# Patient Record
Sex: MALE | Race: WHITE | Employment: OTHER | URBAN - METROPOLITAN AREA
[De-identification: names, ages, dates, MRNs, and addresses within clinical notes are randomized per-mention and may not be internally consistent; named-entity substitution may affect disease eponyms.]

---

## 2017-01-11 DIAGNOSIS — I10 ESSENTIAL HYPERTENSION: ICD-10-CM

## 2017-01-11 RX ORDER — AMLODIPINE BESYLATE 5 MG/1
5 TABLET ORAL DAILY
Qty: 90 TABLET | Refills: 3 | Status: SHIPPED | OUTPATIENT
Start: 2017-01-11 | End: 2018-01-02 | Stop reason: SDUPTHER

## 2017-03-09 RX ORDER — BENAZEPRIL HYDROCHLORIDE 20 MG/1
20 TABLET ORAL DAILY
Qty: 90 TABLET | Refills: 3 | Status: SHIPPED | OUTPATIENT
Start: 2017-03-09 | End: 2018-02-28 | Stop reason: SDUPTHER

## 2017-03-23 ENCOUNTER — OFFICE VISIT (OUTPATIENT)
Dept: FAMILY MEDICINE CLINIC | Age: 67
End: 2017-03-23

## 2017-03-23 VITALS
HEART RATE: 104 BPM | RESPIRATION RATE: 20 BRPM | SYSTOLIC BLOOD PRESSURE: 136 MMHG | BODY MASS INDEX: 25.67 KG/M2 | HEIGHT: 70 IN | WEIGHT: 179.3 LBS | OXYGEN SATURATION: 97 % | DIASTOLIC BLOOD PRESSURE: 82 MMHG

## 2017-03-23 DIAGNOSIS — R29.898 LEFT LEG WEAKNESS: ICD-10-CM

## 2017-03-23 DIAGNOSIS — M48.07 FORAMINAL STENOSIS OF LUMBOSACRAL REGION: ICD-10-CM

## 2017-03-23 DIAGNOSIS — Z72.0 TOBACCO USE: ICD-10-CM

## 2017-03-23 DIAGNOSIS — N40.0 BENIGN NON-NODULAR PROSTATIC HYPERPLASIA WITHOUT LOWER URINARY TRACT SYMPTOMS: ICD-10-CM

## 2017-03-23 DIAGNOSIS — M15.9 PRIMARY OSTEOARTHRITIS INVOLVING MULTIPLE JOINTS: ICD-10-CM

## 2017-03-23 DIAGNOSIS — F10.20 UNCOMPLICATED ALCOHOL DEPENDENCE (HCC): ICD-10-CM

## 2017-03-23 DIAGNOSIS — Z51.81 MEDICATION MONITORING ENCOUNTER: ICD-10-CM

## 2017-03-23 DIAGNOSIS — M54.32 SCIATICA OF LEFT SIDE: ICD-10-CM

## 2017-03-23 DIAGNOSIS — R73.01 IFG (IMPAIRED FASTING GLUCOSE): ICD-10-CM

## 2017-03-23 DIAGNOSIS — E78.00 HYPERCHOLESTEREMIA: ICD-10-CM

## 2017-03-23 DIAGNOSIS — I73.9 PVD (PERIPHERAL VASCULAR DISEASE) (HCC): ICD-10-CM

## 2017-03-23 DIAGNOSIS — I10 ESSENTIAL HYPERTENSION: Primary | ICD-10-CM

## 2017-03-23 PROCEDURE — 99214 OFFICE O/P EST MOD 30 MIN: CPT | Performed by: FAMILY MEDICINE

## 2017-03-23 PROCEDURE — G8427 DOCREV CUR MEDS BY ELIG CLIN: HCPCS | Performed by: FAMILY MEDICINE

## 2017-03-23 PROCEDURE — 4004F PT TOBACCO SCREEN RCVD TLK: CPT | Performed by: FAMILY MEDICINE

## 2017-03-23 PROCEDURE — 3017F COLORECTAL CA SCREEN DOC REV: CPT | Performed by: FAMILY MEDICINE

## 2017-03-23 PROCEDURE — G8484 FLU IMMUNIZE NO ADMIN: HCPCS | Performed by: FAMILY MEDICINE

## 2017-03-23 PROCEDURE — G8420 CALC BMI NORM PARAMETERS: HCPCS | Performed by: FAMILY MEDICINE

## 2017-03-23 PROCEDURE — 4040F PNEUMOC VAC/ADMIN/RCVD: CPT | Performed by: FAMILY MEDICINE

## 2017-03-23 PROCEDURE — 1123F ACP DISCUSS/DSCN MKR DOCD: CPT | Performed by: FAMILY MEDICINE

## 2017-03-23 RX ORDER — ASCORBIC ACID 500 MG
500 TABLET ORAL DAILY
COMMUNITY

## 2017-03-23 RX ORDER — CYCLOBENZAPRINE HCL 10 MG
10 TABLET ORAL 3 TIMES DAILY PRN
COMMUNITY
End: 2017-09-21 | Stop reason: ALTCHOICE

## 2017-03-23 ASSESSMENT — PATIENT HEALTH QUESTIONNAIRE - PHQ9
SUM OF ALL RESPONSES TO PHQ QUESTIONS 1-9: 0
SUM OF ALL RESPONSES TO PHQ9 QUESTIONS 1 & 2: 0
2. FEELING DOWN, DEPRESSED OR HOPELESS: 0
1. LITTLE INTEREST OR PLEASURE IN DOING THINGS: 0

## 2017-03-23 ASSESSMENT — ENCOUNTER SYMPTOMS
BACK PAIN: 1
COUGH: 0
RESPIRATORY NEGATIVE: 1
CHOKING: 0

## 2017-05-01 ENCOUNTER — OFFICE VISIT (OUTPATIENT)
Dept: FAMILY MEDICINE CLINIC | Age: 67
End: 2017-05-01

## 2017-05-01 VITALS
HEIGHT: 70 IN | SYSTOLIC BLOOD PRESSURE: 150 MMHG | HEART RATE: 84 BPM | WEIGHT: 174.8 LBS | BODY MASS INDEX: 25.03 KG/M2 | RESPIRATION RATE: 16 BRPM | DIASTOLIC BLOOD PRESSURE: 78 MMHG

## 2017-05-01 DIAGNOSIS — F10.20 UNCOMPLICATED ALCOHOL DEPENDENCE (HCC): ICD-10-CM

## 2017-05-01 DIAGNOSIS — M48.07 FORAMINAL STENOSIS OF LUMBOSACRAL REGION: ICD-10-CM

## 2017-05-01 DIAGNOSIS — Z01.818 PREOPERATIVE EVALUATION TO RULE OUT SURGICAL CONTRAINDICATION: Primary | ICD-10-CM

## 2017-05-01 DIAGNOSIS — Z72.0 TOBACCO USE: ICD-10-CM

## 2017-05-01 DIAGNOSIS — I10 ESSENTIAL HYPERTENSION: ICD-10-CM

## 2017-05-01 PROCEDURE — 3017F COLORECTAL CA SCREEN DOC REV: CPT | Performed by: FAMILY MEDICINE

## 2017-05-01 PROCEDURE — G8420 CALC BMI NORM PARAMETERS: HCPCS | Performed by: FAMILY MEDICINE

## 2017-05-01 PROCEDURE — 1123F ACP DISCUSS/DSCN MKR DOCD: CPT | Performed by: FAMILY MEDICINE

## 2017-05-01 PROCEDURE — 4004F PT TOBACCO SCREEN RCVD TLK: CPT | Performed by: FAMILY MEDICINE

## 2017-05-01 PROCEDURE — 99214 OFFICE O/P EST MOD 30 MIN: CPT | Performed by: FAMILY MEDICINE

## 2017-05-01 PROCEDURE — G8427 DOCREV CUR MEDS BY ELIG CLIN: HCPCS | Performed by: FAMILY MEDICINE

## 2017-05-01 PROCEDURE — 4040F PNEUMOC VAC/ADMIN/RCVD: CPT | Performed by: FAMILY MEDICINE

## 2017-05-01 ASSESSMENT — ENCOUNTER SYMPTOMS
WHEEZING: 0
SHORTNESS OF BREATH: 0
ALLERGIC/IMMUNOLOGIC NEGATIVE: 1
COUGH: 0
BACK PAIN: 1
RESPIRATORY NEGATIVE: 1
GASTROINTESTINAL NEGATIVE: 1

## 2017-05-02 ENCOUNTER — TELEPHONE (OUTPATIENT)
Dept: FAMILY MEDICINE CLINIC | Age: 67
End: 2017-05-02

## 2017-09-21 ENCOUNTER — OFFICE VISIT (OUTPATIENT)
Dept: FAMILY MEDICINE CLINIC | Age: 67
End: 2017-09-21
Payer: MEDICARE

## 2017-09-21 VITALS
BODY MASS INDEX: 24.28 KG/M2 | RESPIRATION RATE: 13 BRPM | HEIGHT: 70 IN | WEIGHT: 169.6 LBS | DIASTOLIC BLOOD PRESSURE: 78 MMHG | SYSTOLIC BLOOD PRESSURE: 134 MMHG | HEART RATE: 105 BPM | OXYGEN SATURATION: 98 %

## 2017-09-21 DIAGNOSIS — N32.81 OAB (OVERACTIVE BLADDER): ICD-10-CM

## 2017-09-21 DIAGNOSIS — E78.00 HYPERCHOLESTEREMIA: ICD-10-CM

## 2017-09-21 DIAGNOSIS — Z23 NEED FOR VACCINATION WITH 13-POLYVALENT PNEUMOCOCCAL CONJUGATE VACCINE: ICD-10-CM

## 2017-09-21 DIAGNOSIS — M48.07 FORAMINAL STENOSIS OF LUMBOSACRAL REGION: ICD-10-CM

## 2017-09-21 DIAGNOSIS — I10 ESSENTIAL HYPERTENSION: Primary | ICD-10-CM

## 2017-09-21 PROCEDURE — G8420 CALC BMI NORM PARAMETERS: HCPCS | Performed by: FAMILY MEDICINE

## 2017-09-21 PROCEDURE — 4004F PT TOBACCO SCREEN RCVD TLK: CPT | Performed by: FAMILY MEDICINE

## 2017-09-21 PROCEDURE — 90670 PCV13 VACCINE IM: CPT | Performed by: FAMILY MEDICINE

## 2017-09-21 PROCEDURE — G0009 ADMIN PNEUMOCOCCAL VACCINE: HCPCS | Performed by: FAMILY MEDICINE

## 2017-09-21 PROCEDURE — G8427 DOCREV CUR MEDS BY ELIG CLIN: HCPCS | Performed by: FAMILY MEDICINE

## 2017-09-21 PROCEDURE — 99214 OFFICE O/P EST MOD 30 MIN: CPT | Performed by: FAMILY MEDICINE

## 2017-09-21 PROCEDURE — 4040F PNEUMOC VAC/ADMIN/RCVD: CPT | Performed by: FAMILY MEDICINE

## 2017-09-21 PROCEDURE — 3017F COLORECTAL CA SCREEN DOC REV: CPT | Performed by: FAMILY MEDICINE

## 2017-09-21 PROCEDURE — 1123F ACP DISCUSS/DSCN MKR DOCD: CPT | Performed by: FAMILY MEDICINE

## 2017-09-21 RX ORDER — OXYBUTYNIN CHLORIDE 5 MG/1
5 TABLET, EXTENDED RELEASE ORAL DAILY
Qty: 30 TABLET | Refills: 3 | Status: SHIPPED | OUTPATIENT
Start: 2017-09-21 | End: 2017-10-19 | Stop reason: SDUPTHER

## 2017-09-21 ASSESSMENT — ENCOUNTER SYMPTOMS
SHORTNESS OF BREATH: 0
BACK PAIN: 0
RESPIRATORY NEGATIVE: 1
GASTROINTESTINAL NEGATIVE: 1

## 2017-10-19 ENCOUNTER — OFFICE VISIT (OUTPATIENT)
Dept: FAMILY MEDICINE CLINIC | Age: 67
End: 2017-10-19
Payer: MEDICARE

## 2017-10-19 VITALS
TEMPERATURE: 97.5 F | SYSTOLIC BLOOD PRESSURE: 144 MMHG | HEART RATE: 96 BPM | DIASTOLIC BLOOD PRESSURE: 82 MMHG | RESPIRATION RATE: 12 BRPM | WEIGHT: 170.1 LBS | BODY MASS INDEX: 24.35 KG/M2 | HEIGHT: 70 IN

## 2017-10-19 DIAGNOSIS — J40 SINOBRONCHITIS: Primary | ICD-10-CM

## 2017-10-19 DIAGNOSIS — N32.81 OAB (OVERACTIVE BLADDER): ICD-10-CM

## 2017-10-19 DIAGNOSIS — J32.9 SINOBRONCHITIS: Primary | ICD-10-CM

## 2017-10-19 DIAGNOSIS — I10 ESSENTIAL HYPERTENSION: ICD-10-CM

## 2017-10-19 PROCEDURE — 3017F COLORECTAL CA SCREEN DOC REV: CPT | Performed by: FAMILY MEDICINE

## 2017-10-19 PROCEDURE — 1123F ACP DISCUSS/DSCN MKR DOCD: CPT | Performed by: FAMILY MEDICINE

## 2017-10-19 PROCEDURE — G8420 CALC BMI NORM PARAMETERS: HCPCS | Performed by: FAMILY MEDICINE

## 2017-10-19 PROCEDURE — G8427 DOCREV CUR MEDS BY ELIG CLIN: HCPCS | Performed by: FAMILY MEDICINE

## 2017-10-19 PROCEDURE — 99214 OFFICE O/P EST MOD 30 MIN: CPT | Performed by: FAMILY MEDICINE

## 2017-10-19 PROCEDURE — G8484 FLU IMMUNIZE NO ADMIN: HCPCS | Performed by: FAMILY MEDICINE

## 2017-10-19 PROCEDURE — 4040F PNEUMOC VAC/ADMIN/RCVD: CPT | Performed by: FAMILY MEDICINE

## 2017-10-19 PROCEDURE — 4004F PT TOBACCO SCREEN RCVD TLK: CPT | Performed by: FAMILY MEDICINE

## 2017-10-19 RX ORDER — OXYBUTYNIN CHLORIDE 10 MG/1
10 TABLET, EXTENDED RELEASE ORAL DAILY
Qty: 90 TABLET | Refills: 3 | Status: SHIPPED | OUTPATIENT
Start: 2017-10-19 | End: 2019-01-14 | Stop reason: ALTCHOICE

## 2017-10-19 RX ORDER — AZITHROMYCIN 250 MG/1
TABLET, FILM COATED ORAL
Qty: 1 PACKET | Refills: 0 | Status: SHIPPED | OUTPATIENT
Start: 2017-10-19 | End: 2017-10-29

## 2017-10-19 ASSESSMENT — ENCOUNTER SYMPTOMS
GASTROINTESTINAL NEGATIVE: 1
RHINORRHEA: 1
SHORTNESS OF BREATH: 0
COUGH: 1
SINUS PAIN: 1
WHEEZING: 0

## 2018-01-02 DIAGNOSIS — I10 ESSENTIAL HYPERTENSION: ICD-10-CM

## 2018-01-02 RX ORDER — AMLODIPINE BESYLATE 5 MG/1
5 TABLET ORAL DAILY
Qty: 90 TABLET | Refills: 3 | Status: SHIPPED | OUTPATIENT
Start: 2018-01-02 | End: 2018-12-27 | Stop reason: SDUPTHER

## 2018-01-02 NOTE — TELEPHONE ENCOUNTER
Alma Rosa Krishnamurthy called requesting a refill on the following medications:  Requested Prescriptions     Pending Prescriptions Disp Refills    amLODIPine (NORVASC) 5 MG tablet 90 tablet 3     Sig: Take 1 tablet by mouth daily     Pharmacy verified: [facility] [place]  . pv      Date of last visit: 10/19/17  Date of next visit (if applicable): Visit date not found

## 2018-02-28 RX ORDER — BENAZEPRIL HYDROCHLORIDE 20 MG/1
20 TABLET ORAL DAILY
Qty: 90 TABLET | Refills: 3 | Status: SHIPPED | OUTPATIENT
Start: 2018-02-28 | End: 2019-02-25 | Stop reason: SDUPTHER

## 2018-06-07 ENCOUNTER — TELEPHONE (OUTPATIENT)
Dept: FAMILY MEDICINE CLINIC | Age: 68
End: 2018-06-07

## 2018-06-07 DIAGNOSIS — N52.01 ERECTILE DYSFUNCTION DUE TO ARTERIAL INSUFFICIENCY: Primary | ICD-10-CM

## 2018-06-07 RX ORDER — SILDENAFIL 100 MG/1
100 TABLET, FILM COATED ORAL PRN
Qty: 6 TABLET | Refills: 3 | Status: SHIPPED | OUTPATIENT
Start: 2018-06-07 | End: 2019-01-14

## 2018-09-26 PROBLEM — Z01.818 PREOPERATIVE EVALUATION TO RULE OUT SURGICAL CONTRAINDICATION: Status: RESOLVED | Noted: 2017-05-01 | Resolved: 2018-09-26

## 2018-12-27 DIAGNOSIS — I10 ESSENTIAL HYPERTENSION: ICD-10-CM

## 2018-12-27 RX ORDER — AMLODIPINE BESYLATE 5 MG/1
5 TABLET ORAL DAILY
Qty: 90 TABLET | Refills: 3 | Status: SHIPPED | OUTPATIENT
Start: 2018-12-27 | End: 2019-10-31 | Stop reason: SDUPTHER

## 2019-01-14 ENCOUNTER — OFFICE VISIT (OUTPATIENT)
Dept: FAMILY MEDICINE CLINIC | Age: 69
End: 2019-01-14
Payer: MEDICARE

## 2019-01-14 VITALS
TEMPERATURE: 98.4 F | HEART RATE: 90 BPM | BODY MASS INDEX: 24.18 KG/M2 | RESPIRATION RATE: 16 BRPM | DIASTOLIC BLOOD PRESSURE: 70 MMHG | OXYGEN SATURATION: 99 % | WEIGHT: 168.9 LBS | HEIGHT: 70 IN | SYSTOLIC BLOOD PRESSURE: 138 MMHG

## 2019-01-14 DIAGNOSIS — I73.9 PVD (PERIPHERAL VASCULAR DISEASE) (HCC): ICD-10-CM

## 2019-01-14 DIAGNOSIS — M15.9 PRIMARY OSTEOARTHRITIS INVOLVING MULTIPLE JOINTS: ICD-10-CM

## 2019-01-14 DIAGNOSIS — R73.01 IFG (IMPAIRED FASTING GLUCOSE): ICD-10-CM

## 2019-01-14 DIAGNOSIS — Z51.81 MEDICATION MONITORING ENCOUNTER: ICD-10-CM

## 2019-01-14 DIAGNOSIS — Z72.0 TOBACCO USE: ICD-10-CM

## 2019-01-14 DIAGNOSIS — I10 ESSENTIAL HYPERTENSION: Primary | ICD-10-CM

## 2019-01-14 DIAGNOSIS — F10.20 UNCOMPLICATED ALCOHOL DEPENDENCE (HCC): ICD-10-CM

## 2019-01-14 DIAGNOSIS — E78.00 HYPERCHOLESTEREMIA: ICD-10-CM

## 2019-01-14 DIAGNOSIS — M48.07 FORAMINAL STENOSIS OF LUMBOSACRAL REGION: ICD-10-CM

## 2019-01-14 LAB — HBA1C MFR BLD: 5.4 %

## 2019-01-14 PROCEDURE — 99214 OFFICE O/P EST MOD 30 MIN: CPT | Performed by: FAMILY MEDICINE

## 2019-01-14 PROCEDURE — 4040F PNEUMOC VAC/ADMIN/RCVD: CPT | Performed by: FAMILY MEDICINE

## 2019-01-14 PROCEDURE — 1101F PT FALLS ASSESS-DOCD LE1/YR: CPT | Performed by: FAMILY MEDICINE

## 2019-01-14 PROCEDURE — 3017F COLORECTAL CA SCREEN DOC REV: CPT | Performed by: FAMILY MEDICINE

## 2019-01-14 PROCEDURE — 4004F PT TOBACCO SCREEN RCVD TLK: CPT | Performed by: FAMILY MEDICINE

## 2019-01-14 PROCEDURE — G8484 FLU IMMUNIZE NO ADMIN: HCPCS | Performed by: FAMILY MEDICINE

## 2019-01-14 PROCEDURE — 83036 HEMOGLOBIN GLYCOSYLATED A1C: CPT | Performed by: FAMILY MEDICINE

## 2019-01-14 PROCEDURE — 1123F ACP DISCUSS/DSCN MKR DOCD: CPT | Performed by: FAMILY MEDICINE

## 2019-01-14 PROCEDURE — G8427 DOCREV CUR MEDS BY ELIG CLIN: HCPCS | Performed by: FAMILY MEDICINE

## 2019-01-14 PROCEDURE — G8420 CALC BMI NORM PARAMETERS: HCPCS | Performed by: FAMILY MEDICINE

## 2019-01-14 ASSESSMENT — PATIENT HEALTH QUESTIONNAIRE - PHQ9
SUM OF ALL RESPONSES TO PHQ QUESTIONS 1-9: 0
1. LITTLE INTEREST OR PLEASURE IN DOING THINGS: 0
SUM OF ALL RESPONSES TO PHQ QUESTIONS 1-9: 0
2. FEELING DOWN, DEPRESSED OR HOPELESS: 0
SUM OF ALL RESPONSES TO PHQ9 QUESTIONS 1 & 2: 0

## 2019-01-14 ASSESSMENT — ENCOUNTER SYMPTOMS
ALLERGIC/IMMUNOLOGIC NEGATIVE: 1
RESPIRATORY NEGATIVE: 1
SHORTNESS OF BREATH: 0
GASTROINTESTINAL NEGATIVE: 1

## 2019-01-15 LAB
CHOLESTEROL, TOTAL: 176 MG/DL
CHOLESTEROL/HDL RATIO: NORMAL
HDLC SERPL-MCNC: 71 MG/DL (ref 35–70)
LDL CHOLESTEROL CALCULATED: 84 MG/DL (ref 0–160)
TRIGL SERPL-MCNC: 104 MG/DL
VLDLC SERPL CALC-MCNC: NORMAL MG/DL

## 2019-02-25 RX ORDER — BENAZEPRIL HYDROCHLORIDE 20 MG/1
20 TABLET ORAL DAILY
Qty: 90 TABLET | Refills: 3 | Status: SHIPPED | OUTPATIENT
Start: 2019-02-25 | End: 2020-02-24 | Stop reason: SDUPTHER

## 2019-04-29 RX ORDER — METHYLPREDNISOLONE ACETATE 80 MG/ML
80 INJECTION, SUSPENSION INTRA-ARTICULAR; INTRALESIONAL; INTRAMUSCULAR; SOFT TISSUE ONCE
Status: CANCELLED | OUTPATIENT
Start: 2019-04-29 | End: 2019-04-29

## 2019-04-29 RX ORDER — BUPIVACAINE HYDROCHLORIDE 5 MG/ML
1 INJECTION, SOLUTION EPIDURAL; INTRACAUDAL ONCE
Status: CANCELLED | OUTPATIENT
Start: 2019-04-29 | End: 2019-04-29

## 2019-04-30 ENCOUNTER — HOSPITAL ENCOUNTER (OUTPATIENT)
Dept: INTERVENTIONAL RADIOLOGY/VASCULAR | Age: 69
Discharge: HOME | End: 2019-04-30
Payer: MEDICARE

## 2019-04-30 DIAGNOSIS — R52 PAIN: ICD-10-CM

## 2019-04-30 PROCEDURE — 2500000003 HC RX 250 WO HCPCS

## 2019-04-30 PROCEDURE — 6360000004 HC RX CONTRAST MEDICATION: Performed by: RADIOLOGY

## 2019-04-30 PROCEDURE — 6360000002 HC RX W HCPCS

## 2019-04-30 PROCEDURE — 64493 INJ PARAVERT F JNT L/S 1 LEV: CPT | Performed by: RADIOLOGY

## 2019-04-30 PROCEDURE — 2709999900 HC NON-CHARGEABLE SUPPLY

## 2019-04-30 RX ORDER — METHYLPREDNISOLONE ACETATE 80 MG/ML
80 INJECTION, SUSPENSION INTRA-ARTICULAR; INTRALESIONAL; INTRAMUSCULAR; SOFT TISSUE ONCE
Status: COMPLETED | OUTPATIENT
Start: 2019-04-30 | End: 2019-04-30

## 2019-04-30 RX ORDER — BUPIVACAINE HYDROCHLORIDE 5 MG/ML
1 INJECTION, SOLUTION EPIDURAL; INTRACAUDAL ONCE
Status: COMPLETED | OUTPATIENT
Start: 2019-04-30 | End: 2019-04-30

## 2019-04-30 RX ADMIN — BUPIVACAINE HYDROCHLORIDE 1 ML: 5 INJECTION, SOLUTION EPIDURAL; INTRACAUDAL at 15:16

## 2019-04-30 RX ADMIN — BUPIVACAINE HYDROCHLORIDE 1 ML: 5 INJECTION, SOLUTION EPIDURAL; INTRACAUDAL at 15:19

## 2019-04-30 RX ADMIN — IOHEXOL 2 ML: 180 INJECTION INTRAVENOUS at 15:13

## 2019-04-30 RX ADMIN — METHYLPREDNISOLONE ACETATE 80 MG: 80 INJECTION, SUSPENSION INTRA-ARTICULAR; INTRALESIONAL; INTRAMUSCULAR; SOFT TISSUE at 15:19

## 2019-04-30 RX ADMIN — METHYLPREDNISOLONE ACETATE 80 MG: 80 INJECTION, SUSPENSION INTRA-ARTICULAR; INTRALESIONAL; INTRAMUSCULAR; SOFT TISSUE at 15:18

## 2019-04-30 ASSESSMENT — PAIN SCALES - GENERAL
PAINLEVEL_OUTOF10: 4
PAINLEVEL_OUTOF10: 4

## 2019-04-30 NOTE — OP NOTE
Department of Radiology  Post Procedure Progress Note      Pre-Procedure Diagnosis:  Lumbar facet joint arthropathy    Procedure Performed: Facet joint block    Anesthesia: local     Findings: successful    Immediate Complications:  None    Estimated Blood Loss: minimal    SEE DICTATED PROCEDURE NOTE FOR COMPLETE DETAILS.       Dax Otero MD   4/30/2019 3:21 PM

## 2019-04-30 NOTE — H&P
Formulation and discussion of sedation / procedure plans, risks, benefits, side effects and alternatives with patient and/or responsible adult completed.     Electronically signed by Tristan Ramirez MD on 4/30/2019 at 3:21 PM

## 2019-09-18 ENCOUNTER — OFFICE VISIT (OUTPATIENT)
Dept: FAMILY MEDICINE CLINIC | Age: 69
End: 2019-09-18
Payer: MEDICARE

## 2019-09-18 VITALS
TEMPERATURE: 98.1 F | BODY MASS INDEX: 23.54 KG/M2 | HEART RATE: 100 BPM | RESPIRATION RATE: 15 BRPM | HEIGHT: 70 IN | SYSTOLIC BLOOD PRESSURE: 122 MMHG | WEIGHT: 164.4 LBS | DIASTOLIC BLOOD PRESSURE: 78 MMHG | OXYGEN SATURATION: 98 %

## 2019-09-18 DIAGNOSIS — Z00.00 ROUTINE GENERAL MEDICAL EXAMINATION AT A HEALTH CARE FACILITY: Primary | ICD-10-CM

## 2019-09-18 PROCEDURE — 3017F COLORECTAL CA SCREEN DOC REV: CPT | Performed by: FAMILY MEDICINE

## 2019-09-18 PROCEDURE — 1123F ACP DISCUSS/DSCN MKR DOCD: CPT | Performed by: FAMILY MEDICINE

## 2019-09-18 PROCEDURE — 4040F PNEUMOC VAC/ADMIN/RCVD: CPT | Performed by: FAMILY MEDICINE

## 2019-09-18 PROCEDURE — G0438 PPPS, INITIAL VISIT: HCPCS | Performed by: FAMILY MEDICINE

## 2019-09-18 ASSESSMENT — LIFESTYLE VARIABLES
AUDIT-C TOTAL SCORE: 12
HOW OFTEN DURING THE LAST YEAR HAVE YOU FAILED TO DO WHAT WAS NORMALLY EXPECTED FROM YOU BECAUSE OF DRINKING: 0
HOW MANY STANDARD DRINKS CONTAINING ALCOHOL DO YOU HAVE ON A TYPICAL DAY: 4
HOW OFTEN DURING THE LAST YEAR HAVE YOU BEEN UNABLE TO REMEMBER WHAT HAPPENED THE NIGHT BEFORE BECAUSE YOU HAD BEEN DRINKING: 0
HOW OFTEN DURING THE LAST YEAR HAVE YOU FOUND THAT YOU WERE NOT ABLE TO STOP DRINKING ONCE YOU HAD STARTED: 0
HAVE YOU OR SOMEONE ELSE BEEN INJURED AS A RESULT OF YOUR DRINKING: 0
HOW OFTEN DURING THE LAST YEAR HAVE YOU NEEDED AN ALCOHOLIC DRINK FIRST THING IN THE MORNING TO GET YOURSELF GOING AFTER A NIGHT OF HEAVY DRINKING: 0
HOW OFTEN DO YOU HAVE A DRINK CONTAINING ALCOHOL: 4
HAS A RELATIVE, FRIEND, DOCTOR, OR ANOTHER HEALTH PROFESSIONAL EXPRESSED CONCERN ABOUT YOUR DRINKING OR SUGGESTED YOU CUT DOWN: 0
AUDIT TOTAL SCORE: 12
HOW OFTEN DURING THE LAST YEAR HAVE YOU HAD A FEELING OF GUILT OR REMORSE AFTER DRINKING: 0
HOW OFTEN DO YOU HAVE SIX OR MORE DRINKS ON ONE OCCASION: 4

## 2019-09-18 ASSESSMENT — PATIENT HEALTH QUESTIONNAIRE - PHQ9
SUM OF ALL RESPONSES TO PHQ QUESTIONS 1-9: 0
SUM OF ALL RESPONSES TO PHQ QUESTIONS 1-9: 0

## 2019-09-18 NOTE — PROGRESS NOTES
past year?: (!) No  Body mass index is 23.59 kg/m². Health Habits/Nutrition Interventions:  · none. Safety:  Safety  Do you have working smoke detectors?: Yes  Have all throw rugs been removed or fastened?: (!) No  Do you have non-slip mats or surfaces in all bathtubs/showers?: Yes  Do all of your stairways have a railing or banister?: Yes  Are your doorways, halls and stairs free of clutter?: Yes  Do you always fasten your seatbelt when you are in a car?: Yes  Safety Interventions:  · none. Personalized Preventive Plan   Current Health Maintenance Status  Immunization History   Administered Date(s) Administered    Pneumococcal Conjugate 13-valent ([place]) 09/21/2017        Health Maintenance   Topic Date Due    AAA screen  1950    Hepatitis C screen  1950    DTaP/Tdap/Td vaccine (1 - Tdap) 09/20/1968    Shingles Vaccine (1 of 2) 09/20/1999    Low dose CT lung screening  09/20/2004    Potassium monitoring  10/11/2014    Creatinine monitoring  10/11/2014    Pneumococcal 65+ years Vaccine (2 of 2 - PPSV23) 09/21/2018    Annual Wellness Visit (AWV)  05/29/2019    Flu vaccine (1) 09/01/2019    A1C test (Diabetic or Prediabetic)  01/14/2020    Lipid screen  01/15/2024    Colon cancer screen colonoscopy  02/25/2025     Recommendations for Preventive Services Due: see orders and patient instructions/AVS.  . Recommended screening schedule for the next 5-10 years is provided to the patient in written form: see Patient Instructions/AVS.    There are no diagnoses linked to this encounter.

## 2019-10-31 DIAGNOSIS — I10 ESSENTIAL HYPERTENSION: ICD-10-CM

## 2019-10-31 RX ORDER — AMLODIPINE BESYLATE 5 MG/1
5 TABLET ORAL DAILY
Qty: 90 TABLET | Refills: 3 | Status: SHIPPED | OUTPATIENT
Start: 2019-10-31 | End: 2020-11-02 | Stop reason: SDUPTHER

## 2020-02-21 NOTE — TELEPHONE ENCOUNTER
Breanna Rock called requesting a refill on the following medications:  Requested Prescriptions     Pending Prescriptions Disp Refills    benazepril (LOTENSIN) 20 MG tablet 90 tablet 3     Sig: Take 1 tablet by mouth daily     Pharmacy verified: [facility] on [address] [facility]  .       Date of last visit: 9/18/19  Date of next visit (if applicable): 7/94/5128

## 2020-02-24 RX ORDER — BENAZEPRIL HYDROCHLORIDE 20 MG/1
20 TABLET ORAL DAILY
Qty: 90 TABLET | Refills: 3 | Status: SHIPPED | OUTPATIENT
Start: 2020-02-24 | End: 2021-02-18 | Stop reason: SDUPTHER

## 2020-09-23 ENCOUNTER — OFFICE VISIT (OUTPATIENT)
Dept: FAMILY MEDICINE CLINIC | Age: 70
End: 2020-09-23
Payer: MEDICARE

## 2020-09-23 VITALS
RESPIRATION RATE: 20 BRPM | WEIGHT: 161.5 LBS | HEIGHT: 70 IN | TEMPERATURE: 96.4 F | SYSTOLIC BLOOD PRESSURE: 144 MMHG | DIASTOLIC BLOOD PRESSURE: 84 MMHG | HEART RATE: 112 BPM | BODY MASS INDEX: 23.12 KG/M2

## 2020-09-23 LAB — HBA1C MFR BLD: 4.9 %

## 2020-09-23 PROCEDURE — G8420 CALC BMI NORM PARAMETERS: HCPCS | Performed by: FAMILY MEDICINE

## 2020-09-23 PROCEDURE — 99214 OFFICE O/P EST MOD 30 MIN: CPT | Performed by: FAMILY MEDICINE

## 2020-09-23 PROCEDURE — 3017F COLORECTAL CA SCREEN DOC REV: CPT | Performed by: FAMILY MEDICINE

## 2020-09-23 PROCEDURE — 4004F PT TOBACCO SCREEN RCVD TLK: CPT | Performed by: FAMILY MEDICINE

## 2020-09-23 PROCEDURE — G0439 PPPS, SUBSEQ VISIT: HCPCS | Performed by: FAMILY MEDICINE

## 2020-09-23 PROCEDURE — 4040F PNEUMOC VAC/ADMIN/RCVD: CPT | Performed by: FAMILY MEDICINE

## 2020-09-23 PROCEDURE — 83036 HEMOGLOBIN GLYCOSYLATED A1C: CPT | Performed by: FAMILY MEDICINE

## 2020-09-23 PROCEDURE — 1123F ACP DISCUSS/DSCN MKR DOCD: CPT | Performed by: FAMILY MEDICINE

## 2020-09-23 PROCEDURE — G8427 DOCREV CUR MEDS BY ELIG CLIN: HCPCS | Performed by: FAMILY MEDICINE

## 2020-09-23 RX ORDER — HYDROCODONE BITARTRATE AND ACETAMINOPHEN 5; 325 MG/1; MG/1
1 TABLET ORAL EVERY 8 HOURS PRN
Qty: 21 TABLET | Refills: 0 | Status: SHIPPED | OUTPATIENT
Start: 2020-09-23 | End: 2020-10-02 | Stop reason: SDUPTHER

## 2020-09-23 RX ORDER — HYDROCODONE BITARTRATE AND ACETAMINOPHEN 5; 325 MG/1; MG/1
1 TABLET ORAL EVERY 8 HOURS PRN
Qty: 90 TABLET | Refills: 0 | Status: SHIPPED | OUTPATIENT
Start: 2020-09-23 | End: 2020-09-23 | Stop reason: SDUPTHER

## 2020-09-23 SDOH — ECONOMIC STABILITY: FOOD INSECURITY: WITHIN THE PAST 12 MONTHS, YOU WORRIED THAT YOUR FOOD WOULD RUN OUT BEFORE YOU GOT MONEY TO BUY MORE.: NEVER TRUE

## 2020-09-23 SDOH — ECONOMIC STABILITY: TRANSPORTATION INSECURITY
IN THE PAST 12 MONTHS, HAS THE LACK OF TRANSPORTATION KEPT YOU FROM MEDICAL APPOINTMENTS OR FROM GETTING MEDICATIONS?: NO

## 2020-09-23 SDOH — ECONOMIC STABILITY: FOOD INSECURITY: WITHIN THE PAST 12 MONTHS, THE FOOD YOU BOUGHT JUST DIDN'T LAST AND YOU DIDN'T HAVE MONEY TO GET MORE.: NEVER TRUE

## 2020-09-23 SDOH — ECONOMIC STABILITY: INCOME INSECURITY: HOW HARD IS IT FOR YOU TO PAY FOR THE VERY BASICS LIKE FOOD, HOUSING, MEDICAL CARE, AND HEATING?: NOT HARD AT ALL

## 2020-09-23 SDOH — ECONOMIC STABILITY: TRANSPORTATION INSECURITY
IN THE PAST 12 MONTHS, HAS LACK OF TRANSPORTATION KEPT YOU FROM MEETINGS, WORK, OR FROM GETTING THINGS NEEDED FOR DAILY LIVING?: NO

## 2020-09-23 ASSESSMENT — LIFESTYLE VARIABLES
HOW OFTEN DO YOU HAVE A DRINK CONTAINING ALCOHOL: 4
HOW OFTEN DURING THE LAST YEAR HAVE YOU HAD A FEELING OF GUILT OR REMORSE AFTER DRINKING: 0
AUDIT TOTAL SCORE: 11
HOW OFTEN DURING THE LAST YEAR HAVE YOU NEEDED AN ALCOHOLIC DRINK FIRST THING IN THE MORNING TO GET YOURSELF GOING AFTER A NIGHT OF HEAVY DRINKING: 0
HAS A RELATIVE, FRIEND, DOCTOR, OR ANOTHER HEALTH PROFESSIONAL EXPRESSED CONCERN ABOUT YOUR DRINKING OR SUGGESTED YOU CUT DOWN: 0
HOW OFTEN DO YOU HAVE SIX OR MORE DRINKS ON ONE OCCASION: 4
HAVE YOU OR SOMEONE ELSE BEEN INJURED AS A RESULT OF YOUR DRINKING: 0
HOW MANY STANDARD DRINKS CONTAINING ALCOHOL DO YOU HAVE ON A TYPICAL DAY: 3
HOW OFTEN DURING THE LAST YEAR HAVE YOU FAILED TO DO WHAT WAS NORMALLY EXPECTED FROM YOU BECAUSE OF DRINKING: 0
HOW OFTEN DURING THE LAST YEAR HAVE YOU BEEN UNABLE TO REMEMBER WHAT HAPPENED THE NIGHT BEFORE BECAUSE YOU HAD BEEN DRINKING: 0
HOW OFTEN DURING THE LAST YEAR HAVE YOU FOUND THAT YOU WERE NOT ABLE TO STOP DRINKING ONCE YOU HAD STARTED: 0
AUDIT-C TOTAL SCORE: 11

## 2020-09-23 ASSESSMENT — PATIENT HEALTH QUESTIONNAIRE - PHQ9
SUM OF ALL RESPONSES TO PHQ QUESTIONS 1-9: 0
2. FEELING DOWN, DEPRESSED OR HOPELESS: 0
SUM OF ALL RESPONSES TO PHQ9 QUESTIONS 1 & 2: 0
SUM OF ALL RESPONSES TO PHQ QUESTIONS 1-9: 0
1. LITTLE INTEREST OR PLEASURE IN DOING THINGS: 0

## 2020-09-23 ASSESSMENT — ENCOUNTER SYMPTOMS: RESPIRATORY NEGATIVE: 1

## 2020-09-23 NOTE — TELEPHONE ENCOUNTER
Received a VM from [facility] DR NURY BARGER stating that they received a new script for Norco 5-325 mg 90 tablets. They stated that since the patient has never had this medication before they will only pay for 1 week supply and they he will need a new script for the remaining after the 1 week.   ESSENCE

## 2020-09-23 NOTE — PROGRESS NOTES
Chronic Disease Visit Information    BP Readings from Last 3 Encounters:   09/18/19 122/78   01/14/19 138/70   10/19/17 (!) 144/82          Hemoglobin A1C (%)   Date Value   01/14/2019 5.4   08/28/2015 5.2   08/18/2014 5.6     LDL Calculated (mg/dL)   Date Value   01/15/2019 84     HDL   Date Value   01/15/2019 71 mg/dL (A)   02/21/2012 68 mg/dl     BUN (mg/dl)   Date Value   10/11/2013 10     CREATININE (mg/dl)   Date Value   10/11/2013 0.9     Glucose (mg/dl)   Date Value   10/11/2013 121 (H)            Have you changed or started any medications since your last visit including any over-the-counter medicines, vitamins, or herbal medicines? no   Are you having any side effects from any of your medications? -  no  Have you stopped taking any of your medications? Is so, why? -  no    Have you seen any other physician or provider since your last visit? Yes - Records Obtained  Have you had any other diagnostic tests since your last visit? Yes - Records Obtained  Have you been seen in the emergency room and/or had an admission to a hospital since we last saw you? No  Have you had your annual diabetic retinal (eye) exam? Yes - Records Requested  Have you had your routine dental cleaning in the past 6 months? no    Have you activated your [employer] account? If not, what are your barriers?  No: pt choice     Patient Care Team:  Roger Mcclain MD as PCP - General (Family Medicine)  Roger Mcclain MD as PCP - [address] [place], [place] [address]afshan Gómez Provider         Medical History Review  Past Medical, Family, and Social History reviewed and does contribute to the patient presenting condition    Health Maintenance   Topic Date Due    AAA screen  1950    Hepatitis C screen  1950    DTaP/Tdap/Td vaccine (1 - Tdap) 09/20/1968    Shingles Vaccine (1 of 2) 09/20/1999    Low dose CT lung screening  09/20/2004    Potassium monitoring  10/11/2014    Creatinine monitoring  10/11/2014    Pneumococcal 65+ years Vaccine (2 of 2 - PPSV23) 09/21/2018    Annual Wellness Visit (AWV)  05/29/2019    A1C test (Diabetic or Prediabetic)  01/14/2020    Flu vaccine (1) 09/01/2020    Lipid screen  01/15/2024    Colon cancer screen colonoscopy  03/03/2030    Hepatitis A vaccine  Aged Out    Hepatitis B vaccine  Aged Out    Hib vaccine  Aged Out    Meningococcal (ACWY) vaccine  Aged Out       Tobacco Use Visit Information:    Have you changed or started any medications since your last visit including any over-the-counter medicines, vitamins, or herbal medicines? no   Are you having any side effects from any of your medications? -  no  Have you stopped taking any of your medications? Is so, why? -  no    Have you seen any other physician or provider since your last visit? Yes - Records Obtained  Have you had any other diagnostic tests since your last visit? Yes - Records Obtained  Have you been seen in the emergency room and/or had an admission to a hospital since we last saw you? No  Have you had your routine dental cleaning in the past 6 months? no    Have you activated your [employer] account? If not, what are your barriers?  No: pt choice     Patient Care Team:  Elin Garzon MD as PCP - General (Family Medicine)  E[facility] Garzon [facility]    Current Tobacco Use    Social History     Tobacco Use   Smoking Status Current Every Day Smoker    Packs/day: 1.00    Years: 40.00    Pack years: 40.00    Types: Cigarettes, Pipe   Smokeless Tobacco Never Used     Ready to quit: No  Counseling given: Yes     Are you motivated to quit? - no  If yes, have you set a date to quite? - no    Have you tried any anti-smoking aids in the past? -  yes - Chantix     1-800-QUIT-NOW (2-722.457.1482)     Medical History Review  Past Medical, Family, and Social History reviewed and does contribute to the patient presenting condition    Health Maintenance   Topic Date Due    AAA screen  1950    Hepatitis C screen  1950    DTaP/Tdap/Td vaccine (1 - Tdap) 09/20/1968    Shingles Vaccine (1 of 2) 09/20/1999    Low dose CT lung screening  09/20/2004    Potassium monitoring  10/11/2014    Creatinine monitoring  10/11/2014    Pneumococcal 65+ years Vaccine (2 of 2 - PPSV23) 09/21/2018    Annual Wellness Visit (AWV)  05/29/2019    A1C test (Diabetic or Prediabetic)  01/14/2020    Flu vaccine (1) 09/01/2020    Lipid screen  01/15/2024    Colon cancer screen colonoscopy  03/03/2030    Hepatitis A vaccine  Aged Out    Hepatitis B vaccine  Aged Out    Hib vaccine  Aged Out    Meningococcal (ACWY) vaccine  Aged Out

## 2020-09-23 NOTE — PROGRESS NOTES
recording program.  Efforts were made to edit the dictations but occasionally words are mis-transcribed. Review of Systems   Constitutional: Negative. Respiratory: Negative. Cardiovascular: Negative. Endocrine: Negative. Musculoskeletal: Positive for arthralgias. See HPI. Neurological: Negative. Negative for dizziness, light-headedness and headaches. Psychiatric/Behavioral: Positive for sleep disturbance. Negative for agitation, behavioral problems, confusion, decreased concentration, dysphoric mood, hallucinations and suicidal ideas. All other systems reviewed and are negative. Objective:   Physical Exam  Vitals signs and nursing note reviewed. Constitutional:       Appearance: He is normal weight. Eyes:      Extraocular Movements: Extraocular movements intact. Conjunctiva/sclera: Conjunctivae normal.      Pupils: Pupils are equal, round, and reactive to light. Neck:      Vascular: No carotid bruit. Cardiovascular:      Rate and Rhythm: Normal rate and regular rhythm. Pulses: Normal pulses. Heart sounds: Normal heart sounds. Pulmonary:      Effort: Pulmonary effort is normal.      Breath sounds: Normal breath sounds. Abdominal:      General: Bowel sounds are normal.   Musculoskeletal:      Left shoulder: He exhibits decreased range of motion, crepitus and pain. He exhibits no spasm. Arms:       Right lower leg: No edema. Left lower leg: No edema. Skin:     General: Skin is warm and dry. Capillary Refill: Capillary refill takes 2 to 3 seconds. Neurological:      General: No focal deficit present. Mental Status: He is alert and oriented to person, place, and time. Coordination: Coordination normal.      Gait: Gait normal.   Psychiatric:         Mood and Affect: Mood normal.         Assessment:       Diagnosis Orders   1. Routine general medical examination at a health care facility     2.  Traumatic complete tear of left rotator cuff, subsequent encounter  HYDROcodone-acetaminophen (NORCO) 5-325 MG per tablet    DISCONTINUED: HYDROcodone-acetaminophen (NORCO) 5-325 MG per tablet   3. IFG (impaired fasting glucose)  POCT glycosylated hemoglobin (Hb A1C)   4. Uncomplicated alcohol dependence ([facility] [address] 8-10 beers daily. Plan:      Orders Placed This Encounter   Procedures    POCT glycosylated hemoglobin (Hb A1C)     Medications Discontinued During This Encounter   Medication Reason    HYDROcodone-acetaminophen (NORCO) 5-325 MG per tablet REORDER     Current Outpatient Medications   Medication Sig Dispense Refill    HYDROcodone-acetaminophen (NORCO) 5-325 MG per tablet Take 1 tablet by mouth every 8 hours as needed for Pain for up to 7 days. Intended supply: 7 days. Take lowest dose possible to manage pain 21 tablet 0    benazepril (LOTENSIN) 20 MG tablet Take 1 tablet by mouth daily 90 tablet 3    amLODIPine (NORVASC) 5 MG tablet Take 1 tablet by mouth daily 90 tablet 3    Ascorbic Acid (VITAMIN C) 500 MG tablet Take 500 mg by mouth daily      Multiple Vitamin (MULTI VITAMIN PO) Take by mouth      Acetaminophen-Aspirin Buffered (EXCEDRIN BACK & BODY PO) Take by mouth 3 times daily as needed       No current facility-administered medications for this visit. Use norco as needed for pain relief. Follow thru with ortho for possible shoulder repair. Discussed use, benefit, and side effects of prescribed medications. Barriers to compliance discussed. All patient questions answered. Pt voiced understanding. Rick Jaeger Annual Wellness Visit  Name: Marsha Carrasco Date: 2020   MRN: [id number] Sex: Male   Age: 69 y.o.  Ethnicity: Non-/Non    : 1950 Race: Roby Holter is here for Medicare AWV and 6 Month Follow-Up (HTN, lipid)    Screenings for behavioral, psychosocial and functional/safety risks, and cognitive dysfunction are all negative except as indicated below. These results, as well as other patient data from the [address] form, are documented in Flowsheets linked to this Encounter. Allergies   Allergen Reactions    Pcn [Penicillins]          Prior to Visit Medications    Medication Sig Taking? Authorizing Provider   HYDROcodone-acetaminophen (NORCO) 5-325 MG per tablet Take 1 tablet by mouth every 8 hours as needed for Pain for up to 7 days. Intended supply: 7 days.  Take lowest dose possible to manage pain Yes Ana Gibbons MD   benazepril (LOTENSIN) 20 MG tablet Take 1 tablet by mouth daily Yes Ana Gibbons MD   amLODIPine (NORVASC) 5 MG tablet Take 1 tablet by mouth daily Yes Ana Gibbons MD   Ascorbic Acid (VITAMIN C) 500 MG tablet Take 500 mg by mouth daily Yes Historical Provider, MD   Multiple Vitamin (MULTI VITAMIN PO) Take by mouth Yes Historical Provider, MD   Acetaminophen-Aspirin Buffered ([address] PO) Take by mouth 3 times daily as needed  Historical Provider, MD         Past Medical History:   Diagnosis Date    BPH (benign prostatic hyperplasia)     Chronic back pain     Erectile dysfunction     Hypertension     Osteoarthritis     Personal history of colonic polyps 2006    PVD (peripheral vascular disease) [facility])        Past Surgical History:   Procedure Laterality Date    CHOLECYSTECTOMY  09/2006    COLONOSCOPY  [id number]   [facility] History   Problem Relation Age of Onset    Cancer Mother         Skin Cancer    High Blood Pressure Mother     High Blood Pressure Father        CareTeam (Including outside providers/suppliers regularly involved in providing care):   Patient Care Team:  Ana Gibbons MD as PCP - General (Family Medicine)  Ana Gibbons MD as PCP - [facility] Empaneled Provider    Wt Readings from Last 3 Encounters:   09/23/20 161 lb 8 oz (73.3 kg)   09/18/19 164 lb 6.4 oz (74.6 kg)   01/14/19 168 lb 14.4 oz (76.6 kg)     Vitals: Date(s) Administered    Pneumococcal Conjugate 13-valent (Zohra Kurtz) 09/21/2017        Health Maintenance   Topic Date Due    AAA screen  1950    Hepatitis C screen  1950    DTaP/Tdap/Td vaccine (1 - Tdap) 09/20/1968    Shingles Vaccine (1 of 2) 09/20/1999    Low dose CT lung screening  09/20/2004    Potassium monitoring  10/11/2014    Creatinine monitoring  10/11/2014    Pneumococcal 65+ years Vaccine (2 of 2 - PPSV23) 09/21/2018    Annual Wellness Visit (AWV)  05/29/2019    A1C test (Diabetic or Prediabetic)  01/14/2020    Flu vaccine (1) 09/01/2020    Lipid screen  01/15/2024    Colon cancer screen colonoscopy  03/03/2030    Hepatitis A vaccine  Aged Out    Hepatitis B vaccine  Aged Out    Hib vaccine  Aged Out    Meningococcal (ACWY) vaccine  Aged Out     Recommendations for [employer] [place] [employer] Due: see orders and patient instructions/AVS.  . Recommended screening schedule for the next 5-10 years is provided to the patient in written form: see Patient Irena Stevens was seen today for medicare awv and 6 month follow-up. Diagnoses and all orders for this visit:    Routine general medical examination at a health care facility    Traumatic complete tear of left rotator cuff, subsequent encounter  -     Discontinue: HYDROcodone-acetaminophen (NORCO) 5-325 MG per tablet; Take 1 tablet by mouth every 8 hours as needed for Pain for up to 30 days. Intended supply: 7 days. Take lowest dose possible to manage pain  -     HYDROcodone-acetaminophen (NORCO) 5-325 MG per tablet; Take 1 tablet by mouth every 8 hours as needed for Pain for up to 7 days. Intended supply: 7 days. Take lowest dose possible to manage pain    IFG (impaired fasting glucose)  -     POCT glycosylated hemoglobin (Hb C9T)    Uncomplicated alcohol dependence (HCC), 8-10 beers daily.

## 2020-09-23 NOTE — PATIENT INSTRUCTIONS
You may receive a survey regarding the care you received during your visit. Your input is valuable to us. We encourage you to complete and return your survey. We hope you will choose us in the future for your healthcare needs. Personalized Preventive Plan for Angela Sky - 9/23/2020  Medicare offers a range of preventive health benefits. Some of the tests and screenings are paid in full while other may be subject to a deductible, co-insurance, and/or copay. Some of these benefits include a comprehensive review of your medical history including lifestyle, illnesses that may run in your family, and various assessments and screenings as appropriate. After reviewing your medical record and screening and assessments performed today your provider may have ordered immunizations, labs, imaging, and/or referrals for you. A list of these orders (if applicable) as well as your Preventive Care list are included within your After Visit Summary for your review. Other Preventive Recommendations:    · A preventive eye exam performed by an eye specialist is recommended every 1-2 years to screen for glaucoma; cataracts, macular degeneration, and other eye disorders. · A preventive dental visit is recommended every 6 months. · Try to get at least 150 minutes of exercise per week or 10,000 steps per day on a pedometer . · Order or download the FREE \"Exercise & Physical Activity: Your Everyday Guide\" from [employer]. Call 4-563.448.4043 or search [employer] online. · You need 8513-1530 mg of calcium and 3780-7970 IU of vitamin D per day. It is possible to meet your calcium requirement with diet alone, but a vitamin D supplement is usually necessary to meet this goal.  · When exposed to the sun, use a sunscreen that protects against both UVA and UVB radiation with an SPF of 30 or greater.  Reapply every 2 to 3 hours or after sweating, drying off with a towel, or

## 2020-10-01 ENCOUNTER — TELEPHONE (OUTPATIENT)
Dept: FAMILY MEDICINE CLINIC | Age: 70
End: 2020-10-01

## 2020-10-02 RX ORDER — HYDROCODONE BITARTRATE AND ACETAMINOPHEN 5; 325 MG/1; MG/1
1 TABLET ORAL EVERY 8 HOURS PRN
Qty: 21 TABLET | Refills: 0 | Status: SHIPPED | OUTPATIENT
Start: 2020-10-02 | End: 2020-10-14 | Stop reason: SDUPTHER

## 2020-10-14 RX ORDER — HYDROCODONE BITARTRATE AND ACETAMINOPHEN 5; 325 MG/1; MG/1
1 TABLET ORAL EVERY 8 HOURS PRN
Qty: 21 TABLET | Refills: 0 | Status: SHIPPED | OUTPATIENT
Start: 2020-10-14 | End: 2020-10-26 | Stop reason: SDUPTHER

## 2020-10-14 NOTE — TELEPHONE ENCOUNTER
Fabio Goodrich called requesting a refill on the following medications:  Requested Prescriptions     Pending Prescriptions Disp Refills    HYDROcodone-acetaminophen (NORCO) 5-325 MG per tablet 21 tablet 0     Sig: Take 1 tablet by mouth every 8 hours as needed for Pain for up to 7 days. Intended supply: 7 days.  Take lowest dose possible to manage pain     [facility]armacy [facility]    Date of last visit: 09/23/20  Date of next visit (if applicable): 5/12/9373

## 2020-10-26 ENCOUNTER — TELEPHONE (OUTPATIENT)
Dept: FAMILY MEDICINE CLINIC | Age: 70
End: 2020-10-26

## 2020-10-26 RX ORDER — HYDROCODONE BITARTRATE AND ACETAMINOPHEN 5; 325 MG/1; MG/1
1 TABLET ORAL EVERY 8 HOURS PRN
Qty: 21 TABLET | Refills: 0 | Status: SHIPPED | OUTPATIENT
Start: 2020-10-26 | End: 2021-01-05 | Stop reason: SDUPTHER

## 2020-10-26 NOTE — TELEPHONE ENCOUNTER
Pt is calling to request a refill for NORCO 5-325 MG but this medication did not load for refill request. Pt states he only has one day left.

## 2020-11-02 RX ORDER — AMLODIPINE BESYLATE 5 MG/1
5 TABLET ORAL DAILY
Qty: 90 TABLET | Refills: 3 | Status: SHIPPED | OUTPATIENT
Start: 2020-11-02 | End: 2021-11-02 | Stop reason: SDUPTHER

## 2020-11-02 NOTE — TELEPHONE ENCOUNTER
amLODIPine (NORVASC) 5 MG tablet     [place] wants [place] refill, pref pharm [facility] [address]

## 2021-01-05 DIAGNOSIS — S32.040S COMPRESSION FRACTURE OF L4 VERTEBRA, SEQUELA: ICD-10-CM

## 2021-01-05 DIAGNOSIS — S46.012D TRAUMATIC COMPLETE TEAR OF LEFT ROTATOR CUFF, SUBSEQUENT ENCOUNTER: ICD-10-CM

## 2021-01-05 RX ORDER — HYDROCODONE BITARTRATE AND ACETAMINOPHEN 5; 325 MG/1; MG/1
1 TABLET ORAL EVERY 8 HOURS PRN
Qty: 21 TABLET | Refills: 0 | Status: SHIPPED | OUTPATIENT
Start: 2021-01-05 | End: 2021-01-12

## 2021-01-29 ENCOUNTER — OFFICE VISIT (OUTPATIENT)
Dept: FAMILY MEDICINE CLINIC | Age: 71
End: 2021-01-29
Payer: MEDICARE

## 2021-01-29 VITALS
OXYGEN SATURATION: 98 % | RESPIRATION RATE: 20 BRPM | TEMPERATURE: 97 F | WEIGHT: 163.6 LBS | SYSTOLIC BLOOD PRESSURE: 138 MMHG | DIASTOLIC BLOOD PRESSURE: 80 MMHG | HEART RATE: 87 BPM | BODY MASS INDEX: 23.47 KG/M2

## 2021-01-29 DIAGNOSIS — F10.20 UNCOMPLICATED ALCOHOL DEPENDENCE (HCC): ICD-10-CM

## 2021-01-29 DIAGNOSIS — E78.00 HYPERCHOLESTEREMIA: ICD-10-CM

## 2021-01-29 DIAGNOSIS — S46.012D TRAUMATIC ROTATOR CUFF TEAR, LEFT, SUBSEQUENT ENCOUNTER: ICD-10-CM

## 2021-01-29 DIAGNOSIS — R73.01 IFG (IMPAIRED FASTING GLUCOSE): ICD-10-CM

## 2021-01-29 DIAGNOSIS — I10 ESSENTIAL HYPERTENSION: ICD-10-CM

## 2021-01-29 DIAGNOSIS — Z01.818 PRE-OP EVALUATION: Primary | ICD-10-CM

## 2021-01-29 DIAGNOSIS — I73.9 PVD (PERIPHERAL VASCULAR DISEASE) (HCC): ICD-10-CM

## 2021-01-29 DIAGNOSIS — Z72.0 TOBACCO USE: ICD-10-CM

## 2021-01-29 PROCEDURE — 4040F PNEUMOC VAC/ADMIN/RCVD: CPT | Performed by: FAMILY MEDICINE

## 2021-01-29 PROCEDURE — 3017F COLORECTAL CA SCREEN DOC REV: CPT | Performed by: FAMILY MEDICINE

## 2021-01-29 PROCEDURE — 1036F TOBACCO NON-USER: CPT | Performed by: FAMILY MEDICINE

## 2021-01-29 PROCEDURE — G8484 FLU IMMUNIZE NO ADMIN: HCPCS | Performed by: FAMILY MEDICINE

## 2021-01-29 PROCEDURE — G8427 DOCREV CUR MEDS BY ELIG CLIN: HCPCS | Performed by: FAMILY MEDICINE

## 2021-01-29 PROCEDURE — G8420 CALC BMI NORM PARAMETERS: HCPCS | Performed by: FAMILY MEDICINE

## 2021-01-29 PROCEDURE — 99214 OFFICE O/P EST MOD 30 MIN: CPT | Performed by: FAMILY MEDICINE

## 2021-01-29 PROCEDURE — 1123F ACP DISCUSS/DSCN MKR DOCD: CPT | Performed by: FAMILY MEDICINE

## 2021-01-29 ASSESSMENT — PATIENT HEALTH QUESTIONNAIRE - PHQ9
1. LITTLE INTEREST OR PLEASURE IN DOING THINGS: 0
SUM OF ALL RESPONSES TO PHQ QUESTIONS 1-9: 0
SUM OF ALL RESPONSES TO PHQ QUESTIONS 1-9: 0
2. FEELING DOWN, DEPRESSED OR HOPELESS: 0

## 2021-01-29 NOTE — PROGRESS NOTES
2021    Noelle Zaldivar (:  1950) is a 69 y.o. male, here for a preventive medicine evaluation. Chief Complaint   Patient presents with   Nas Suarez 2021 left shoulder surgery-pre-op testing done in media      Pre-op evaluation. Scheduled for left RC repair with Dr. Drew Suarez on 21. Pt denies CP, chest tightness, SOB. Able to perform 4 METs with no cardiac symptoms. No recent stress or echo. Long hx of smoking, recently quit to have the surgery. BPs controlled. BP Readings from Last 3 Encounters:   21 138/80   20 (!) 144/84   19 122/78     Pt denies hx of general anesthesia complications. Patient Active Problem List   Diagnosis    HTN (hypertension)    Hypercholesteremia    PVD (peripheral vascular disease) ([facility] [address]    Low back pain    ED (erectile dysfunction)    OA (osteoarthritis), multiple joints    BPH (benign prostatic hyperplasia)    Medication monitoring encounter    Tobacco use    IFG (impaired fasting glucose)    Compression fracture of L4 lumbar vertebra, age indeterminate.  Foraminal stenosis of lumbosacral region. L3-4 to L5-S1 due to herniated disc.  Left leg weakness    Uncomplicated alcohol dependence (HCC), 8-10 beers daily.  OAB (overactive bladder)       Review of Systems   Constitutional: Negative. HENT: Negative. Respiratory: Negative. Cardiovascular: Negative. Gastrointestinal: Negative. Musculoskeletal: Positive for arthralgias (left shoulder pain). All other systems reviewed and are negative. Prior to Visit Medications    Medication Sig Taking?  Authorizing Provider   amLODIPine (NORVASC) 5 MG tablet Take 1 tablet by mouth daily Yes Juan Corley MD   benazepril (LOTENSIN) 20 MG tablet Take 1 tablet by mouth daily Yes Juan Corley MD   Ascorbic Acid (VITAMIN C) 500 MG tablet Take 500 mg by mouth daily Yes Historical Provider, MD   Multiple Vitamin (MULTI VITAMIN PO) Take by mouth Yes Historical Provider, MD   Acetaminophen-Aspirin Buffered ([address] [facility] PO) Take by mouth 3 times daily as needed Yes Historical Provider, MD        Allergies   Allergen Reactions    Pcn [Penicillins]        Past Medical History:   Diagnosis Date    BPH (benign prostatic hyperplasia)     Chronic back pain     Erectile dysfunction     Hypertension     Osteoarthritis     Personal history of colonic polyps     PVD (peripheral vascular disease) [facility])        Past Surgical History:   Procedure Laterality Date    CHOLECYSTECTOMY  2006    COLONOSCOPY  [id number]   [address] [facility]      Dr. Josselin Vu at [address] [place] [address] Marital status:      Spouse name: Jocelyn Toussaint Number of children: 3    Years of education: 6    Highest education level: GED or equivalent   Occupational History    Not on file   Social Needs    Financial resource strain: Not hard at all   [employer] insecurity     Worry: Never true     Inability: Never true   [employer] needs     Medical: No     Non-medical: No   Tobacco Use    Smoking status: Former Smoker     Packs/day: 1.00     Years: 40.00     Pack years: 40.00     Types: Cigarettes, Pipe     Quit date: 2021     Years since quittin.0    Smokeless tobacco: Never Used   Substance and Sexual Activity    Alcohol use:  Yes     Alcohol/week: 0.0 standard drinks     Comment: daily beer    Drug use: No    Sexual activity: Not on file   Lifestyle    Physical activity     Days per week: Not on file     Minutes per session: Not on file    Stress: Not on file   Relationships    Social connections     Talks on phone: Not on file     Gets together: Not on file     Attends Yazidism service: Not on file     Active member of club or organization: Not on file     Attends meetings of clubs or organizations: Not on file     Relationship status: Not on file    Intimate partner violence     Fear of current or ex partner: Not on file     Emotionally abused: Not on file     Physically abused: Not on file     Forced sexual activity: Not on file   Other Topics Concern    Not on file   Social History Narrative    Not on file        Family History   Problem Relation Age of Onset    Cancer Mother         Skin Cancer    High Blood Pressure Mother     High Blood Pressure Father        ADVANCE DIRECTIVE: N, <no information>    Vitals:    01/29/21 1102   BP: 138/80   Pulse: 87   Resp: 20   Temp: 97 °F (36.1 °C)   TempSrc: Skin   SpO2: 98%   Weight: 163 lb 9.6 oz (74.2 kg)     Estimated body mass index is 23.47 kg/m² as calculated from the following:    Height as of 9/23/20: 5' 10\" (1.778 m). Weight as of this encounter: 163 lb 9.6 oz (74.2 kg). Physical Exam  Vitals signs and nursing note reviewed. Constitutional:       General: He is not in acute distress. Appearance: Normal appearance. He is well-developed. HENT:      Head: Normocephalic and atraumatic. Right Ear: Tympanic membrane normal.      Left Ear: Tympanic membrane normal.   Eyes:      Conjunctiva/sclera: Conjunctivae normal.   Neck:      Musculoskeletal: Neck supple. Cardiovascular:      Rate and Rhythm: Normal rate and regular rhythm. Heart sounds: Normal heart sounds. No murmur. Pulmonary:      Effort: Pulmonary effort is normal.      Breath sounds: Normal breath sounds. No wheezing, rhonchi or rales. Abdominal:      General: There is no distension. Skin:     General: Skin is warm and dry. Findings: No rash (on exposed surfaces). Neurological:      General: No focal deficit present. Mental Status: He is alert. Psychiatric:         Attention and Perception: Attention normal.         Mood and Affect: Mood normal.         Speech: Speech normal.         Behavior: Behavior normal. Behavior is cooperative. Thought Content: Thought content normal.         Judgment: Judgment normal.         No flowsheet data found.     Lab Results acceptable risk for surgery, copy of this note will be sent to Dr. Dominga Mosqueda for RTO as needed. An electronic signature was used to authenticate this note.     --Do Bui DO on 1/30/2021 at 9:56 AM

## 2021-01-29 NOTE — PROGRESS NOTES
Visit Information    Have you changed or started any medications since your last visit including any over-the-counter medicines, vitamins, or herbal medicines? no   Are you having any side effects from any of your medications? -  no  Have you stopped taking any of your medications? Is so, why? -  no    Have you seen any other physician or provider since your last visit? Yes - Records Obtained  Have you had any other diagnostic tests since your last visit? Yes - Records Obtained  Have you been seen in the emergency room and/or had an admission to a hospital since we last saw you? No  Have you had your routine dental cleaning in the past 6 months? na    Have you activated your [employer] account? If not, what are your barriers?  No:      Patient Care Team:  Fabrizio Peacock DO as PCP - General (Family Medicine)  Fabrizio Peacock DO as PCP - [facility] Provider    Medical History Review  Past Medical, Family, and Social History reviewed and does contribute to the patient presenting condition    Health Maintenance   Topic Date Due    AAA screen  1950    Hepatitis C screen  1950    DTaP/Tdap/Td vaccine (1 - Tdap) 09/20/1968    Shingles Vaccine (1 of 2) 09/20/1999    Low dose CT lung screening  09/20/2004    Potassium monitoring  10/11/2014    Creatinine monitoring  10/11/2014    Pneumococcal 65+ years Vaccine (2 of 2 - PPSV23) 09/21/2018    Flu vaccine (1) 09/01/2020    A1C test (Diabetic or Prediabetic)  09/23/2021    Annual Wellness Visit (AWV)  09/24/2021    Lipid screen  01/15/2024    Colon cancer screen colonoscopy  03/03/2030    Hepatitis A vaccine  Aged Out    Hepatitis B vaccine  Aged Out    Hib vaccine  Aged Out    Meningococcal (ACWY) vaccine  Aged Out

## 2021-01-30 ASSESSMENT — ENCOUNTER SYMPTOMS
RESPIRATORY NEGATIVE: 1
GASTROINTESTINAL NEGATIVE: 1

## 2021-02-18 RX ORDER — BENAZEPRIL HYDROCHLORIDE 20 MG/1
20 TABLET ORAL DAILY
Qty: 90 TABLET | Refills: 3 | Status: SHIPPED | OUTPATIENT
Start: 2021-02-18 | End: 2022-01-06

## 2021-02-18 NOTE — TELEPHONE ENCOUNTER
Caitlyn Tripathi called requesting a refill on the following medications:  Requested Prescriptions     Pending Prescriptions Disp Refills    benazepril (LOTENSIN) 20 MG tablet 90 tablet 3     Sig: Take 1 tablet by mouth daily     Pharmacy verified:  [facility]      Date of last visit: 1/29/21  Date of next visit (if applicable): 5/54/9768

## 2021-03-23 ENCOUNTER — OFFICE VISIT (OUTPATIENT)
Dept: FAMILY MEDICINE CLINIC | Age: 71
End: 2021-03-23
Payer: MEDICARE

## 2021-03-23 VITALS
HEIGHT: 70 IN | TEMPERATURE: 96 F | BODY MASS INDEX: 23.34 KG/M2 | OXYGEN SATURATION: 99 % | RESPIRATION RATE: 20 BRPM | DIASTOLIC BLOOD PRESSURE: 76 MMHG | HEART RATE: 105 BPM | SYSTOLIC BLOOD PRESSURE: 128 MMHG | WEIGHT: 163 LBS

## 2021-03-23 DIAGNOSIS — E78.00 HYPERCHOLESTEREMIA: ICD-10-CM

## 2021-03-23 DIAGNOSIS — I10 ESSENTIAL HYPERTENSION: Primary | ICD-10-CM

## 2021-03-23 DIAGNOSIS — Z72.0 TOBACCO USE: ICD-10-CM

## 2021-03-23 DIAGNOSIS — R73.01 IFG (IMPAIRED FASTING GLUCOSE): ICD-10-CM

## 2021-03-23 PROCEDURE — 3017F COLORECTAL CA SCREEN DOC REV: CPT | Performed by: FAMILY MEDICINE

## 2021-03-23 PROCEDURE — 99214 OFFICE O/P EST MOD 30 MIN: CPT | Performed by: FAMILY MEDICINE

## 2021-03-23 PROCEDURE — 1036F TOBACCO NON-USER: CPT | Performed by: FAMILY MEDICINE

## 2021-03-23 PROCEDURE — 4040F PNEUMOC VAC/ADMIN/RCVD: CPT | Performed by: FAMILY MEDICINE

## 2021-03-23 PROCEDURE — 1123F ACP DISCUSS/DSCN MKR DOCD: CPT | Performed by: FAMILY MEDICINE

## 2021-03-23 PROCEDURE — G8420 CALC BMI NORM PARAMETERS: HCPCS | Performed by: FAMILY MEDICINE

## 2021-03-23 PROCEDURE — G8484 FLU IMMUNIZE NO ADMIN: HCPCS | Performed by: FAMILY MEDICINE

## 2021-03-23 PROCEDURE — G8427 DOCREV CUR MEDS BY ELIG CLIN: HCPCS | Performed by: FAMILY MEDICINE

## 2021-03-23 RX ORDER — OXYCODONE HYDROCHLORIDE AND ACETAMINOPHEN 5; 325 MG/1; MG/1
1 TABLET ORAL EVERY 4 HOURS PRN
COMMUNITY
End: 2022-02-21

## 2021-03-23 NOTE — PROGRESS NOTES
3/23/2021    Cherelle Alexander (:  1950) is a 69 y.o. male, here for a preventive medicine evaluation. Chief Complaint   Patient presents with    6 Month Follow-Up     6 month eval.  Doing well overall. S/p RC repair left, surgery went well. In PT now. BPs and weight stable. BP Readings from Last 3 Encounters:   21 128/76   21 138/80   20 (!) 144/84     Wt Readings from Last 3 Encounters:   21 163 lb (73.9 kg)   21 163 lb 9.6 oz (74.2 kg)   20 161 lb 8 oz (73.3 kg)     He is no longer smoking. Patient Active Problem List   Diagnosis    HTN (hypertension)    Hypercholesteremia    PVD [facility]peripheral vascular disease) ([facility] [address]    Low back pain    ED (erectile dysfunction)    OA (osteoarthritis), multiple joints    BPH (benign prostatic hyperplasia)    Medication monitoring encounter    Tobacco use    IFG (impaired fasting glucose)    Compression fracture of L4 lumbar vertebra, age indeterminate.  Foraminal stenosis of lumbosacral region. L3-4 to L5-S1 due to herniated disc.  Left leg weakness    Uncomplicated alcohol dependence (HCC), 8-10 beers daily.  OAB (overactive bladder)       Review of Systems   Constitutional: Negative. HENT: Negative. Respiratory: Negative. Cardiovascular: Negative. Gastrointestinal: Negative. Musculoskeletal: Negative. All other systems reviewed and are negative. Prior to Visit Medications    Medication Sig Taking? Authorizing Provider   oxyCODONE-acetaminophen (PERCOCET) 5-325 MG per tablet Take 1 tablet by mouth every 4 hours as needed for Pain.  Yes Historical Provider, MD   benazepril (LOTENSIN) 20 MG tablet Take 1 tablet by mouth daily Yes Fabrizio Peacock DO   amLODIPine (NORVASC) 5 MG tablet Take 1 tablet by mouth daily Yes Kelsie Crespo MD   Ascorbic Acid (VITAMIN C) 500 MG tablet Take 500 mg by mouth daily Yes Historical Provider, MD   Multiple Vitamin (MULTI VITAMIN PO) Take by mouth Yes Historical Provider, MD   Acetaminophen-Aspirin Buffered ([address]  PO) Take by mouth 3 times daily as needed Yes Historical Provider, MD        Allergies   Allergen Reactions    Pcn [Penicillins]        Past Medical History:   Diagnosis Date    BPH (benign prostatic hyperplasia)     Chronic back pain     Erectile dysfunction     Hypertension     Osteoarthritis     Personal history of colonic polyps     PVD [facility]peripheral vascular disease) [facility])        Past Surgical History:   Procedure Laterality Date    CHOLECYSTECTOMY  2006    COLONOSCOPY  [id number]   [facility]      Dr. Jigna Cornejo at [address] [place] [address]  2021       Social History     Socioeconomic History    Marital status:      Spouse name: Martin Mcgowan Number of children: 3    Years of education: 6    Highest education level: GED or equivalent   Occupational History    Not on file   Social Needs    Financial resource strain: Not hard at all   [employer] insecurity     Worry: Never true     Inability: Never true   [employer] needs     Medical: No     Non-medical: No   Tobacco Use    Smoking status: Former Smoker     Packs/day: 1.00     Years: 40.00     Pack years: 40.00     Types: Cigarettes, Pipe     Quit date: 2021     Years since quittin.1    Smokeless tobacco: Never Used   Substance and Sexual Activity    Alcohol use:  Yes     Alcohol/week: 0.0 standard drinks     Comment: daily beer    Drug use: No    Sexual activity: Not on file   Lifestyle    Physical activity     Days per week: Not on file     Minutes per session: Not on file    Stress: Not on file   Relationships    Social connections     Talks on phone: Not on file     Gets together: Not on file     Attends Gnosticism service: Not on file     Active member of club or organization: Not on file     Attends meetings of clubs or organizations: Not on file     Relationship status: Not on file    Intimate partner violence Fear of current or ex partner: Not on file     Emotionally abused: Not on file     Physically abused: Not on file     Forced sexual activity: Not on file   Other Topics Concern    Not on file   Social History Narrative    Not on file        Family History   Problem Relation Age of Onset    Cancer Mother         Skin Cancer    High Blood Pressure Mother     High Blood Pressure Father        ADVANCE DIRECTIVE: N, <no information>    Vitals:    03/23/21 1424   BP: 128/76   Site: Right Upper Arm   Position: Sitting   Cuff Size: Large Adult   Pulse: 105   Resp: 20   Temp: 96 °F (35.6 °C)   TempSrc: Temporal   SpO2: 99%   Weight: 163 lb (73.9 kg)   Height: 5' 10\" (1.778 m)     Estimated body mass index is 23.39 kg/m² as calculated from the following:    Height as of this encounter: 5' 10\" (1.778 m). Weight as of this encounter: 163 lb (73.9 kg). Physical Exam  Vitals signs and nursing note reviewed. Constitutional:       General: He is not in acute distress. Appearance: Normal appearance. He is well-developed. HENT:      Head: Normocephalic and atraumatic. Right Ear: Tympanic membrane normal.      Left Ear: Tympanic membrane normal.   Eyes:      Conjunctiva/sclera: Conjunctivae normal.   Neck:      Musculoskeletal: Neck supple. Cardiovascular:      Rate and Rhythm: Normal rate and regular rhythm. Heart sounds: Normal heart sounds. No murmur. Pulmonary:      Effort: Pulmonary effort is normal.      Breath sounds: Normal breath sounds. No wheezing, rhonchi or rales. Abdominal:      General: There is no distension. Skin:     General: Skin is warm and dry. Findings: No rash (on exposed surfaces). Neurological:      General: No focal deficit present. Mental Status: He is alert. Psychiatric:         Attention and Perception: Attention normal.         Mood and Affect: Mood normal.         Speech: Speech normal.         Behavior: Behavior normal. Behavior is cooperative. Thought Content: Thought content normal.         Judgment: Judgment normal.         No flowsheet data found. Lab Results   Component Value Date    CHOL 176 01/15/2019    CHOL 169 08/28/2015    CHOL 215 08/18/2014    TRIG 104 01/15/2019    TRIG 52 08/28/2015    TRIG 200 08/18/2014    HDL 71 01/15/2019    HDL 70 08/28/2015    HDL 86 08/18/2014    HDL 68 02/21/2012    LDLCALC 84 01/15/2019    LDLCALC 89 08/28/2015    LDLCALC 89 08/18/2014    GLUCOSE 121 10/11/2013    LABA1C 4.9 09/23/2020    LABA1C 5.4 01/14/2019    LABA1C 5.2 08/28/2015       The 10-year ASCVD risk score (Ju Shafer et al., 2013) is: 18.3%    Values used to calculate the score:      Age: 69 years      Sex: Male      Is Non- : No      Diabetic: No      Tobacco smoker: No      Systolic Blood Pressure: 528 mmHg      Is BP treated: Yes      HDL Cholesterol: 71 mg/dL      Total Cholesterol: 176 mg/dL    Immunization History   Administered Date(s) Administered    Pneumococcal Conjugate 13-valent (Coretha Conroe) 09/21/2017       Health Maintenance   Topic Date Due    AAA screen  Never done    Hepatitis C screen  Never done    DTaP/Tdap/Td vaccine (1 - Tdap) Never done    Shingles Vaccine (1 of 2) Never done    Low dose CT lung screening  Never done    Potassium monitoring  10/11/2014    Creatinine monitoring  10/11/2014    Pneumococcal 65+ years Vaccine (2 of 2 - PPSV23) 09/21/2018    Flu vaccine (1) Never done    COVID-19 Vaccine (2 - Moderna 2-dose series) 04/16/2021    A1C test (Diabetic or Prediabetic)  09/23/2021    Annual Wellness Visit (AWV)  09/24/2021    Lipid screen  01/15/2024    Colon cancer screen colonoscopy  03/03/2030    Hepatitis A vaccine  Aged Out    Hepatitis B vaccine  Aged Out    Hib vaccine  Aged Out    Meningococcal (ACWY) vaccine  Aged Out       ASSESSMENT/PLAN:  1. Essential hypertension  -     Comprehensive Metabolic Panel; Future  -     CBC Auto Differential; Future  2. Hypercholesteremia  -     Lipid Panel w/ Reflex Direct LDL; Future  -     Comprehensive Metabolic Panel; Future  -     TSH with Reflex; Future  3. Tobacco use  4. IFG (impaired fasting glucose)  -     Comprehensive Metabolic Panel; Future  -     Hemoglobin A1C; Future    -  Chronic medical problems stable  -  Continue current medications  -  Follow up with specialists as scheduled  -  Preventive care declined at this time  -  Check labs 6 mos    Return in about 6 months (around 9/23/2021) for HTN. An electronic signature was used to authenticate this note.     --Mirlande Navarrete,  on 3/24/2021 at 8:08 AM

## 2021-03-24 ASSESSMENT — ENCOUNTER SYMPTOMS
GASTROINTESTINAL NEGATIVE: 1
RESPIRATORY NEGATIVE: 1

## 2021-07-14 DIAGNOSIS — S32.040S COMPRESSION FRACTURE OF L4 VERTEBRA, SEQUELA: ICD-10-CM

## 2021-07-14 DIAGNOSIS — S46.012D TRAUMATIC COMPLETE TEAR OF LEFT ROTATOR CUFF, SUBSEQUENT ENCOUNTER: ICD-10-CM

## 2021-07-14 RX ORDER — HYDROCODONE BITARTRATE AND ACETAMINOPHEN 5; 325 MG/1; MG/1
1 TABLET ORAL EVERY 8 HOURS PRN
Qty: 21 TABLET | Refills: 0 | OUTPATIENT
Start: 2021-07-14 | End: 2021-07-21

## 2021-07-14 NOTE — TELEPHONE ENCOUNTER
Pt called office requesting a refill of his Lanexa to [address] [place] [address]. If no call back, he will check with the pharmacy after 4pm. Refill if appropriate.

## 2021-11-02 DIAGNOSIS — I10 ESSENTIAL HYPERTENSION: ICD-10-CM

## 2021-11-02 RX ORDER — AMLODIPINE BESYLATE 5 MG/1
5 TABLET ORAL DAILY
Qty: 90 TABLET | Refills: 1 | Status: SHIPPED | OUTPATIENT
Start: 2021-11-02 | End: 2022-02-21 | Stop reason: SDUPTHER

## 2021-11-02 NOTE — TELEPHONE ENCOUNTER
----- Message from Madill sent at 11/2/2021 12:42 PM EDT -----  Subject: Refill Request    QUESTIONS  Name of Medication? amLODIPine (NORVASC) 5 MG tablet  Patient-reported dosage and instructions? 5 MG tablet, Once daily   How many days do you have left? 20  Preferred Pharmacy? [id number] Pharmacy phone number (if available)? [phone]  ---------------------------------------------------------------------------  --------------  CALL BACK INFO  What is the best way for the office to contact you? OK to leave message on   voicemail  Preferred Call Back Phone Number?  [phone]

## 2022-02-21 ENCOUNTER — OFFICE VISIT (OUTPATIENT)
Dept: FAMILY MEDICINE CLINIC | Age: 72
End: 2022-02-21
Payer: MEDICARE

## 2022-02-21 VITALS
RESPIRATION RATE: 18 BRPM | HEIGHT: 70 IN | BODY MASS INDEX: 21.26 KG/M2 | HEART RATE: 100 BPM | WEIGHT: 148.5 LBS | SYSTOLIC BLOOD PRESSURE: 132 MMHG | DIASTOLIC BLOOD PRESSURE: 76 MMHG

## 2022-02-21 DIAGNOSIS — R73.01 IFG (IMPAIRED FASTING GLUCOSE): ICD-10-CM

## 2022-02-21 DIAGNOSIS — Z00.00 MEDICARE ANNUAL WELLNESS VISIT, SUBSEQUENT: Primary | ICD-10-CM

## 2022-02-21 DIAGNOSIS — Z72.0 TOBACCO USE: ICD-10-CM

## 2022-02-21 DIAGNOSIS — R39.11 URINARY HESITANCY: ICD-10-CM

## 2022-02-21 DIAGNOSIS — N40.0 BENIGN PROSTATIC HYPERPLASIA, UNSPECIFIED WHETHER LOWER URINARY TRACT SYMPTOMS PRESENT: ICD-10-CM

## 2022-02-21 DIAGNOSIS — I10 HYPERTENSION, UNSPECIFIED TYPE: ICD-10-CM

## 2022-02-21 DIAGNOSIS — F10.20 UNCOMPLICATED ALCOHOL DEPENDENCE (HCC): ICD-10-CM

## 2022-02-21 DIAGNOSIS — K62.5 RECTAL BLEEDING: ICD-10-CM

## 2022-02-21 DIAGNOSIS — E78.00 HYPERCHOLESTEREMIA: ICD-10-CM

## 2022-02-21 DIAGNOSIS — I73.9 PVD (PERIPHERAL VASCULAR DISEASE) (HCC): ICD-10-CM

## 2022-02-21 DIAGNOSIS — R63.4 UNINTENDED WEIGHT LOSS: ICD-10-CM

## 2022-02-21 PROCEDURE — G8484 FLU IMMUNIZE NO ADMIN: HCPCS | Performed by: FAMILY MEDICINE

## 2022-02-21 PROCEDURE — G8420 CALC BMI NORM PARAMETERS: HCPCS | Performed by: FAMILY MEDICINE

## 2022-02-21 PROCEDURE — 99214 OFFICE O/P EST MOD 30 MIN: CPT | Performed by: FAMILY MEDICINE

## 2022-02-21 PROCEDURE — 4004F PT TOBACCO SCREEN RCVD TLK: CPT | Performed by: FAMILY MEDICINE

## 2022-02-21 PROCEDURE — G0439 PPPS, SUBSEQ VISIT: HCPCS | Performed by: FAMILY MEDICINE

## 2022-02-21 PROCEDURE — 3017F COLORECTAL CA SCREEN DOC REV: CPT | Performed by: FAMILY MEDICINE

## 2022-02-21 PROCEDURE — G8427 DOCREV CUR MEDS BY ELIG CLIN: HCPCS | Performed by: FAMILY MEDICINE

## 2022-02-21 PROCEDURE — 4040F PNEUMOC VAC/ADMIN/RCVD: CPT | Performed by: FAMILY MEDICINE

## 2022-02-21 PROCEDURE — 1123F ACP DISCUSS/DSCN MKR DOCD: CPT | Performed by: FAMILY MEDICINE

## 2022-02-21 RX ORDER — BENAZEPRIL HYDROCHLORIDE 20 MG/1
TABLET ORAL
Qty: 90 TABLET | Refills: 3 | Status: SHIPPED | OUTPATIENT
Start: 2022-02-21 | End: 2022-08-22 | Stop reason: SDUPTHER

## 2022-02-21 RX ORDER — AMLODIPINE BESYLATE 5 MG/1
5 TABLET ORAL DAILY
Qty: 90 TABLET | Refills: 3 | Status: SHIPPED | OUTPATIENT
Start: 2022-02-21 | End: 2022-08-22 | Stop reason: SDUPTHER

## 2022-02-21 SDOH — ECONOMIC STABILITY: FOOD INSECURITY: WITHIN THE PAST 12 MONTHS, THE FOOD YOU BOUGHT JUST DIDN'T LAST AND YOU DIDN'T HAVE MONEY TO GET MORE.: NEVER TRUE

## 2022-02-21 SDOH — ECONOMIC STABILITY: FOOD INSECURITY: WITHIN THE PAST 12 MONTHS, YOU WORRIED THAT YOUR FOOD WOULD RUN OUT BEFORE YOU GOT MONEY TO BUY MORE.: NEVER TRUE

## 2022-02-21 ASSESSMENT — LIFESTYLE VARIABLES
HOW OFTEN DURING THE LAST YEAR HAVE YOU NEEDED AN ALCOHOLIC DRINK FIRST THING IN THE MORNING TO GET YOURSELF GOING AFTER A NIGHT OF HEAVY DRINKING: 0
HOW OFTEN DURING THE LAST YEAR HAVE YOU FAILED TO DO WHAT WAS NORMALLY EXPECTED FROM YOU BECAUSE OF DRINKING: 0
HAVE YOU OR SOMEONE ELSE BEEN INJURED AS A RESULT OF YOUR DRINKING: 0
HAS A RELATIVE, FRIEND, DOCTOR, OR ANOTHER HEALTH PROFESSIONAL EXPRESSED CONCERN ABOUT YOUR DRINKING OR SUGGESTED YOU CUT DOWN: 0
HOW OFTEN DURING THE LAST YEAR HAVE YOU FOUND THAT YOU WERE NOT ABLE TO STOP DRINKING ONCE YOU HAD STARTED: 0
HOW MANY STANDARD DRINKS CONTAINING ALCOHOL DO YOU HAVE ON A TYPICAL DAY: 7 TO 9
HOW OFTEN DURING THE LAST YEAR HAVE YOU BEEN UNABLE TO REMEMBER WHAT HAPPENED THE NIGHT BEFORE BECAUSE YOU HAD BEEN DRINKING: 0
HOW OFTEN DO YOU HAVE A DRINK CONTAINING ALCOHOL: 4 OR MORE TIMES A WEEK
HOW OFTEN DURING THE LAST YEAR HAVE YOU HAD A FEELING OF GUILT OR REMORSE AFTER DRINKING: 0

## 2022-02-21 ASSESSMENT — ENCOUNTER SYMPTOMS
RESPIRATORY NEGATIVE: 1
BLOOD IN STOOL: 1
ABDOMINAL PAIN: 0

## 2022-02-21 ASSESSMENT — SOCIAL DETERMINANTS OF HEALTH (SDOH): HOW HARD IS IT FOR YOU TO PAY FOR THE VERY BASICS LIKE FOOD, HOUSING, MEDICAL CARE, AND HEATING?: NOT HARD AT ALL

## 2022-02-21 NOTE — PROGRESS NOTES
(LOTENSIN) 20 MG tablet TAKE 1 TABLET BY MOUTH EVERY DAY Yes Tapan Amaya DO   amLODIPine (NORVASC) 5 MG tablet Take 1 tablet by mouth daily Yes Tapan Amaya DO   Ascorbic Acid (VITAMIN C) 500 MG tablet Take 500 mg by mouth daily Yes Historical Provider, MD   Multiple Vitamin (MULTI VITAMIN PO) Take by mouth Yes Historical Provider, MD   Acetaminophen-Aspirin Buffered ([address] PO) Take by mouth 3 times daily as needed Yes Historical Provider, MD        Allergies   Allergen Reactions    Pcn [Penicillins]        Past Medical History:   Diagnosis Date    BPH (benign prostatic hyperplasia)     Chronic back pain     Erectile dysfunction     Hypertension     Osteoarthritis     Personal history of colonic polyps 2006    PVD (peripheral vascular disease) [facility])        Past Surgical History:   Procedure Laterality Date    CHOLECYSTECTOMY  09/2006    COLONOSCOPY  [id number]   [facility]      Dr. No Tsai at [address] [place] [address]  02/04/2021       Social History     Socioeconomic History    Marital status:      Spouse name: Radha Renteria Number of children: 3    Years of education: 6    Highest education level: GED or equivalent   Occupational History    Not on file   Tobacco Use    Smoking status: Current Every Day Smoker     Packs/day: 1.00     Years: 40.00     Pack years: 40.00     Types: Cigarettes, Pipe    Smokeless tobacco: Never Used   Vaping Use    Vaping Use: Never used   Substance and Sexual Activity    Alcohol use:  Yes     Alcohol/week: 0.0 standard drinks     Comment: daily beer    Drug use: No    Sexual activity: Not on file   Other Topics Concern    Not on file   Social History Narrative    Not on file     Social Determinants of Health     Financial Resource Strain: Low Risk     Difficulty of Paying Living Expenses: Not hard at all   Food Insecurity: No Food Insecurity    Worried About [facility] in the Last Year: Never true    Ran Out of Food in the Last Year: Never true   Transportation Needs:     Lack of Transportation (Medical): Not on file    Lack of Transportation (Non-Medical): Not on file   Physical Activity: Inactive    Days of Exercise per Week: 0 days    Minutes of Exercise per Session: 0 min   Stress:     Feeling of Stress : Not on file   Social Connections:     Frequency of Communication with Friends and Family: Not on file    Frequency of Social Gatherings with Friends and Family: Not on file    Attends [employer] Services: Not on file    Active Member of Clubs or Organizations: Not on file    Attends Club or Organization Meetings: Not on file    Marital Status: Not on file   Intimate Partner Violence:     Fear of Current or Ex-Partner: Not on file    Emotionally Abused: Not on file    Physically Abused: Not on file    Sexually Abused: Not on file   Housing Stability:     Unable to Pay for Housing in the Last Year: Not on file    Number of [place] in the Last Year: Not on file    Unstable Housing in the Last Year: Not on file        Family History   Problem Relation Age of Onset    Cancer Mother         Skin Cancer    High Blood Pressure Mother     High Blood Pressure Father        ADVANCE DIRECTIVE: N, <no information>    Vitals:    02/21/22 1402   BP: 132/76   Site: Right Upper Arm   Position: Sitting   Cuff Size: Medium Adult   Pulse: 100   Resp: 18   Weight: 148 lb 8 oz (67.4 kg)   Height: 5' 10\" (1.778 m)     Estimated body mass index is 21.31 kg/m² as calculated from the following:    Height as of this encounter: 5' 10\" (1.778 m). Weight as of this encounter: 148 lb 8 oz (67.4 kg). Physical Exam  Vitals and nursing note reviewed. Constitutional:       General: He is not in acute distress. Appearance: Normal appearance. He is well-developed. HENT:      Head: Normocephalic and atraumatic.       Right Ear: Tympanic membrane normal.      Left Ear: Tympanic membrane normal.   Eyes: Conjunctiva/sclera: Conjunctivae normal.   Cardiovascular:      Rate and Rhythm: Normal rate and regular rhythm. Heart sounds: Normal heart sounds. No murmur heard. Pulmonary:      Effort: Pulmonary effort is normal.      Breath sounds: Normal breath sounds. No wheezing, rhonchi or rales. Abdominal:      General: There is no distension. Musculoskeletal:      Cervical back: Neck supple. Skin:     General: Skin is warm and dry. Findings: No rash (on exposed surfaces). Neurological:      General: No focal deficit present. Mental Status: He is alert. Psychiatric:         Attention and Perception: Attention normal.         Mood and Affect: Mood normal.         Speech: Speech normal.         Behavior: Behavior normal. Behavior is cooperative. Thought Content: Thought content normal.         Judgment: Judgment normal.         No flowsheet data found.     Lab Results   Component Value Date    CHOL 176 01/15/2019    CHOL 169 08/28/2015    CHOL 215 08/18/2014    TRIG 104 01/15/2019    TRIG 52 08/28/2015    TRIG 200 08/18/2014    HDL 71 01/15/2019    HDL 70 08/28/2015    HDL 86 08/18/2014    HDL 68 02/21/2012    LDLCALC 84 01/15/2019    LDLCALC 89 08/28/2015    LDLCALC 89 08/18/2014    GLUCOSE 121 10/11/2013    LABA1C 4.9 09/23/2020    LABA1C 5.4 01/14/2019    LABA1C 5.2 08/28/2015       The ASCVD Risk score (Catalina Chaudhary, et al., 2013) failed to calculate for the following reasons:    Cannot find a previous HDL lab    Cannot find a previous total cholesterol lab    Immunization History   Administered Date(s) Administered    COVID-19, Nicole Mendoza, Primary or Immunocompromised, PF, 100mcg/0.5mL 03/19/2021, 04/16/2021    Pneumococcal Conjugate 13-valent (Clemon Anes) 09/21/2017       Health Maintenance   Topic Date Due    Hepatitis C screen  Never done    Depression Screen  Never done    DTaP/Tdap/Td vaccine (1 - Tdap) Never done    Shingles Vaccine (1 of 2) Never done    Low dose CT lung screening  Never done    AAA screen  Never done    Potassium monitoring  10/11/2014    Creatinine monitoring  10/11/2014    Pneumococcal 65+ years Vaccine (2 of 2 - PPSV23) 09/21/2018    Flu vaccine (1) Never done    COVID-19 Vaccine (3 - Booster for Moderna series) 09/16/2021    A1C test (Diabetic or Prediabetic)  09/23/2021    Annual Wellness Visit (AWV)  09/24/2021    Lipid screen  01/15/2024    Colorectal Cancer Screen  03/03/2030    Hepatitis A vaccine  Aged Out    Hepatitis B vaccine  Aged Out    Hib vaccine  Aged Out    Meningococcal (ACWY) vaccine  Aged Out       Assessment & Plan   Medicare annual wellness visit, subsequent  Rectal bleeding  Unintended weight loss  Urinary hesitancy  -     Urinalysis with Reflex to Culture; Future  Hypertension, unspecified type  -     CBC with Auto Differential; Future  Hypercholesteremia  -     Lipid Panel w/ Reflex Direct LDL; Future  -     Comprehensive Metabolic Panel; Future  -     TSH with Reflex; Future  Uncomplicated alcohol dependence ([address] 8-10 beers daily. Tobacco use  Benign prostatic hyperplasia, unspecified whether lower urinary tract symptoms present  IFG (impaired fasting glucose)  -     Comprehensive Metabolic Panel; Future  -     Hemoglobin A1C; Future  PVD (peripheral vascular disease) (HCC)    -  Chronic medical problems stable  -  Continue current medications  -  Check labs, will call  -  Monitor wt closely, last CRS 2020  -  GI and urologic symptoms resolved at this time  -  Encouraged to limit ETOH  -  Declines all other preventive care at this time    Return in 6 months (on 8/21/2022) for HTN.         --Tapan Amaya, DO    Medicare Annual Wellness Visit    Aly Lee is here for Medicare AWV, Health Maintenance (needs a pneumonia vaccine), and Medication Refill    Assessment & Plan   Leslee Councilman was seen today for medicare awv, health maintenance and medication refill.     Diagnoses and all orders for this visit:    Medicare annual wellness visit, subsequent    Rectal bleeding    Unintended weight loss    Urinary hesitancy  -     Urinalysis with Reflex to Culture; Future    Hypertension, unspecified type  -     CBC with Auto Differential; Future    Hypercholesteremia  -     Lipid Panel w/ Reflex Direct LDL; Future  -     Comprehensive Metabolic Panel; Future  -     TSH with Reflex; Future    Uncomplicated alcohol dependence ([address] 8-10 beers daily. Tobacco use    Benign prostatic hyperplasia, unspecified whether lower urinary tract symptoms present    IFG (impaired fasting glucose)  -     Comprehensive Metabolic Panel; Future  -     Hemoglobin A1C; Future    PVD (peripheral vascular disease) (HCC)    Other orders  -     benazepril (LOTENSIN) 20 MG tablet; TAKE 1 TABLET BY MOUTH EVERY DAY  -     amLODIPine (NORVASC) 5 MG tablet; Take 1 tablet by mouth daily         Recommendations for Preventive Services Due: see orders and patient instructions/AVS.  Recommended screening schedule for the next 5-10 years is provided to the patient in written form: see Patient Instructions/AVS.     Return in 6 months (on 8/21/2022) for HTN. Reviewed and updated this visit by clinical staff:  Tobacco  Allergies  Meds  Problems  Med Hx  Surg Hx  Soc Hx  Fam Hx        Subjective       Patient's complete Health Risk Assessment and screening values have been reviewed and are found in Flowsheets. The following problems were reviewed today and where indicated follow up appointments were made and/or referrals ordered.     Positive Risk Factor Screenings with Interventions:         Tobacco Use:     Tobacco Use: High Risk    Smoking Tobacco Use: Current Every Day Smoker    Smokeless Tobacco Use: Never Used     E-Cigarettes/Vaping Use     Questions Responses    E-Cigarette/Vaping Use Never User    Start Date     Passive Exposure     Quit Date     Counseling Given     Comments         Substance Abuse - Tobacco Interventions:  tobacco cessation tips and resources provided    Alcohol Screening:  AUDIT Total Score: 11    A score of 8 or more is associated with harmful or hazardous drinking. A score of 13 or more in women, and 15 or more in men, is likely to indicate alcohol dependence.     Substance Abuse - Alcohol Interventions:  patient is not ready to change his/her alcohol consumption behavior at this time          General Health and ACP:  General  In general, how would you say your health is?: Very Good  In the past 7 days, have you experienced any of the following: New or Increased Pain, New or Increased Fatigue, Loneliness, Social Isolation, Stress or Anger?: No  Do you get the social and emotional support that you need?: Yes  Do you have a Living Will?: Yes    Advance Directives     Power of  Living Will ACP-Advance Directive ACP-Power of     Not on File Not on File Not on File Not on File      General Health Risk Interventions:  · NA    Health Habits/Nutrition:     Physical Activity: Inactive    Days of Exercise per Week: 0 days    Minutes of Exercise per Session: 0 min     Have you lost any weight without trying in the past 3 months?: (!) Yes    Body mass index: 21.31    Have you seen the dentist within the past year?: N/A - wear dentures      Health Habits/Nutrition Interventions:  · Nutritional issues:  educational materials for healthy, well-balanced diet provided    Hearing/Vision:  No exam data present  Hearing/Vision  Do you or your family notice any trouble with your hearing that hasn't been managed with hearing aids?: No  Do you have difficulty driving, watching TV, or doing any of your daily activities because of your eyesight?: No  Have you had an eye exam within the past year?: (!) No    Hearing/Vision Interventions:  · Vision concerns:  patient encouraged to make appointment with his/her eye specialist         Objective             Allergies   Allergen Reactions    Pcn [Penicillins]      Prior to Visit Medications    Medication Sig Taking?  Authorizing Provider   benazepril (LOTENSIN) 20 MG tablet TAKE 1 TABLET BY MOUTH EVERY DAY Yes Itzel Starr, DO   amLODIPine (NORVASC) 5 MG tablet Take 1 tablet by mouth daily Yes Itzel Starr, DO   Ascorbic Acid (VITAMIN C) 500 MG tablet Take 500 mg by mouth daily Yes Historical Provider, MD   Multiple Vitamin (MULTI VITAMIN PO) Take by mouth Yes Historical Provider, MD   Acetaminophen-Aspirin Buffered ([address]) Take by mouth 3 times daily as needed Yes Historical Provider, MD [place] (Including outside providers/suppliers regularly involved in providing care):   Patient Care Team:  Itzel Starr DO as PCP - General (Family Medicine)  Itzel Starr DO as PCP - REHABILITATION [facility] Empaneled Provider

## 2022-02-21 NOTE — PATIENT INSTRUCTIONS
You may receive a survey regarding the care you received during your visit. Your input is valuable to us. We encourage you to complete and return your survey. We hope you will choose us in the future for your healthcare needs. Tobacco Cessation Programs     Telephonic behavior modification  Mali Escobar ([phone])   Counseling service for those who are ready to quit using tobacco.     Available for uninsured [place] residents, [employer] recipients, pregnant women, or patients whose health plans or employers are members of the [address] [place] [address] behavior modification   http://[place]. Quitlogix. org   Online support program to help patients through each step of the quitting process. Available 24 hours a day 7 days a week. Provides up to date researched based tool, step-by-step guides, and motivational messages. Online behavior modification   www.lungusa.org/stop-smoking/how-to-quit   HelpLine: 1-800-LUNG-[place] (281-3390)   Email questions to:  Prudenciodevendra@[employer]. [employer]    Website offers resources to help tobacco users figure out their reasons for quitting and then take the big step of quitting for good. Hypnosis   Location: [address] [place] [address] [place] [employer] [place], [place]   Contact: Phu Prince, PhD at [phone]   Hypnosis for tobacco cessation   Cost $225 for the initial session and $175 for each session afterwards. Most patients require 6-8 sessions. There is the option to submit through the patients insurance. Hypnosis and behavior modification   Location: [address] [place] [address],[facility],  [address] [place], [place]   Contact: Rachael Artis, PhD at [phone]  [address] [place] [address] Counseling and hypnosis for nicotine addition   Cost: For uninsured patients:  Please call above phone number  Cost for insured patients depends on patients insurance plan.     Behavior modification   Location: [facility], [address] [facility] [address] [place], [address]: [place] [address] include four one-on-one appointments between the patient and a respiratory therapist.  The four appointments span over three weeks. The respiratory therapist schedules one of the appointments to occur 48 hours after the patients quit date.  Cost $100 total for the four sessions. Tobacco cessation products are not included in the cost and are not provided by LISS [address]iz [address] for [place] - 2/21/2022  Medicare offers a range of preventive health benefits. Some of the tests and screenings are paid in full while other may be subject to a deductible, co-insurance, and/or copay. Some of these benefits include a comprehensive review of your medical history including lifestyle, illnesses that may run in your family, and various assessments and screenings as appropriate. After reviewing your medical record and screening and assessments performed today your provider may have ordered immunizations, labs, imaging, and/or referrals for you. A list of these orders (if applicable) as well as your Preventive Care list are included within your After Visit Summary for your review. Other Preventive Recommendations:    · A preventive eye exam performed by an eye specialist is recommended every 1-2 years to screen for glaucoma; cataracts, macular degeneration, and other eye disorders. · A preventive dental visit is recommended every 6 months. · Try to get at least 150 minutes of exercise per week or 10,000 steps per day on a pedometer . · Order or download the FREE \"Exercise & Physical Activity: Your Everyday Guide\" from [employer]. Call 1-990.366.5774 or search [employer] online. · You need 6457-0710 mg of calcium and 8250-5901 IU of vitamin D per day.  It is possible to meet your calcium requirement with diet alone, but a vitamin D supplement is usually necessary to meet this goal.  · When exposed to the sun, use a sunscreen that protects against both UVA and UVB radiation with an SPF of 30 or greater. Reapply every 2 to 3 hours or after sweating, drying off with a towel, or swimming. · Always wear a seat belt when traveling in a car. Always wear a helmet when riding a bicycle or motorcycle.

## 2022-02-23 LAB
ABSOLUTE BASO #: 0.1 X10E9/L (ref 0–0.2)
ABSOLUTE EOS #: 0.1 X10E9/L (ref 0–0.4)
ABSOLUTE LYMPH #: 2.5 X10E9/L (ref 1–3.5)
ABSOLUTE MONO #: 0.9 X10E9/L (ref 0–0.9)
ABSOLUTE NEUT #: 6.3 X10E9/L (ref 1.5–6.6)
ALBUMIN SERPL-MCNC: 4 G/DL (ref 3.2–5.3)
ALK PHOSPHATASE: 92 U/L (ref 39–130)
ALT SERPL-CCNC: 14 U/L (ref 0–40)
ANION GAP SERPL CALCULATED.3IONS-SCNC: 12 MMOL/L (ref 5–15)
APPEARANCE: ABNORMAL
AST SERPL-CCNC: 17 U/L (ref 0–41)
AVERAGE GLUCOSE: 123 MG/DL
BASOPHILS RELATIVE PERCENT: 1 %
BILIRUB SERPL-MCNC: 0.6 MG/DL (ref 0.3–1.2)
BILIRUBIN: NEGATIVE
BUN BLDV-MCNC: 6 MG/DL (ref 5–27)
CALCIUM SERPL-MCNC: 9.8 MG/DL (ref 8.5–10.5)
CHLORIDE BLD-SCNC: 98 MMOL/L (ref 98–109)
CHOLESTEROL/HDL RATIO: 2.2 (ref 1–5)
CHOLESTEROL: 169 MG/DL (ref 150–200)
CO2: 27 MMOL/L (ref 22–32)
COLOR: YELLOW
CREAT SERPL-MCNC: 0.63 MG/DL (ref 0.6–1.3)
EGFR AFRICAN AMERICAN: >60 ML/MIN/1.73SQ.M
EGFR IF NONAFRICAN AMERICAN: >60 ML/MIN/1.73SQ.M
EOSINOPHILS RELATIVE PERCENT: 0.7 %
GLUCOSE BLD-MCNC: NEGATIVE MG/DL
GLUCOSE: 80 MG/DL (ref 65–99)
HBA1C MFR BLD: 5.9 % (ref 4.4–6.4)
HCT VFR BLD CALC: 47.5 % (ref 39–49)
HDLC SERPL-MCNC: 76 MG/DL
HEMOGLOBIN: 16.5 G/DL (ref 13–17)
KETONES, URINE: NEGATIVE MG/DL
LDL CHOLESTEROL CALCULATED: 80 MG/DL
LDL/HDL RATIO: 1.1
LEUKOCYTE ESTERASE, URINE: ABNORMAL
LYMPHOCYTE %: 25 %
MCH RBC QN AUTO: 36.2 PG (ref 27–34)
MCHC RBC AUTO-ENTMCNC: 34.8 G/DL (ref 32–36)
MCV RBC AUTO: 104 FL (ref 80–100)
MONOCYTES # BLD: 9.6 %
NEUTROPHILS RELATIVE PERCENT: 63.7 %
NITRITE, URINE: POSITIVE
OCCULT BLOOD,URINE: ABNORMAL
OTHER MICROSCOPIC ELEMENTS: ABNORMAL
PDW BLD-RTO: 14.4 % (ref 11.5–15)
PH: 6.5 (ref 5–8.5)
PLATELETS: 398 X10E9/L (ref 150–450)
PMV BLD AUTO: 8.6 FL (ref 7–12)
POTASSIUM SERPL-SCNC: 3.6 MMOL/L (ref 3.5–5)
PROTEIN, URINE: ABNORMAL MG/DL
RBC: 14 /HPF (ref 0–5)
RBC: 4.56 X10E12/L (ref 4.1–5.7)
REPORT STATUS: NORMAL
SITE/TYPE: ABNORMAL
SITE/TYPE: NORMAL
SODIUM BLD-SCNC: 137 MMOL/L (ref 134–146)
SP GRAVITY MISCELLANEOUS: 1 (ref 1–1.03)
TOTAL PROTEIN: 8 G/DL (ref 6–8)
TRIGL SERPL-MCNC: 66 MG/DL (ref 27–150)
TSH SERPL DL<=0.05 MIU/L-ACNC: 3.57 UIU/ML (ref 0.49–4.67)
URINE CULTURE, ROUTINE: NORMAL
UROBILINOGEN, URINE: <1.1 EU/DL
VLDLC SERPL CALC-MCNC: 13 MG/DL (ref 0–30)
WBC: 305 /HPF (ref 0–5)
WBC: 9.8 X10E9/L (ref 4–11)

## 2022-02-24 RX ORDER — SULFAMETHOXAZOLE AND TRIMETHOPRIM 800; 160 MG/1; MG/1
1 TABLET ORAL 2 TIMES DAILY
Qty: 6 TABLET | Refills: 0 | Status: SHIPPED | OUTPATIENT
Start: 2022-02-24 | End: 2022-02-27

## 2022-08-22 ENCOUNTER — OFFICE VISIT (OUTPATIENT)
Dept: FAMILY MEDICINE CLINIC | Age: 72
End: 2022-08-22
Payer: MEDICARE

## 2022-08-22 VITALS
BODY MASS INDEX: 20.99 KG/M2 | SYSTOLIC BLOOD PRESSURE: 138 MMHG | RESPIRATION RATE: 16 BRPM | HEART RATE: 92 BPM | WEIGHT: 146.3 LBS | DIASTOLIC BLOOD PRESSURE: 74 MMHG

## 2022-08-22 DIAGNOSIS — F10.20 UNCOMPLICATED ALCOHOL DEPENDENCE (HCC): ICD-10-CM

## 2022-08-22 DIAGNOSIS — R73.01 IFG (IMPAIRED FASTING GLUCOSE): ICD-10-CM

## 2022-08-22 DIAGNOSIS — L72.3 SEBACEOUS CYST: Primary | ICD-10-CM

## 2022-08-22 DIAGNOSIS — Z72.0 TOBACCO USE: ICD-10-CM

## 2022-08-22 DIAGNOSIS — R63.4 UNINTENDED WEIGHT LOSS: ICD-10-CM

## 2022-08-22 DIAGNOSIS — N40.0 BENIGN PROSTATIC HYPERPLASIA, UNSPECIFIED WHETHER LOWER URINARY TRACT SYMPTOMS PRESENT: ICD-10-CM

## 2022-08-22 DIAGNOSIS — I10 HYPERTENSION, UNSPECIFIED TYPE: ICD-10-CM

## 2022-08-22 DIAGNOSIS — E78.00 HYPERCHOLESTEREMIA: ICD-10-CM

## 2022-08-22 DIAGNOSIS — I73.9 PVD (PERIPHERAL VASCULAR DISEASE) (HCC): ICD-10-CM

## 2022-08-22 PROCEDURE — 99214 OFFICE O/P EST MOD 30 MIN: CPT | Performed by: FAMILY MEDICINE

## 2022-08-22 PROCEDURE — 1123F ACP DISCUSS/DSCN MKR DOCD: CPT | Performed by: FAMILY MEDICINE

## 2022-08-22 PROCEDURE — 3017F COLORECTAL CA SCREEN DOC REV: CPT | Performed by: FAMILY MEDICINE

## 2022-08-22 PROCEDURE — 4004F PT TOBACCO SCREEN RCVD TLK: CPT | Performed by: FAMILY MEDICINE

## 2022-08-22 PROCEDURE — G8427 DOCREV CUR MEDS BY ELIG CLIN: HCPCS | Performed by: FAMILY MEDICINE

## 2022-08-22 PROCEDURE — G8420 CALC BMI NORM PARAMETERS: HCPCS | Performed by: FAMILY MEDICINE

## 2022-08-22 RX ORDER — BENAZEPRIL HYDROCHLORIDE 20 MG/1
TABLET ORAL
Qty: 90 TABLET | Refills: 3 | Status: SHIPPED | OUTPATIENT
Start: 2022-08-22

## 2022-08-22 RX ORDER — AMLODIPINE BESYLATE 5 MG/1
5 TABLET ORAL DAILY
Qty: 90 TABLET | Refills: 3 | Status: SHIPPED | OUTPATIENT
Start: 2022-08-22

## 2022-08-22 ASSESSMENT — ENCOUNTER SYMPTOMS
RESPIRATORY NEGATIVE: 1
GASTROINTESTINAL NEGATIVE: 1

## 2022-08-22 NOTE — PROGRESS NOTES
Arabella Garza (:  1950) is a 66 y.o. male,Established patient, here for evaluation of the following chief complaint(s):  6 Month Follow-Up, Medication Refill, and Cyst (Pt found a small cyst on his right buttock/hip area since 3/2022, getting bigger over time)        Subjective   SUBJECTIVE/OBJECTIVE:  HPI:    Chief Complaint   Patient presents with    6 Month Follow-Up    Medication Refill    Cyst     Pt found a small cyst on his right buttock/hip area since 3/2022, getting bigger over time     6 month eval.      Pt has a small cyst on his right hip for the last several months. BPs ok. BP Readings from Last 3 Encounters:   22 138/74   22 132/76   21 128/76     Weight is still dropping slowly. See previous note regarding an illness at that time. Has not gained any back. Appetite is poor. BMs regular. Last colonoscopy , due again in . Wt Readings from Last 3 Encounters:   22 146 lb 4.8 oz (66.4 kg)   22 148 lb 8 oz (67.4 kg)   21 163 lb (73.9 kg)     Patient Active Problem List   Diagnosis    HTN (hypertension)    Hypercholesteremia    PVD (peripheral vascular disease) (HCC)    Low back pain    ED (erectile dysfunction)    OA (osteoarthritis), multiple joints    BPH (benign prostatic hyperplasia)    Medication monitoring encounter    Tobacco use    IFG (impaired fasting glucose)    Compression fracture of L4 lumbar vertebra, age indeterminate. Foraminal stenosis of lumbosacral region. L3-4 to L5-S1 due to herniated disc. Left leg weakness    Uncomplicated alcohol dependence (HCC), 8-10 beers daily.     OAB (overactive bladder)     Past Surgical History:   Procedure Laterality Date    CHOLECYSTECTOMY  2006    COLONOSCOPY  [id number]    Shabana Scales at [address]  2021     Social History     Tobacco Use    Smoking status: Every Day     Packs/day: 1.00     Years: 40.00     Pack years: 40.00 Types: Cigarettes, Pipe    Smokeless tobacco: Never   Vaping Use    Vaping Use: Never used   Substance Use Topics    Alcohol use: Yes     Alcohol/week: 0.0 standard drinks     Comment: daily beer    Drug use: No     Prior to Admission medications    Medication Sig Start Date End Date Taking? Authorizing Provider   amLODIPine (NORVASC) 5 MG tablet Take 1 tablet by mouth daily 8/22/22  Yes Zaria Lara, DO   benazepril (LOTENSIN) 20 MG tablet TAKE 1 TABLET BY MOUTH EVERY DAY 8/22/22  Yes Zaria Lara, DO   Ascorbic Acid (VITAMIN C) 500 MG tablet Take 500 mg by mouth daily   Yes Historical Provider, MD   Multiple Vitamin (MULTI VITAMIN PO) Take by mouth   Yes Historical Provider, MD   Acetaminophen-Aspirin Buffered ([address]) Take by mouth 3 times daily as needed   Yes Historical Provider, MD         Review of Systems   Constitutional: Negative. HENT: Negative. Respiratory: Negative. Cardiovascular: Negative. Gastrointestinal: Negative. Musculoskeletal: Negative. Skin:         Cyst to right hip   All other systems reviewed and are negative. Objective   Physical Exam  Vitals and nursing note reviewed. Constitutional:       General: He is not in acute distress. Appearance: Normal appearance. He is well-developed. HENT:      Head: Normocephalic and atraumatic. Right Ear: Tympanic membrane normal.      Left Ear: Tympanic membrane normal.   Eyes:      Conjunctiva/sclera: Conjunctivae normal.   Cardiovascular:      Rate and Rhythm: Normal rate and regular rhythm. Heart sounds: Normal heart sounds. No murmur heard. Pulmonary:      Effort: Pulmonary effort is normal.      Breath sounds: Normal breath sounds. No wheezing, rhonchi or rales. Abdominal:      General: There is no distension. Musculoskeletal:      Cervical back: Neck supple. Skin:     General: Skin is warm and dry. Findings: No rash (on exposed surfaces).           Neurological: General: No focal deficit present. Mental Status: He is alert. Psychiatric:         Attention and Perception: Attention normal.         Mood and Affect: Mood normal.         Speech: Speech normal.         Behavior: Behavior normal. Behavior is cooperative. Thought Content: Thought content normal.         Judgment: Judgment normal.             ASSESSMENT/PLAN:  1. Sebaceous cyst  2. Unintended weight loss  3. Hypercholesteremia  4. Uncomplicated alcohol dependence ([facility] [address] 8-10 beers daily. 5. Tobacco use  6. Hypertension, unspecified type  7. IFG (impaired fasting glucose)  8. PVD (peripheral vascular disease) ([facility]  9. Benign prostatic hyperplasia, unspecified whether lower urinary tract symptoms present    -  Chronic medical problems stable  -  Continue current medications  -  Follow up with specialists as scheduled  -  Pt reassurance regarding #1  -  Weight continues to drop slightly which is concerning. Discussed GI referral vs CT chest/abd for which he declines  -  Will continue to monitor at home    Return in about 6 months (around 2/22/2023) for HTN. An electronic signature was used to authenticate this note.     --Effie Blas, DO

## 2023-04-10 DIAGNOSIS — I10 ESSENTIAL (PRIMARY) HYPERTENSION: ICD-10-CM

## 2023-04-10 RX ORDER — AMLODIPINE BESYLATE 5 MG/1
TABLET ORAL
Qty: 90 TABLET | Refills: 1 | Status: SHIPPED | OUTPATIENT
Start: 2023-04-10 | End: 2023-05-01 | Stop reason: SDUPTHER

## 2023-04-10 RX ORDER — BENAZEPRIL HYDROCHLORIDE 20 MG/1
TABLET ORAL
Qty: 90 TABLET | Refills: 1 | Status: SHIPPED | OUTPATIENT
Start: 2023-04-10 | End: 2023-05-01 | Stop reason: SDUPTHER

## 2023-05-01 DIAGNOSIS — I10 ESSENTIAL (PRIMARY) HYPERTENSION: ICD-10-CM

## 2023-05-01 RX ORDER — AMLODIPINE BESYLATE 5 MG/1
5 TABLET ORAL DAILY
Qty: 90 TABLET | Refills: 1 | Status: SHIPPED | OUTPATIENT
Start: 2023-05-01

## 2023-05-01 RX ORDER — BENAZEPRIL HYDROCHLORIDE 20 MG/1
20 TABLET ORAL DAILY
Qty: 90 TABLET | Refills: 1 | Status: SHIPPED | OUTPATIENT
Start: 2023-05-01

## 2023-05-01 NOTE — TELEPHONE ENCOUNTER
Wife Lianet Ling requesting refill of Norvasc and Lotensin to [facility]. Please refill if appropriate.

## 2023-10-12 DIAGNOSIS — I10 ESSENTIAL (PRIMARY) HYPERTENSION: ICD-10-CM

## 2023-10-12 RX ORDER — AMLODIPINE BESYLATE 5 MG/1
5 TABLET ORAL DAILY
Qty: 90 TABLET | Refills: 1 | OUTPATIENT
Start: 2023-10-12

## 2023-10-12 RX ORDER — BENAZEPRIL HYDROCHLORIDE 20 MG/1
20 TABLET ORAL DAILY
Qty: 90 TABLET | Refills: 1 | OUTPATIENT
Start: 2023-10-12

## 2023-10-12 RX ORDER — BENAZEPRIL HYDROCHLORIDE 20 MG/1
20 TABLET ORAL DAILY
Qty: 90 TABLET | Refills: 0 | Status: SHIPPED | OUTPATIENT
Start: 2023-10-12

## 2023-10-12 RX ORDER — AMLODIPINE BESYLATE 5 MG/1
5 TABLET ORAL DAILY
Qty: 90 TABLET | Refills: 0 | Status: SHIPPED | OUTPATIENT
Start: 2023-10-12

## 2023-10-12 NOTE — TELEPHONE ENCOUNTER
The patient called in requesting refills on his amlodipine 5mg and benazepril 20mg to be sent to [facility]. Orders pended for your signature. If no call back the patient will know that the scripts were sent.

## 2023-10-12 NOTE — TELEPHONE ENCOUNTER
Spoke with patient and appt given for 12/6/23. Patient leaving for [place] to go deer hunting next week and will be gone until December.   Please refill until appt

## 2023-12-06 ENCOUNTER — OFFICE VISIT (OUTPATIENT)
Dept: FAMILY MEDICINE CLINIC | Age: 73
End: 2023-12-06
Payer: MEDICARE

## 2023-12-06 VITALS
HEART RATE: 68 BPM | DIASTOLIC BLOOD PRESSURE: 80 MMHG | WEIGHT: 147.4 LBS | HEIGHT: 70 IN | RESPIRATION RATE: 16 BRPM | BODY MASS INDEX: 21.1 KG/M2 | SYSTOLIC BLOOD PRESSURE: 132 MMHG

## 2023-12-06 DIAGNOSIS — R13.10 DYSPHAGIA, UNSPECIFIED TYPE: ICD-10-CM

## 2023-12-06 DIAGNOSIS — K21.9 GASTROESOPHAGEAL REFLUX DISEASE, UNSPECIFIED WHETHER ESOPHAGITIS PRESENT: ICD-10-CM

## 2023-12-06 DIAGNOSIS — I73.9 PVD (PERIPHERAL VASCULAR DISEASE) (HCC): ICD-10-CM

## 2023-12-06 DIAGNOSIS — E78.00 HYPERCHOLESTEREMIA: ICD-10-CM

## 2023-12-06 DIAGNOSIS — N40.0 BENIGN PROSTATIC HYPERPLASIA, UNSPECIFIED WHETHER LOWER URINARY TRACT SYMPTOMS PRESENT: ICD-10-CM

## 2023-12-06 DIAGNOSIS — R73.01 IFG (IMPAIRED FASTING GLUCOSE): ICD-10-CM

## 2023-12-06 DIAGNOSIS — Z00.00 MEDICARE ANNUAL WELLNESS VISIT, SUBSEQUENT: Primary | ICD-10-CM

## 2023-12-06 DIAGNOSIS — I10 ESSENTIAL (PRIMARY) HYPERTENSION: ICD-10-CM

## 2023-12-06 DIAGNOSIS — R49.0 HOARSENESS OF VOICE: ICD-10-CM

## 2023-12-06 DIAGNOSIS — Z72.0 TOBACCO USE: ICD-10-CM

## 2023-12-06 DIAGNOSIS — Z13.6 SCREENING FOR AAA (ABDOMINAL AORTIC ANEURYSM): ICD-10-CM

## 2023-12-06 DIAGNOSIS — M79.89 MASS OF SOFT TISSUE OF HIP: ICD-10-CM

## 2023-12-06 DIAGNOSIS — F10.20 UNCOMPLICATED ALCOHOL DEPENDENCE (HCC): ICD-10-CM

## 2023-12-06 PROCEDURE — 4004F PT TOBACCO SCREEN RCVD TLK: CPT | Performed by: FAMILY MEDICINE

## 2023-12-06 PROCEDURE — 3017F COLORECTAL CA SCREEN DOC REV: CPT | Performed by: FAMILY MEDICINE

## 2023-12-06 PROCEDURE — 3079F DIAST BP 80-89 MM HG: CPT | Performed by: FAMILY MEDICINE

## 2023-12-06 PROCEDURE — 3075F SYST BP GE 130 - 139MM HG: CPT | Performed by: FAMILY MEDICINE

## 2023-12-06 PROCEDURE — G8484 FLU IMMUNIZE NO ADMIN: HCPCS | Performed by: FAMILY MEDICINE

## 2023-12-06 PROCEDURE — G8420 CALC BMI NORM PARAMETERS: HCPCS | Performed by: FAMILY MEDICINE

## 2023-12-06 PROCEDURE — G0439 PPPS, SUBSEQ VISIT: HCPCS | Performed by: FAMILY MEDICINE

## 2023-12-06 PROCEDURE — 1123F ACP DISCUSS/DSCN MKR DOCD: CPT | Performed by: FAMILY MEDICINE

## 2023-12-06 PROCEDURE — 99214 OFFICE O/P EST MOD 30 MIN: CPT | Performed by: FAMILY MEDICINE

## 2023-12-06 PROCEDURE — G8427 DOCREV CUR MEDS BY ELIG CLIN: HCPCS | Performed by: FAMILY MEDICINE

## 2023-12-06 RX ORDER — BENAZEPRIL HYDROCHLORIDE 20 MG/1
20 TABLET ORAL DAILY
Qty: 90 TABLET | Refills: 3 | Status: SHIPPED | OUTPATIENT
Start: 2023-12-06

## 2023-12-06 RX ORDER — PANTOPRAZOLE SODIUM 40 MG/1
40 TABLET, DELAYED RELEASE ORAL
Qty: 90 TABLET | Refills: 3 | Status: SHIPPED | OUTPATIENT
Start: 2023-12-06

## 2023-12-06 RX ORDER — AMLODIPINE BESYLATE 5 MG/1
5 TABLET ORAL DAILY
Qty: 90 TABLET | Refills: 3 | Status: SHIPPED | OUTPATIENT
Start: 2023-12-06

## 2023-12-06 SDOH — ECONOMIC STABILITY: FOOD INSECURITY: WITHIN THE PAST 12 MONTHS, THE FOOD YOU BOUGHT JUST DIDN'T LAST AND YOU DIDN'T HAVE MONEY TO GET MORE.: NEVER TRUE

## 2023-12-06 SDOH — ECONOMIC STABILITY: HOUSING INSECURITY
IN THE LAST 12 MONTHS, WAS THERE A TIME WHEN YOU DID NOT HAVE A STEADY PLACE TO SLEEP OR SLEPT IN A SHELTER (INCLUDING NOW)?: NO

## 2023-12-06 SDOH — ECONOMIC STABILITY: INCOME INSECURITY: HOW HARD IS IT FOR YOU TO PAY FOR THE VERY BASICS LIKE FOOD, HOUSING, MEDICAL CARE, AND HEATING?: NOT HARD AT ALL

## 2023-12-06 SDOH — ECONOMIC STABILITY: FOOD INSECURITY: WITHIN THE PAST 12 MONTHS, YOU WORRIED THAT YOUR FOOD WOULD RUN OUT BEFORE YOU GOT MONEY TO BUY MORE.: NEVER TRUE

## 2023-12-06 ASSESSMENT — LIFESTYLE VARIABLES
HOW OFTEN DURING THE LAST YEAR HAVE YOU NEEDED AN ALCOHOLIC DRINK FIRST THING IN THE MORNING TO GET YOURSELF GOING AFTER A NIGHT OF HEAVY DRINKING: 0
HAS A RELATIVE, FRIEND, DOCTOR, OR ANOTHER HEALTH PROFESSIONAL EXPRESSED CONCERN ABOUT YOUR DRINKING OR SUGGESTED YOU CUT DOWN: 0
HAVE YOU OR SOMEONE ELSE BEEN INJURED AS A RESULT OF YOUR DRINKING: 0
HOW OFTEN DURING THE LAST YEAR HAVE YOU HAD A FEELING OF GUILT OR REMORSE AFTER DRINKING: 0
HOW MANY STANDARD DRINKS CONTAINING ALCOHOL DO YOU HAVE ON A TYPICAL DAY: 7 TO 9
HOW OFTEN DO YOU HAVE A DRINK CONTAINING ALCOHOL: 4 OR MORE TIMES A WEEK
HOW OFTEN DURING THE LAST YEAR HAVE YOU FAILED TO DO WHAT WAS NORMALLY EXPECTED FROM YOU BECAUSE OF DRINKING: 0
HOW OFTEN DURING THE LAST YEAR HAVE YOU FOUND THAT YOU WERE NOT ABLE TO STOP DRINKING ONCE YOU HAD STARTED: 4
HOW OFTEN DURING THE LAST YEAR HAVE YOU BEEN UNABLE TO REMEMBER WHAT HAPPENED THE NIGHT BEFORE BECAUSE YOU HAD BEEN DRINKING: 0

## 2023-12-06 ASSESSMENT — ENCOUNTER SYMPTOMS
TROUBLE SWALLOWING: 1
RESPIRATORY NEGATIVE: 1
GASTROINTESTINAL NEGATIVE: 1
VOICE CHANGE: 1

## 2023-12-06 ASSESSMENT — PATIENT HEALTH QUESTIONNAIRE - PHQ9
SUM OF ALL RESPONSES TO PHQ9 QUESTIONS 1 & 2: 0
SUM OF ALL RESPONSES TO PHQ QUESTIONS 1-9: 0
1. LITTLE INTEREST OR PLEASURE IN DOING THINGS: 0
SUM OF ALL RESPONSES TO PHQ QUESTIONS 1-9: 0
SUM OF ALL RESPONSES TO PHQ QUESTIONS 1-9: 0
2. FEELING DOWN, DEPRESSED OR HOPELESS: 0
SUM OF ALL RESPONSES TO PHQ QUESTIONS 1-9: 0

## 2023-12-06 NOTE — PROGRESS NOTES
is no distension. Musculoskeletal:      Cervical back: Neck supple. Skin:     General: Skin is warm and dry. Findings: No rash (on exposed surfaces). Neurological:      General: No focal deficit present. Mental Status: He is alert. Psychiatric:         Attention and Perception: Attention normal.         Mood and Affect: Mood normal.         Speech: Speech normal.         Behavior: Behavior normal. Behavior is cooperative. Thought Content: Thought content normal.         Judgment: Judgment normal.             Latest Ref Rng & Units 2/22/2022     2:50 PM 9/23/2020     2:48 PM 1/15/2019    12:00 AM   LAB PRIMARY CARE   A1C 4.4 - 6.4 % 5.9  4.9     A1C POC 4.4 - 6.4 % 5.9  4.9     GLU random 65 - 99 mg/dL  Negative mg/dL 80     Negative      CHOL 150 - 200 mg/dL 169   176       TRIG 27 - 150 mg/dL 66   104       HDL >39 mg/dL 76   71       LDL CALC <130 mg/dL 80   84        - 146 mmol/L 137      K 3.5 - 5.0 mmol/L 3.6      BUN 5 - 27 mg/dL 6      CR 0.60 - 1.30 mg/dL 0.63      CA 8.5 - 10.5 mg/dL 9.8      ALT 0 - 40 U/L 14      AST 0 - 41 U/L 17      TSH 0.49 - 4.67 uIU/mL 3.57      HGB 13.0 - 17.0 g/dL 16.5          This result is from an external source.     Multiple values from one day are sorted in reverse-chronological order       Lab Results   Component Value Date/Time    CHOL 169 02/22/2022 02:50 PM    CHOL 176 01/15/2019 12:00 AM    CHOL 169 08/28/2015 12:00 AM    CHOL 215 08/18/2014 12:00 AM    TRIG 66 02/22/2022 02:50 PM    TRIG 104 01/15/2019 12:00 AM    TRIG 52 08/28/2015 12:00 AM    HDL 76 02/22/2022 02:50 PM    HDL 71 01/15/2019 12:00 AM    HDL 70 08/28/2015 12:00 AM    HDL 68 02/21/2012 12:00 AM    LDLCALC 80 02/22/2022 02:50 PM    LDLCALC 84 01/15/2019 12:00 AM    LDLCALC 89 08/28/2015 12:00 AM    GLUCOSE 80 02/22/2022 02:50 PM    GLUCOSE Negative 02/22/2022 02:50 PM    LABA1C 5.9 02/22/2022 02:50 PM    LABA1C 4.9 09/23/2020 02:48 PM    LABA1C 5.4 01/14/2019 02:48 PM

## 2023-12-07 ENCOUNTER — TELEPHONE (OUTPATIENT)
Dept: SURGERY | Age: 73
End: 2023-12-07

## 2023-12-07 LAB
A/G RATIO: 1.3 (ref 1.5–2.5)
ABSOLUTE BASO #: 0 /CMM (ref 0–200)
ABSOLUTE EOS #: 100 /CMM (ref 0–500)
ABSOLUTE LYMPH #: 2200 /CMM (ref 1000–4800)
ABSOLUTE MONO #: 900 /CMM (ref 0–800)
ABSOLUTE NEUT #: 6200 /CMM (ref 1800–7700)
ALBUMIN SERPL-MCNC: 4.3 G/DL (ref 3.5–5)
ALP BLD-CCNC: 84 IU/L (ref 41–137)
ALT SERPL-CCNC: 12 IU/L (ref 10–40)
ANION GAP SERPL CALCULATED.3IONS-SCNC: 10 MMOL/L (ref 4–12)
AST SERPL-CCNC: 16 IU/L (ref 15–41)
BASOPHILS RELATIVE PERCENT: 0.4 % (ref 0–2)
BILIRUB SERPL-MCNC: 1 MG/DL (ref 0.2–1)
BUN BLDV-MCNC: 8 MG/DL (ref 7–20)
CALCIUM SERPL-MCNC: 9.1 MG/DL (ref 8.8–10.5)
CHLORIDE BLD-SCNC: 101 MEQ/L (ref 101–111)
CHOLESTEROL/HDL RELATIVE RISK: 1.7 (ref 4–5)
CHOLESTEROL: 153 MG/DL
CO2: 26 MEQ/L (ref 21–32)
CREAT SERPL-MCNC: 0.58 MG/DL (ref 0.6–1.3)
CREATININE CLEARANCE: >60
DIRECT-LDL / HDL RISK: 0.6
EOSINOPHILS RELATIVE PERCENT: 1.5 % (ref 0–6)
GLUCOSE: 103 MG/DL (ref 70–110)
HCT VFR BLD CALC: 55 % (ref 40–49)
HDLC SERPL-MCNC: 89 MG/DL
HEMOGLOBIN: 19.1 GM/DL (ref 13.5–16.5)
LDL CHOLESTEROL DIRECT: 62 MG/DL
LYMPHOCYTES RELATIVE PERCENT: 23.1 % (ref 15–45)
MACROCYTOSIS: ABNORMAL
MCH RBC QN AUTO: 36.6 PG (ref 27.5–33)
MCHC RBC AUTO-ENTMCNC: 34.7 GM/DL (ref 33–36)
MCV RBC AUTO: 105.6 CU MIC (ref 80–97)
MONOCYTES RELATIVE PERCENT: 9.1 % (ref 2–10)
NEUTROPHILS RELATIVE PERCENT: 65.9 % (ref 40–70)
NUCLEATED RBCS: 0.2 /100 WBC
PDW BLD-RTO: 14.8 % (ref 12–16)
PLATELET # BLD: 320 TH/CMM (ref 150–400)
POTASSIUM SERPL-SCNC: 3.8 MEQ/L (ref 3.6–5)
RBC # BLD: 5.21 MIL/CMM (ref 4.5–6)
SODIUM BLD-SCNC: 137 MEQ/L (ref 135–145)
TOTAL PROTEIN: 7.6 G/DL (ref 6.2–8)
TRIGL SERPL-MCNC: 45 MG/DL
TSH REFLEX: 3.38 MCIU/ML (ref 0.49–4.67)
VLDLC SERPL CALC-MCNC: 9 MG/DL
WBC # BLD: 9.4 TH/CMM (ref 4.4–10.5)

## 2023-12-07 NOTE — TELEPHONE ENCOUNTER
Left voicemail; NP referral from [address] for mass of soft tissue of hip w/ Dr. Jay Rodriguez

## 2023-12-08 LAB
ESTIMATED AVERAGE GLUCOSE: 88 MG/DL
HBA1C MFR BLD: 4.7 % (ref 4.4–6.4)

## 2024-01-05 ENCOUNTER — HOSPITAL ENCOUNTER (OUTPATIENT)
Dept: ULTRASOUND IMAGING | Age: 74
Discharge: HOME | End: 2024-01-05
Attending: FAMILY MEDICINE
Payer: MEDICARE

## 2024-01-05 DIAGNOSIS — Z13.6 SCREENING FOR AAA (ABDOMINAL AORTIC ANEURYSM): ICD-10-CM

## 2024-01-05 PROCEDURE — 76706 US ABDL AORTA SCREEN AAA: CPT

## 2024-01-12 ENCOUNTER — PROCEDURE VISIT (OUTPATIENT)
Dept: SURGERY | Age: 74
End: 2024-01-12

## 2024-01-12 VITALS
HEART RATE: 89 BPM | RESPIRATION RATE: 18 BRPM | TEMPERATURE: 97.2 F | SYSTOLIC BLOOD PRESSURE: 124 MMHG | OXYGEN SATURATION: 97 % | DIASTOLIC BLOOD PRESSURE: 82 MMHG

## 2024-01-12 DIAGNOSIS — L72.0 EPIDERMOID CYST: Primary | ICD-10-CM

## 2024-01-12 DIAGNOSIS — D17.20 LIPOMA OF LOWER EXTREMITY, UNSPECIFIED LATERALITY: ICD-10-CM

## 2024-01-12 NOTE — PROGRESS NOTES
OFFICE PROCEDURE NOTE  Pt Name: Masood Randhawa  Date of Birth 1950   Procedure Performed: 1/13/2024 in the office    PREOPERATIVE DIAGNOSIS: 1.5 cm epidermoid cyst right hip, 1 cm lipoma right lower leg  POSTOPERATIVE DIAGNOSIS: Same, path pending  PROCEDURE PERFORMED:  Excision of 1.5 cm epidermoid cyst right hip, 1 cm lipoma right lower leg  SURGEON:  Dr. Oscar Fabian.  ANESTHESIA:  Local  ESTIMATED BLOOD LOSS:  5 ml  COMPLICATIONS:  None immediately appreciated.     DISCUSSION: Masood is a 73 y.o. year old male who was seen in the office regarding a mass located on the right hip and right lower leg that has been enlarging. After history and physical examination was performed potential diagnostic and therapeutic modalities discussed with the patient. Operative and non operative management was discussed. He was given opportunity to ask questions.  Once answered informed consent was obtained. He was then positioned for the procedure.     OPERATIVE FINDINGS:  At time of exploration, a 1.5 cm epidermoid cyst right hip, 1 cm lipoma right lower leg were removed.      PROCEDURE:  The patient was taken to the office procedure room and positioned appropriately. The skin overlying the lesion was prepped and draped in sterile fashion. Skin edges was infiltrated with local anesthetic. A transverse incision made with a #15 scalpel blade over the top of the lesion and carried down to subcutaneous tissues. Removal of the lesion was performed using a combination of blunt and sharp dissection. Following removal adequate hemostasis was appreciated and no additional pathology evident. The deep tissues were approximated with 3-0 Vicryl suture and skin closed using 4-0 Vicryl suture in running fashion. The right lower leg lesion removed in similar fashion. The wound was then cleansed and sterile dressings were applied. Pt tolerated the procedure well with no immediate complications evident. All sponge, instrument and

## 2024-02-06 DIAGNOSIS — D75.1 POLYCYTHEMIA: Primary | ICD-10-CM

## 2024-02-06 LAB
ABSOLUTE BASO #: 100 /CMM (ref 0–200)
ABSOLUTE EOS #: 100 /CMM (ref 0–500)
ABSOLUTE LYMPH #: 2100 /CMM (ref 1000–4800)
ABSOLUTE MONO #: 800 /CMM (ref 0–800)
ABSOLUTE NEUT #: 5200 /CMM (ref 1800–7700)
BASOPHILS RELATIVE PERCENT: 0.7 % (ref 0–2)
EOSINOPHILS RELATIVE PERCENT: 1.3 % (ref 0–6)
HCT VFR BLD CALC: 52.6 % (ref 40–49)
HEMOGLOBIN: 18.4 GM/DL (ref 13.5–16.5)
LYMPHOCYTES RELATIVE PERCENT: 25.4 % (ref 15–45)
MACROCYTOSIS: ABNORMAL
MCH RBC QN AUTO: 37 PG (ref 27.5–33)
MCHC RBC AUTO-ENTMCNC: 35 GM/DL (ref 33–36)
MCV RBC AUTO: 105.7 CU MIC (ref 80–97)
MONOCYTES RELATIVE PERCENT: 10 % (ref 2–10)
NEUTROPHILS RELATIVE PERCENT: 62.6 % (ref 40–70)
NUCLEATED RBCS: 0.2 /100 WBC
PDW BLD-RTO: 15.5 % (ref 12–16)
PLATELET # BLD: 314 TH/CMM (ref 150–400)
RBC # BLD: 4.98 MIL/CMM (ref 4.5–6)
WBC # BLD: 8.3 TH/CMM (ref 4.4–10.5)

## 2024-03-16 NOTE — PROGRESS NOTES
Osteoarthritis     Personal history of colonic polyps 2006    PVD (peripheral vascular disease) (HCC)     PVD (peripheral vascular disease) (HCC) 02/14/2012      Past Surgical History:   Procedure Laterality Date    CHOLECYSTECTOMY  09/2006    COLONOSCOPY  [id number]    LUMBAR FUSION      Dr. Malone at [facility]     ROTATOR CUFF REPAIR  02/04/2021      Family History   Problem Relation Age of Onset    Cancer Mother         Skin Cancer    High Blood Pressure Mother     High Blood Pressure Father       Social History     Tobacco Use    Smoking status: Every Day     Current packs/day: 1.00     Average packs/day: 1 pack/day for 40.0 years (40.0 ttl pk-yrs)     Types: Cigarettes, Pipe    Smokeless tobacco: Never   Substance Use Topics    Alcohol use: Yes     Alcohol/week: 0.0 standard drinks of alcohol     Comment: daily beer      Current Outpatient Medications   Medication Sig Dispense Refill    amLODIPine (NORVASC) 5 MG tablet Take 1 tablet by mouth daily 90 tablet 3    benazepril (LOTENSIN) 20 MG tablet Take 1 tablet by mouth daily 90 tablet 3    pantoprazole (PROTONIX) 40 MG tablet Take 1 tablet by mouth daily (with breakfast) 90 tablet 3    Ascorbic Acid (VITAMIN C) 500 MG tablet Take 1 tablet by mouth daily      Multiple Vitamin (MULTI VITAMIN PO) Take by mouth      Acetaminophen-Aspirin Buffered (EXCEDRIN BACK & BODY PO) Take by mouth 3 times daily as needed       No current facility-administered medications for this visit.      Allergies   Allergen Reactions    Pcn [Penicillins]           Review of Systems:   Review of Systems   Pertinent review of systems noted in HPI, all other ROS negative.   Objective:   Physical Exam   /72   Pulse (!) 113   Temp 97.9 °F (36.6 °C) (Oral)   Resp 16   Ht 1.778 m (5' 10\")   Wt 66.7 kg (147 lb)   SpO2 99%   BMI 21.09 kg/m²    General appearance: No apparent distress, calm and cooperative.  HEENT: Pupils equal, round, and reactive to light. Conjunctivae/corneas clear. Oral

## 2024-03-18 ENCOUNTER — HOSPITAL ENCOUNTER (OUTPATIENT)
Dept: INFUSION THERAPY | Age: 74
Discharge: HOME | End: 2024-03-18
Payer: MEDICARE

## 2024-03-18 ENCOUNTER — OFFICE VISIT (OUTPATIENT)
Dept: ONCOLOGY | Age: 74
End: 2024-03-18
Payer: MEDICARE

## 2024-03-18 VITALS
DIASTOLIC BLOOD PRESSURE: 72 MMHG | OXYGEN SATURATION: 99 % | TEMPERATURE: 97.9 F | HEART RATE: 113 BPM | HEIGHT: 70 IN | SYSTOLIC BLOOD PRESSURE: 138 MMHG | WEIGHT: 147 LBS | RESPIRATION RATE: 16 BRPM | BODY MASS INDEX: 21.05 KG/M2

## 2024-03-18 VITALS
RESPIRATION RATE: 16 BRPM | TEMPERATURE: 97.9 F | OXYGEN SATURATION: 99 % | HEART RATE: 113 BPM | SYSTOLIC BLOOD PRESSURE: 138 MMHG | DIASTOLIC BLOOD PRESSURE: 72 MMHG

## 2024-03-18 DIAGNOSIS — D75.1 ERYTHROCYTOSIS: ICD-10-CM

## 2024-03-18 DIAGNOSIS — D75.1 ERYTHROCYTOSIS: Primary | ICD-10-CM

## 2024-03-18 LAB
ALBUMIN SERPL BCG-MCNC: 4.3 G/DL (ref 3.5–5.1)
ALP SERPL-CCNC: 84 U/L (ref 38–126)
ALT SERPL W/O P-5'-P-CCNC: 11 U/L (ref 11–66)
ANION GAP SERPL CALC-SCNC: 15 MEQ/L (ref 8–16)
AST SERPL-CCNC: 16 U/L (ref 5–40)
BILIRUB SERPL-MCNC: 0.5 MG/DL (ref 0.3–1.2)
BUN SERPL-MCNC: 8 MG/DL (ref 7–22)
CALCIUM SERPL-MCNC: 9.5 MG/DL (ref 8.5–10.5)
CHLORIDE SERPL-SCNC: 99 MEQ/L (ref 98–111)
CO2 SERPL-SCNC: 25 MEQ/L (ref 23–33)
COHGB MFR BLDV: 8.3 % CO SAT
CREAT SERPL-MCNC: 0.6 MG/DL (ref 0.4–1.2)
GFR SERPL CREATININE-BSD FRML MDRD: > 60 ML/MIN/1.73M2
GLUCOSE SERPL-MCNC: 76 MG/DL (ref 70–108)
JAK2 QUAL, SOURCE: NORMAL
POTASSIUM SERPL-SCNC: 3.5 MEQ/L (ref 3.5–5.2)
PROT SERPL-MCNC: 8.1 G/DL (ref 6.1–8)
SODIUM SERPL-SCNC: 139 MEQ/L (ref 135–145)

## 2024-03-18 PROCEDURE — 99204 OFFICE O/P NEW MOD 45 MIN: CPT | Performed by: NURSE PRACTITIONER

## 2024-03-18 PROCEDURE — 99211 OFF/OP EST MAY X REQ PHY/QHP: CPT

## 2024-03-18 PROCEDURE — 3075F SYST BP GE 130 - 139MM HG: CPT | Performed by: NURSE PRACTITIONER

## 2024-03-18 PROCEDURE — 80053 COMPREHEN METABOLIC PANEL: CPT

## 2024-03-18 PROCEDURE — 3078F DIAST BP <80 MM HG: CPT | Performed by: NURSE PRACTITIONER

## 2024-03-18 PROCEDURE — G8484 FLU IMMUNIZE NO ADMIN: HCPCS | Performed by: NURSE PRACTITIONER

## 2024-03-18 PROCEDURE — 81270 JAK2 GENE: CPT

## 2024-03-18 PROCEDURE — 85025 COMPLETE CBC W/AUTO DIFF WBC: CPT

## 2024-03-18 PROCEDURE — 81219 CALR GENE COM VARIANTS: CPT

## 2024-03-18 PROCEDURE — 82746 ASSAY OF FOLIC ACID SERUM: CPT

## 2024-03-18 PROCEDURE — 3017F COLORECTAL CA SCREEN DOC REV: CPT | Performed by: NURSE PRACTITIONER

## 2024-03-18 PROCEDURE — 82375 ASSAY CARBOXYHB QUANT: CPT

## 2024-03-18 PROCEDURE — 1123F ACP DISCUSS/DSCN MKR DOCD: CPT | Performed by: NURSE PRACTITIONER

## 2024-03-18 PROCEDURE — 4004F PT TOBACCO SCREEN RCVD TLK: CPT | Performed by: NURSE PRACTITIONER

## 2024-03-18 PROCEDURE — G8427 DOCREV CUR MEDS BY ELIG CLIN: HCPCS | Performed by: NURSE PRACTITIONER

## 2024-03-18 PROCEDURE — 82607 VITAMIN B-12: CPT

## 2024-03-18 PROCEDURE — 81338 MPL GENE COMMON VARIANTS: CPT

## 2024-03-18 PROCEDURE — G8420 CALC BMI NORM PARAMETERS: HCPCS | Performed by: NURSE PRACTITIONER

## 2024-03-18 PROCEDURE — 36415 COLL VENOUS BLD VENIPUNCTURE: CPT

## 2024-03-18 PROCEDURE — 82668 ASSAY OF ERYTHROPOIETIN: CPT

## 2024-03-19 LAB
ABSOLUTE IMMATURE GRANULOCYTE: 0.03 THOU/MM3 (ref 0–0.07)
BASOPHILS ABSOLUTE: 0 THOU/MM3 (ref 0–0.1)
BASOPHILS NFR BLD AUTO: 0 % (ref 0–3)
EOSINOPHIL NFR BLD AUTO: 0 % (ref 0–4)
EOSINOPHILS ABSOLUTE: 0 THOU/MM3 (ref 0–0.4)
ERYTHROCYTE [DISTWIDTH] IN BLOOD BY AUTOMATED COUNT: 13.9 % (ref 11.5–14.5)
FOLATE SERPL-MCNC: > 20 NG/ML (ref 4.8–24.2)
HCT VFR BLD AUTO: 52.7 % (ref 42–52)
HGB BLD-MCNC: 18.4 GM/DL (ref 14–18)
IMMATURE GRANULOCYTES: 0 %
LYMPHOCYTES ABSOLUTE: 1.9 THOU/MM3 (ref 1–4.8)
LYMPHOCYTES NFR BLD AUTO: 18 % (ref 15–47)
MCH RBC QN AUTO: 36.1 PG (ref 26–33)
MCHC RBC AUTO-ENTMCNC: 34.9 GM/DL (ref 32.2–35.5)
MCV RBC AUTO: 104 FL (ref 80–94)
MONOCYTES ABSOLUTE: 0.8 THOU/MM3 (ref 0.4–1.3)
MONOCYTES NFR BLD AUTO: 7 % (ref 0–12)
NEUTROPHILS NFR BLD AUTO: 74 % (ref 43–75)
PATHOLOGIST REVIEW: ABNORMAL
PLATELET # BLD AUTO: 346 THOU/MM3 (ref 130–400)
PMV BLD AUTO: 9.5 FL (ref 9.4–12.4)
RBC # BLD AUTO: 5.09 MILL/MM3 (ref 4.7–6.1)
SEGMENTED NEUTROPHILS ABSOLUTE COUNT: 8 THOU/MM3 (ref 1.8–7.7)
VIT B12 SERPL-MCNC: 376 PG/ML (ref 211–911)
WBC # BLD AUTO: 10.8 THOU/MM3 (ref 4.8–10.8)

## 2024-03-21 LAB — EPO SERPL-ACNC: 4 MU/ML (ref 4–27)

## 2024-03-27 LAB
JAK2 P.V617F BLD/T QL: NOT DETECTED
SPECIMEN SOURCE: NORMAL

## 2024-03-29 LAB — MPL GENE MUT TESTED BLD/T: NOT DETECTED

## 2024-04-01 ENCOUNTER — TELEPHONE (OUTPATIENT)
Dept: ONCOLOGY | Age: 74
End: 2024-04-01

## 2024-04-01 LAB — GENE XXX MUT ANL BLD/T: NOT DETECTED

## 2024-05-01 DIAGNOSIS — D75.1 ERYTHROCYTOSIS: Primary | ICD-10-CM

## 2024-05-06 NOTE — PROGRESS NOTES
Systems:   Review of Systems   Pertinent review of systems noted in HPI, all other ROS negative.   Objective:   Physical Exam   BP (!) 152/73 (Site: Right Upper Arm, Position: Sitting)   Pulse 99   Temp 97.8 °F (36.6 °C) (Oral)   Resp 18   Ht 1.778 m (5' 10\")   Wt 64.4 kg (142 lb)   SpO2 96%   BMI 20.37 kg/m²    General appearance: No apparent distress, calm and cooperative.  HEENT: Pupils equal, round, and reactive to light. Conjunctivae/corneas clear. Oral mucosa moist  Neck: Supple, with full range of motion. Trachea midline.   Respiratory:  Normal respiratory effort. Clear to auscultation all lung fields.  Cardiovascular: RRR, S1/S2  Abdomen: Soft, non-tender, non-distended with active BS  Musculoskeletal: No clubbing, cyanosis or edema bilaterally.  He is able to ambulate in office with use of cane  Skin: Skin color, texture, turgor normal.  No visible rashes or lesions.  Neurologic:  Neurovascularly intact without any focal sensory/motor deficits. Cranial nerves: II-XII intact, grossly non-focal.  Psychiatric: Alert and oriented x 3, thought content appropriate, normal insight  Capillary Refill: < 3 seconds   Peripheral Pulses: +2 palpable      Imaging Studies and Labs:   CBC:   Lab Results   Component Value Date    WBC 10.5 05/07/2024    HGB 17.7 05/07/2024    HCT 50.9 05/07/2024     (H) 05/07/2024     05/07/2024     BMP:   Lab Results   Component Value Date/Time     03/18/2024 01:54 PM    K 3.5 03/18/2024 01:54 PM    CL 99 03/18/2024 01:54 PM    CO2 25 03/18/2024 01:54 PM    BUN 8 03/18/2024 01:54 PM    CREATININE 0.6 03/18/2024 01:54 PM    GLUCOSE 76 03/18/2024 01:54 PM    GLUCOSE 103 12/07/2023 12:58 PM    CALCIUM 9.5 03/18/2024 01:54 PM      LFT:   Lab Results   Component Value Date    ALT 11 03/18/2024    AST 16 03/18/2024    ALKPHOS 84 03/18/2024    BILITOT 0.5 03/18/2024      Assessment & Plan   Assessment and Plan:     1. Erythrocytosis  Hx polycythemia since at least

## 2024-05-07 ENCOUNTER — HOSPITAL ENCOUNTER (OUTPATIENT)
Dept: INFUSION THERAPY | Age: 74
Discharge: HOME | End: 2024-05-07
Payer: MEDICARE

## 2024-05-07 ENCOUNTER — OFFICE VISIT (OUTPATIENT)
Dept: ONCOLOGY | Age: 74
End: 2024-05-07

## 2024-05-07 VITALS
SYSTOLIC BLOOD PRESSURE: 152 MMHG | TEMPERATURE: 97.8 F | RESPIRATION RATE: 18 BRPM | HEART RATE: 99 BPM | DIASTOLIC BLOOD PRESSURE: 73 MMHG | BODY MASS INDEX: 20.33 KG/M2 | OXYGEN SATURATION: 96 % | HEIGHT: 70 IN | WEIGHT: 142 LBS

## 2024-05-07 VITALS
SYSTOLIC BLOOD PRESSURE: 152 MMHG | DIASTOLIC BLOOD PRESSURE: 73 MMHG | TEMPERATURE: 97.8 F | HEART RATE: 99 BPM | OXYGEN SATURATION: 96 % | RESPIRATION RATE: 18 BRPM

## 2024-05-07 DIAGNOSIS — D75.1 ERYTHROCYTOSIS: Primary | ICD-10-CM

## 2024-05-07 DIAGNOSIS — D75.1 ERYTHROCYTOSIS: ICD-10-CM

## 2024-05-07 LAB
BASOPHILS ABSOLUTE: 0 THOU/MM3 (ref 0–0.1)
BASOPHILS NFR BLD AUTO: 0 % (ref 0–3)
EOSINOPHIL NFR BLD AUTO: 1 % (ref 0–4)
EOSINOPHILS ABSOLUTE: 0.1 THOU/MM3 (ref 0–0.4)
ERYTHROCYTE [DISTWIDTH] IN BLOOD BY AUTOMATED COUNT: 14.7 % (ref 11.5–14.5)
HCT VFR BLD AUTO: 50.9 % (ref 42–52)
HGB BLD-MCNC: 17.7 GM/DL (ref 14–18)
IMMATURE GRANULOCYTES %: 0 %
IMMATURE GRANULOCYTES ABSOLUTE: 0.03 THOU/MM3 (ref 0–0.07)
LYMPHOCYTES ABSOLUTE: 2.5 THOU/MM3 (ref 1–4.8)
LYMPHOCYTES NFR BLD AUTO: 24 % (ref 15–47)
MCH RBC QN AUTO: 35.5 PG (ref 26–33)
MCHC RBC AUTO-ENTMCNC: 34.8 GM/DL (ref 32.2–35.5)
MCV RBC AUTO: 102 FL (ref 80–94)
MONOCYTES ABSOLUTE: 0.9 THOU/MM3 (ref 0.4–1.3)
MONOCYTES NFR BLD AUTO: 9 % (ref 0–12)
NEUTROPHILS ABSOLUTE: 7 THOU/MM3 (ref 1.8–7.7)
NEUTROPHILS NFR BLD AUTO: 67 % (ref 43–75)
PLATELET # BLD AUTO: 279 THOU/MM3 (ref 130–400)
PMV BLD AUTO: 9.7 FL (ref 9.4–12.4)
RBC # BLD AUTO: 4.98 MILL/MM3 (ref 4.7–6.1)
WBC # BLD AUTO: 10.5 THOU/MM3 (ref 4.8–10.8)

## 2024-05-07 PROCEDURE — 36415 COLL VENOUS BLD VENIPUNCTURE: CPT

## 2024-05-07 PROCEDURE — 85025 COMPLETE CBC W/AUTO DIFF WBC: CPT

## 2024-05-07 PROCEDURE — 99211 OFF/OP EST MAY X REQ PHY/QHP: CPT

## 2024-08-09 DIAGNOSIS — D75.1 ERYTHROCYTOSIS: Primary | ICD-10-CM

## 2024-08-12 NOTE — PROGRESS NOTES
Pertinent review of systems noted in HPI, all other ROS negative.   Objective:   Physical Exam   BP (!) 149/70 (Site: Right Upper Arm, Position: Sitting)   Pulse (!) 108   Temp 97.4 °F (36.3 °C) (Oral)   Resp 16   Ht 1.778 m (5' 10\")   Wt 61.4 kg (135 lb 6.4 oz)   SpO2 95%   BMI 19.43 kg/m²    General appearance: No apparent distress, calm and cooperative.  HEENT: Pupils equal, round, and reactive to light. Conjunctivae/corneas clear. Oral mucosa moist  Neck: Supple, with full range of motion. Trachea midline.   Respiratory:  Normal respiratory effort. Clear to auscultation all lung fields.  Cardiovascular: RRR, S1/S2  Abdomen: Soft, non-tender, non-distended with active BS  Musculoskeletal: No clubbing, cyanosis or edema bilaterally.  He is able to ambulate in office  Skin: Skin color, texture, turgor normal.  No visible rashes or lesions.  Neurologic:  Neurovascularly intact without any focal sensory/motor deficits. Cranial nerves: II-XII intact, grossly non-focal.  Psychiatric: Alert and oriented x 3, thought content appropriate, normal insight  Capillary Refill: < 3 seconds   Peripheral Pulses: +2 palpable      Imaging Studies and Labs:   CBC:   Lab Results   Component Value Date    WBC 12.2 (H) 08/13/2024    HGB 16.4 08/13/2024    HCT 46.6 08/13/2024     (H) 08/13/2024     08/13/2024     BMP:   Lab Results   Component Value Date/Time     03/18/2024 01:54 PM    K 3.5 03/18/2024 01:54 PM    CL 99 03/18/2024 01:54 PM    CO2 25 03/18/2024 01:54 PM    BUN 8 03/18/2024 01:54 PM    CREATININE 0.6 03/18/2024 01:54 PM    GLUCOSE 76 03/18/2024 01:54 PM    GLUCOSE 103 12/07/2023 12:58 PM    CALCIUM 9.5 03/18/2024 01:54 PM      LFT:   Lab Results   Component Value Date    ALT 11 03/18/2024    AST 16 03/18/2024    ALKPHOS 84 03/18/2024    BILITOT 0.5 03/18/2024      Assessment & Plan   Assessment and Plan:     1. Erythrocytosis  Hx polycythemia since at least December 2023.  No prior labs to

## 2024-08-13 ENCOUNTER — HOSPITAL ENCOUNTER (OUTPATIENT)
Dept: INFUSION THERAPY | Age: 74
Discharge: HOME | End: 2024-08-13
Payer: MEDICARE

## 2024-08-13 ENCOUNTER — OFFICE VISIT (OUTPATIENT)
Dept: ONCOLOGY | Age: 74
End: 2024-08-13
Payer: MEDICARE

## 2024-08-13 VITALS
RESPIRATION RATE: 16 BRPM | TEMPERATURE: 97.4 F | SYSTOLIC BLOOD PRESSURE: 149 MMHG | HEART RATE: 108 BPM | WEIGHT: 135.4 LBS | DIASTOLIC BLOOD PRESSURE: 70 MMHG | BODY MASS INDEX: 19.39 KG/M2 | HEIGHT: 70 IN | OXYGEN SATURATION: 95 %

## 2024-08-13 VITALS
DIASTOLIC BLOOD PRESSURE: 70 MMHG | TEMPERATURE: 97.4 F | SYSTOLIC BLOOD PRESSURE: 149 MMHG | RESPIRATION RATE: 16 BRPM | OXYGEN SATURATION: 95 % | HEART RATE: 108 BPM

## 2024-08-13 DIAGNOSIS — D75.1 ERYTHROCYTOSIS: ICD-10-CM

## 2024-08-13 DIAGNOSIS — D75.1 ERYTHROCYTOSIS: Primary | ICD-10-CM

## 2024-08-13 LAB
BASOPHILS ABSOLUTE: 0 THOU/MM3 (ref 0–0.1)
BASOPHILS NFR BLD AUTO: 0 % (ref 0–3)
EOSINOPHIL NFR BLD AUTO: 0 % (ref 0–4)
EOSINOPHILS ABSOLUTE: 0 THOU/MM3 (ref 0–0.4)
ERYTHROCYTE [DISTWIDTH] IN BLOOD BY AUTOMATED COUNT: 14.1 % (ref 11.5–14.5)
HCT VFR BLD AUTO: 46.6 % (ref 42–52)
HGB BLD-MCNC: 16.4 GM/DL (ref 14–18)
IMMATURE GRANULOCYTES %: 0 %
IMMATURE GRANULOCYTES ABSOLUTE: 0.03 THOU/MM3 (ref 0–0.07)
LYMPHOCYTES ABSOLUTE: 2.6 THOU/MM3 (ref 1–4.8)
LYMPHOCYTES NFR BLD AUTO: 21 % (ref 15–47)
MCH RBC QN AUTO: 35.6 PG (ref 26–33)
MCHC RBC AUTO-ENTMCNC: 35.2 GM/DL (ref 32.2–35.5)
MCV RBC AUTO: 101 FL (ref 80–94)
MONOCYTES ABSOLUTE: 0.8 THOU/MM3 (ref 0.4–1.3)
MONOCYTES NFR BLD AUTO: 7 % (ref 0–12)
NEUTROPHILS ABSOLUTE: 8.7 THOU/MM3 (ref 1.8–7.7)
NEUTROPHILS NFR BLD AUTO: 71 % (ref 43–75)
PLATELET # BLD AUTO: 317 THOU/MM3 (ref 130–400)
PMV BLD AUTO: 9 FL (ref 9.4–12.4)
RBC # BLD AUTO: 4.61 MILL/MM3 (ref 4.7–6.1)
WBC # BLD AUTO: 12.2 THOU/MM3 (ref 4.8–10.8)

## 2024-08-13 PROCEDURE — 3077F SYST BP >= 140 MM HG: CPT | Performed by: NURSE PRACTITIONER

## 2024-08-13 PROCEDURE — 3017F COLORECTAL CA SCREEN DOC REV: CPT | Performed by: NURSE PRACTITIONER

## 2024-08-13 PROCEDURE — 4004F PT TOBACCO SCREEN RCVD TLK: CPT | Performed by: NURSE PRACTITIONER

## 2024-08-13 PROCEDURE — 1123F ACP DISCUSS/DSCN MKR DOCD: CPT | Performed by: NURSE PRACTITIONER

## 2024-08-13 PROCEDURE — 85025 COMPLETE CBC W/AUTO DIFF WBC: CPT

## 2024-08-13 PROCEDURE — 99211 OFF/OP EST MAY X REQ PHY/QHP: CPT

## 2024-08-13 PROCEDURE — G8427 DOCREV CUR MEDS BY ELIG CLIN: HCPCS | Performed by: NURSE PRACTITIONER

## 2024-08-13 PROCEDURE — G8420 CALC BMI NORM PARAMETERS: HCPCS | Performed by: NURSE PRACTITIONER

## 2024-08-13 PROCEDURE — 36415 COLL VENOUS BLD VENIPUNCTURE: CPT

## 2024-08-13 PROCEDURE — 99213 OFFICE O/P EST LOW 20 MIN: CPT | Performed by: NURSE PRACTITIONER

## 2024-08-13 PROCEDURE — 3078F DIAST BP <80 MM HG: CPT | Performed by: NURSE PRACTITIONER

## 2024-12-09 ENCOUNTER — TELEPHONE (OUTPATIENT)
Dept: FAMILY MEDICINE CLINIC | Age: 74
End: 2024-12-09

## 2024-12-09 ENCOUNTER — OFFICE VISIT (OUTPATIENT)
Dept: FAMILY MEDICINE CLINIC | Age: 74
End: 2024-12-09

## 2024-12-09 VITALS
DIASTOLIC BLOOD PRESSURE: 70 MMHG | WEIGHT: 129.9 LBS | HEART RATE: 100 BPM | HEIGHT: 70 IN | SYSTOLIC BLOOD PRESSURE: 128 MMHG | BODY MASS INDEX: 18.6 KG/M2 | RESPIRATION RATE: 18 BRPM

## 2024-12-09 DIAGNOSIS — Z72.0 TOBACCO USE: ICD-10-CM

## 2024-12-09 DIAGNOSIS — I73.9 PVD (PERIPHERAL VASCULAR DISEASE) (HCC): ICD-10-CM

## 2024-12-09 DIAGNOSIS — G56.01 CARPAL TUNNEL SYNDROME ON RIGHT: ICD-10-CM

## 2024-12-09 DIAGNOSIS — D75.1 POLYCYTHEMIA: ICD-10-CM

## 2024-12-09 DIAGNOSIS — R06.02 SOB (SHORTNESS OF BREATH): ICD-10-CM

## 2024-12-09 DIAGNOSIS — R29.898 WEAKNESS OF LEFT SHOULDER: ICD-10-CM

## 2024-12-09 DIAGNOSIS — F10.10 ALCOHOL ABUSE: ICD-10-CM

## 2024-12-09 DIAGNOSIS — E78.00 HYPERCHOLESTEREMIA: ICD-10-CM

## 2024-12-09 DIAGNOSIS — R63.4 UNINTENDED WEIGHT LOSS: ICD-10-CM

## 2024-12-09 DIAGNOSIS — H91.93 BILATERAL HEARING LOSS, UNSPECIFIED HEARING LOSS TYPE: ICD-10-CM

## 2024-12-09 DIAGNOSIS — H61.23 IMPACTED CERUMEN OF BOTH EARS: ICD-10-CM

## 2024-12-09 DIAGNOSIS — Z51.81 MEDICATION MONITORING ENCOUNTER: ICD-10-CM

## 2024-12-09 DIAGNOSIS — R73.01 IFG (IMPAIRED FASTING GLUCOSE): ICD-10-CM

## 2024-12-09 DIAGNOSIS — N40.0 BENIGN PROSTATIC HYPERPLASIA, UNSPECIFIED WHETHER LOWER URINARY TRACT SYMPTOMS PRESENT: ICD-10-CM

## 2024-12-09 DIAGNOSIS — R63.0 DECREASED APPETITE: Primary | ICD-10-CM

## 2024-12-09 DIAGNOSIS — R49.0 HOARSENESS OF VOICE: ICD-10-CM

## 2024-12-09 DIAGNOSIS — F10.20 UNCOMPLICATED ALCOHOL DEPENDENCE (HCC): ICD-10-CM

## 2024-12-09 DIAGNOSIS — K21.9 GASTROESOPHAGEAL REFLUX DISEASE, UNSPECIFIED WHETHER ESOPHAGITIS PRESENT: ICD-10-CM

## 2024-12-09 DIAGNOSIS — Z91.81 AT HIGH RISK FOR FALLS: ICD-10-CM

## 2024-12-09 DIAGNOSIS — I10 ESSENTIAL (PRIMARY) HYPERTENSION: ICD-10-CM

## 2024-12-09 PROBLEM — M67.912 UNSPECIFIED DISORDER OF SYNOVIUM AND TENDON, LEFT SHOULDER: Status: ACTIVE | Noted: 2024-12-09

## 2024-12-09 PROBLEM — M75.120 FULL THICKNESS ROTATOR CUFF TEAR: Status: ACTIVE | Noted: 2024-12-09

## 2024-12-09 PROBLEM — M67.922 TENDINOPATHY OF LEFT BICEPS TENDON: Status: ACTIVE | Noted: 2024-12-09

## 2024-12-09 PROBLEM — S49.92XA INJURY OF LEFT SHOULDER: Status: ACTIVE | Noted: 2024-12-09

## 2024-12-09 RX ORDER — AMLODIPINE BESYLATE 5 MG/1
5 TABLET ORAL DAILY
Qty: 90 TABLET | Refills: 3 | Status: SHIPPED | OUTPATIENT
Start: 2024-12-09

## 2024-12-09 RX ORDER — BENAZEPRIL HYDROCHLORIDE 20 MG/1
20 TABLET ORAL DAILY
Qty: 90 TABLET | Refills: 3 | Status: SHIPPED | OUTPATIENT
Start: 2024-12-09

## 2024-12-09 SDOH — ECONOMIC STABILITY: FOOD INSECURITY: WITHIN THE PAST 12 MONTHS, YOU WORRIED THAT YOUR FOOD WOULD RUN OUT BEFORE YOU GOT MONEY TO BUY MORE.: NEVER TRUE

## 2024-12-09 SDOH — ECONOMIC STABILITY: FOOD INSECURITY: WITHIN THE PAST 12 MONTHS, THE FOOD YOU BOUGHT JUST DIDN'T LAST AND YOU DIDN'T HAVE MONEY TO GET MORE.: NEVER TRUE

## 2024-12-09 SDOH — ECONOMIC STABILITY: INCOME INSECURITY: HOW HARD IS IT FOR YOU TO PAY FOR THE VERY BASICS LIKE FOOD, HOUSING, MEDICAL CARE, AND HEATING?: NOT HARD AT ALL

## 2024-12-09 ASSESSMENT — PATIENT HEALTH QUESTIONNAIRE - PHQ9
1. LITTLE INTEREST OR PLEASURE IN DOING THINGS: NOT AT ALL
SUM OF ALL RESPONSES TO PHQ QUESTIONS 1-9: 0
2. FEELING DOWN, DEPRESSED OR HOPELESS: NOT AT ALL
SUM OF ALL RESPONSES TO PHQ QUESTIONS 1-9: 0
SUM OF ALL RESPONSES TO PHQ9 QUESTIONS 1 & 2: 0
SUM OF ALL RESPONSES TO PHQ QUESTIONS 1-9: 0
SUM OF ALL RESPONSES TO PHQ QUESTIONS 1-9: 0

## 2024-12-09 NOTE — PROGRESS NOTES
Masood Randhawa (:  1950) is a 74 y.o. male,Established patient, here for evaluation of the following chief complaint(s):  Annual Exam, Shortness of Breath (C/o out of breath /Not eating much- weight loss /Balance off /Lost voice about a year ago /Sweats /), and Hearing Loss (Left )          Subjective   SUBJECTIVE/OBJECTIVE:  HPI  Chief Complaint   Patient presents with    Annual Exam    Shortness of Breath     C/o out of breath   Not eating much- weight loss   Balance off   Lost voice about a year ago   Sweats       Hearing Loss     Left      Annual eval.    Several concerns today.    Pt c/o hoarseness for several months.  Uncertain cause.  Some PND.  Denies GERD.  He is having some SOB but continues to smoke.    Also with concerns regarding decreased appetite and weight loss.  Down significantly as noted below.      Wt Readings from Last 3 Encounters:   24 58.9 kg (129 lb 14.4 oz)   24 61.4 kg (135 lb 6.4 oz)   24 64.4 kg (142 lb)     BP Readings from Last 3 Encounters:   24 128/70   24 (!) 149/70   24 (!) 149/70     Also c/o right hand pain and weakness for over a year.  Has noticed muscle atrophy near the thumb.    Following on regular basis with Oncology.        Patient Active Problem List   Diagnosis    HTN (hypertension)    Hypercholesteremia    PVD (peripheral vascular disease) (HCC)    Low back pain    ED (erectile dysfunction)    OA (osteoarthritis), multiple joints    BPH (benign prostatic hyperplasia)    Medication monitoring encounter    Tobacco use    IFG (impaired fasting glucose)    Compression fracture of L4 lumbar vertebra, age indeterminate.    Foraminal stenosis of lumbosacral region. L3-4 to L5-S1 due to herniated disc.    Left leg weakness    Uncomplicated alcohol dependence (HCC), 8-10 beers daily.    OAB (overactive bladder)    Unspecified disorder of synovium and tendon, left shoulder    Tendinopathy of left biceps tendon    Injury of left

## 2024-12-09 NOTE — TELEPHONE ENCOUNTER
Patient was seen today and forgot to mention about a few times having diarrhea and not making it to the bathroom in time.

## 2024-12-09 NOTE — PATIENT INSTRUCTIONS
appointments span over three weeks.  The respiratory therapist schedules one of the appointments to occur 48 hours after the patient’s quit date.   Cost $100 total for the four sessions.  Tobacco cessation products are not included in the cost and are not provided by [facility].

## 2024-12-10 ASSESSMENT — ENCOUNTER SYMPTOMS
GASTROINTESTINAL NEGATIVE: 1
VOMITING: 0
DIARRHEA: 0
VOICE CHANGE: 1
NAUSEA: 0
WHEEZING: 1
SHORTNESS OF BREATH: 1
CHEST TIGHTNESS: 1
COUGH: 1
BLOOD IN STOOL: 0

## 2024-12-19 LAB
A/G RATIO: 1.3 (ref 1.5–2.5)
ALBUMIN: 4.3 G/DL (ref 3.5–5)
ALP BLD-CCNC: 66 IU/L (ref 41–137)
ALT SERPL-CCNC: 11 IU/L (ref 10–40)
ANION GAP SERPL CALCULATED.3IONS-SCNC: 10 MMOL/L (ref 4–12)
AST SERPL-CCNC: 15 IU/L (ref 15–41)
BASOPHILS ABSOLUTE: 100 /CMM (ref 0–200)
BASOPHILS RELATIVE PERCENT: 0.7 % (ref 0–2)
BILIRUB SERPL-MCNC: 0.8 MG/DL (ref 0.2–1)
BUN BLDV-MCNC: 8 MG/DL (ref 7–20)
CALCIUM SERPL-MCNC: 9 MG/DL (ref 8.8–10.5)
CHLORIDE BLD-SCNC: 96 MEQ/L (ref 101–111)
CHOLESTEROL, TOTAL: 156 MG/DL
CHOLESTEROL/HDL RELATIVE RISK: 1.7 (ref 4–5)
CO2: 29 MEQ/L (ref 21–32)
CREAT SERPL-MCNC: 0.61 MG/DL (ref 0.6–1.3)
CREATININE CLEARANCE: >60
DIRECT-LDL / HDL RISK: 0.7
EOSINOPHILS ABSOLUTE: 100 /CMM (ref 0–500)
EOSINOPHILS RELATIVE PERCENT: 0.7 % (ref 0–6)
ESTIMATED AVERAGE GLUCOSE: 100 MG/DL
FOLATE: 15.7 NG/ML
GLUCOSE: 115 MG/DL (ref 70–110)
HBA1C MFR BLD: 5.1 % (ref 4.4–6.4)
HCT VFR BLD CALC: 51.4 % (ref 40–49)
HDLC SERPL-MCNC: 87 MG/DL
HEMOGLOBIN: 17.1 GM/DL (ref 13.5–16.5)
LDL CHOLESTEROL DIRECT: 62 MG/DL
LIPASE: 48 IU/L (ref 21–51)
LYMPHOCYTES ABSOLUTE: 2400 /CMM (ref 1000–4800)
LYMPHOCYTES RELATIVE PERCENT: 25.8 % (ref 15–45)
MACROCYTOSIS: ABNORMAL
MAGNESIUM: 2 MG/DL (ref 1.8–2.5)
MCH RBC QN AUTO: 36.1 PG (ref 27.5–33)
MCHC RBC AUTO-ENTMCNC: 33.2 GM/DL (ref 33–36)
MCV RBC AUTO: 108.7 CU MIC (ref 80–97)
MONOCYTES ABSOLUTE: 800 /CMM (ref 0–800)
MONOCYTES RELATIVE PERCENT: 8.2 % (ref 2–10)
NEUTROPHILS ABSOLUTE: 5900 /CMM (ref 1800–7700)
NEUTROPHILS RELATIVE PERCENT: 64.6 % (ref 40–70)
NUCLEATED RBCS: 0.1 /100 WBC
PDW BLD-RTO: 14.6 % (ref 12–16)
PLATELET # BLD: 352 TH/CMM (ref 150–400)
POTASSIUM SERPL-SCNC: 3.7 MEQ/L (ref 3.6–5)
RBC # BLD: 4.73 MIL/CMM (ref 4.5–6)
SODIUM BLD-SCNC: 135 MEQ/L (ref 135–145)
TOTAL PROTEIN: 7.7 G/DL (ref 6.2–8)
TRIGL SERPL-MCNC: 64 MG/DL
TSH REFLEX: 3.62 MCIU/ML (ref 0.49–4.67)
VITAMIN B-12: 206 PG/ML (ref 180–914)
VLDLC SERPL CALC-MCNC: 12 MG/DL
WBC # BLD: 9.2 TH/CMM (ref 4.4–10.5)

## 2024-12-23 ENCOUNTER — TELEPHONE (OUTPATIENT)
Dept: FAMILY MEDICINE CLINIC | Age: 74
End: 2024-12-23

## 2024-12-23 RX ORDER — ALBUTEROL SULFATE 90 UG/1
2 INHALANT RESPIRATORY (INHALATION) 4 TIMES DAILY PRN
Qty: 18 G | Refills: 1 | Status: SHIPPED | OUTPATIENT
Start: 2024-12-23

## 2024-12-23 NOTE — TELEPHONE ENCOUNTER
Wife Soraya calling regarding patient. Requesting inhaler to help with SOB and breathing issues until all testing can be completed  Pharmacy is [facility].  Please advise.  Will check with pharmacy after 4pm.

## 2024-12-24 LAB — THIAMINE BLOOD: 104 NMOL/L (ref 70–180)

## 2025-01-07 ENCOUNTER — HOSPITAL ENCOUNTER (OUTPATIENT)
Dept: PULMONOLOGY | Age: 75
Discharge: HOME | End: 2025-01-07
Attending: FAMILY MEDICINE
Payer: MEDICARE

## 2025-01-07 DIAGNOSIS — R06.02 SOB (SHORTNESS OF BREATH): ICD-10-CM

## 2025-01-07 DIAGNOSIS — Z72.0 TOBACCO USE: ICD-10-CM

## 2025-01-07 PROCEDURE — 94726 PLETHYSMOGRAPHY LUNG VOLUMES: CPT

## 2025-01-07 PROCEDURE — 94060 EVALUATION OF WHEEZING: CPT

## 2025-01-07 PROCEDURE — 94729 DIFFUSING CAPACITY: CPT

## 2025-01-07 NOTE — PROGRESS NOTES
[facility] [place] [facility]  [facility] EAR, NOSE AND THROAT  [address]  [address]  [facility] [place] [address]  Dept: [phone]  Dept Fax: [phone]  Loc: [phone]    Masood Randhawa is a 74 y.o. male who was referred by Samy Piper DO for:  Chief Complaint   Patient presents with    Hoarse     New patient here for evaluation of his hoarseness, voice changes and weight loss. Hoarseness and weight loss has been going on for about a year. Only once in a whole has trouble swallowing. Denies sore throat. Referred by Samy Piper DO   .    HPI:     Masood Randhawa presents today for evaluation of hoarseness.  Patient was referred by Samy Piper DO; notes reviewed.  Patient is accompanied by spouse who assist with obtaining primary history.  She states that patient has been putting off 'getting himself taken care of' for many years and she is finally persisted in getting him established with different healthcare providers.  Patient has longstanding history of cigarette smoking of 1 pack/day for the last 50 years and is currently down to a quarter of a pack a day.  He states that he is currently in the process of trying to quit.  He also has history of alcohol abuse for the last 60 years drinking approximately 8 beers per day and whiskey every now and then.  Patient states that he has had intermittent hoarseness of the voice but it has become more persistent over the last year and getting worse.  He voices concerns regarding night sweats happening about 3 times a week and unintentional weight loss from 180 pounds to 120 pounds in the last year.  Patient has history of wheezing with exertion but overall he denies any concern for shortness of breath at rest or with minimal exertion.  Spouse however states that he does get winded with walking around.  No reports of inspiratory or expiratory stridor noted.  No reports of hemoptysis or radiating otalgia.  He feels

## 2025-01-08 ENCOUNTER — OFFICE VISIT (OUTPATIENT)
Dept: ENT CLINIC | Age: 75
End: 2025-01-08
Payer: MEDICARE

## 2025-01-08 VITALS
SYSTOLIC BLOOD PRESSURE: 118 MMHG | BODY MASS INDEX: 17.04 KG/M2 | TEMPERATURE: 97.8 F | WEIGHT: 119 LBS | HEIGHT: 70 IN | HEART RATE: 97 BPM | DIASTOLIC BLOOD PRESSURE: 74 MMHG | OXYGEN SATURATION: 97 %

## 2025-01-08 DIAGNOSIS — J38.6 SUPRAGLOTTIC AIRWAY OBSTRUCTION: ICD-10-CM

## 2025-01-08 DIAGNOSIS — Z01.818 PRE-OP TESTING: Primary | ICD-10-CM

## 2025-01-08 DIAGNOSIS — R49.0 HOARSENESS OF VOICE: ICD-10-CM

## 2025-01-08 DIAGNOSIS — K21.9 GASTROESOPHAGEAL REFLUX DISEASE WITHOUT ESOPHAGITIS: ICD-10-CM

## 2025-01-08 DIAGNOSIS — Z78.9 ALCOHOL USE: ICD-10-CM

## 2025-01-08 DIAGNOSIS — H61.23 HEARING LOSS OF BOTH EARS DUE TO CERUMEN IMPACTION: ICD-10-CM

## 2025-01-08 DIAGNOSIS — Z87.891 HISTORY OF SMOKING GREATER THAN 50 PACK YEARS: ICD-10-CM

## 2025-01-08 DIAGNOSIS — R63.4 UNINTENTIONAL WEIGHT LOSS: ICD-10-CM

## 2025-01-08 DIAGNOSIS — R61 NIGHT SWEATS: ICD-10-CM

## 2025-01-08 DIAGNOSIS — R09.89 ABNORMAL FINDINGS ON LARYNGOSCOPY: Primary | ICD-10-CM

## 2025-01-08 PROCEDURE — 3074F SYST BP LT 130 MM HG: CPT | Performed by: REGISTERED NURSE

## 2025-01-08 PROCEDURE — 31575 DIAGNOSTIC LARYNGOSCOPY: CPT | Performed by: REGISTERED NURSE

## 2025-01-08 PROCEDURE — 4004F PT TOBACCO SCREEN RCVD TLK: CPT | Performed by: REGISTERED NURSE

## 2025-01-08 PROCEDURE — 3017F COLORECTAL CA SCREEN DOC REV: CPT | Performed by: REGISTERED NURSE

## 2025-01-08 PROCEDURE — 1159F MED LIST DOCD IN RCRD: CPT | Performed by: REGISTERED NURSE

## 2025-01-08 PROCEDURE — 99204 OFFICE O/P NEW MOD 45 MIN: CPT | Performed by: REGISTERED NURSE

## 2025-01-08 PROCEDURE — G8427 DOCREV CUR MEDS BY ELIG CLIN: HCPCS | Performed by: REGISTERED NURSE

## 2025-01-08 PROCEDURE — G8419 CALC BMI OUT NRM PARAM NOF/U: HCPCS | Performed by: REGISTERED NURSE

## 2025-01-08 PROCEDURE — 1123F ACP DISCUSS/DSCN MKR DOCD: CPT | Performed by: REGISTERED NURSE

## 2025-01-08 PROCEDURE — 3078F DIAST BP <80 MM HG: CPT | Performed by: REGISTERED NURSE

## 2025-01-08 PROCEDURE — M1308 PR FLU IMMUNIZE NO ADMIN: HCPCS | Performed by: REGISTERED NURSE

## 2025-01-08 PROCEDURE — 69210 REMOVE IMPACTED EAR WAX UNI: CPT | Performed by: REGISTERED NURSE

## 2025-01-08 PROCEDURE — 1160F RVW MEDS BY RX/DR IN RCRD: CPT | Performed by: REGISTERED NURSE

## 2025-01-08 RX ORDER — PANTOPRAZOLE SODIUM 40 MG/1
40 TABLET, DELAYED RELEASE ORAL
Qty: 90 TABLET | Refills: 0 | Status: SHIPPED | OUTPATIENT
Start: 2025-01-08

## 2025-01-08 RX ORDER — FAMOTIDINE 40 MG/1
40 TABLET, FILM COATED ORAL EVERY EVENING
Qty: 30 TABLET | Refills: 3 | Status: SHIPPED | OUTPATIENT
Start: 2025-01-08

## 2025-01-10 ENCOUNTER — TELEPHONE (OUTPATIENT)
Dept: FAMILY MEDICINE CLINIC | Age: 75
End: 2025-01-10

## 2025-01-10 ENCOUNTER — HOSPITAL ENCOUNTER (OUTPATIENT)
Dept: CT IMAGING | Age: 75
Discharge: HOME | End: 2025-01-10
Attending: FAMILY MEDICINE
Payer: MEDICARE

## 2025-01-10 ENCOUNTER — HOSPITAL ENCOUNTER (OUTPATIENT)
Dept: CT IMAGING | Age: 75
Discharge: HOME | End: 2025-01-10
Attending: REGISTERED NURSE
Payer: MEDICARE

## 2025-01-10 ENCOUNTER — PREP FOR PROCEDURE (OUTPATIENT)
Dept: ENT CLINIC | Age: 75
End: 2025-01-10

## 2025-01-10 DIAGNOSIS — Z87.891 HISTORY OF SMOKING GREATER THAN 50 PACK YEARS: ICD-10-CM

## 2025-01-10 DIAGNOSIS — R63.4 UNINTENTIONAL WEIGHT LOSS: ICD-10-CM

## 2025-01-10 DIAGNOSIS — J38.6 SUPRAGLOTTIC AIRWAY OBSTRUCTION: ICD-10-CM

## 2025-01-10 DIAGNOSIS — R61 NIGHT SWEATS: ICD-10-CM

## 2025-01-10 DIAGNOSIS — Z78.9 ALCOHOL USE: ICD-10-CM

## 2025-01-10 DIAGNOSIS — R06.81: ICD-10-CM

## 2025-01-10 DIAGNOSIS — R09.89 ABNORMAL FINDINGS ON LARYNGOSCOPY: ICD-10-CM

## 2025-01-10 DIAGNOSIS — R49.0 HOARSENESS OF VOICE: ICD-10-CM

## 2025-01-10 DIAGNOSIS — R06.02 SOB (SHORTNESS OF BREATH): ICD-10-CM

## 2025-01-10 DIAGNOSIS — K21.9: ICD-10-CM

## 2025-01-10 DIAGNOSIS — Z72.0 TOBACCO USE: ICD-10-CM

## 2025-01-10 DIAGNOSIS — R63.4 UNINTENDED WEIGHT LOSS: ICD-10-CM

## 2025-01-10 DIAGNOSIS — K21.9 GASTROESOPHAGEAL REFLUX DISEASE WITHOUT ESOPHAGITIS: ICD-10-CM

## 2025-01-10 DIAGNOSIS — R63.0 DECREASED APPETITE: ICD-10-CM

## 2025-01-10 PROCEDURE — 71260 CT THORAX DX C+: CPT

## 2025-01-10 PROCEDURE — 74177 CT ABD & PELVIS W/CONTRAST: CPT

## 2025-01-10 PROCEDURE — 70491 CT SOFT TISSUE NECK W/DYE: CPT

## 2025-01-10 PROCEDURE — 6360000004 HC RX CONTRAST MEDICATION: Performed by: FAMILY MEDICINE

## 2025-01-10 RX ORDER — IOPAMIDOL 755 MG/ML
85 INJECTION, SOLUTION INTRAVASCULAR
Status: COMPLETED | OUTPATIENT
Start: 2025-01-10 | End: 2025-01-10

## 2025-01-10 RX ADMIN — IOPAMIDOL 85 ML: 755 INJECTION, SOLUTION INTRAVENOUS at 14:24

## 2025-01-10 NOTE — TELEPHONE ENCOUNTER
Masood is scheduled for surgery with Dr Daley on 2/12/25. We are requesting a medical clearance. Patient will obtain an updated EKG, already had labs and CXR on 12/19/24.    Surgery:  Biopsy of larynx/supraglottis with possible debulking    Please advise.    Thank you!

## 2025-01-13 ENCOUNTER — TELEPHONE (OUTPATIENT)
Dept: ENT CLINIC | Age: 75
End: 2025-01-13

## 2025-01-13 DIAGNOSIS — R49.0 HOARSENESS OF VOICE: Primary | ICD-10-CM

## 2025-01-13 DIAGNOSIS — J38.3 VOCAL CORD MASS: ICD-10-CM

## 2025-01-13 DIAGNOSIS — J38.6 SUPRAGLOTTIC AIRWAY OBSTRUCTION: ICD-10-CM

## 2025-01-13 DIAGNOSIS — R63.4 UNINTENTIONAL WEIGHT LOSS: ICD-10-CM

## 2025-01-13 DIAGNOSIS — R59.0 LYMPHADENOPATHY OF HEAD AND NECK REGION: ICD-10-CM

## 2025-01-13 DIAGNOSIS — R61 NIGHT SWEATS: ICD-10-CM

## 2025-01-13 DIAGNOSIS — Z87.891 HISTORY OF SMOKING GREATER THAN 50 PACK YEARS: ICD-10-CM

## 2025-01-13 DIAGNOSIS — R09.89 ABNORMAL FINDINGS ON LARYNGOSCOPY: ICD-10-CM

## 2025-01-13 NOTE — TELEPHONE ENCOUNTER
Reviewed patient's CT soft tissue neck with Dr. Daley in office this morning with recommendations for further surgical care/adjustment to previous surgical plan.  I called patient and no answer; left voicemail to return call.    Recommended surgical procedure as follows:  Transoral biopsy and debulking of laryngeal mass with transoral partial vertical laryngectomy.    Would also recommend biopsy of noted necrotic lymph node to the right deep to the SCM a the level of the vocal cords.    I would like to personally review recommended procedures with patient/spouse when they return call.

## 2025-01-14 PROBLEM — R59.0 LYMPHADENOPATHY OF HEAD AND NECK REGION: Status: ACTIVE | Noted: 2025-01-14

## 2025-01-14 NOTE — TELEPHONE ENCOUNTER
I was able to speak with patient and spouse over the phone and reviewed recommendations in detail with them.   They wish to proceed with additional surgical intervention as well as biopsy of right lymph node.    Orders placed for addendum to surgical plan as well as biopsy of lymph node if we can assist with scheduling for the OR time as well as biopsy.    Thank you!

## 2025-01-24 ENCOUNTER — TELEPHONE (OUTPATIENT)
Dept: FAMILY MEDICINE CLINIC | Age: 75
End: 2025-01-24

## 2025-01-24 NOTE — TELEPHONE ENCOUNTER
Wife Soraya on [employer]IP[employer] called office stating pt had 2 CTs done about 2wks ago and they have not heard anything about results. Please advise.

## 2025-01-24 NOTE — TELEPHONE ENCOUNTER
CT chest showed an abnormality in the neck region, this is already being followed by ENT.  The CT abd/pelvis looked good overall.  Questionable mild inflammation of the colon.  GI referral was faxed on 12/20, does he have a scheduled appt?

## 2025-01-24 NOTE — TELEPHONE ENCOUNTER
Wife Soraya notified. Patient is scheduled  for two procedures on 2/4/25 and 2/12/25 for his neck. Also patient did see GI. Visit note in media tab on 1/3/25. She stated all they did was give him a kit to collect his diarrhea with, in which he has not completed at this time. Stated his neck is probably a little more important right now. She stated they believe it is cancer. Much support provided to wife at this time.

## 2025-01-29 ENCOUNTER — TELEPHONE (OUTPATIENT)
Dept: FAMILY MEDICINE CLINIC | Age: 75
End: 2025-01-29

## 2025-01-29 RX ORDER — ALBUTEROL SULFATE 90 UG/1
2 INHALANT RESPIRATORY (INHALATION) EVERY 6 HOURS PRN
Qty: 18 G | Refills: 1 | Status: SHIPPED | OUTPATIENT
Start: 2025-01-29

## 2025-01-29 NOTE — TELEPHONE ENCOUNTER
Received a fax from [facility] patient was given a 30 day temporary supply of albuterol hfa 90 mcg inhaler. Reason for notification: this drug is not on our formulary.

## 2025-02-04 ENCOUNTER — HOSPITAL ENCOUNTER (OUTPATIENT)
Age: 75
Discharge: HOME | End: 2025-02-04
Payer: MEDICARE

## 2025-02-04 ENCOUNTER — HOSPITAL ENCOUNTER (OUTPATIENT)
Dept: ULTRASOUND IMAGING | Age: 75
Discharge: HOME | End: 2025-02-04
Payer: MEDICARE

## 2025-02-04 DIAGNOSIS — J38.6 SUPRAGLOTTIC AIRWAY OBSTRUCTION: ICD-10-CM

## 2025-02-04 DIAGNOSIS — R59.0 LYMPHADENOPATHY OF HEAD AND NECK REGION: ICD-10-CM

## 2025-02-04 DIAGNOSIS — J38.3 VOCAL CORD MASS: ICD-10-CM

## 2025-02-04 DIAGNOSIS — R09.89 ABNORMAL FINDINGS ON LARYNGOSCOPY: ICD-10-CM

## 2025-02-04 PROCEDURE — 76942 ECHO GUIDE FOR BIOPSY: CPT

## 2025-02-04 PROCEDURE — 93005 ELECTROCARDIOGRAM TRACING: CPT | Performed by: OTOLARYNGOLOGY

## 2025-02-05 LAB
EKG ATRIAL RATE: 91 BPM
EKG P AXIS: 83 DEGREES
EKG P-R INTERVAL: 126 MS
EKG Q-T INTERVAL: 346 MS
EKG QRS DURATION: 84 MS
EKG QTC CALCULATION (BAZETT): 425 MS
EKG R AXIS: 65 DEGREES
EKG T AXIS: 23 DEGREES
EKG VENTRICULAR RATE: 91 BPM

## 2025-02-07 ENCOUNTER — TELEPHONE (OUTPATIENT)
Dept: ENT CLINIC | Age: 75
End: 2025-02-07

## 2025-02-07 NOTE — TELEPHONE ENCOUNTER
I called and spoke with patients spouse Soraya regarding pathology results from lymph node biopsy consistent with squamous cell carcinoma.    Patient was asleep at this time and she requested that I speak with her. We discussed that patient has been more weak lately, sleeping longer, and it is getting harder for him to swallow food/liquids, and harder to breathe. Spouse states she has been watching him gasp for air more frequently lately.  I advised Soraya that these are all concerning findings and would recommend ER evaluation and consideration for admission. 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  Soraya voiced that patient is very stubborn and noncompliant and she doesn't think he would be agreeable to ER evaluation/admission. She asked that I call her back later this afternoon when patient is awake to have this discussion further with him and I will.

## 2025-02-07 NOTE — TELEPHONE ENCOUNTER
Dr. Daley; for your information.    Patient scheduled for procedure 2/12: laryngoscopy with biopsy of larynx/supraglottis with possible debulking and transoral partial vertical laryngectomy.     Recent FNA of lymph node to neck positive for SCC.

## 2025-02-07 NOTE — TELEPHONE ENCOUNTER
Called patient and spouse back as requested this afternoon and reviewed pathology report findings with patient. He understands.  Patient confirms he has been more weak, tired, struggling to eat/swallow, and increasingly short of breath. I advised him he needed to come in through ER for evaluation and most likely admission. Patient stated he understood and he deferred presentation today against my recommendations; plans to present to ER tomorrow afternoon.  We discussed he will most likely require admission for close airway monitoring; potentially HFNC, IV fluids and potentially NG tube placement for supplemental nutrition with consideration for sooner surgical care than already scheduled next week with Dr. Daley.    Patient and wife understand and they plan to come in to ER tomorrow afternoon per patient request. I advised spouse that if acute SOB were to occur; need to come sooner.    Patient understands he is a complex airway and if he were to decompensate with his breathing he could require temporary tracheostomy tube placement. He notes he would agree to this if it were completely necessary.    At this time he wishes to have everything done to treat his cancer.    Will consider outpatient rad/onc consultation in the future pending acute concerns/surgical care.

## 2025-02-08 ENCOUNTER — APPOINTMENT (OUTPATIENT)
Dept: CT IMAGING | Age: 75
DRG: 143 | End: 2025-02-08
Payer: MEDICARE

## 2025-02-08 ENCOUNTER — APPOINTMENT (OUTPATIENT)
Dept: GENERAL RADIOLOGY | Age: 75
DRG: 143 | End: 2025-02-08
Payer: MEDICARE

## 2025-02-08 ENCOUNTER — HOSPITAL ENCOUNTER (INPATIENT)
Age: 75
LOS: 9 days | Discharge: HOME | DRG: 143 | End: 2025-02-17
Attending: EMERGENCY MEDICINE | Admitting: STUDENT IN AN ORGANIZED HEALTH CARE EDUCATION/TRAINING PROGRAM
Payer: MEDICARE

## 2025-02-08 DIAGNOSIS — R09.89 ABNORMAL FINDINGS ON LARYNGOSCOPY: ICD-10-CM

## 2025-02-08 DIAGNOSIS — K21.9: ICD-10-CM

## 2025-02-08 DIAGNOSIS — J38.3 VOCAL CORD MASS: ICD-10-CM

## 2025-02-08 DIAGNOSIS — R63.4 UNINTENTIONAL WEIGHT LOSS: ICD-10-CM

## 2025-02-08 DIAGNOSIS — R49.0 HOARSENESS OF VOICE: ICD-10-CM

## 2025-02-08 DIAGNOSIS — C32.0 CANCER OF GLOTTIS (HCC): ICD-10-CM

## 2025-02-08 DIAGNOSIS — C76.0 HEAD AND NECK CANCER (HCC): ICD-10-CM

## 2025-02-08 DIAGNOSIS — R62.7 FAILURE TO THRIVE IN ADULT: Primary | ICD-10-CM

## 2025-02-08 DIAGNOSIS — R61 NIGHT SWEATS: ICD-10-CM

## 2025-02-08 DIAGNOSIS — R59.0 LYMPHADENOPATHY OF HEAD AND NECK REGION: ICD-10-CM

## 2025-02-08 DIAGNOSIS — Z87.891 HISTORY OF SMOKING GREATER THAN 50 PACK YEARS: ICD-10-CM

## 2025-02-08 DIAGNOSIS — R06.81: ICD-10-CM

## 2025-02-08 DIAGNOSIS — E43 SEVERE MALNUTRITION: ICD-10-CM

## 2025-02-08 DIAGNOSIS — R00.0 TACHYCARDIA: ICD-10-CM

## 2025-02-08 DIAGNOSIS — J38.6 SUPRAGLOTTIC AIRWAY OBSTRUCTION: ICD-10-CM

## 2025-02-08 LAB
ANION GAP SERPL CALC-SCNC: 19 MEQ/L (ref 8–16)
BASOPHILS ABSOLUTE: 0.1 THOU/MM3 (ref 0–0.1)
BASOPHILS NFR BLD AUTO: 0.6 %
BUN SERPL-MCNC: 14 MG/DL (ref 7–22)
CALCIUM SERPL-MCNC: 9.3 MG/DL (ref 8.5–10.5)
CHLORIDE SERPL-SCNC: 92 MEQ/L (ref 98–111)
CO2 SERPL-SCNC: 24 MEQ/L (ref 23–33)
CREAT SERPL-MCNC: 0.6 MG/DL (ref 0.4–1.2)
DEPRECATED RDW RBC AUTO: 47.5 FL (ref 35–45)
EOSINOPHIL NFR BLD AUTO: 0.2 %
EOSINOPHILS ABSOLUTE: 0 THOU/MM3 (ref 0–0.4)
ERYTHROCYTE [DISTWIDTH] IN BLOOD BY AUTOMATED COUNT: 13.2 % (ref 11.5–14.5)
GFR SERPL CREATININE-BSD FRML MDRD: > 90 ML/MIN/1.73M2
GLUCOSE SERPL-MCNC: 84 MG/DL (ref 70–108)
HCT VFR BLD AUTO: 40.6 % (ref 42–52)
HGB BLD-MCNC: 14.6 GM/DL (ref 14–18)
IMM GRANULOCYTES # BLD AUTO: 0.03 THOU/MM3 (ref 0–0.07)
IMM GRANULOCYTES NFR BLD AUTO: 0.3 %
LACTIC ACID, SEPSIS: 1 MMOL/L (ref 0.5–1.9)
LACTIC ACID, SEPSIS: 3.1 MMOL/L (ref 0.5–1.9)
LYMPHOCYTES ABSOLUTE: 2.2 THOU/MM3 (ref 1–4.8)
LYMPHOCYTES NFR BLD AUTO: 21.6 %
MAGNESIUM SERPL-MCNC: 1.8 MG/DL (ref 1.6–2.4)
MCH RBC QN AUTO: 35.1 PG (ref 26–33)
MCHC RBC AUTO-ENTMCNC: 36 GM/DL (ref 32.2–35.5)
MCV RBC AUTO: 97.6 FL (ref 80–94)
MONOCYTES ABSOLUTE: 0.8 THOU/MM3 (ref 0.4–1.3)
MONOCYTES NFR BLD AUTO: 8 %
NEUTROPHILS ABSOLUTE: 7 THOU/MM3 (ref 1.8–7.7)
NEUTROPHILS NFR BLD AUTO: 69.3 %
NRBC BLD AUTO-RTO: 0 /100 WBC
OSMOLALITY SERPL CALC.SUM OF ELEC: 269.8 MOSMOL/KG (ref 275–300)
PLATELET # BLD AUTO: 324 THOU/MM3 (ref 130–400)
PMV BLD AUTO: 10 FL (ref 9.4–12.4)
POTASSIUM SERPL-SCNC: 3.6 MEQ/L (ref 3.5–5.2)
RBC # BLD AUTO: 4.16 MILL/MM3 (ref 4.7–6.1)
SODIUM SERPL-SCNC: 135 MEQ/L (ref 135–145)
TROPONIN, HIGH SENSITIVITY: 18 NG/L (ref 0–12)
TROPONIN, HIGH SENSITIVITY: 21 NG/L (ref 0–12)
WBC # BLD AUTO: 10.1 THOU/MM3 (ref 4.8–10.8)

## 2025-02-08 PROCEDURE — 71260 CT THORAX DX C+: CPT

## 2025-02-08 PROCEDURE — 2500000003 HC RX 250 WO HCPCS

## 2025-02-08 PROCEDURE — 84484 ASSAY OF TROPONIN QUANT: CPT

## 2025-02-08 PROCEDURE — 70491 CT SOFT TISSUE NECK W/DYE: CPT

## 2025-02-08 PROCEDURE — 94760 N-INVAS EAR/PLS OXIMETRY 1: CPT

## 2025-02-08 PROCEDURE — 83605 ASSAY OF LACTIC ACID: CPT

## 2025-02-08 PROCEDURE — 2140000000 HC CCU INTERMEDIATE R&B

## 2025-02-08 PROCEDURE — 71045 X-RAY EXAM CHEST 1 VIEW: CPT

## 2025-02-08 PROCEDURE — 94640 AIRWAY INHALATION TREATMENT: CPT

## 2025-02-08 PROCEDURE — 84134 ASSAY OF PREALBUMIN: CPT

## 2025-02-08 PROCEDURE — 99222 1ST HOSP IP/OBS MODERATE 55: CPT

## 2025-02-08 PROCEDURE — 2580000003 HC RX 258: Performed by: EMERGENCY MEDICINE

## 2025-02-08 PROCEDURE — 93005 ELECTROCARDIOGRAM TRACING: CPT | Performed by: EMERGENCY MEDICINE

## 2025-02-08 PROCEDURE — 85025 COMPLETE CBC W/AUTO DIFF WBC: CPT

## 2025-02-08 PROCEDURE — 80048 BASIC METABOLIC PNL TOTAL CA: CPT

## 2025-02-08 PROCEDURE — 36415 COLL VENOUS BLD VENIPUNCTURE: CPT

## 2025-02-08 PROCEDURE — 6370000000 HC RX 637 (ALT 250 FOR IP)

## 2025-02-08 PROCEDURE — 6360000004 HC RX CONTRAST MEDICATION: Performed by: EMERGENCY MEDICINE

## 2025-02-08 PROCEDURE — 99285 EMERGENCY DEPT VISIT HI MDM: CPT

## 2025-02-08 PROCEDURE — 83735 ASSAY OF MAGNESIUM: CPT

## 2025-02-08 RX ORDER — MAGNESIUM SULFATE IN WATER 40 MG/ML
2000 INJECTION, SOLUTION INTRAVENOUS PRN
Status: DISCONTINUED | OUTPATIENT
Start: 2025-02-08 | End: 2025-02-17 | Stop reason: HOSPADM

## 2025-02-08 RX ORDER — LISINOPRIL 20 MG/1
20 TABLET ORAL DAILY
Status: DISCONTINUED | OUTPATIENT
Start: 2025-02-08 | End: 2025-02-17 | Stop reason: HOSPADM

## 2025-02-08 RX ORDER — SODIUM CHLORIDE, SODIUM LACTATE, POTASSIUM CHLORIDE, CALCIUM CHLORIDE 600; 310; 30; 20 MG/100ML; MG/100ML; MG/100ML; MG/100ML
INJECTION, SOLUTION INTRAVENOUS CONTINUOUS
Status: ACTIVE | OUTPATIENT
Start: 2025-02-08 | End: 2025-02-09

## 2025-02-08 RX ORDER — FAMOTIDINE 20 MG/1
40 TABLET, FILM COATED ORAL EVERY EVENING
Status: DISCONTINUED | OUTPATIENT
Start: 2025-02-08 | End: 2025-02-17 | Stop reason: HOSPADM

## 2025-02-08 RX ORDER — SODIUM CHLORIDE 9 MG/ML
INJECTION, SOLUTION INTRAVENOUS PRN
Status: DISCONTINUED | OUTPATIENT
Start: 2025-02-08 | End: 2025-02-17 | Stop reason: HOSPADM

## 2025-02-08 RX ORDER — AMLODIPINE BESYLATE 5 MG/1
5 TABLET ORAL DAILY
Status: DISCONTINUED | OUTPATIENT
Start: 2025-02-08 | End: 2025-02-17 | Stop reason: HOSPADM

## 2025-02-08 RX ORDER — ALBUTEROL SULFATE 90 UG/1
2 INHALANT RESPIRATORY (INHALATION) 4 TIMES DAILY
Status: DISCONTINUED | OUTPATIENT
Start: 2025-02-09 | End: 2025-02-11

## 2025-02-08 RX ORDER — PANTOPRAZOLE SODIUM 40 MG/1
40 TABLET, DELAYED RELEASE ORAL
Status: DISCONTINUED | OUTPATIENT
Start: 2025-02-09 | End: 2025-02-17 | Stop reason: HOSPADM

## 2025-02-08 RX ORDER — POTASSIUM CHLORIDE 7.45 MG/ML
10 INJECTION INTRAVENOUS PRN
Status: DISCONTINUED | OUTPATIENT
Start: 2025-02-08 | End: 2025-02-17 | Stop reason: HOSPADM

## 2025-02-08 RX ORDER — ALBUTEROL SULFATE 90 UG/1
2 INHALANT RESPIRATORY (INHALATION) EVERY 6 HOURS PRN
Status: DISCONTINUED | OUTPATIENT
Start: 2025-02-08 | End: 2025-02-08

## 2025-02-08 RX ORDER — ALBUTEROL SULFATE 90 UG/1
2 INHALANT RESPIRATORY (INHALATION) EVERY 4 HOURS PRN
Status: DISCONTINUED | OUTPATIENT
Start: 2025-02-08 | End: 2025-02-17 | Stop reason: HOSPADM

## 2025-02-08 RX ORDER — IOPAMIDOL 755 MG/ML
80 INJECTION, SOLUTION INTRAVASCULAR
Status: COMPLETED | OUTPATIENT
Start: 2025-02-08 | End: 2025-02-08

## 2025-02-08 RX ORDER — BISACODYL 5 MG/1
5 TABLET, DELAYED RELEASE ORAL DAILY PRN
Status: DISCONTINUED | OUTPATIENT
Start: 2025-02-08 | End: 2025-02-17 | Stop reason: HOSPADM

## 2025-02-08 RX ORDER — ONDANSETRON 2 MG/ML
4 INJECTION INTRAMUSCULAR; INTRAVENOUS EVERY 6 HOURS PRN
Status: DISCONTINUED | OUTPATIENT
Start: 2025-02-08 | End: 2025-02-17 | Stop reason: HOSPADM

## 2025-02-08 RX ORDER — SENNOSIDES 8.8 MG/5ML
5 LIQUID ORAL 2 TIMES DAILY PRN
Status: DISCONTINUED | OUTPATIENT
Start: 2025-02-08 | End: 2025-02-17 | Stop reason: HOSPADM

## 2025-02-08 RX ORDER — ONDANSETRON 4 MG/1
4 TABLET, ORALLY DISINTEGRATING ORAL EVERY 8 HOURS PRN
Status: DISCONTINUED | OUTPATIENT
Start: 2025-02-08 | End: 2025-02-17 | Stop reason: HOSPADM

## 2025-02-08 RX ORDER — SODIUM CHLORIDE 0.9 % (FLUSH) 0.9 %
5-40 SYRINGE (ML) INJECTION PRN
Status: DISCONTINUED | OUTPATIENT
Start: 2025-02-08 | End: 2025-02-17 | Stop reason: HOSPADM

## 2025-02-08 RX ORDER — ENOXAPARIN SODIUM 100 MG/ML
40 INJECTION SUBCUTANEOUS DAILY
Status: DISCONTINUED | OUTPATIENT
Start: 2025-02-09 | End: 2025-02-17 | Stop reason: HOSPADM

## 2025-02-08 RX ORDER — ACETAMINOPHEN 650 MG/1
650 SUPPOSITORY RECTAL EVERY 6 HOURS PRN
Status: DISCONTINUED | OUTPATIENT
Start: 2025-02-08 | End: 2025-02-17 | Stop reason: HOSPADM

## 2025-02-08 RX ORDER — POTASSIUM CHLORIDE 1500 MG/1
40 TABLET, EXTENDED RELEASE ORAL PRN
Status: DISCONTINUED | OUTPATIENT
Start: 2025-02-08 | End: 2025-02-17 | Stop reason: HOSPADM

## 2025-02-08 RX ORDER — SODIUM CHLORIDE 0.9 % (FLUSH) 0.9 %
5-40 SYRINGE (ML) INJECTION EVERY 12 HOURS SCHEDULED
Status: DISCONTINUED | OUTPATIENT
Start: 2025-02-08 | End: 2025-02-17 | Stop reason: HOSPADM

## 2025-02-08 RX ORDER — ACETAMINOPHEN 325 MG/1
650 TABLET ORAL EVERY 6 HOURS PRN
Status: DISCONTINUED | OUTPATIENT
Start: 2025-02-08 | End: 2025-02-17 | Stop reason: HOSPADM

## 2025-02-08 RX ADMIN — AMLODIPINE BESYLATE 5 MG: 5 TABLET ORAL at 22:00

## 2025-02-08 RX ADMIN — IOPAMIDOL 80 ML: 755 INJECTION, SOLUTION INTRAVENOUS at 17:17

## 2025-02-08 RX ADMIN — SODIUM CHLORIDE, POTASSIUM CHLORIDE, SODIUM LACTATE AND CALCIUM CHLORIDE: 600; 310; 30; 20 INJECTION, SOLUTION INTRAVENOUS at 15:00

## 2025-02-08 RX ADMIN — LISINOPRIL 20 MG: 20 TABLET ORAL at 22:42

## 2025-02-08 RX ADMIN — ALBUTEROL SULFATE 2 PUFF: 90 AEROSOL, METERED RESPIRATORY (INHALATION) at 21:22

## 2025-02-08 RX ADMIN — Medication 3 MG: at 22:01

## 2025-02-08 RX ADMIN — FAMOTIDINE 40 MG: 20 TABLET, FILM COATED ORAL at 22:01

## 2025-02-08 RX ADMIN — SODIUM CHLORIDE, PRESERVATIVE FREE 10 ML: 5 INJECTION INTRAVENOUS at 22:01

## 2025-02-08 ASSESSMENT — PAIN - FUNCTIONAL ASSESSMENT: PAIN_FUNCTIONAL_ASSESSMENT: NONE - DENIES PAIN

## 2025-02-08 NOTE — ED PROVIDER NOTES
[facility]      EMERGENCY MEDICINE     Pt Name: Masood Randhawa  MRN: [id number]  Birthdate 1950  Date of evaluation: 2/8/2025  Provider: Amrik Sarmiento DO  Supervising Physician: Amrik Ramirez DO    CHIEF COMPLAINT       Chief Complaint   Patient presents with    Shortness of Breath     Diagnosed with throat cancer last Tuesday    Weight Loss     180 to 120 in 7 months     HISTORY OF PRESENT ILLNESS   Masood Randhawa is a 74 y.o. male with a h/o COPD and recent Dx of vocal cord SCC who presents to the emergency department from home for evaluation of failure to thrive.  Patient has been having the symptoms for several months up to a year.  He has been having night sweats, has lost 60 pounds in 7 months, his voice has become increasingly hoarse.  Patient is become increasingly short of breath but only feels mildly short of breath and so.  It is positional.  He follows up with ENT and was recently told that he had squamous cell carcinoma and he was advised to come here for further evaluation.  Patient is upcoming surgery scheduled with Dr. Daley.  He endorses poor appetite, denies any fever, chest pain, vomiting.  His wife notes he is having difficulty swallowing foods and liquids.    PASTMEDICAL HISTORY     Past Medical History:   Diagnosis Date    BPH (benign prostatic hyperplasia)     Chronic back pain     COPD (chronic obstructive pulmonary disease) ([facility])     Erectile dysfunction     Gastroesophageal reflux disease with apnea without esophagitis 01/10/2025    Head and neck cancer (HCC) 2/8/2025    Hypertension     Osteoarthritis     Personal history of colonic polyps 2006    PVD (peripheral vascular disease) ([facility])     PVD (peripheral vascular disease) ([facility]) 02/14/2012       Patient Active Problem List   Diagnosis Code    HTN (hypertension) I10    Hypercholesteremia E78.00    PVD (peripheral vascular disease) ([facility]) I73.9    Low back pain M54.50    ED (erectile dysfunction) N52.9

## 2025-02-08 NOTE — ED NOTES
Patient presents to ED with complaint of shortness of breath for past 8 months, weight loss and decreased appetite for past 7 months.  Patient states he was diagnosed with throat cancer this past week and was told to get checked here in ED.  Patient states he is scheduled for throat surgery on Wednesday.  Patient has slight inspiratory stridor noted with expiratory wheezing throughout.  Patient states he uses inhaler at home.  Patient denies pain, patient denies nausea.  EKG obtained.  Patient gowned and placed on cardiac monitor.

## 2025-02-09 PROBLEM — R00.0 TACHYCARDIA: Status: ACTIVE | Noted: 2025-02-09

## 2025-02-09 PROBLEM — J38.3 VOCAL CORD MASS: Status: ACTIVE | Noted: 2025-02-09

## 2025-02-09 PROBLEM — R62.7 FAILURE TO THRIVE IN ADULT: Status: ACTIVE | Noted: 2025-02-09

## 2025-02-09 LAB
ANION GAP SERPL CALC-SCNC: 19 MEQ/L (ref 8–16)
BUN SERPL-MCNC: 9 MG/DL (ref 7–22)
CALCIUM SERPL-MCNC: 9 MG/DL (ref 8.5–10.5)
CHLORIDE SERPL-SCNC: 94 MEQ/L (ref 98–111)
CO2 SERPL-SCNC: 26 MEQ/L (ref 23–33)
CREAT SERPL-MCNC: 0.5 MG/DL (ref 0.4–1.2)
DEPRECATED RDW RBC AUTO: 49.7 FL (ref 35–45)
EKG ATRIAL RATE: 104 BPM
EKG P AXIS: 87 DEGREES
EKG P-R INTERVAL: 130 MS
EKG Q-T INTERVAL: 334 MS
EKG QRS DURATION: 78 MS
EKG QTC CALCULATION (BAZETT): 439 MS
EKG R AXIS: 76 DEGREES
EKG T AXIS: 60 DEGREES
EKG VENTRICULAR RATE: 104 BPM
ERYTHROCYTE [DISTWIDTH] IN BLOOD BY AUTOMATED COUNT: 13.5 % (ref 11.5–14.5)
GFR SERPL CREATININE-BSD FRML MDRD: > 90 ML/MIN/1.73M2
GLUCOSE SERPL-MCNC: 92 MG/DL (ref 70–108)
HCT VFR BLD AUTO: 41.2 % (ref 42–52)
HGB BLD-MCNC: 14.1 GM/DL (ref 14–18)
MAGNESIUM SERPL-MCNC: 1.8 MG/DL (ref 1.6–2.4)
MCH RBC QN AUTO: 34.6 PG (ref 26–33)
MCHC RBC AUTO-ENTMCNC: 34.2 GM/DL (ref 32.2–35.5)
MCV RBC AUTO: 101.2 FL (ref 80–94)
PHOSPHATE SERPL-MCNC: 3.2 MG/DL (ref 2.4–4.7)
PLATELET # BLD AUTO: 323 THOU/MM3 (ref 130–400)
PMV BLD AUTO: 9.9 FL (ref 9.4–12.4)
POTASSIUM SERPL-SCNC: 3 MEQ/L (ref 3.5–5.2)
POTASSIUM SERPL-SCNC: 5.5 MEQ/L (ref 3.5–5.2)
PREALB SERPL-MCNC: 19.8 MG/DL (ref 20–40)
RBC # BLD AUTO: 4.07 MILL/MM3 (ref 4.7–6.1)
SODIUM SERPL-SCNC: 139 MEQ/L (ref 135–145)
WBC # BLD AUTO: 9.4 THOU/MM3 (ref 4.8–10.8)

## 2025-02-09 PROCEDURE — 84132 ASSAY OF SERUM POTASSIUM: CPT

## 2025-02-09 PROCEDURE — 99232 SBSQ HOSP IP/OBS MODERATE 35: CPT | Performed by: STUDENT IN AN ORGANIZED HEALTH CARE EDUCATION/TRAINING PROGRAM

## 2025-02-09 PROCEDURE — 93010 ELECTROCARDIOGRAM REPORT: CPT | Performed by: NUCLEAR MEDICINE

## 2025-02-09 PROCEDURE — 6360000002 HC RX W HCPCS

## 2025-02-09 PROCEDURE — 2140000000 HC CCU INTERMEDIATE R&B

## 2025-02-09 PROCEDURE — 85027 COMPLETE CBC AUTOMATED: CPT

## 2025-02-09 PROCEDURE — 83735 ASSAY OF MAGNESIUM: CPT

## 2025-02-09 PROCEDURE — 36415 COLL VENOUS BLD VENIPUNCTURE: CPT

## 2025-02-09 PROCEDURE — 80048 BASIC METABOLIC PNL TOTAL CA: CPT

## 2025-02-09 PROCEDURE — 2500000003 HC RX 250 WO HCPCS

## 2025-02-09 PROCEDURE — 94761 N-INVAS EAR/PLS OXIMETRY MLT: CPT

## 2025-02-09 PROCEDURE — 94640 AIRWAY INHALATION TREATMENT: CPT

## 2025-02-09 PROCEDURE — 6370000000 HC RX 637 (ALT 250 FOR IP)

## 2025-02-09 PROCEDURE — 99231 SBSQ HOSP IP/OBS SF/LOW 25: CPT

## 2025-02-09 PROCEDURE — 2580000003 HC RX 258

## 2025-02-09 PROCEDURE — 94669 MECHANICAL CHEST WALL OSCILL: CPT

## 2025-02-09 PROCEDURE — 84100 ASSAY OF PHOSPHORUS: CPT

## 2025-02-09 RX ADMIN — ALBUTEROL SULFATE 2 PUFF: 90 AEROSOL, METERED RESPIRATORY (INHALATION) at 17:23

## 2025-02-09 RX ADMIN — POTASSIUM CHLORIDE 10 MEQ: 7.46 INJECTION, SOLUTION INTRAVENOUS at 06:42

## 2025-02-09 RX ADMIN — AMLODIPINE BESYLATE 5 MG: 5 TABLET ORAL at 07:40

## 2025-02-09 RX ADMIN — POTASSIUM CHLORIDE 10 MEQ: 7.46 INJECTION, SOLUTION INTRAVENOUS at 11:42

## 2025-02-09 RX ADMIN — SODIUM CHLORIDE, PRESERVATIVE FREE 10 ML: 5 INJECTION INTRAVENOUS at 21:30

## 2025-02-09 RX ADMIN — ENOXAPARIN SODIUM 40 MG: 100 INJECTION SUBCUTANEOUS at 07:40

## 2025-02-09 RX ADMIN — ALBUTEROL SULFATE 2 PUFF: 90 AEROSOL, METERED RESPIRATORY (INHALATION) at 21:20

## 2025-02-09 RX ADMIN — SODIUM CHLORIDE: 9 INJECTION, SOLUTION INTRAVENOUS at 06:42

## 2025-02-09 RX ADMIN — PANTOPRAZOLE SODIUM 40 MG: 40 TABLET, DELAYED RELEASE ORAL at 05:30

## 2025-02-09 RX ADMIN — ALBUTEROL SULFATE 2 PUFF: 90 AEROSOL, METERED RESPIRATORY (INHALATION) at 13:52

## 2025-02-09 RX ADMIN — Medication 3 MG: at 21:30

## 2025-02-09 RX ADMIN — POTASSIUM CHLORIDE 10 MEQ: 7.46 INJECTION, SOLUTION INTRAVENOUS at 10:38

## 2025-02-09 RX ADMIN — POTASSIUM CHLORIDE 10 MEQ: 7.46 INJECTION, SOLUTION INTRAVENOUS at 07:47

## 2025-02-09 RX ADMIN — LISINOPRIL 20 MG: 20 TABLET ORAL at 07:40

## 2025-02-09 RX ADMIN — FAMOTIDINE 40 MG: 20 TABLET, FILM COATED ORAL at 17:13

## 2025-02-09 RX ADMIN — POTASSIUM CHLORIDE 10 MEQ: 7.46 INJECTION, SOLUTION INTRAVENOUS at 12:51

## 2025-02-09 RX ADMIN — POTASSIUM CHLORIDE 10 MEQ: 7.46 INJECTION, SOLUTION INTRAVENOUS at 09:07

## 2025-02-09 RX ADMIN — ALBUTEROL SULFATE 2 PUFF: 90 AEROSOL, METERED RESPIRATORY (INHALATION) at 07:46

## 2025-02-09 NOTE — PLAN OF CARE
Problem: Respiratory - Adult  Goal: Achieves optimal ventilation and oxygenation  2/9/2025 0801 by Maria Elena Samuels, RCP  Outcome: Adequate for Discharge

## 2025-02-09 NOTE — PLAN OF CARE
Problem: Discharge Planning  Goal: Discharge to home or other facility with appropriate resources  2/9/2025 0937 by Ally Perkins RN  Flowsheets (Taken 2/9/2025 0937)  Discharge to home or other facility with appropriate resources:   Identify barriers to discharge with patient and caregiver   Arrange for needed discharge resources and transportation as appropriate  2/8/2025 2303 by Amrik Delgadillo RN  Outcome: Not Progressing  Flowsheets (Taken 2/8/2025 2303)  Discharge to home or other facility with appropriate resources:   Identify barriers to discharge with patient and caregiver   Refer to discharge planning if patient needs post-hospital services based on physician order or complex needs related to functional status, cognitive ability or social support system   Identify discharge learning needs (meds, wound care, etc)   Arrange for needed discharge resources and transportation as appropriate     Problem: Cardiovascular - Adult  Goal: Maintains optimal cardiac output and hemodynamic stability  2/9/2025 0937 by Ally Perkins RN  Flowsheets (Taken 2/9/2025 0937)  Maintains optimal cardiac output and hemodynamic stability:   Monitor blood pressure and heart rate   Monitor urine output and notify Licensed Independent Practitioner for values outside of normal range  2/8/2025 2303 by Amrik Delgadillo RN  Outcome: Not Progressing  Flowsheets (Taken 2/8/2025 2303)  Maintains optimal cardiac output and hemodynamic stability:   Monitor blood pressure and heart rate   Assess for signs of decreased cardiac output  Goal: Absence of cardiac dysrhythmias or at baseline  2/8/2025 2303 by Amrik Delgadillo RN  Outcome: Not Progressing  Flowsheets (Taken 2/8/2025 2303)  Absence of cardiac dysrhythmias or at baseline:   Monitor cardiac rate and rhythm   Assess for signs of decreased cardiac output     Problem: Infection - Adult  Goal: Absence of infection during hospitalization  2/9/2025 0937 by Ally Perkins

## 2025-02-09 NOTE — RT PROTOCOL NOTE
RT Inhaler-Nebulizer Bronchodilator Protocol Note    There is a bronchodilator order in the chart from a provider indicating to follow the RT Bronchodilator Protocol and there is an “Initiate RT Inhaler-Nebulizer Bronchodilator Protocol” order as well (see protocol at bottom of note).    CXR Findings:  XR CHEST PORTABLE    Result Date: 2/8/2025  Emphysematous changes with no acute intrathoracic process. **This report has been created using voice recognition software. It may contain minor errors which are inherent in voice recognition technology.** Electronically signed by Dr Matt Kinney      The findings from the last RT Protocol Assessment were as follows:   History Pulmonary Disease: Chronic pulmonary disease  Respiratory Pattern: Regular pattern and RR 12-20 bpm  Breath Sounds: Slightly diminished and/or crackles  Cough: Strong, spontaneous, non-productive  Indication for Bronchodilator Therapy: On home bronchodilators  Bronchodilator Assessment Score: 4    Aerosolized bronchodilator medication orders have been revised according to the RT Inhaler-Nebulizer Bronchodilator Protocol below.    Respiratory Therapist to perform RT Therapy Protocol Assessment initially then follow the protocol.  Repeat RT Therapy Protocol Assessment PRN for score 0-3 or on second treatment, BID, and PRN for scores above 3.    No Indications - adjust the frequency to every 6 hours PRN wheezing or bronchospasm, if no treatments needed after 48 hours then discontinue using Per Protocol order mode.     If indication present, adjust the RT bronchodilator orders based on the Bronchodilator Assessment Score as indicated below.  Use Inhaler orders unless patient has one or more of the following: on home nebulizer, not able to hold breath for 10 seconds, is not alert and oriented, cannot activate and use MDI correctly, or respiratory rate 25 breaths per minute or more, then use the equivalent nebulizer order(s) with same Frequency and PRN

## 2025-02-09 NOTE — PLAN OF CARE
Problem: Discharge Planning  Goal: Discharge to home or other facility with appropriate resources  Outcome: Not Progressing  Flowsheets (Taken 2/8/2025 2303)  Discharge to home or other facility with appropriate resources:   Identify barriers to discharge with patient and caregiver   Refer to discharge planning if patient needs post-hospital services based on physician order or complex needs related to functional status, cognitive ability or social support system   Identify discharge learning needs (meds, wound care, etc)   Arrange for needed discharge resources and transportation as appropriate     Problem: Respiratory - Adult  Goal: Achieves optimal ventilation and oxygenation  Outcome: Not Progressing  Flowsheets (Taken 2/8/2025 2303)  Achieves optimal ventilation and oxygenation:   Assess for changes in respiratory status   Position to facilitate oxygenation and minimize respiratory effort     Problem: Cardiovascular - Adult  Goal: Maintains optimal cardiac output and hemodynamic stability  Outcome: Not Progressing  Flowsheets (Taken 2/8/2025 2303)  Maintains optimal cardiac output and hemodynamic stability:   Monitor blood pressure and heart rate   Assess for signs of decreased cardiac output     Problem: Cardiovascular - Adult  Goal: Absence of cardiac dysrhythmias or at baseline  Outcome: Not Progressing  Flowsheets (Taken 2/8/2025 2303)  Absence of cardiac dysrhythmias or at baseline:   Monitor cardiac rate and rhythm   Assess for signs of decreased cardiac output     Problem: Infection - Adult  Goal: Absence of infection during hospitalization  Outcome: Not Progressing  Flowsheets (Taken 2/8/2025 2303)  Absence of infection during hospitalization:   Assess and monitor for signs and symptoms of infection   Monitor all insertion sites i.e., indwelling lines, tubes and drains   Monitor lab/diagnostic results     Problem: Pain  Goal: Verbalizes/displays adequate comfort level or baseline comfort

## 2025-02-09 NOTE — RT PROTOCOL NOTE
RT Inhaler-Nebulizer Bronchodilator Protocol Note    There is a bronchodilator order in the chart from a provider indicating to follow the RT Bronchodilator Protocol and there is an “Initiate RT Inhaler-Nebulizer Bronchodilator Protocol” order as well (see protocol at bottom of note).    CXR Findings:  XR CHEST PORTABLE    Result Date: 2/8/2025  Emphysematous changes with no acute intrathoracic process. **This report has been created using voice recognition software. It may contain minor errors which are inherent in voice recognition technology.** Electronically signed by Dr Matt Kinney      The findings from the last RT Protocol Assessment were as follows:   History Pulmonary Disease: Chronic pulmonary disease  Respiratory Pattern: Dyspnea on exertion or RR 21-25 bpm  Breath Sounds: Slightly diminished and/or crackles  Cough: Strong, spontaneous, non-productive  Indication for Bronchodilator Therapy: On home bronchodilators, Decreased or absent breath sounds (QID albuterol MDI at home)  Bronchodilator Assessment Score: 6    Aerosolized bronchodilator medication orders have been revised according to the RT Inhaler-Nebulizer Bronchodilator Protocol below.    Respiratory Therapist to perform RT Therapy Protocol Assessment initially then follow the protocol.  Repeat RT Therapy Protocol Assessment PRN for score 0-3 or on second treatment, BID, and PRN for scores above 3.    No Indications - adjust the frequency to every 6 hours PRN wheezing or bronchospasm, if no treatments needed after 48 hours then discontinue using Per Protocol order mode.     If indication present, adjust the RT bronchodilator orders based on the Bronchodilator Assessment Score as indicated below.  Use Inhaler orders unless patient has one or more of the following: on home nebulizer, not able to hold breath for 10 seconds, is not alert and oriented, cannot activate and use MDI correctly, or respiratory rate 25 breaths per minute or more, then

## 2025-02-09 NOTE — CONSULTS
Department of Otolaryngology  Consult Note    Reason for Consult: Worsening shortness of breath in setting of metastatic squamous cell carcinoma with necrotic lymph nodes and laryngeal mass    Requesting Physician:  Dr. Sarmiento    CHIEF COMPLAINT: Shortness of breath, worsening fatigue, and dysphagia    History Obtained From:  patient, electronic medical record    HISTORY OF PRESENT ILLNESS:                The patient is a 74 y.o. male recently diagnosed with metastatic squamous cell carcinoma from FNA of cervical lymph node who was admitted to [facility] for worsening fatigue, worsening shortness of breath, and dysphagia.  Patient was seen outpatient with complaints of hoarseness and voice changes for multiple years which he did not seek care for until now.  He underwent CT scan which showed laryngeal mass and necrotic cervical lymph nodes.  Patient underwent FNA of a necrotic cervical lymph node which was positive for metastatic squamous cell carcinoma. 2/7 when provider called to review FNA results patient endorsed the above symptoms of worsening shortness of breath, worsening hoarseness, extreme fatigue, and worsening dysphagia.and was advised to come to the ED for evaluation and airway monitoring.  Patient is scheduled for laryngoscopy with biopsy of larynx/supraglottis with possible debulking 2/12/2025 with Dr. Daley.    Past Medical History:        Diagnosis Date    BPH (benign prostatic hyperplasia)     Chronic back pain     COPD (chronic obstructive pulmonary disease) (HCC)     Erectile dysfunction     Gastroesophageal reflux disease with apnea without esophagitis 01/10/2025    Head and neck cancer (HCC) 2/8/2025    Hypertension     Osteoarthritis     Personal history of colonic polyps 2006    PVD (peripheral vascular disease) (HCC)     PVD (peripheral vascular disease) (HCC) 02/14/2012       Past Surgical History:        Procedure Laterality Date    CARPAL TUNNEL RELEASE

## 2025-02-10 LAB
ANION GAP SERPL CALC-SCNC: 10 MEQ/L (ref 8–16)
BUN SERPL-MCNC: 10 MG/DL (ref 7–22)
CALCIUM SERPL-MCNC: 9 MG/DL (ref 8.5–10.5)
CHLORIDE SERPL-SCNC: 100 MEQ/L (ref 98–111)
CO2 SERPL-SCNC: 29 MEQ/L (ref 23–33)
CREAT SERPL-MCNC: 0.4 MG/DL (ref 0.4–1.2)
DEPRECATED RDW RBC AUTO: 49.4 FL (ref 35–45)
ERYTHROCYTE [DISTWIDTH] IN BLOOD BY AUTOMATED COUNT: 13.3 % (ref 11.5–14.5)
GFR SERPL CREATININE-BSD FRML MDRD: > 90 ML/MIN/1.73M2
GLUCOSE SERPL-MCNC: 102 MG/DL (ref 70–108)
HCT VFR BLD AUTO: 38.9 % (ref 42–52)
HGB BLD-MCNC: 13.5 GM/DL (ref 14–18)
MAGNESIUM SERPL-MCNC: 1.9 MG/DL (ref 1.6–2.4)
MCH RBC QN AUTO: 34.9 PG (ref 26–33)
MCHC RBC AUTO-ENTMCNC: 34.7 GM/DL (ref 32.2–35.5)
MCV RBC AUTO: 100.5 FL (ref 80–94)
PHOSPHATE SERPL-MCNC: 3 MG/DL (ref 2.4–4.7)
PLATELET # BLD AUTO: 309 THOU/MM3 (ref 130–400)
PMV BLD AUTO: 10.2 FL (ref 9.4–12.4)
POTASSIUM SERPL-SCNC: 4.2 MEQ/L (ref 3.5–5.2)
RBC # BLD AUTO: 3.87 MILL/MM3 (ref 4.7–6.1)
SODIUM SERPL-SCNC: 139 MEQ/L (ref 135–145)
WBC # BLD AUTO: 9.1 THOU/MM3 (ref 4.8–10.8)

## 2025-02-10 PROCEDURE — 6370000000 HC RX 637 (ALT 250 FOR IP): Performed by: REGISTERED NURSE

## 2025-02-10 PROCEDURE — 2500000003 HC RX 250 WO HCPCS

## 2025-02-10 PROCEDURE — 84100 ASSAY OF PHOSPHORUS: CPT

## 2025-02-10 PROCEDURE — 94761 N-INVAS EAR/PLS OXIMETRY MLT: CPT

## 2025-02-10 PROCEDURE — 6370000000 HC RX 637 (ALT 250 FOR IP)

## 2025-02-10 PROCEDURE — 36415 COLL VENOUS BLD VENIPUNCTURE: CPT

## 2025-02-10 PROCEDURE — 6360000002 HC RX W HCPCS

## 2025-02-10 PROCEDURE — 83735 ASSAY OF MAGNESIUM: CPT

## 2025-02-10 PROCEDURE — 80048 BASIC METABOLIC PNL TOTAL CA: CPT

## 2025-02-10 PROCEDURE — 99232 SBSQ HOSP IP/OBS MODERATE 35: CPT | Performed by: STUDENT IN AN ORGANIZED HEALTH CARE EDUCATION/TRAINING PROGRAM

## 2025-02-10 PROCEDURE — 2140000000 HC CCU INTERMEDIATE R&B

## 2025-02-10 PROCEDURE — 85027 COMPLETE CBC AUTOMATED: CPT

## 2025-02-10 PROCEDURE — 99231 SBSQ HOSP IP/OBS SF/LOW 25: CPT | Performed by: REGISTERED NURSE

## 2025-02-10 PROCEDURE — 94640 AIRWAY INHALATION TREATMENT: CPT

## 2025-02-10 RX ORDER — SODIUM CHLORIDE FOR INHALATION 0.9 %
3 VIAL, NEBULIZER (ML) INHALATION 3 TIMES DAILY
Status: DISCONTINUED | OUTPATIENT
Start: 2025-02-10 | End: 2025-02-11

## 2025-02-10 RX ORDER — ASPIRIN 81 MG/1
81 TABLET ORAL DAILY
COMMUNITY

## 2025-02-10 RX ADMIN — ALBUTEROL SULFATE 2 PUFF: 90 AEROSOL, METERED RESPIRATORY (INHALATION) at 22:10

## 2025-02-10 RX ADMIN — SODIUM CHLORIDE, PRESERVATIVE FREE 10 ML: 5 INJECTION INTRAVENOUS at 10:09

## 2025-02-10 RX ADMIN — ACETAMINOPHEN 650 MG: 325 TABLET ORAL at 15:01

## 2025-02-10 RX ADMIN — ALBUTEROL SULFATE 2 PUFF: 90 AEROSOL, METERED RESPIRATORY (INHALATION) at 12:53

## 2025-02-10 RX ADMIN — LISINOPRIL 20 MG: 20 TABLET ORAL at 10:09

## 2025-02-10 RX ADMIN — PANTOPRAZOLE SODIUM 40 MG: 40 TABLET, DELAYED RELEASE ORAL at 05:38

## 2025-02-10 RX ADMIN — ISODIUM CHLORIDE 3 ML: 0.03 SOLUTION RESPIRATORY (INHALATION) at 12:53

## 2025-02-10 RX ADMIN — ALBUTEROL SULFATE 2 PUFF: 90 AEROSOL, METERED RESPIRATORY (INHALATION) at 15:57

## 2025-02-10 RX ADMIN — SODIUM CHLORIDE, PRESERVATIVE FREE 10 ML: 5 INJECTION INTRAVENOUS at 19:57

## 2025-02-10 RX ADMIN — ENOXAPARIN SODIUM 40 MG: 100 INJECTION SUBCUTANEOUS at 10:09

## 2025-02-10 RX ADMIN — ALBUTEROL SULFATE 2 PUFF: 90 AEROSOL, METERED RESPIRATORY (INHALATION) at 08:10

## 2025-02-10 RX ADMIN — AMLODIPINE BESYLATE 5 MG: 5 TABLET ORAL at 10:09

## 2025-02-10 RX ADMIN — FAMOTIDINE 40 MG: 20 TABLET, FILM COATED ORAL at 18:14

## 2025-02-10 RX ADMIN — ISODIUM CHLORIDE 3 ML: 0.03 SOLUTION RESPIRATORY (INHALATION) at 22:10

## 2025-02-10 RX ADMIN — Medication 3 MG: at 19:57

## 2025-02-10 ASSESSMENT — PAIN DESCRIPTION - DESCRIPTORS: DESCRIPTORS: DISCOMFORT

## 2025-02-10 ASSESSMENT — PAIN SCALES - GENERAL: PAINLEVEL_OUTOF10: 3

## 2025-02-10 ASSESSMENT — PAIN DESCRIPTION - ORIENTATION: ORIENTATION: POSTERIOR

## 2025-02-10 ASSESSMENT — PAIN DESCRIPTION - LOCATION: LOCATION: NECK

## 2025-02-10 NOTE — PLAN OF CARE
Problem: Respiratory - Adult  Goal: Achieves optimal ventilation and oxygenation  2/10/2025 1152 by Brittney Mixon, RN  Outcome: Progressing  Flowsheets (Taken 2/10/2025 1152)  Achieves optimal ventilation and oxygenation:   Assess for changes in respiratory status   Assess for changes in mentation and behavior   Position to facilitate oxygenation and minimize respiratory effort

## 2025-02-10 NOTE — PLAN OF CARE
Problem: Discharge Planning  Goal: Discharge to home or other facility with appropriate resources  2/9/2025 2330 by Amrik Delgadillo RN  Outcome: Progressing  Flowsheets (Taken 2/9/2025 2330)  Discharge to home or other facility with appropriate resources:   Identify barriers to discharge with patient and caregiver   Identify discharge learning needs (meds, wound care, etc)   Refer to discharge planning if patient needs post-hospital services based on physician order or complex needs related to functional status, cognitive ability or social support system   Arrange for needed discharge resources and transportation as appropriate     Problem: Respiratory - Adult  Goal: Achieves optimal ventilation and oxygenation  2/9/2025 2330 by Amrik Delgadillo RN  Outcome: Progressing  Flowsheets  Taken 2/9/2025 2330 by Amrik Delgadillo RN  Achieves optimal ventilation and oxygenation: Assess for changes in respiratory status  Taken 2/9/2025 1724 by Cordell Paz RCP  Achieves optimal ventilation and oxygenation: Assess for changes in respiratory status  Taken 2/9/2025 1353 by Cordell Paz RCP  Achieves optimal ventilation and oxygenation: Assess for changes in respiratory status     Problem: Cardiovascular - Adult  Goal: Maintains optimal cardiac output and hemodynamic stability  2/9/2025 2330 by Amrik Delgadillo RN  Outcome: Progressing  Flowsheets (Taken 2/9/2025 2330)  Maintains optimal cardiac output and hemodynamic stability:   Monitor blood pressure and heart rate   Assess for signs of decreased cardiac output     Problem: Cardiovascular - Adult  Goal: Absence of cardiac dysrhythmias or at baseline  Outcome: Progressing  Flowsheets (Taken 2/9/2025 2330)  Absence of cardiac dysrhythmias or at baseline:   Monitor cardiac rate and rhythm   Assess for signs of decreased cardiac output     Problem: Infection - Adult  Goal: Absence of infection during hospitalization  2/9/2025 2330 by Amrik Delgadillo

## 2025-02-11 PROBLEM — E43 SEVERE MALNUTRITION (HCC): Status: ACTIVE | Noted: 2025-02-11

## 2025-02-11 LAB
ANION GAP SERPL CALC-SCNC: 11 MEQ/L (ref 8–16)
BUN SERPL-MCNC: 16 MG/DL (ref 7–22)
CALCIUM SERPL-MCNC: 9.1 MG/DL (ref 8.5–10.5)
CHLORIDE SERPL-SCNC: 99 MEQ/L (ref 98–111)
CO2 SERPL-SCNC: 28 MEQ/L (ref 23–33)
CREAT SERPL-MCNC: 0.6 MG/DL (ref 0.4–1.2)
DEPRECATED RDW RBC AUTO: 50.1 FL (ref 35–45)
ERYTHROCYTE [DISTWIDTH] IN BLOOD BY AUTOMATED COUNT: 13.6 % (ref 11.5–14.5)
GFR SERPL CREATININE-BSD FRML MDRD: > 90 ML/MIN/1.73M2
GLUCOSE SERPL-MCNC: 101 MG/DL (ref 70–108)
HCT VFR BLD AUTO: 40 % (ref 42–52)
HGB BLD-MCNC: 13.9 GM/DL (ref 14–18)
MAGNESIUM SERPL-MCNC: 1.9 MG/DL (ref 1.6–2.4)
MCH RBC QN AUTO: 34.8 PG (ref 26–33)
MCHC RBC AUTO-ENTMCNC: 34.8 GM/DL (ref 32.2–35.5)
MCV RBC AUTO: 100.3 FL (ref 80–94)
PHOSPHATE SERPL-MCNC: 3.6 MG/DL (ref 2.4–4.7)
PLATELET # BLD AUTO: 283 THOU/MM3 (ref 130–400)
PMV BLD AUTO: 10 FL (ref 9.4–12.4)
POTASSIUM SERPL-SCNC: 4 MEQ/L (ref 3.5–5.2)
RBC # BLD AUTO: 3.99 MILL/MM3 (ref 4.7–6.1)
SODIUM SERPL-SCNC: 138 MEQ/L (ref 135–145)
WBC # BLD AUTO: 8.9 THOU/MM3 (ref 4.8–10.8)

## 2025-02-11 PROCEDURE — 94640 AIRWAY INHALATION TREATMENT: CPT

## 2025-02-11 PROCEDURE — 99231 SBSQ HOSP IP/OBS SF/LOW 25: CPT | Performed by: REGISTERED NURSE

## 2025-02-11 PROCEDURE — 6370000000 HC RX 637 (ALT 250 FOR IP)

## 2025-02-11 PROCEDURE — 80048 BASIC METABOLIC PNL TOTAL CA: CPT

## 2025-02-11 PROCEDURE — 94669 MECHANICAL CHEST WALL OSCILL: CPT

## 2025-02-11 PROCEDURE — 2500000003 HC RX 250 WO HCPCS

## 2025-02-11 PROCEDURE — 83735 ASSAY OF MAGNESIUM: CPT

## 2025-02-11 PROCEDURE — 85027 COMPLETE CBC AUTOMATED: CPT

## 2025-02-11 PROCEDURE — 2140000000 HC CCU INTERMEDIATE R&B

## 2025-02-11 PROCEDURE — 99232 SBSQ HOSP IP/OBS MODERATE 35: CPT | Performed by: STUDENT IN AN ORGANIZED HEALTH CARE EDUCATION/TRAINING PROGRAM

## 2025-02-11 PROCEDURE — 6370000000 HC RX 637 (ALT 250 FOR IP): Performed by: REGISTERED NURSE

## 2025-02-11 PROCEDURE — 94761 N-INVAS EAR/PLS OXIMETRY MLT: CPT

## 2025-02-11 PROCEDURE — 36415 COLL VENOUS BLD VENIPUNCTURE: CPT

## 2025-02-11 PROCEDURE — 6360000002 HC RX W HCPCS

## 2025-02-11 PROCEDURE — 84100 ASSAY OF PHOSPHORUS: CPT

## 2025-02-11 RX ORDER — ALBUTEROL SULFATE 90 UG/1
2 INHALANT RESPIRATORY (INHALATION)
Status: DISCONTINUED | OUTPATIENT
Start: 2025-02-12 | End: 2025-02-13

## 2025-02-11 RX ADMIN — ALBUTEROL SULFATE 2 PUFF: 90 AEROSOL, METERED RESPIRATORY (INHALATION) at 07:44

## 2025-02-11 RX ADMIN — ALBUTEROL SULFATE 2 PUFF: 90 AEROSOL, METERED RESPIRATORY (INHALATION) at 20:28

## 2025-02-11 RX ADMIN — AMLODIPINE BESYLATE 5 MG: 5 TABLET ORAL at 09:10

## 2025-02-11 RX ADMIN — ISODIUM CHLORIDE 3 ML: 0.03 SOLUTION RESPIRATORY (INHALATION) at 07:48

## 2025-02-11 RX ADMIN — SENNOSIDES 8.8 MG: 8.8 LIQUID ORAL at 09:10

## 2025-02-11 RX ADMIN — SODIUM CHLORIDE, PRESERVATIVE FREE 10 ML: 5 INJECTION INTRAVENOUS at 19:49

## 2025-02-11 RX ADMIN — LISINOPRIL 20 MG: 20 TABLET ORAL at 09:10

## 2025-02-11 RX ADMIN — ALBUTEROL SULFATE 2 PUFF: 90 AEROSOL, METERED RESPIRATORY (INHALATION) at 13:27

## 2025-02-11 RX ADMIN — Medication 3 MG: at 19:49

## 2025-02-11 RX ADMIN — PANTOPRAZOLE SODIUM 40 MG: 40 TABLET, DELAYED RELEASE ORAL at 05:31

## 2025-02-11 RX ADMIN — FAMOTIDINE 40 MG: 20 TABLET, FILM COATED ORAL at 16:57

## 2025-02-11 RX ADMIN — ENOXAPARIN SODIUM 40 MG: 100 INJECTION SUBCUTANEOUS at 09:10

## 2025-02-11 ASSESSMENT — PAIN SCALES - GENERAL: PAINLEVEL_OUTOF10: 0

## 2025-02-11 NOTE — PLAN OF CARE
Problem: Respiratory - Adult  Goal: Achieves optimal ventilation and oxygenation  2/11/2025 0924 by Nichelle Tan RCP  Outcome: Progressing   Patient tolerating treatment well. Takes Albuterol at home.   2/11/2025 0923 by Barb Soliz RN  Outcome: Progressing  2/10/2025 2306 by Amanda Espino RN  Outcome: Progressing  2/10/2025 2213 by Susan Gutiérrez RCP  Outcome: Progressing

## 2025-02-11 NOTE — RT PROTOCOL NOTE
RT Inhaler-Nebulizer Bronchodilator Protocol Note    There is a bronchodilator order in the chart from a provider indicating to follow the RT Bronchodilator Protocol and there is an “Initiate RT Inhaler-Nebulizer Bronchodilator Protocol” order as well (see protocol at bottom of note).    CXR Findings:  No results found.    The findings from the last RT Protocol Assessment were as follows:   History Pulmonary Disease: Chronic pulmonary disease  Respiratory Pattern: Dyspnea on exertion or RR 21-25 bpm  Breath Sounds: Intermittent or unilateral wheezes  Cough: weak productive  Indication for Bronchodilator Therapy: On home bronchodilators, Wheezing associated with pulm disorder  Bronchodilator Assessment Score: 10    Aerosolized bronchodilator medication orders have been revised according to the RT Inhaler-Nebulizer Bronchodilator Protocol below.    Respiratory Therapist to perform RT Therapy Protocol Assessment initially then follow the protocol.  Repeat RT Therapy Protocol Assessment PRN for score 0-3 or on second treatment, BID, and PRN for scores above 3.    No Indications - adjust the frequency to every 6 hours PRN wheezing or bronchospasm, if no treatments needed after 48 hours then discontinue using Per Protocol order mode.     If indication present, adjust the RT bronchodilator orders based on the Bronchodilator Assessment Score as indicated below.  Use Inhaler orders unless patient has one or more of the following: on home nebulizer, not able to hold breath for 10 seconds, is not alert and oriented, cannot activate and use MDI correctly, or respiratory rate 25 breaths per minute or more, then use the equivalent nebulizer order(s) with same Frequency and PRN reasons based on the score.  If a patient is on this medication at home then do not decrease Frequency below that used at home.    0-3 - enter or revise RT bronchodilator order(s) to equivalent RT Bronchodilator order with Frequency of every 4 hours PRN for

## 2025-02-11 NOTE — PLAN OF CARE
Problem: Respiratory - Adult  Goal: Achieves optimal ventilation and oxygenation  2/10/2025 2213 by Susan Gutiérrez RCP  Outcome: Progressing   Patient mutually agreed on goals.

## 2025-02-11 NOTE — PLAN OF CARE
Problem: Discharge Planning  Goal: Discharge to home or other facility with appropriate resources  2/10/2025 2306 by Amanda Espino RN  Outcome: Progressing     Problem: Respiratory - Adult  Goal: Achieves optimal ventilation and oxygenation  2/10/2025 2306 by Amanda Espino RN  Outcome: Progressing     Problem: Cardiovascular - Adult  Goal: Maintains optimal cardiac output and hemodynamic stability  2/10/2025 2306 by Amanda Espino RN  Outcome: Progressing

## 2025-02-12 ENCOUNTER — APPOINTMENT (OUTPATIENT)
Dept: GENERAL RADIOLOGY | Age: 75
DRG: 143 | End: 2025-02-12
Payer: MEDICARE

## 2025-02-12 ENCOUNTER — ANESTHESIA (OUTPATIENT)
Dept: OPERATING ROOM | Age: 75
End: 2025-02-12
Payer: MEDICARE

## 2025-02-12 ENCOUNTER — ANESTHESIA EVENT (OUTPATIENT)
Dept: OPERATING ROOM | Age: 75
End: 2025-02-12
Payer: MEDICARE

## 2025-02-12 PROBLEM — J96.21 ACUTE ON CHRONIC RESPIRATORY FAILURE WITH HYPOXIA (HCC): Status: ACTIVE | Noted: 2025-02-12

## 2025-02-12 LAB
ANION GAP SERPL CALC-SCNC: 15 MEQ/L (ref 8–16)
BUN SERPL-MCNC: 21 MG/DL (ref 7–22)
CALCIUM SERPL-MCNC: 9.4 MG/DL (ref 8.5–10.5)
CHLORIDE SERPL-SCNC: 95 MEQ/L (ref 98–111)
CO2 SERPL-SCNC: 28 MEQ/L (ref 23–33)
CREAT SERPL-MCNC: 0.5 MG/DL (ref 0.4–1.2)
DEPRECATED RDW RBC AUTO: 50.9 FL (ref 35–45)
ERYTHROCYTE [DISTWIDTH] IN BLOOD BY AUTOMATED COUNT: 13.9 % (ref 11.5–14.5)
FOLATE SERPL-MCNC: 17.2 NG/ML (ref 4.8–24.2)
GFR SERPL CREATININE-BSD FRML MDRD: > 90 ML/MIN/1.73M2
GLUCOSE SERPL-MCNC: 100 MG/DL (ref 70–108)
HCT VFR BLD AUTO: 38.9 % (ref 42–52)
HGB BLD-MCNC: 13.4 GM/DL (ref 14–18)
MAGNESIUM SERPL-MCNC: 1.9 MG/DL (ref 1.6–2.4)
MCH RBC QN AUTO: 34.8 PG (ref 26–33)
MCHC RBC AUTO-ENTMCNC: 34.4 GM/DL (ref 32.2–35.5)
MCV RBC AUTO: 101 FL (ref 80–94)
PHOSPHATE SERPL-MCNC: 4.2 MG/DL (ref 2.4–4.7)
PLATELET # BLD AUTO: 294 THOU/MM3 (ref 130–400)
PMV BLD AUTO: 10 FL (ref 9.4–12.4)
POTASSIUM SERPL-SCNC: 4.3 MEQ/L (ref 3.5–5.2)
RBC # BLD AUTO: 3.85 MILL/MM3 (ref 4.7–6.1)
SODIUM SERPL-SCNC: 138 MEQ/L (ref 135–145)
VIT B12 SERPL-MCNC: 380 PG/ML (ref 211–911)
WBC # BLD AUTO: 10.2 THOU/MM3 (ref 4.8–10.8)

## 2025-02-12 PROCEDURE — 0CSS8ZZ REPOSITION LARYNX, VIA NATURAL OR ARTIFICIAL OPENING ENDOSCOPIC: ICD-10-PCS | Performed by: OTOLARYNGOLOGY

## 2025-02-12 PROCEDURE — 7100000001 HC PACU RECOVERY - ADDTL 15 MIN: Performed by: OTOLARYNGOLOGY

## 2025-02-12 PROCEDURE — 31375 PARTIAL REMOVAL OF LARYNX: CPT | Performed by: OTOLARYNGOLOGY

## 2025-02-12 PROCEDURE — 2500000003 HC RX 250 WO HCPCS: Performed by: NURSE PRACTITIONER

## 2025-02-12 PROCEDURE — APPNB45 APP NON BILLABLE 31-45 MINUTES: Performed by: NURSE PRACTITIONER

## 2025-02-12 PROCEDURE — 5A0955A ASSISTANCE WITH RESPIRATORY VENTILATION, GREATER THAN 96 CONSECUTIVE HOURS, HIGH NASAL FLOW/VELOCITY: ICD-10-PCS | Performed by: OTOLARYNGOLOGY

## 2025-02-12 PROCEDURE — 88305 TISSUE EXAM BY PATHOLOGIST: CPT

## 2025-02-12 PROCEDURE — 3700000001 HC ADD 15 MINUTES (ANESTHESIA): Performed by: OTOLARYNGOLOGY

## 2025-02-12 PROCEDURE — 2580000003 HC RX 258: Performed by: NURSE PRACTITIONER

## 2025-02-12 PROCEDURE — 3700000000 HC ANESTHESIA ATTENDED CARE: Performed by: OTOLARYNGOLOGY

## 2025-02-12 PROCEDURE — 6360000002 HC RX W HCPCS

## 2025-02-12 PROCEDURE — 82746 ASSAY OF FOLIC ACID SERUM: CPT

## 2025-02-12 PROCEDURE — 7100000000 HC PACU RECOVERY - FIRST 15 MIN: Performed by: OTOLARYNGOLOGY

## 2025-02-12 PROCEDURE — 6360000002 HC RX W HCPCS: Performed by: OTOLARYNGOLOGY

## 2025-02-12 PROCEDURE — 6360000002 HC RX W HCPCS: Performed by: NURSE ANESTHETIST, CERTIFIED REGISTERED

## 2025-02-12 PROCEDURE — 6370000000 HC RX 637 (ALT 250 FOR IP): Performed by: NURSE PRACTITIONER

## 2025-02-12 PROCEDURE — 83735 ASSAY OF MAGNESIUM: CPT

## 2025-02-12 PROCEDURE — 94640 AIRWAY INHALATION TREATMENT: CPT

## 2025-02-12 PROCEDURE — 6370000000 HC RX 637 (ALT 250 FOR IP)

## 2025-02-12 PROCEDURE — 3600000014 HC SURGERY LEVEL 4 ADDTL 15MIN: Performed by: OTOLARYNGOLOGY

## 2025-02-12 PROCEDURE — 36415 COLL VENOUS BLD VENIPUNCTURE: CPT

## 2025-02-12 PROCEDURE — 94761 N-INVAS EAR/PLS OXIMETRY MLT: CPT

## 2025-02-12 PROCEDURE — 87641 MR-STAPH DNA AMP PROBE: CPT

## 2025-02-12 PROCEDURE — 71045 X-RAY EXAM CHEST 1 VIEW: CPT

## 2025-02-12 PROCEDURE — 6360000002 HC RX W HCPCS: Performed by: NURSE PRACTITIONER

## 2025-02-12 PROCEDURE — 2700000000 HC OXYGEN THERAPY PER DAY

## 2025-02-12 PROCEDURE — 2709999900 HC NON-CHARGEABLE SUPPLY: Performed by: OTOLARYNGOLOGY

## 2025-02-12 PROCEDURE — 31552 LARYNGOPLASTY LARYNGEAL STEN: CPT | Performed by: OTOLARYNGOLOGY

## 2025-02-12 PROCEDURE — 80048 BASIC METABOLIC PNL TOTAL CA: CPT

## 2025-02-12 PROCEDURE — 84100 ASSAY OF PHOSPHORUS: CPT

## 2025-02-12 PROCEDURE — 2580000003 HC RX 258: Performed by: NURSE ANESTHETIST, CERTIFIED REGISTERED

## 2025-02-12 PROCEDURE — 82607 VITAMIN B-12: CPT

## 2025-02-12 PROCEDURE — 3600000004 HC SURGERY LEVEL 4 BASE: Performed by: OTOLARYNGOLOGY

## 2025-02-12 PROCEDURE — 99232 SBSQ HOSP IP/OBS MODERATE 35: CPT | Performed by: INTERNAL MEDICINE

## 2025-02-12 PROCEDURE — 85027 COMPLETE CBC AUTOMATED: CPT

## 2025-02-12 PROCEDURE — 2500000003 HC RX 250 WO HCPCS: Performed by: NURSE ANESTHETIST, CERTIFIED REGISTERED

## 2025-02-12 PROCEDURE — 2000000000 HC ICU R&B

## 2025-02-12 PROCEDURE — 2720000010 HC SURG SUPPLY STERILE: Performed by: OTOLARYNGOLOGY

## 2025-02-12 PROCEDURE — 6360000002 HC RX W HCPCS: Performed by: STUDENT IN AN ORGANIZED HEALTH CARE EDUCATION/TRAINING PROGRAM

## 2025-02-12 RX ORDER — SODIUM CHLORIDE 0.9 % (FLUSH) 0.9 %
5-40 SYRINGE (ML) INJECTION PRN
Status: DISCONTINUED | OUTPATIENT
Start: 2025-02-12 | End: 2025-02-12 | Stop reason: HOSPADM

## 2025-02-12 RX ORDER — REMIFENTANIL HYDROCHLORIDE 1 MG/ML
INJECTION, POWDER, LYOPHILIZED, FOR SOLUTION INTRAVENOUS
Status: DISCONTINUED | OUTPATIENT
Start: 2025-02-12 | End: 2025-02-12 | Stop reason: SDUPTHER

## 2025-02-12 RX ORDER — ROCURONIUM BROMIDE 10 MG/ML
INJECTION, SOLUTION INTRAVENOUS
Status: DISCONTINUED | OUTPATIENT
Start: 2025-02-12 | End: 2025-02-12 | Stop reason: SDUPTHER

## 2025-02-12 RX ORDER — MEPERIDINE HYDROCHLORIDE 25 MG/ML
12.5 INJECTION INTRAMUSCULAR; INTRAVENOUS; SUBCUTANEOUS EVERY 5 MIN PRN
Status: DISCONTINUED | OUTPATIENT
Start: 2025-02-12 | End: 2025-02-12 | Stop reason: HOSPADM

## 2025-02-12 RX ORDER — PROPOFOL 10 MG/ML
INJECTION, EMULSION INTRAVENOUS
Status: DISCONTINUED | OUTPATIENT
Start: 2025-02-12 | End: 2025-02-12 | Stop reason: SDUPTHER

## 2025-02-12 RX ORDER — FENTANYL CITRATE 50 UG/ML
50 INJECTION, SOLUTION INTRAMUSCULAR; INTRAVENOUS EVERY 5 MIN PRN
Status: DISCONTINUED | OUTPATIENT
Start: 2025-02-12 | End: 2025-02-12 | Stop reason: HOSPADM

## 2025-02-12 RX ORDER — EPINEPHRINE 1 MG/ML
INJECTION, SOLUTION, CONCENTRATE INTRAVENOUS PRN
Status: DISCONTINUED | OUTPATIENT
Start: 2025-02-12 | End: 2025-02-12 | Stop reason: ALTCHOICE

## 2025-02-12 RX ORDER — SODIUM CHLORIDE FOR INHALATION 0.9 %
3 VIAL, NEBULIZER (ML) INHALATION 4 TIMES DAILY
Status: DISCONTINUED | OUTPATIENT
Start: 2025-02-12 | End: 2025-02-17 | Stop reason: HOSPADM

## 2025-02-12 RX ORDER — OXYCODONE HYDROCHLORIDE 5 MG/1
5 TABLET ORAL EVERY 4 HOURS PRN
Status: DISCONTINUED | OUTPATIENT
Start: 2025-02-12 | End: 2025-02-17 | Stop reason: HOSPADM

## 2025-02-12 RX ORDER — MIDAZOLAM HYDROCHLORIDE 1 MG/ML
INJECTION, SOLUTION INTRAMUSCULAR; INTRAVENOUS
Status: DISCONTINUED | OUTPATIENT
Start: 2025-02-12 | End: 2025-02-12 | Stop reason: SDUPTHER

## 2025-02-12 RX ORDER — ONDANSETRON 2 MG/ML
INJECTION INTRAMUSCULAR; INTRAVENOUS
Status: DISCONTINUED | OUTPATIENT
Start: 2025-02-12 | End: 2025-02-12 | Stop reason: SDUPTHER

## 2025-02-12 RX ORDER — FENTANYL CITRATE 50 UG/ML
INJECTION, SOLUTION INTRAMUSCULAR; INTRAVENOUS
Status: COMPLETED
Start: 2025-02-12 | End: 2025-02-12

## 2025-02-12 RX ORDER — PHENYLEPHRINE HCL IN 0.9% NACL 1 MG/10 ML
SYRINGE (ML) INTRAVENOUS
Status: DISCONTINUED | OUTPATIENT
Start: 2025-02-12 | End: 2025-02-12 | Stop reason: SDUPTHER

## 2025-02-12 RX ORDER — LIDOCAINE HYDROCHLORIDE 20 MG/ML
INJECTION, SOLUTION INTRAVENOUS
Status: DISCONTINUED | OUTPATIENT
Start: 2025-02-12 | End: 2025-02-12 | Stop reason: SDUPTHER

## 2025-02-12 RX ORDER — DEXAMETHASONE SODIUM PHOSPHATE 4 MG/ML
10 INJECTION, SOLUTION INTRA-ARTICULAR; INTRALESIONAL; INTRAMUSCULAR; INTRAVENOUS; SOFT TISSUE ONCE
Status: DISCONTINUED | OUTPATIENT
Start: 2025-02-12 | End: 2025-02-12

## 2025-02-12 RX ORDER — OXYCODONE HYDROCHLORIDE 5 MG/1
10 TABLET ORAL EVERY 4 HOURS PRN
Status: DISCONTINUED | OUTPATIENT
Start: 2025-02-12 | End: 2025-02-17 | Stop reason: HOSPADM

## 2025-02-12 RX ORDER — FENTANYL CITRATE 50 UG/ML
INJECTION, SOLUTION INTRAMUSCULAR; INTRAVENOUS
Status: DISCONTINUED | OUTPATIENT
Start: 2025-02-12 | End: 2025-02-12 | Stop reason: SDUPTHER

## 2025-02-12 RX ORDER — SODIUM CHLORIDE, SODIUM LACTATE, POTASSIUM CHLORIDE, CALCIUM CHLORIDE 600; 310; 30; 20 MG/100ML; MG/100ML; MG/100ML; MG/100ML
INJECTION, SOLUTION INTRAVENOUS
Status: DISCONTINUED | OUTPATIENT
Start: 2025-02-12 | End: 2025-02-12 | Stop reason: SDUPTHER

## 2025-02-12 RX ORDER — SODIUM CHLORIDE 0.9 % (FLUSH) 0.9 %
5-40 SYRINGE (ML) INJECTION EVERY 12 HOURS SCHEDULED
Status: DISCONTINUED | OUTPATIENT
Start: 2025-02-12 | End: 2025-02-12 | Stop reason: HOSPADM

## 2025-02-12 RX ORDER — PIPERACILLIN SODIUM, TAZOBACTAM SODIUM 3; .375 G/15ML; G/15ML
INJECTION, POWDER, LYOPHILIZED, FOR SOLUTION INTRAVENOUS
Status: DISCONTINUED | OUTPATIENT
Start: 2025-02-12 | End: 2025-02-12 | Stop reason: SDUPTHER

## 2025-02-12 RX ORDER — NALOXONE HYDROCHLORIDE 0.4 MG/ML
INJECTION, SOLUTION INTRAMUSCULAR; INTRAVENOUS; SUBCUTANEOUS PRN
Status: DISCONTINUED | OUTPATIENT
Start: 2025-02-12 | End: 2025-02-12 | Stop reason: HOSPADM

## 2025-02-12 RX ORDER — SODIUM CHLORIDE 9 MG/ML
INJECTION, SOLUTION INTRAVENOUS PRN
Status: DISCONTINUED | OUTPATIENT
Start: 2025-02-12 | End: 2025-02-12 | Stop reason: HOSPADM

## 2025-02-12 RX ORDER — DIPHENHYDRAMINE HYDROCHLORIDE 50 MG/ML
12.5 INJECTION INTRAMUSCULAR; INTRAVENOUS
Status: DISCONTINUED | OUTPATIENT
Start: 2025-02-12 | End: 2025-02-12 | Stop reason: HOSPADM

## 2025-02-12 RX ORDER — ONDANSETRON 2 MG/ML
4 INJECTION INTRAMUSCULAR; INTRAVENOUS
Status: DISCONTINUED | OUTPATIENT
Start: 2025-02-12 | End: 2025-02-12 | Stop reason: HOSPADM

## 2025-02-12 RX ORDER — SODIUM CHLORIDE 9 MG/ML
INJECTION, SOLUTION INTRAVENOUS
Status: DISCONTINUED | OUTPATIENT
Start: 2025-02-12 | End: 2025-02-12 | Stop reason: SDUPTHER

## 2025-02-12 RX ADMIN — ROCURONIUM BROMIDE 30 MG: 10 INJECTION INTRAVENOUS at 11:49

## 2025-02-12 RX ADMIN — FENTANYL CITRATE 50 MCG: 50 INJECTION, SOLUTION INTRAMUSCULAR; INTRAVENOUS at 14:33

## 2025-02-12 RX ADMIN — PIPERACILLIN AND TAZOBACTAM 3.38 G: 3; .375 INJECTION, POWDER, FOR SOLUTION INTRAVENOUS at 13:11

## 2025-02-12 RX ADMIN — PIPERACILLIN AND TAZOBACTAM 3.38 G: 3; .375 INJECTION, POWDER, FOR SOLUTION INTRAVENOUS at 11:11

## 2025-02-12 RX ADMIN — PROPOFOL 120 MCG/KG/MIN: 10 INJECTION, EMULSION INTRAVENOUS at 11:31

## 2025-02-12 RX ADMIN — PROPOFOL 20 MG: 10 INJECTION, EMULSION INTRAVENOUS at 10:46

## 2025-02-12 RX ADMIN — PIPERACILLIN AND TAZOBACTAM 3375 MG: 3; .375 INJECTION, POWDER, FOR SOLUTION INTRAVENOUS at 19:09

## 2025-02-12 RX ADMIN — PROPOFOL 30 MG: 10 INJECTION, EMULSION INTRAVENOUS at 12:15

## 2025-02-12 RX ADMIN — SODIUM CHLORIDE, PRESERVATIVE FREE 10 ML: 5 INJECTION INTRAVENOUS at 19:53

## 2025-02-12 RX ADMIN — SODIUM CHLORIDE, POTASSIUM CHLORIDE, SODIUM LACTATE AND CALCIUM CHLORIDE: 600; 310; 30; 20 INJECTION, SOLUTION INTRAVENOUS at 11:00

## 2025-02-12 RX ADMIN — SODIUM CHLORIDE: 9 INJECTION, SOLUTION INTRAVENOUS at 10:24

## 2025-02-12 RX ADMIN — AMLODIPINE BESYLATE 5 MG: 5 TABLET ORAL at 08:01

## 2025-02-12 RX ADMIN — SUGAMMADEX 200 MG: 100 INJECTION, SOLUTION INTRAVENOUS at 13:29

## 2025-02-12 RX ADMIN — SODIUM CHLORIDE: 9 INJECTION, SOLUTION INTRAVENOUS at 16:11

## 2025-02-12 RX ADMIN — HYDROMORPHONE HYDROCHLORIDE 0.5 MG: 1 INJECTION, SOLUTION INTRAMUSCULAR; INTRAVENOUS; SUBCUTANEOUS at 16:24

## 2025-02-12 RX ADMIN — Medication 3 MG: at 21:31

## 2025-02-12 RX ADMIN — Medication 200 MCG: at 13:31

## 2025-02-12 RX ADMIN — MIDAZOLAM 1 MG: 1 INJECTION INTRAMUSCULAR; INTRAVENOUS at 10:34

## 2025-02-12 RX ADMIN — HYDROMORPHONE HYDROCHLORIDE 0.5 MG: 1 INJECTION, SOLUTION INTRAMUSCULAR; INTRAVENOUS; SUBCUTANEOUS at 19:50

## 2025-02-12 RX ADMIN — Medication 200 MCG: at 13:29

## 2025-02-12 RX ADMIN — PROPOFOL 50 MG: 10 INJECTION, EMULSION INTRAVENOUS at 13:15

## 2025-02-12 RX ADMIN — ALBUTEROL SULFATE 2 PUFF: 90 AEROSOL, METERED RESPIRATORY (INHALATION) at 16:43

## 2025-02-12 RX ADMIN — PROPOFOL 40 MG: 10 INJECTION, EMULSION INTRAVENOUS at 11:28

## 2025-02-12 RX ADMIN — ISODIUM CHLORIDE 3 ML: 0.03 SOLUTION RESPIRATORY (INHALATION) at 22:15

## 2025-02-12 RX ADMIN — HYDROMORPHONE HYDROCHLORIDE 0.25 MG: 1 INJECTION, SOLUTION INTRAMUSCULAR; INTRAVENOUS; SUBCUTANEOUS at 23:36

## 2025-02-12 RX ADMIN — LISINOPRIL 20 MG: 20 TABLET ORAL at 08:01

## 2025-02-12 RX ADMIN — FENTANYL CITRATE 50 MCG: 50 INJECTION, SOLUTION INTRAMUSCULAR; INTRAVENOUS at 11:11

## 2025-02-12 RX ADMIN — ONDANSETRON 4 MG: 2 INJECTION INTRAMUSCULAR; INTRAVENOUS at 13:26

## 2025-02-12 RX ADMIN — ROCURONIUM BROMIDE 40 MG: 10 INJECTION INTRAVENOUS at 10:50

## 2025-02-12 RX ADMIN — REMIFENTANIL HYDROCHLORIDE 0.02 MCG/KG/MIN: 1 INJECTION, POWDER, LYOPHILIZED, FOR SOLUTION INTRAVENOUS at 11:31

## 2025-02-12 RX ADMIN — PROPOFOL 30 MG: 10 INJECTION, EMULSION INTRAVENOUS at 10:43

## 2025-02-12 RX ADMIN — FENTANYL CITRATE 50 MCG: 50 INJECTION, SOLUTION INTRAMUSCULAR; INTRAVENOUS at 11:19

## 2025-02-12 RX ADMIN — Medication 25 MG: at 10:43

## 2025-02-12 RX ADMIN — FENTANYL CITRATE 50 MCG: 50 INJECTION, SOLUTION INTRAMUSCULAR; INTRAVENOUS at 14:38

## 2025-02-12 RX ADMIN — MIDAZOLAM 1 MG: 1 INJECTION INTRAMUSCULAR; INTRAVENOUS at 10:28

## 2025-02-12 RX ADMIN — ROCURONIUM BROMIDE 10 MG: 10 INJECTION INTRAVENOUS at 11:25

## 2025-02-12 RX ADMIN — FAMOTIDINE 40 MG: 20 TABLET, FILM COATED ORAL at 18:20

## 2025-02-12 RX ADMIN — ISODIUM CHLORIDE 3 ML: 0.03 SOLUTION RESPIRATORY (INHALATION) at 16:43

## 2025-02-12 RX ADMIN — ALBUTEROL SULFATE 2 PUFF: 90 AEROSOL, METERED RESPIRATORY (INHALATION) at 22:21

## 2025-02-12 RX ADMIN — LIDOCAINE HYDROCHLORIDE 80 MG: 20 INJECTION, SOLUTION INTRAVENOUS at 10:43

## 2025-02-12 ASSESSMENT — PAIN DESCRIPTION - PAIN TYPE
TYPE: ACUTE PAIN;SURGICAL PAIN

## 2025-02-12 ASSESSMENT — PAIN DESCRIPTION - FREQUENCY
FREQUENCY: CONTINUOUS

## 2025-02-12 ASSESSMENT — PAIN SCALES - WONG BAKER
WONGBAKER_NUMERICALRESPONSE: NO HURT

## 2025-02-12 ASSESSMENT — PAIN SCALES - GENERAL
PAINLEVEL_OUTOF10: 7
PAINLEVEL_OUTOF10: 5
PAINLEVEL_OUTOF10: 0
PAINLEVEL_OUTOF10: 0
PAINLEVEL_OUTOF10: 8
PAINLEVEL_OUTOF10: 0
PAINLEVEL_OUTOF10: 8
PAINLEVEL_OUTOF10: 2
PAINLEVEL_OUTOF10: 7

## 2025-02-12 ASSESSMENT — PAIN DESCRIPTION - LOCATION
LOCATION: THROAT
LOCATION: THROAT;NECK
LOCATION: THROAT;NECK;HEAD
LOCATION: THROAT
LOCATION: HEAD;THROAT;NECK

## 2025-02-12 ASSESSMENT — PAIN DESCRIPTION - ORIENTATION
ORIENTATION: MID
ORIENTATION: MID
ORIENTATION: INNER
ORIENTATION: MID

## 2025-02-12 ASSESSMENT — PAIN DESCRIPTION - ONSET
ONSET: ON-GOING

## 2025-02-12 ASSESSMENT — PAIN DESCRIPTION - DESCRIPTORS
DESCRIPTORS: DISCOMFORT;NAGGING
DESCRIPTORS: DISCOMFORT;NAGGING;SORE
DESCRIPTORS: SORE
DESCRIPTORS: DISCOMFORT;NAGGING;SORE
DESCRIPTORS: SORE

## 2025-02-12 ASSESSMENT — PAIN - FUNCTIONAL ASSESSMENT
PAIN_FUNCTIONAL_ASSESSMENT: ACTIVITIES ARE NOT PREVENTED
PAIN_FUNCTIONAL_ASSESSMENT: WONG-BAKER FACES

## 2025-02-12 NOTE — ANESTHESIA PRE PROCEDURE
Department of Anesthesiology  Preprocedure Note       Name:  Masood Randhawa   Age:  74 y.o.  :  1950                                          MRN:  [id number]         Date:  2025      Surgeon: Surgeon(s):  Rickie Daley MD    Procedure: Procedure(s):  Laryngoscopy with biopsy of larynx/supraglottis with possible debulking TRANSORAL PARTIAL VERTICAL LARYNGECTOMY    Medications prior to admission:   Prior to Admission medications    Medication Sig Start Date End Date Taking? Authorizing Provider   aspirin 81 MG EC tablet Take 1 tablet by mouth daily   Yes ProviderHilary MD   albuterol sulfate HFA (PROVENTIL;VENTOLIN;PROAIR) 108 (90 Base) MCG/ACT inhaler Inhale 2 puffs into the lungs every 6 hours as needed for Wheezing 25  Yes Samy Piper DO   pantoprazole (PROTONIX) 40 MG tablet Take 1 tablet by mouth every morning (before breakfast) 25  Yes Alecia Seymour APRN - CNP   famotidine (PEPCID) 40 MG tablet Take 1 tablet by mouth every evening 25  Yes Alecia Seymour APRN - CNP   amLODIPine (NORVASC) 5 MG tablet Take 1 tablet by mouth daily 24  Yes Samy Piper DO   benazepril (LOTENSIN) 20 MG tablet Take 1 tablet by mouth daily 24  Yes Samy Piper DO   Multiple Vitamin (MULTI VITAMIN PO) Take 1 tablet by mouth daily   Yes Hilary Peralta MD       Current medications:    Current Facility-Administered Medications   Medication Dose Route Frequency Provider Last Rate Last Admin   • albuterol sulfate HFA (PROVENTIL;VENTOLIN;PROAIR) 108 (90 Base) MCG/ACT inhaler 2 puff  2 puff Inhalation TID RT Andria Vizcarra DO       • Menthol lozenge 4.8 mg  1 lozenge Oral Q2H PRN Cathryn Esparza PA-C       • amLODIPine (NORVASC) tablet 5 mg  5 mg Oral Daily Domenic Faria DO   5 mg at 25 0801   • lisinopril (PRINIVIL;ZESTRIL) tablet 20 mg  20 mg Oral Daily Domenic Faria DO   20 mg at 25 08   • famotidine (PEPCID) tablet

## 2025-02-12 NOTE — RT PROTOCOL NOTE
--------------  ADMISSION REVIEW     Payor: Raoul Huston  #:  [id number]  Authorization Number: [id number]    Admit date: 1/30/19  Admit time: 2204     Admitting Physician: Caleb Ramirez MD  Attending Physician:  Caleb Ramirez MD  Primary Care RT Inhaler-Nebulizer Bronchodilator Protocol Note    There is a bronchodilator order in the chart from a provider indicating to follow the RT Bronchodilator Protocol and there is an “Initiate RT Inhaler-Nebulizer Bronchodilator Protocol” order as well (see protocol at bottom of note).    CXR Findings:  No results found.    The findings from the last RT Protocol Assessment were as follows:   History Pulmonary Disease: Chronic pulmonary disease  Respiratory Pattern: Dyspnea on exertion or RR 21-25 bpm  Breath Sounds: Slightly diminished and/or crackles  Cough: Weak, non-productive  Indication for Bronchodilator Therapy: Decreased or absent breath sounds  Bronchodilator Assessment Score: 9    Aerosolized bronchodilator medication orders have been revised according to the RT Inhaler-Nebulizer Bronchodilator Protocol below.    Respiratory Therapist to perform RT Therapy Protocol Assessment initially then follow the protocol.  Repeat RT Therapy Protocol Assessment PRN for score 0-3 or on second treatment, BID, and PRN for scores above 3.    No Indications - adjust the frequency to every 6 hours PRN wheezing or bronchospasm, if no treatments needed after 48 hours then discontinue using Per Protocol order mode.     If indication present, adjust the RT bronchodilator orders based on the Bronchodilator Assessment Score as indicated below.  Use Inhaler orders unless patient has one or more of the following: on home nebulizer, not able to hold breath for 10 seconds, is not alert and oriented, cannot activate and use MDI correctly, or respiratory rate 25 breaths per minute or more, then use the equivalent nebulizer order(s) with same Frequency and PRN reasons based on the score.  If a patient is on this medication at home then do not decrease Frequency below that used at home.    0-3 - enter or revise RT bronchodilator order(s) to equivalent RT Bronchodilator order with Frequency of every 4 hours PRN for wheezing or increased  per Infectious Disease at the nursing home, currently Dr. Selina Farmer 

## 2025-02-12 NOTE — PLAN OF CARE
Problem: Discharge Planning  Goal: Discharge to home or other facility with appropriate resources  2/11/2025 2007 by Amanda Espino RN  Outcome: Progressing     Problem: Respiratory - Adult  Goal: Achieves optimal ventilation and oxygenation  2/11/2025 2007 by Amanda Espino RN  Outcome: Progressing  Flowsheets (Taken 2/11/2025 1328 by Cordell Paz RCP)  Achieves optimal ventilation and oxygenation: Assess for changes in respiratory status     Problem: Cardiovascular - Adult  Goal: Maintains optimal cardiac output and hemodynamic stability  2/11/2025 2007 by Amanda Espino RN  Outcome: Progressing

## 2025-02-12 NOTE — PLAN OF CARE
Name: Masood Randhawa  YOB: 1950  MRN: [id number]  Date: 02/12/25     Plan of Care      Masood Randhawa is a 74 y.o. male w/ PMHx BPH, COPD, GERD, HTN, PVD, OA, SCC of head and neck who presented to [facility] with worsening fatigue, SOB, dysphagia, and hoarseness. Pt was seen by ENT and had CT soft tissue neck done which revealed a laryngeal mass w/ cervical LN enlargement. He underwent FNA which shoed metastatic SCC. Has never received HPV vaccine. Endorses worsening sx of SOB and hoarseness that started ~1 mo ago, fatigue, unintentional weight loss, and dysphagia w/ poor appetite. He quit smoking ~1 month ago (50 pk-yr hx). He underwent debulking of vocal cord mass 2/12/25 with Dr. Daley. Tolerated the procedure well and was transferred to ICU post-op for close monitoring of airway.   VSS, reports moderate throat pain. Denies any CP, HA, nausea, abdominal pain. Alert & oriented x4, using whiteboard to communicate.       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 Daley. Noted to have supraglottic airway obstruction w/ hoarseness and assoc B-sx. Per admit note, ENT requested pt be admitted for close monitoring d/t SOB and complex airway. Mallampati 2.   HHFNC per ENT  SCDs ; will continue holding Lovenox for few days d/t high bleeding risk   If having large hemoptysis or coughing up large clots, okay to intubate and pack pharynx per ENT  Continue Zosyn for surgical ppx  Dilaudid and Roxicodone PRN for pain  B-cell sx: including severe, unintentional weight loss (60 lb in 7 mo), night sweats, chills, poor appetite. Likely 2/2 SCC head and neck. Prealbumin low at 19.8.   Dietician following; aggressive high protein diet  COPD/asthma: Spirometry w/o bronchodilator 3/2013 - FVC 88%, FEV1 77%, FEV1/FVC 70%. On albuterol per home meds. Not on maintenance inhalers. Continue albuterol inhaler.  Acute Macrocytic Anemia: suspect 2/2

## 2025-02-12 NOTE — ANESTHESIA POSTPROCEDURE EVALUATION
Department of Anesthesiology  Postprocedure Note    Patient: Masood Randhawa  MRN: [id number]  YOB: 1950  Date of evaluation: 2/12/2025    Procedure Summary       Date: 02/12/25 Room / Location: [facility] OR 03 / [facility] OR    Anesthesia Start: 1024 Anesthesia Stop: 1356    Procedure: Laryngoscopy with Right Transoral anterolateral laryngectomy, Advancement flap laryongoplasty Diagnosis:       Hoarseness of voice      History of smoking greater than 50 pack years      Unintentional weight loss      Night sweats      Abnormal findings on laryngoscopy      Gastroesophageal reflux disease with apnea without esophagitis      Supraglottic airway obstruction      (Hoarseness of voice [R49.0])      (History of smoking greater than 50 pack years [Z87.891])      (Unintentional weight loss [R63.4])      (Night sweats [R61])      (Abnormal findings on laryngoscopy [R09.89])      (Gastroesophageal reflux disease with apnea without esophagitis [K21.9, R06.81])      (Supraglottic airway obstruction [J38.6])    Surgeons: Rickie Daley MD Responsible Provider: Du Warren DO    Anesthesia Type: general ASA Status: 3            Anesthesia Type: No value filed.    Edenilson Phase I: Edenilson Score: 8    Edenilson Phase II:      Anesthesia Post Evaluation    Patient location during evaluation: PACU  Patient participation: complete - patient participated  Level of consciousness: sleepy but conscious  Airway patency: patent  Nausea & Vomiting: no vomiting and no nausea  Cardiovascular status: hemodynamically stable  Respiratory status: acceptable  Hydration status: stable  Pain management: adequate    No notable events documented.

## 2025-02-13 LAB
BUN SERPL-MCNC: 16 MG/DL (ref 7–22)
BUN SERPL-MCNC: 17 MG/DL (ref 7–22)
CALCIUM SERPL-MCNC: 9 MG/DL (ref 8.5–10.5)
CALCIUM SERPL-MCNC: 9.4 MG/DL (ref 8.5–10.5)
CHLORIDE SERPL-SCNC: 101 MEQ/L (ref 98–111)
CHLORIDE SERPL-SCNC: 99 MEQ/L (ref 98–111)
CREAT SERPL-MCNC: 0.6 MG/DL (ref 0.4–1.2)
CREAT SERPL-MCNC: 0.7 MG/DL (ref 0.4–1.2)
DEPRECATED RDW RBC AUTO: 53.3 FL (ref 35–45)
DEPRECATED RDW RBC AUTO: 53.8 FL (ref 35–45)
ERYTHROCYTE [DISTWIDTH] IN BLOOD BY AUTOMATED COUNT: 14.2 % (ref 11.5–14.5)
ERYTHROCYTE [DISTWIDTH] IN BLOOD BY AUTOMATED COUNT: 14.2 % (ref 11.5–14.5)
FERRITIN SERPL IA-MCNC: 529 NG/ML (ref 22–322)
GFR SERPL CREATININE-BSD FRML MDRD: > 90 ML/MIN/1.73M2
GFR SERPL CREATININE-BSD FRML MDRD: > 90 ML/MIN/1.73M2
GLUCOSE SERPL-MCNC: 101 MG/DL (ref 70–108)
GLUCOSE SERPL-MCNC: 111 MG/DL (ref 70–108)
HCT VFR BLD AUTO: 37.5 % (ref 42–52)
HCT VFR BLD AUTO: 39.5 % (ref 42–52)
HGB BLD-MCNC: 12.8 GM/DL (ref 14–18)
HGB BLD-MCNC: 13.5 GM/DL (ref 14–18)
IRON SATN MFR SERPL: 28 % (ref 20–50)
IRON SERPL-MCNC: 61 UG/DL (ref 65–195)
MAGNESIUM SERPL-MCNC: 2.1 MG/DL (ref 1.6–2.4)
MCH RBC QN AUTO: 34.9 PG (ref 26–33)
MCH RBC QN AUTO: 35.2 PG (ref 26–33)
MCHC RBC AUTO-ENTMCNC: 34.1 GM/DL (ref 32.2–35.5)
MCHC RBC AUTO-ENTMCNC: 34.2 GM/DL (ref 32.2–35.5)
MCV RBC AUTO: 102.2 FL (ref 80–94)
MCV RBC AUTO: 102.9 FL (ref 80–94)
MRSA DNA SPEC QL NAA+PROBE: NEGATIVE
PHOSPHATE SERPL-MCNC: 4.3 MG/DL (ref 2.4–4.7)
PLATELET # BLD AUTO: 277 THOU/MM3 (ref 130–400)
PLATELET # BLD AUTO: 285 THOU/MM3 (ref 130–400)
PMV BLD AUTO: 10.3 FL (ref 9.4–12.4)
PMV BLD AUTO: 10.6 FL (ref 9.4–12.4)
POTASSIUM SERPL-SCNC: 4.7 MEQ/L (ref 3.5–5.2)
POTASSIUM SERPL-SCNC: 5.6 MEQ/L (ref 3.5–5.2)
RBC # BLD AUTO: 3.67 MILL/MM3 (ref 4.7–6.1)
RBC # BLD AUTO: 3.84 MILL/MM3 (ref 4.7–6.1)
SODIUM SERPL-SCNC: 141 MEQ/L (ref 135–145)
SODIUM SERPL-SCNC: 142 MEQ/L (ref 135–145)
TIBC SERPL-MCNC: 215 UG/DL (ref 171–450)
WBC # BLD AUTO: 25.7 THOU/MM3 (ref 4.8–10.8)
WBC # BLD AUTO: 28.5 THOU/MM3 (ref 4.8–10.8)

## 2025-02-13 PROCEDURE — 84520 ASSAY OF UREA NITROGEN: CPT

## 2025-02-13 PROCEDURE — 6370000000 HC RX 637 (ALT 250 FOR IP): Performed by: NURSE PRACTITIONER

## 2025-02-13 PROCEDURE — 84132 ASSAY OF SERUM POTASSIUM: CPT

## 2025-02-13 PROCEDURE — 94640 AIRWAY INHALATION TREATMENT: CPT

## 2025-02-13 PROCEDURE — 82565 ASSAY OF CREATININE: CPT

## 2025-02-13 PROCEDURE — 84295 ASSAY OF SERUM SODIUM: CPT

## 2025-02-13 PROCEDURE — 2580000003 HC RX 258: Performed by: NURSE PRACTITIONER

## 2025-02-13 PROCEDURE — 94761 N-INVAS EAR/PLS OXIMETRY MLT: CPT

## 2025-02-13 PROCEDURE — 82310 ASSAY OF CALCIUM: CPT

## 2025-02-13 PROCEDURE — 82435 ASSAY OF BLOOD CHLORIDE: CPT

## 2025-02-13 PROCEDURE — 99232 SBSQ HOSP IP/OBS MODERATE 35: CPT | Performed by: INTERNAL MEDICINE

## 2025-02-13 PROCEDURE — 82947 ASSAY GLUCOSE BLOOD QUANT: CPT

## 2025-02-13 PROCEDURE — 83550 IRON BINDING TEST: CPT

## 2025-02-13 PROCEDURE — 2500000003 HC RX 250 WO HCPCS: Performed by: NURSE PRACTITIONER

## 2025-02-13 PROCEDURE — 6360000002 HC RX W HCPCS: Performed by: NURSE PRACTITIONER

## 2025-02-13 PROCEDURE — 83735 ASSAY OF MAGNESIUM: CPT

## 2025-02-13 PROCEDURE — 85027 COMPLETE CBC AUTOMATED: CPT

## 2025-02-13 PROCEDURE — 6370000000 HC RX 637 (ALT 250 FOR IP)

## 2025-02-13 PROCEDURE — 2060000000 HC ICU INTERMEDIATE R&B

## 2025-02-13 PROCEDURE — 84100 ASSAY OF PHOSPHORUS: CPT

## 2025-02-13 PROCEDURE — 36415 COLL VENOUS BLD VENIPUNCTURE: CPT

## 2025-02-13 PROCEDURE — 2700000000 HC OXYGEN THERAPY PER DAY

## 2025-02-13 PROCEDURE — 83540 ASSAY OF IRON: CPT

## 2025-02-13 PROCEDURE — 99024 POSTOP FOLLOW-UP VISIT: CPT | Performed by: REGISTERED NURSE

## 2025-02-13 PROCEDURE — 82728 ASSAY OF FERRITIN: CPT

## 2025-02-13 RX ORDER — ALBUTEROL SULFATE 90 UG/1
2 INHALANT RESPIRATORY (INHALATION)
Status: DISCONTINUED | OUTPATIENT
Start: 2025-02-14 | End: 2025-02-17 | Stop reason: HOSPADM

## 2025-02-13 RX ORDER — ALBUTEROL SULFATE 90 UG/1
2 INHALANT RESPIRATORY (INHALATION) 2 TIMES DAILY
Status: DISCONTINUED | OUTPATIENT
Start: 2025-02-13 | End: 2025-02-13

## 2025-02-13 RX ADMIN — AMLODIPINE BESYLATE 5 MG: 5 TABLET ORAL at 09:20

## 2025-02-13 RX ADMIN — ISODIUM CHLORIDE 3 ML: 0.03 SOLUTION RESPIRATORY (INHALATION) at 19:33

## 2025-02-13 RX ADMIN — ALBUTEROL SULFATE 2 PUFF: 90 AEROSOL, METERED RESPIRATORY (INHALATION) at 08:26

## 2025-02-13 RX ADMIN — HYDROMORPHONE HYDROCHLORIDE 0.5 MG: 1 INJECTION, SOLUTION INTRAMUSCULAR; INTRAVENOUS; SUBCUTANEOUS at 18:35

## 2025-02-13 RX ADMIN — HYDROMORPHONE HYDROCHLORIDE 0.5 MG: 1 INJECTION, SOLUTION INTRAMUSCULAR; INTRAVENOUS; SUBCUTANEOUS at 09:11

## 2025-02-13 RX ADMIN — ISODIUM CHLORIDE 3 ML: 0.03 SOLUTION RESPIRATORY (INHALATION) at 12:29

## 2025-02-13 RX ADMIN — ALBUTEROL SULFATE 2 PUFF: 90 AEROSOL, METERED RESPIRATORY (INHALATION) at 19:32

## 2025-02-13 RX ADMIN — PIPERACILLIN AND TAZOBACTAM 3375 MG: 3; .375 INJECTION, POWDER, FOR SOLUTION INTRAVENOUS at 03:29

## 2025-02-13 RX ADMIN — PIPERACILLIN AND TAZOBACTAM 3375 MG: 3; .375 INJECTION, POWDER, FOR SOLUTION INTRAVENOUS at 11:34

## 2025-02-13 RX ADMIN — OXYCODONE HYDROCHLORIDE 10 MG: 5 TABLET ORAL at 21:40

## 2025-02-13 RX ADMIN — OXYCODONE HYDROCHLORIDE 5 MG: 5 TABLET ORAL at 11:38

## 2025-02-13 RX ADMIN — FAMOTIDINE 40 MG: 20 TABLET, FILM COATED ORAL at 18:36

## 2025-02-13 RX ADMIN — ISODIUM CHLORIDE 3 ML: 0.03 SOLUTION RESPIRATORY (INHALATION) at 08:27

## 2025-02-13 RX ADMIN — ISODIUM CHLORIDE 3 ML: 0.03 SOLUTION RESPIRATORY (INHALATION) at 15:56

## 2025-02-13 RX ADMIN — ALBUTEROL SULFATE 2 PUFF: 90 AEROSOL, METERED RESPIRATORY (INHALATION) at 12:41

## 2025-02-13 RX ADMIN — Medication 3 MG: at 21:40

## 2025-02-13 RX ADMIN — PIPERACILLIN AND TAZOBACTAM 3375 MG: 3; .375 INJECTION, POWDER, FOR SOLUTION INTRAVENOUS at 21:41

## 2025-02-13 RX ADMIN — PANTOPRAZOLE SODIUM 40 MG: 40 TABLET, DELAYED RELEASE ORAL at 06:05

## 2025-02-13 RX ADMIN — LISINOPRIL 20 MG: 20 TABLET ORAL at 09:20

## 2025-02-13 RX ADMIN — SODIUM CHLORIDE, PRESERVATIVE FREE 10 ML: 5 INJECTION INTRAVENOUS at 09:21

## 2025-02-13 ASSESSMENT — PAIN SCALES - GENERAL
PAINLEVEL_OUTOF10: 0
PAINLEVEL_OUTOF10: 7
PAINLEVEL_OUTOF10: 1
PAINLEVEL_OUTOF10: 4
PAINLEVEL_OUTOF10: 7
PAINLEVEL_OUTOF10: 0
PAINLEVEL_OUTOF10: 7
PAINLEVEL_OUTOF10: 2

## 2025-02-13 ASSESSMENT — PAIN DESCRIPTION - LOCATION
LOCATION: THROAT
LOCATION: NECK

## 2025-02-13 ASSESSMENT — PAIN DESCRIPTION - ONSET
ONSET: ON-GOING

## 2025-02-13 ASSESSMENT — PAIN DESCRIPTION - DESCRIPTORS
DESCRIPTORS: SORE
DESCRIPTORS: ACHING
DESCRIPTORS: SORE

## 2025-02-13 ASSESSMENT — PAIN DESCRIPTION - FREQUENCY
FREQUENCY: CONTINUOUS

## 2025-02-13 ASSESSMENT — PAIN - FUNCTIONAL ASSESSMENT
PAIN_FUNCTIONAL_ASSESSMENT: ACTIVITIES ARE NOT PREVENTED

## 2025-02-13 ASSESSMENT — PAIN DESCRIPTION - PAIN TYPE
TYPE: SURGICAL PAIN
TYPE: SURGICAL PAIN
TYPE: ACUTE PAIN;SURGICAL PAIN
TYPE: SURGICAL PAIN

## 2025-02-13 ASSESSMENT — PAIN DESCRIPTION - ORIENTATION
ORIENTATION: MID

## 2025-02-13 ASSESSMENT — PAIN SCALES - WONG BAKER: WONGBAKER_NUMERICALRESPONSE: NO HURT

## 2025-02-13 NOTE — PLAN OF CARE
Internal Medicine Resident Plan of Care    Patient:  Masood Randhawa    YOB: 1950  Unit/Bed:4D-14/014-A  MRN: [id number]    Acct: [id number]   PCP: Samy Piper DO    Date of Admission: 2/8/2025    Patient is a 74-year-old male with a past medical history of oropharyngeal squamous cell carcinoma, COPD, HTN, PAD, BPH who initially presented to [facility] on 2/8/2025 with fatigue, dyspnea, dysphagia, hoarseness.  He has had a 60 pound weight loss over the course of the last 7 months.  50-pack-year smoking history, quit in January 2025.  He underwent laryngoscopic with right transoral anterolateral laryngectomy followed by advancement flap laryngoplasty with Dr. Daley on 2/12/2025.    The morning of 2/13/2025, patient did have some scant dark red hemoptysis, however no persistent bleeding.  He continues on heated high flow nasal cannula on recommendation of ENT and is overall saturating well, settings on transfer out of ICU FiO2 40%, flow rate 50 L/min, saturating in the high 90s.    Has postoperative leukocytosis, was 28.5 on 2/12/2025, improved to 25.7 on 2/13/2025.  Will require close monitoring.  Hemoglobin overall well-maintained despite recent surgical intervention, 12.8 on 2/13/2025.  Recent anemia panel most consistent with anemia of chronic disease with slight decrease in iron stores.  Remains on Zosyn for postsurgical prophylaxis.  Is on Roxicodone PO pain panel with Dilaudid pain panel for breakthrough pain.  Pain appears to be well-controlled and he has only used 1 dose of oxycodone and 0.5 mg of Dilaudid today.  Has menthol lozenges for throat discomfort.    Blood pressure appears well-controlled on current regimen  Stiolto and albuterol for COPD.  Saline nebs 4 times daily in setting of recent surgical intervention.  Remains on tube feeds through [facility].  IR request sent for PEG tube placement in setting of recent laryngectomy/-plasty.    Mr. Randhawa is seen and examined in

## 2025-02-13 NOTE — RT PROTOCOL NOTE
RT Inhaler-Nebulizer Bronchodilator Protocol Note    There is a bronchodilator order in the chart from a provider indicating to follow the RT Bronchodilator Protocol and there is an “Initiate RT Inhaler-Nebulizer Bronchodilator Protocol” order as well (see protocol at bottom of note).    CXR Findings:  XR CHEST PORTABLE    Result Date: 2/12/2025  No acute intrathoracic process. **This report has been created using voice recognition software. It may contain minor errors which are inherent in voice recognition technology.** Electronically signed by Dr. Oscar Griffith      The findings from the last RT Protocol Assessment were as follows:   History Pulmonary Disease: Chronic pulmonary disease  Respiratory Pattern: Regular pattern and RR 12-20 bpm  Breath Sounds: Slightly diminished and/or crackles  Cough: Strong, productive  Indication for Bronchodilator Therapy: Decreased or absent breath sounds  Bronchodilator Assessment Score: 5    Aerosolized bronchodilator medication orders have been revised according to the RT Inhaler-Nebulizer Bronchodilator Protocol below.    Respiratory Therapist to perform RT Therapy Protocol Assessment initially then follow the protocol.  Repeat RT Therapy Protocol Assessment PRN for score 0-3 or on second treatment, BID, and PRN for scores above 3.    No Indications - adjust the frequency to every 6 hours PRN wheezing or bronchospasm, if no treatments needed after 48 hours then discontinue using Per Protocol order mode.     If indication present, adjust the RT bronchodilator orders based on the Bronchodilator Assessment Score as indicated below.  Use Inhaler orders unless patient has one or more of the following: on home nebulizer, not able to hold breath for 10 seconds, is not alert and oriented, cannot activate and use MDI correctly, or respiratory rate 25 breaths per minute or more, then use the equivalent nebulizer order(s) with same Frequency and PRN reasons based on the score.  If

## 2025-02-13 NOTE — CONSULTS
Consult History & Physical      Patient:  Masood Randhawa  YOB: 1950  MRN: [id number]     Acct: [id number]  Code Status: Full Code  PCP: Samy Piper DO      Chief Complaint:    Chief Complaint   Patient presents with    Shortness of Breath     Diagnosed with throat cancer last Tuesday    Weight Loss     180 to 120 in 7 months       Date of Service: Pt seen/examined in consultation on 2/13/2025    History Of Present Illness:      Masood Randhawa  is a 74 y.o. male who we are asked to see/evaluate by Krunal Carroll MD for medical management of dysphagia and need for PEG tube placement in the setting of laryngeal tumor with need for radiation.  Patient presented to the ED on 2/8/25 with weakness and 60 pound weight loss over 7 months.  PMHx significant for BPH, chronic back pain, COPD, GERD, HTN, OA, and PVD. On 2/12/25 patient underwent right transoral anterolateral laryngectomy and flap laryngoplasty for metastatic squamous cell carcinoma with necrotic lymph nodes. Patient currently on high flow oxygen.  Discussed with wife and patient that Interventional radiology placement of PEG tube may be the better option given his current respiratory status if the PEG tube need is immediate otherwise he currently has NG tube and would need High flow oxygen to be weaned and respiratory status to be improved.     Past Medical History:    Past Medical History:   Diagnosis Date    BPH (benign prostatic hyperplasia)     Chronic back pain     COPD (chronic obstructive pulmonary disease) (HCC)     Erectile dysfunction     Gastroesophageal reflux disease with apnea without esophagitis 01/10/2025    Head and neck cancer (HCC) 2/8/2025    Hypertension     Osteoarthritis     Personal history of colonic polyps 2006    PVD (peripheral vascular disease) (HCC)     PVD (peripheral vascular disease) (HCC) 02/14/2012       Home Medications:  Prior to Admission medications    Medication Sig Start Date End Date

## 2025-02-13 NOTE — PLAN OF CARE
Patient stable to transfer to stepdown unit, discussed with ENT service. Going to bed 4b8. Signout given via perfectserve to hospitalist team, Dr Noble.    Electronically signed by Fabien Lopez DO on 2/13/2025 at 1:54 PM

## 2025-02-14 ENCOUNTER — APPOINTMENT (OUTPATIENT)
Dept: INTERVENTIONAL RADIOLOGY/VASCULAR | Age: 75
DRG: 143 | End: 2025-02-14
Payer: MEDICARE

## 2025-02-14 ENCOUNTER — TELEPHONE (OUTPATIENT)
Dept: ENT CLINIC | Age: 75
End: 2025-02-14

## 2025-02-14 DIAGNOSIS — C32.0 SQUAMOUS CELL CARCINOMA OF GLOTTIS (HCC): Primary | ICD-10-CM

## 2025-02-14 LAB
ANION GAP SERPL CALC-SCNC: 15 MEQ/L (ref 8–16)
BUN SERPL-MCNC: 15 MG/DL (ref 7–22)
CALCIUM SERPL-MCNC: 8.5 MG/DL (ref 8.5–10.5)
CHLORIDE SERPL-SCNC: 98 MEQ/L (ref 98–111)
CO2 SERPL-SCNC: 26 MEQ/L (ref 23–33)
CREAT SERPL-MCNC: 0.5 MG/DL (ref 0.4–1.2)
DEPRECATED RDW RBC AUTO: 52.5 FL (ref 35–45)
ERYTHROCYTE [DISTWIDTH] IN BLOOD BY AUTOMATED COUNT: 14.2 % (ref 11.5–14.5)
GFR SERPL CREATININE-BSD FRML MDRD: > 90 ML/MIN/1.73M2
GLUCOSE SERPL-MCNC: 87 MG/DL (ref 70–108)
HCT VFR BLD AUTO: 35.8 % (ref 42–52)
HGB BLD-MCNC: 12.3 GM/DL (ref 14–18)
MAGNESIUM SERPL-MCNC: 2 MG/DL (ref 1.6–2.4)
MCH RBC QN AUTO: 34.8 PG (ref 26–33)
MCHC RBC AUTO-ENTMCNC: 34.4 GM/DL (ref 32.2–35.5)
MCV RBC AUTO: 101.4 FL (ref 80–94)
PLATELET # BLD AUTO: 262 THOU/MM3 (ref 130–400)
PMV BLD AUTO: 10.4 FL (ref 9.4–12.4)
POTASSIUM SERPL-SCNC: 3.6 MEQ/L (ref 3.5–5.2)
RBC # BLD AUTO: 3.53 MILL/MM3 (ref 4.7–6.1)
SODIUM SERPL-SCNC: 139 MEQ/L (ref 135–145)
WBC # BLD AUTO: 15.8 THOU/MM3 (ref 4.8–10.8)

## 2025-02-14 PROCEDURE — 99233 SBSQ HOSP IP/OBS HIGH 50: CPT | Performed by: STUDENT IN AN ORGANIZED HEALTH CARE EDUCATION/TRAINING PROGRAM

## 2025-02-14 PROCEDURE — 36415 COLL VENOUS BLD VENIPUNCTURE: CPT

## 2025-02-14 PROCEDURE — 2580000003 HC RX 258: Performed by: NURSE PRACTITIONER

## 2025-02-14 PROCEDURE — 6360000002 HC RX W HCPCS: Performed by: RADIOLOGY

## 2025-02-14 PROCEDURE — 6370000000 HC RX 637 (ALT 250 FOR IP): Performed by: NURSE PRACTITIONER

## 2025-02-14 PROCEDURE — 6370000000 HC RX 637 (ALT 250 FOR IP)

## 2025-02-14 PROCEDURE — 94760 N-INVAS EAR/PLS OXIMETRY 1: CPT

## 2025-02-14 PROCEDURE — 94669 MECHANICAL CHEST WALL OSCILL: CPT

## 2025-02-14 PROCEDURE — 99024 POSTOP FOLLOW-UP VISIT: CPT | Performed by: REGISTERED NURSE

## 2025-02-14 PROCEDURE — 2500000003 HC RX 250 WO HCPCS: Performed by: NURSE PRACTITIONER

## 2025-02-14 PROCEDURE — 80048 BASIC METABOLIC PNL TOTAL CA: CPT

## 2025-02-14 PROCEDURE — C1713 ANCHOR/SCREW BN/BN,TIS/BN: HCPCS

## 2025-02-14 PROCEDURE — 0DH63UZ INSERTION OF FEEDING DEVICE INTO STOMACH, PERCUTANEOUS APPROACH: ICD-10-PCS | Performed by: RADIOLOGY

## 2025-02-14 PROCEDURE — 6360000002 HC RX W HCPCS: Performed by: NURSE PRACTITIONER

## 2025-02-14 PROCEDURE — 6360000004 HC RX CONTRAST MEDICATION: Performed by: RADIOLOGY

## 2025-02-14 PROCEDURE — 6370000000 HC RX 637 (ALT 250 FOR IP): Performed by: STUDENT IN AN ORGANIZED HEALTH CARE EDUCATION/TRAINING PROGRAM

## 2025-02-14 PROCEDURE — 83735 ASSAY OF MAGNESIUM: CPT

## 2025-02-14 PROCEDURE — 94640 AIRWAY INHALATION TREATMENT: CPT

## 2025-02-14 PROCEDURE — 2700000000 HC OXYGEN THERAPY PER DAY

## 2025-02-14 PROCEDURE — 94761 N-INVAS EAR/PLS OXIMETRY MLT: CPT

## 2025-02-14 PROCEDURE — 2060000000 HC ICU INTERMEDIATE R&B

## 2025-02-14 PROCEDURE — 85027 COMPLETE CBC AUTOMATED: CPT

## 2025-02-14 RX ORDER — LIDOCAINE HYDROCHLORIDE 20 MG/ML
INJECTION, SOLUTION EPIDURAL; INFILTRATION; INTRACAUDAL; PERINEURAL PRN
Status: COMPLETED | OUTPATIENT
Start: 2025-02-14 | End: 2025-02-14

## 2025-02-14 RX ORDER — FENTANYL CITRATE 50 UG/ML
INJECTION, SOLUTION INTRAMUSCULAR; INTRAVENOUS PRN
Status: COMPLETED | OUTPATIENT
Start: 2025-02-14 | End: 2025-02-14

## 2025-02-14 RX ORDER — MIDAZOLAM HYDROCHLORIDE 1 MG/ML
INJECTION, SOLUTION INTRAMUSCULAR; INTRAVENOUS PRN
Status: COMPLETED | OUTPATIENT
Start: 2025-02-14 | End: 2025-02-14

## 2025-02-14 RX ORDER — DIATRIZOATE MEGLUMINE AND DIATRIZOATE SODIUM 660; 100 MG/ML; MG/ML
SOLUTION ORAL; RECTAL PRN
Status: COMPLETED | OUTPATIENT
Start: 2025-02-14 | End: 2025-02-14

## 2025-02-14 RX ADMIN — FENTANYL CITRATE 25 MCG: 50 INJECTION, SOLUTION INTRAMUSCULAR; INTRAVENOUS at 15:00

## 2025-02-14 RX ADMIN — ALBUTEROL SULFATE 2 PUFF: 90 AEROSOL, METERED RESPIRATORY (INHALATION) at 09:26

## 2025-02-14 RX ADMIN — ALBUTEROL SULFATE 2 PUFF: 90 AEROSOL, METERED RESPIRATORY (INHALATION) at 17:10

## 2025-02-14 RX ADMIN — ACETAMINOPHEN 650 MG: 325 TABLET ORAL at 23:05

## 2025-02-14 RX ADMIN — FAMOTIDINE 40 MG: 20 TABLET, FILM COATED ORAL at 17:40

## 2025-02-14 RX ADMIN — LISINOPRIL 20 MG: 20 TABLET ORAL at 08:45

## 2025-02-14 RX ADMIN — PIPERACILLIN AND TAZOBACTAM 3375 MG: 3; .375 INJECTION, POWDER, FOR SOLUTION INTRAVENOUS at 03:52

## 2025-02-14 RX ADMIN — TIOTROPIUM BROMIDE AND OLODATEROL 2 PUFF: 3.124; 2.736 SPRAY, METERED RESPIRATORY (INHALATION) at 09:27

## 2025-02-14 RX ADMIN — PIPERACILLIN AND TAZOBACTAM 3375 MG: 3; .375 INJECTION, POWDER, FOR SOLUTION INTRAVENOUS at 13:03

## 2025-02-14 RX ADMIN — SODIUM CHLORIDE, PRESERVATIVE FREE 10 ML: 5 INJECTION INTRAVENOUS at 17:40

## 2025-02-14 RX ADMIN — DIATRIZOATE MEGLUMINE AND DIATRIZOATE SODIUM 15 ML: 600; 100 SOLUTION ORAL; RECTAL at 15:30

## 2025-02-14 RX ADMIN — MIDAZOLAM 0.5 MG: 1 INJECTION INTRAMUSCULAR; INTRAVENOUS at 15:20

## 2025-02-14 RX ADMIN — HYDROMORPHONE HYDROCHLORIDE 0.25 MG: 1 INJECTION, SOLUTION INTRAMUSCULAR; INTRAVENOUS; SUBCUTANEOUS at 17:40

## 2025-02-14 RX ADMIN — Medication 3 MG: at 21:04

## 2025-02-14 RX ADMIN — PIPERACILLIN AND TAZOBACTAM 3375 MG: 3; .375 INJECTION, POWDER, FOR SOLUTION INTRAVENOUS at 21:35

## 2025-02-14 RX ADMIN — SODIUM CHLORIDE, PRESERVATIVE FREE 10 ML: 5 INJECTION INTRAVENOUS at 21:05

## 2025-02-14 RX ADMIN — ISODIUM CHLORIDE 3 ML: 0.03 SOLUTION RESPIRATORY (INHALATION) at 09:27

## 2025-02-14 RX ADMIN — MIDAZOLAM 0.5 MG: 1 INJECTION INTRAMUSCULAR; INTRAVENOUS at 15:00

## 2025-02-14 RX ADMIN — SODIUM CHLORIDE, PRESERVATIVE FREE 10 ML: 5 INJECTION INTRAVENOUS at 08:45

## 2025-02-14 RX ADMIN — LIDOCAINE HYDROCHLORIDE 5 ML: 20 INJECTION, SOLUTION EPIDURAL; INFILTRATION; INTRACAUDAL; PERINEURAL at 15:31

## 2025-02-14 RX ADMIN — AMLODIPINE BESYLATE 5 MG: 5 TABLET ORAL at 08:45

## 2025-02-14 RX ADMIN — FENTANYL CITRATE 25 MCG: 50 INJECTION, SOLUTION INTRAMUSCULAR; INTRAVENOUS at 15:07

## 2025-02-14 RX ADMIN — FENTANYL CITRATE 25 MCG: 50 INJECTION, SOLUTION INTRAMUSCULAR; INTRAVENOUS at 15:20

## 2025-02-14 RX ADMIN — MIDAZOLAM 0.5 MG: 1 INJECTION INTRAMUSCULAR; INTRAVENOUS at 15:07

## 2025-02-14 ASSESSMENT — PAIN DESCRIPTION - DESCRIPTORS
DESCRIPTORS: DISCOMFORT
DESCRIPTORS: ACHING;DISCOMFORT

## 2025-02-14 ASSESSMENT — PAIN SCALES - GENERAL
PAINLEVEL_OUTOF10: 0
PAINLEVEL_OUTOF10: 3
PAINLEVEL_OUTOF10: 3
PAINLEVEL_OUTOF10: 0
PAINLEVEL_OUTOF10: 3
PAINLEVEL_OUTOF10: 4
PAINLEVEL_OUTOF10: 0
PAINLEVEL_OUTOF10: 0

## 2025-02-14 ASSESSMENT — PAIN - FUNCTIONAL ASSESSMENT
PAIN_FUNCTIONAL_ASSESSMENT: ACTIVITIES ARE NOT PREVENTED
PAIN_FUNCTIONAL_ASSESSMENT: ACTIVITIES ARE NOT PREVENTED

## 2025-02-14 ASSESSMENT — PAIN DESCRIPTION - FREQUENCY: FREQUENCY: INTERMITTENT

## 2025-02-14 ASSESSMENT — PAIN DESCRIPTION - ONSET: ONSET: ON-GOING

## 2025-02-14 ASSESSMENT — PAIN DESCRIPTION - LOCATION
LOCATION: THROAT
LOCATION: THROAT
LOCATION: ABDOMEN

## 2025-02-14 ASSESSMENT — PAIN DESCRIPTION - ORIENTATION
ORIENTATION: MID
ORIENTATION: MID

## 2025-02-14 ASSESSMENT — PAIN DESCRIPTION - PAIN TYPE: TYPE: ACUTE PAIN;SURGICAL PAIN

## 2025-02-14 NOTE — H&P
Patient:  Masood Randhawa  YOB: 1950  Date of Service: 2/8/2025  Admission Date:2/8/2025  Code Status: No Order  NIHSS:      Assessment / Plan     Squamous cell carcinoma of the head and neck: Noted to have supraglottic airway obstruction with hoarseness and associated B-type symptoms.  Scheduled for resection.  Per ED, ENT physician has requested patient be admitted for close monitoring due to shortness of breath.  Patient denies ever receiving the HPV vaccine.    Mallampati 2  Severe unintentional weight loss: Likely from squamous cell carcinoma of the head and neck.  Has lost 60 pounds in 7 months with B-type symptoms including night sweats/chills.  He does endorse poor appetite.  BMI 19.37.  Chloride low due to poor oral intake.  Prealbumin ordered  Dietitian consulted  Aggressive protein diet  Moderate COPD/asthma: On albuterol per home medications.  Not on daily maintenance inhalers.  Continued.  Hypertension: Continue amlodipine and lisinopril.  Tobacco abuse: 50-pack-year history.  1 pack/day, quit smoking 1 month ago.      LDA: []CVC / []PICC / []Midline / []Rousseau / []Drains / []Mediport / [x]None  Antibiotics:     []Yes / [x]No  Steroids:  []Yes / [x]No  Labs: [x]Yes   Level of care: [x]Step Down / []Med-Surg  Bed Status: [x]Inpatient  Telemetry: [x]Yes / []No  PT/OT: []Yes / []No    DVT Prophylaxis: [x] Lovenox / [] Heparin / [] SCDs / [] Already on Systemic Anticoagulation / [] None     Bowel:    BP: (!) 123/55 HR: 91  Air:   on None (Room air) on    I/O: No intake/output data recorded.  Diet: No diet orders on file      Subjective   General Medicine History and Physical   Per Initial H&P:  Masood Randhawa is a 74 y.o., , male with a PMH of tobacco abuse COPD and hypertension who presented with worsening fatigue shortness of breath dysphagia and baseline hoarseness.  Patient was seen by ENT and had a CT scan which showed laryngeal mass with cervical lymph node 
Formulation and discussion of sedation / procedure plans, risks, benefits, side effects and alternatives with patient and/or responsible adult completed.    History and Physical reviewed and unchanged.    Electronically signed by Fredo Strickland MD on 2/14/25 at 3:24 PM EST   
record)  [x]Formulation and discussion of sedation/procedure plan, risks, and expectations with patient and/or responsible adult completed.  [x]Patient examined immediately prior to the procedure. (Refer to nursing sedation/analgesia documentation record)    Fredo Strickland MD, MD  Electronically signed 2/14/2025 at 3:24 PM    
injection 40 mg, 40 mg, SubCUTAneous, Daily  ondansetron (ZOFRAN-ODT) disintegrating tablet 4 mg, 4 mg, Oral, Q8H PRN **OR** ondansetron (ZOFRAN) injection 4 mg, 4 mg, IntraVENous, Q6H PRN  bisacodyl (DULCOLAX) EC tablet 5 mg, 5 mg, Oral, Daily PRN  senna (SENOKOT) 8.8 MG/5ML syrup 8.8 mg, 5 mL, Oral, BID PRN  acetaminophen (TYLENOL) tablet 650 mg, 650 mg, Oral, Q6H PRN **OR** acetaminophen (TYLENOL) suppository 650 mg, 650 mg, Rectal, Q6H PRN  melatonin tablet 3 mg, 3 mg, Oral, Nightly  albuterol sulfate HFA (PROVENTIL;VENTOLIN;PROAIR) 108 (90 Base) MCG/ACT inhaler 2 puff, 2 puff, Inhalation, 4x Daily  albuterol sulfate HFA (PROVENTIL;VENTOLIN;PROAIR) 108 (90 Base) MCG/ACT inhaler 2 puff, 2 puff, Inhalation, Q4H PRN        ASSESSMENT AND PLAN     Worsening shortness of breath in setting of metastatic squamous cell carcinoma with necrotic lymph nodes and laryngeal mass      -Diet as patient tolerates; monitor for risk of aspiration.   -Difficult airway; close monitoring. If patient requires intubation provider should be aware of difficult airway as depicted in pictures above, mass and edema obliterates view of vocal cords. Will likely need a boujee and small tube ETT.   -Plan for procedure tomorrow 2/12/25 with Dr. Daley for debulking of vocal cord mass. Holding Lovenox for procedure. Place SCD's for DVT prophylaxis.  NPO after midnight.      Electronically signed by SOO Delatorre CNP on 2/11/2025 at 6:27 AM                  Electronically signed by Alecia Seymour APRN - CNP at 2/11/2025 11:49 AM

## 2025-02-14 NOTE — OP NOTE
Department of Radiology  Post Procedure Progress Note      Pre-Procedure Diagnosis:  head and neck cancer , dysphagia     Procedure Performed:  G tube insertion     Anesthesia: local / versed and fentanyl    Findings: successful    Immediate Complications:  None    Estimated Blood Loss: minimal    SEE DICTATED PROCEDURE NOTE FOR COMPLETE DETAILS.    Fredo Strickland MD   2/14/2025 3:26 PM  
the AE fold laterally to the epiglottic cartilage anteriorly to the arytenoid tower and AE fold posteriorly and medially into the interarytenoid mucosa as well as down well beyond 10 mm below the level of the free edge of the true cords and anterior commissure.  It did not appear to be grossly crossing the midline but neither could this be excluded by virtue of much it was pushing against the opposite side anteriorly.  Cherelle placed a traction stitch of 2-0 Prolene through the epiglottis in a horizontal mattress configuration and withdrew the scope.  Following this I took over the airway management and picked up the epiglottis with a traction stitch and adjusted its position to enable as brought in axis as possible for effective transoral resection.  This included the conversion from the endotracheal tube intubation to a check catheter being placed into the distal trachea which Cherelle passed in through the the oral cavity so that I could pick it up in the laryngeal inlet.  Jet ventilation was commenced and was sufficient for maintaining the patient's oxygenation throughout the case.    Bring on the field the operating microscope with a [employer] ultra pulsed CO2 laser set at 10 W average power density with a 1.2 mm [place] followed by a 0.8 mm [place] for the remainder of the case.  I turned my attention to the supraglottic disease.  Using a [place] laser suction grasper, I traction the bulging hard supraglottic soft tissues off of the AE fold apex and incised the AE fold down into the submucosa quickly encountering tumor tissue.  I took the incision more laterally to see if I could find the breakpoint but it appeared to be attached to the underlying thyroid lamina.  I took the soft tissue off of the thyroid lamina with the laser encountering cartilage regularly but rolled it medially and anteriorly off of the posterior aspect of the AE fold amputating it in the mid coronal plane anteriorly and across the false fold

## 2025-02-14 NOTE — RT PROTOCOL NOTE
RT Inhaler-Nebulizer Bronchodilator Protocol Note    There is a bronchodilator order in the chart from a provider indicating to follow the RT Bronchodilator Protocol and there is an “Initiate RT Inhaler-Nebulizer Bronchodilator Protocol” order as well (see protocol at bottom of note).    CXR Findings:  XR CHEST PORTABLE    Result Date: 2/12/2025  No acute intrathoracic process. **This report has been created using voice recognition software. It may contain minor errors which are inherent in voice recognition technology.** Electronically signed by Dr. Oscar Griffith      The findings from the last RT Protocol Assessment were as follows:   History Pulmonary Disease: Chronic pulmonary disease  Respiratory Pattern: Regular pattern and RR 12-20 bpm  Breath Sounds: Slightly diminished and/or crackles  Cough: Strong, spontaneous, non-productive  Indication for Bronchodilator Therapy: Decreased or absent breath sounds (takes QID at home)  Bronchodilator Assessment Score: 4    Aerosolized bronchodilator medication orders have been revised according to the RT Inhaler-Nebulizer Bronchodilator Protocol below.    Respiratory Therapist to perform RT Therapy Protocol Assessment initially then follow the protocol.  Repeat RT Therapy Protocol Assessment PRN for score 0-3 or on second treatment, BID, and PRN for scores above 3.    No Indications - adjust the frequency to every 6 hours PRN wheezing or bronchospasm, if no treatments needed after 48 hours then discontinue using Per Protocol order mode.     If indication present, adjust the RT bronchodilator orders based on the Bronchodilator Assessment Score as indicated below.  Use Inhaler orders unless patient has one or more of the following: on home nebulizer, not able to hold breath for 10 seconds, is not alert and oriented, cannot activate and use MDI correctly, or respiratory rate 25 breaths per minute or more, then use the equivalent nebulizer order(s) with same Frequency and

## 2025-02-14 NOTE — TELEPHONE ENCOUNTER
Orders placed for referral to oncology and radiation/oncology as well as PET Scan.  Patient currently in hospital s/p anterolateral laryngectomy with Dr. Daley. Please assist with getting the referral sent and will coordinate for PET scan at some point in the future as well.      FINAL DIAGNOSIS:   A. Left supraglottic polyp and mucosa, excision:    Polypoid squamous mucosa with underlying cystic dilatation and       associated tonsillar tissue.    Negative for malignancy.     B. Posterior and superior glottis, biopsy:    Invasive moderately differentiated squamous cell carcinoma.     C. Right anterior supraglottis, biopsy:    Invasive moderately differentiated squamous cell carcinoma.     D. Right glottis and subglottis, biopsy:    Invasive moderately differentiated squamous cell carcinoma.    Lymphovascular invasion present.     E. Right posterior glottis and arytenoid, biopsy:    Invasive moderately differentiated squamous cell carcinoma.

## 2025-02-14 NOTE — PLAN OF CARE
Problem: Discharge Planning  Goal: Discharge to home or other facility with appropriate resources  2/14/2025 1018 by Nellie Michel RN  Outcome: Progressing  Flowsheets (Taken 2/14/2025 0815)  Discharge to home or other facility with appropriate resources:   Identify barriers to discharge with patient and caregiver   Arrange for needed discharge resources and transportation as appropriate   Identify discharge learning needs (meds, wound care, etc)     Problem: Respiratory - Adult  Goal: Achieves optimal ventilation and oxygenation  2/14/2025 1018 by Nellie Michel, RN  Outcome: Progressing  Taken 2/14/2025 0815 by Nellie Michel, RN  Achieves optimal ventilation and oxygenation:   Assess for changes in respiratory status   Assess for changes in mentation and behavior   Position to facilitate oxygenation and minimize respiratory effort   Oxygen supplementation based on oxygen saturation or arterial blood gases   Initiate smoking cessation protocol as indicated   Encourage broncho-pulmonary hygiene including cough, deep breathe, incentive spirometry   Assess the need for suctioning and aspirate as needed   Assess and instruct to report shortness of breath or any respiratory difficulty   Respiratory therapy support as indicated       Problem: Cardiovascular - Adult  Goal: Maintains optimal cardiac output and hemodynamic stability  2/14/2025 1018 by Nellie Michel, RN  Outcome: Progressing  Flowsheets (Taken 2/14/2025 0815)  Maintains optimal cardiac output and hemodynamic stability:   Monitor blood pressure and heart rate   Monitor urine output and notify Licensed Independent Practitioner for values outside of normal range   Assess for signs of decreased cardiac output   Administer fluid and/or volume expanders as ordered     Problem: Cardiovascular - Adult  Goal: Absence of cardiac dysrhythmias or at baseline  Outcome: Progressing  Flowsheets (Taken 2/14/2025 0815)  Absence of cardiac

## 2025-02-14 NOTE — PLAN OF CARE
Problem: Discharge Planning  Goal: Discharge to home or other facility with appropriate resources  Outcome: Progressing  Problem: Respiratory - Adult  Goal: Achieves optimal ventilation and oxygenation  Outcome: Progressing   Assess for changes in respiratory status   Assess for changes in mentation and behavior     Problem: Cardiovascular - Adult  Goal: Maintains optimal cardiac output and hemodynamic stability  Outcome: Progressing

## 2025-02-15 LAB
ANION GAP SERPL CALC-SCNC: 13 MEQ/L (ref 8–16)
BUN SERPL-MCNC: 19 MG/DL (ref 7–22)
CALCIUM SERPL-MCNC: 8.8 MG/DL (ref 8.5–10.5)
CHLORIDE SERPL-SCNC: 98 MEQ/L (ref 98–111)
CO2 SERPL-SCNC: 27 MEQ/L (ref 23–33)
CREAT SERPL-MCNC: 0.5 MG/DL (ref 0.4–1.2)
DEPRECATED RDW RBC AUTO: 50.5 FL (ref 35–45)
ERYTHROCYTE [DISTWIDTH] IN BLOOD BY AUTOMATED COUNT: 13.9 % (ref 11.5–14.5)
GFR SERPL CREATININE-BSD FRML MDRD: > 90 ML/MIN/1.73M2
GLUCOSE SERPL-MCNC: 112 MG/DL (ref 70–108)
HCT VFR BLD AUTO: 37.7 % (ref 42–52)
HGB BLD-MCNC: 12.9 GM/DL (ref 14–18)
MAGNESIUM SERPL-MCNC: 2.3 MG/DL (ref 1.6–2.4)
MCH RBC QN AUTO: 34.7 PG (ref 26–33)
MCHC RBC AUTO-ENTMCNC: 34.2 GM/DL (ref 32.2–35.5)
MCV RBC AUTO: 101.3 FL (ref 80–94)
PLATELET # BLD AUTO: 279 THOU/MM3 (ref 130–400)
PMV BLD AUTO: 10.3 FL (ref 9.4–12.4)
POTASSIUM SERPL-SCNC: 3.5 MEQ/L (ref 3.5–5.2)
RBC # BLD AUTO: 3.72 MILL/MM3 (ref 4.7–6.1)
SODIUM SERPL-SCNC: 138 MEQ/L (ref 135–145)
WBC # BLD AUTO: 12.6 THOU/MM3 (ref 4.8–10.8)

## 2025-02-15 PROCEDURE — 2500000003 HC RX 250 WO HCPCS: Performed by: NURSE PRACTITIONER

## 2025-02-15 PROCEDURE — 6370000000 HC RX 637 (ALT 250 FOR IP)

## 2025-02-15 PROCEDURE — 6360000002 HC RX W HCPCS: Performed by: NURSE PRACTITIONER

## 2025-02-15 PROCEDURE — 83735 ASSAY OF MAGNESIUM: CPT

## 2025-02-15 PROCEDURE — 85027 COMPLETE CBC AUTOMATED: CPT

## 2025-02-15 PROCEDURE — 2700000000 HC OXYGEN THERAPY PER DAY

## 2025-02-15 PROCEDURE — 94761 N-INVAS EAR/PLS OXIMETRY MLT: CPT

## 2025-02-15 PROCEDURE — 6370000000 HC RX 637 (ALT 250 FOR IP): Performed by: STUDENT IN AN ORGANIZED HEALTH CARE EDUCATION/TRAINING PROGRAM

## 2025-02-15 PROCEDURE — 80048 BASIC METABOLIC PNL TOTAL CA: CPT

## 2025-02-15 PROCEDURE — 2060000000 HC ICU INTERMEDIATE R&B

## 2025-02-15 PROCEDURE — 99024 POSTOP FOLLOW-UP VISIT: CPT | Performed by: REGISTERED NURSE

## 2025-02-15 PROCEDURE — 51798 US URINE CAPACITY MEASURE: CPT

## 2025-02-15 PROCEDURE — 99233 SBSQ HOSP IP/OBS HIGH 50: CPT | Performed by: STUDENT IN AN ORGANIZED HEALTH CARE EDUCATION/TRAINING PROGRAM

## 2025-02-15 PROCEDURE — 36415 COLL VENOUS BLD VENIPUNCTURE: CPT

## 2025-02-15 PROCEDURE — 2580000003 HC RX 258: Performed by: NURSE PRACTITIONER

## 2025-02-15 PROCEDURE — 6370000000 HC RX 637 (ALT 250 FOR IP): Performed by: NURSE PRACTITIONER

## 2025-02-15 PROCEDURE — 94640 AIRWAY INHALATION TREATMENT: CPT

## 2025-02-15 RX ADMIN — ISODIUM CHLORIDE 3 ML: 0.03 SOLUTION RESPIRATORY (INHALATION) at 08:38

## 2025-02-15 RX ADMIN — ALBUTEROL SULFATE 2 PUFF: 90 AEROSOL, METERED RESPIRATORY (INHALATION) at 15:59

## 2025-02-15 RX ADMIN — HYDROMORPHONE HYDROCHLORIDE 0.25 MG: 1 INJECTION, SOLUTION INTRAMUSCULAR; INTRAVENOUS; SUBCUTANEOUS at 14:19

## 2025-02-15 RX ADMIN — ISODIUM CHLORIDE 3 ML: 0.03 SOLUTION RESPIRATORY (INHALATION) at 22:18

## 2025-02-15 RX ADMIN — ISODIUM CHLORIDE 3 ML: 0.03 SOLUTION RESPIRATORY (INHALATION) at 15:59

## 2025-02-15 RX ADMIN — PIPERACILLIN AND TAZOBACTAM 3375 MG: 3; .375 INJECTION, POWDER, FOR SOLUTION INTRAVENOUS at 20:32

## 2025-02-15 RX ADMIN — LISINOPRIL 20 MG: 20 TABLET ORAL at 08:15

## 2025-02-15 RX ADMIN — HYDROMORPHONE HYDROCHLORIDE 0.25 MG: 1 INJECTION, SOLUTION INTRAMUSCULAR; INTRAVENOUS; SUBCUTANEOUS at 17:24

## 2025-02-15 RX ADMIN — PIPERACILLIN AND TAZOBACTAM 3375 MG: 3; .375 INJECTION, POWDER, FOR SOLUTION INTRAVENOUS at 12:16

## 2025-02-15 RX ADMIN — SODIUM CHLORIDE, PRESERVATIVE FREE 10 ML: 5 INJECTION INTRAVENOUS at 08:15

## 2025-02-15 RX ADMIN — SODIUM CHLORIDE, PRESERVATIVE FREE 10 ML: 5 INJECTION INTRAVENOUS at 20:27

## 2025-02-15 RX ADMIN — PIPERACILLIN AND TAZOBACTAM 3375 MG: 3; .375 INJECTION, POWDER, FOR SOLUTION INTRAVENOUS at 03:10

## 2025-02-15 RX ADMIN — TIOTROPIUM BROMIDE AND OLODATEROL 2 PUFF: 3.124; 2.736 SPRAY, METERED RESPIRATORY (INHALATION) at 08:38

## 2025-02-15 RX ADMIN — ALBUTEROL SULFATE 2 PUFF: 90 AEROSOL, METERED RESPIRATORY (INHALATION) at 22:16

## 2025-02-15 RX ADMIN — HYDROMORPHONE HYDROCHLORIDE 0.25 MG: 1 INJECTION, SOLUTION INTRAMUSCULAR; INTRAVENOUS; SUBCUTANEOUS at 11:04

## 2025-02-15 RX ADMIN — HYDROMORPHONE HYDROCHLORIDE 0.5 MG: 1 INJECTION, SOLUTION INTRAMUSCULAR; INTRAVENOUS; SUBCUTANEOUS at 20:42

## 2025-02-15 RX ADMIN — ALBUTEROL SULFATE 2 PUFF: 90 AEROSOL, METERED RESPIRATORY (INHALATION) at 12:01

## 2025-02-15 RX ADMIN — AMLODIPINE BESYLATE 5 MG: 5 TABLET ORAL at 08:15

## 2025-02-15 RX ADMIN — FAMOTIDINE 40 MG: 20 TABLET, FILM COATED ORAL at 16:59

## 2025-02-15 RX ADMIN — ALBUTEROL SULFATE 2 PUFF: 90 AEROSOL, METERED RESPIRATORY (INHALATION) at 08:38

## 2025-02-15 RX ADMIN — Medication 3 MG: at 20:28

## 2025-02-15 RX ADMIN — PANTOPRAZOLE SODIUM 40 MG: 40 TABLET, DELAYED RELEASE ORAL at 05:08

## 2025-02-15 RX ADMIN — ISODIUM CHLORIDE 3 ML: 0.03 SOLUTION RESPIRATORY (INHALATION) at 12:00

## 2025-02-15 ASSESSMENT — PAIN DESCRIPTION - DESCRIPTORS
DESCRIPTORS: ACHING

## 2025-02-15 ASSESSMENT — PAIN SCALES - GENERAL
PAINLEVEL_OUTOF10: 4
PAINLEVEL_OUTOF10: 4
PAINLEVEL_OUTOF10: 6
PAINLEVEL_OUTOF10: 6
PAINLEVEL_OUTOF10: 4

## 2025-02-15 ASSESSMENT — PAIN DESCRIPTION - LOCATION
LOCATION: ABDOMEN

## 2025-02-15 ASSESSMENT — PAIN DESCRIPTION - ONSET
ONSET: ON-GOING
ONSET: ON-GOING

## 2025-02-15 ASSESSMENT — PAIN DESCRIPTION - ORIENTATION
ORIENTATION: MID

## 2025-02-15 ASSESSMENT — PAIN DESCRIPTION - FREQUENCY
FREQUENCY: INTERMITTENT
FREQUENCY: INTERMITTENT

## 2025-02-15 ASSESSMENT — PAIN DESCRIPTION - PAIN TYPE
TYPE: SURGICAL PAIN
TYPE: SURGICAL PAIN

## 2025-02-16 DIAGNOSIS — D75.1 ERYTHROCYTOSIS: Primary | ICD-10-CM

## 2025-02-16 LAB
ANION GAP SERPL CALC-SCNC: 11 MEQ/L (ref 8–16)
BUN SERPL-MCNC: 20 MG/DL (ref 7–22)
CALCIUM SERPL-MCNC: 8.7 MG/DL (ref 8.5–10.5)
CHLORIDE SERPL-SCNC: 100 MEQ/L (ref 98–111)
CO2 SERPL-SCNC: 28 MEQ/L (ref 23–33)
CREAT SERPL-MCNC: 0.6 MG/DL (ref 0.4–1.2)
DEPRECATED RDW RBC AUTO: 52.7 FL (ref 35–45)
ERYTHROCYTE [DISTWIDTH] IN BLOOD BY AUTOMATED COUNT: 13.9 % (ref 11.5–14.5)
GFR SERPL CREATININE-BSD FRML MDRD: > 90 ML/MIN/1.73M2
GLUCOSE BLD STRIP.AUTO-MCNC: 101 MG/DL (ref 70–108)
GLUCOSE SERPL-MCNC: 159 MG/DL (ref 70–108)
HCT VFR BLD AUTO: 37.9 % (ref 42–52)
HGB BLD-MCNC: 12.8 GM/DL (ref 14–18)
MCH RBC QN AUTO: 35.1 PG (ref 26–33)
MCHC RBC AUTO-ENTMCNC: 33.8 GM/DL (ref 32.2–35.5)
MCV RBC AUTO: 103.8 FL (ref 80–94)
PLATELET # BLD AUTO: 290 THOU/MM3 (ref 130–400)
PMV BLD AUTO: 10.2 FL (ref 9.4–12.4)
POTASSIUM SERPL-SCNC: 3.5 MEQ/L (ref 3.5–5.2)
RBC # BLD AUTO: 3.65 MILL/MM3 (ref 4.7–6.1)
SODIUM SERPL-SCNC: 139 MEQ/L (ref 135–145)
WBC # BLD AUTO: 13.4 THOU/MM3 (ref 4.8–10.8)

## 2025-02-16 PROCEDURE — 99233 SBSQ HOSP IP/OBS HIGH 50: CPT | Performed by: STUDENT IN AN ORGANIZED HEALTH CARE EDUCATION/TRAINING PROGRAM

## 2025-02-16 PROCEDURE — 6370000000 HC RX 637 (ALT 250 FOR IP): Performed by: STUDENT IN AN ORGANIZED HEALTH CARE EDUCATION/TRAINING PROGRAM

## 2025-02-16 PROCEDURE — 6370000000 HC RX 637 (ALT 250 FOR IP): Performed by: NURSE PRACTITIONER

## 2025-02-16 PROCEDURE — 2580000003 HC RX 258: Performed by: NURSE PRACTITIONER

## 2025-02-16 PROCEDURE — 85027 COMPLETE CBC AUTOMATED: CPT

## 2025-02-16 PROCEDURE — 82948 REAGENT STRIP/BLOOD GLUCOSE: CPT

## 2025-02-16 PROCEDURE — 2060000000 HC ICU INTERMEDIATE R&B

## 2025-02-16 PROCEDURE — 94761 N-INVAS EAR/PLS OXIMETRY MLT: CPT

## 2025-02-16 PROCEDURE — 80048 BASIC METABOLIC PNL TOTAL CA: CPT

## 2025-02-16 PROCEDURE — 36415 COLL VENOUS BLD VENIPUNCTURE: CPT

## 2025-02-16 PROCEDURE — 6370000000 HC RX 637 (ALT 250 FOR IP)

## 2025-02-16 PROCEDURE — 99024 POSTOP FOLLOW-UP VISIT: CPT | Performed by: REGISTERED NURSE

## 2025-02-16 PROCEDURE — 2500000003 HC RX 250 WO HCPCS: Performed by: NURSE PRACTITIONER

## 2025-02-16 PROCEDURE — 94640 AIRWAY INHALATION TREATMENT: CPT

## 2025-02-16 PROCEDURE — 94669 MECHANICAL CHEST WALL OSCILL: CPT

## 2025-02-16 PROCEDURE — 2700000000 HC OXYGEN THERAPY PER DAY

## 2025-02-16 PROCEDURE — 6360000002 HC RX W HCPCS: Performed by: NURSE PRACTITIONER

## 2025-02-16 RX ORDER — LOPERAMIDE HYDROCHLORIDE 2 MG/1
2 CAPSULE ORAL 4 TIMES DAILY PRN
Status: DISCONTINUED | OUTPATIENT
Start: 2025-02-16 | End: 2025-02-17 | Stop reason: HOSPADM

## 2025-02-16 RX ADMIN — OXYCODONE HYDROCHLORIDE 5 MG: 5 TABLET ORAL at 08:43

## 2025-02-16 RX ADMIN — OXYCODONE HYDROCHLORIDE 10 MG: 5 TABLET ORAL at 01:31

## 2025-02-16 RX ADMIN — LISINOPRIL 20 MG: 20 TABLET ORAL at 08:43

## 2025-02-16 RX ADMIN — TIOTROPIUM BROMIDE AND OLODATEROL 2 PUFF: 3.124; 2.736 SPRAY, METERED RESPIRATORY (INHALATION) at 08:04

## 2025-02-16 RX ADMIN — ISODIUM CHLORIDE 3 ML: 0.03 SOLUTION RESPIRATORY (INHALATION) at 16:58

## 2025-02-16 RX ADMIN — ALBUTEROL SULFATE 2 PUFF: 90 AEROSOL, METERED RESPIRATORY (INHALATION) at 13:15

## 2025-02-16 RX ADMIN — OXYCODONE HYDROCHLORIDE 5 MG: 5 TABLET ORAL at 17:49

## 2025-02-16 RX ADMIN — SODIUM CHLORIDE, PRESERVATIVE FREE 10 ML: 5 INJECTION INTRAVENOUS at 21:23

## 2025-02-16 RX ADMIN — SODIUM CHLORIDE, PRESERVATIVE FREE 10 ML: 5 INJECTION INTRAVENOUS at 08:31

## 2025-02-16 RX ADMIN — PIPERACILLIN AND TAZOBACTAM 3375 MG: 3; .375 INJECTION, POWDER, FOR SOLUTION INTRAVENOUS at 22:03

## 2025-02-16 RX ADMIN — PIPERACILLIN AND TAZOBACTAM 3375 MG: 3; .375 INJECTION, POWDER, FOR SOLUTION INTRAVENOUS at 10:59

## 2025-02-16 RX ADMIN — FAMOTIDINE 40 MG: 20 TABLET, FILM COATED ORAL at 17:47

## 2025-02-16 RX ADMIN — ALBUTEROL SULFATE 2 PUFF: 90 AEROSOL, METERED RESPIRATORY (INHALATION) at 16:58

## 2025-02-16 RX ADMIN — OXYCODONE HYDROCHLORIDE 5 MG: 5 TABLET ORAL at 13:41

## 2025-02-16 RX ADMIN — ISODIUM CHLORIDE 3 ML: 0.03 SOLUTION RESPIRATORY (INHALATION) at 13:15

## 2025-02-16 RX ADMIN — Medication 3 MG: at 22:03

## 2025-02-16 RX ADMIN — OXYCODONE HYDROCHLORIDE 5 MG: 5 TABLET ORAL at 22:03

## 2025-02-16 RX ADMIN — ALBUTEROL SULFATE 2 PUFF: 90 AEROSOL, METERED RESPIRATORY (INHALATION) at 08:14

## 2025-02-16 RX ADMIN — LOPERAMIDE HYDROCHLORIDE 2 MG: 2 CAPSULE ORAL at 12:22

## 2025-02-16 RX ADMIN — AMLODIPINE BESYLATE 5 MG: 5 TABLET ORAL at 08:43

## 2025-02-16 RX ADMIN — PIPERACILLIN AND TAZOBACTAM 3375 MG: 3; .375 INJECTION, POWDER, FOR SOLUTION INTRAVENOUS at 05:20

## 2025-02-16 RX ADMIN — ISODIUM CHLORIDE 3 ML: 0.03 SOLUTION RESPIRATORY (INHALATION) at 08:04

## 2025-02-16 ASSESSMENT — PAIN - FUNCTIONAL ASSESSMENT
PAIN_FUNCTIONAL_ASSESSMENT: ACTIVITIES ARE NOT PREVENTED

## 2025-02-16 ASSESSMENT — PAIN SCALES - GENERAL
PAINLEVEL_OUTOF10: 7
PAINLEVEL_OUTOF10: 5
PAINLEVEL_OUTOF10: 3

## 2025-02-16 ASSESSMENT — PAIN DESCRIPTION - ORIENTATION
ORIENTATION: INNER
ORIENTATION: MID
ORIENTATION: MID
ORIENTATION: INNER
ORIENTATION: MID

## 2025-02-16 ASSESSMENT — PAIN DESCRIPTION - LOCATION
LOCATION: ABDOMEN;INCISION
LOCATION: ABDOMEN;THROAT
LOCATION: ABDOMEN;THROAT
LOCATION: ABDOMEN
LOCATION: ABDOMEN;THROAT
LOCATION: ABDOMEN

## 2025-02-16 ASSESSMENT — PAIN DESCRIPTION - ONSET
ONSET: ON-GOING

## 2025-02-16 ASSESSMENT — PAIN DESCRIPTION - PAIN TYPE
TYPE: SURGICAL PAIN

## 2025-02-16 ASSESSMENT — PAIN DESCRIPTION - DESCRIPTORS
DESCRIPTORS: ACHING
DESCRIPTORS: ACHING;THROBBING
DESCRIPTORS: ACHING

## 2025-02-16 ASSESSMENT — PAIN DESCRIPTION - FREQUENCY
FREQUENCY: INTERMITTENT

## 2025-02-16 ASSESSMENT — PAIN SCALES - WONG BAKER: WONGBAKER_NUMERICALRESPONSE: NO HURT

## 2025-02-17 ENCOUNTER — TELEPHONE (OUTPATIENT)
Dept: ENT CLINIC | Age: 75
End: 2025-02-17

## 2025-02-17 VITALS
OXYGEN SATURATION: 97 % | TEMPERATURE: 98.4 F | SYSTOLIC BLOOD PRESSURE: 133 MMHG | WEIGHT: 118 LBS | BODY MASS INDEX: 18.96 KG/M2 | HEART RATE: 92 BPM | DIASTOLIC BLOOD PRESSURE: 71 MMHG | HEIGHT: 66 IN | RESPIRATION RATE: 18 BRPM

## 2025-02-17 PROBLEM — C32.0 CANCER OF GLOTTIS (HCC): Status: ACTIVE | Noted: 2025-02-17

## 2025-02-17 LAB
ANION GAP SERPL CALC-SCNC: 15 MEQ/L (ref 8–16)
BUN SERPL-MCNC: 15 MG/DL (ref 7–22)
CALCIUM SERPL-MCNC: 8.7 MG/DL (ref 8.5–10.5)
CHLORIDE SERPL-SCNC: 97 MEQ/L (ref 98–111)
CO2 SERPL-SCNC: 27 MEQ/L (ref 23–33)
CREAT SERPL-MCNC: 0.5 MG/DL (ref 0.4–1.2)
DEPRECATED RDW RBC AUTO: 53.2 FL (ref 35–45)
ERYTHROCYTE [DISTWIDTH] IN BLOOD BY AUTOMATED COUNT: 13.9 % (ref 11.5–14.5)
GFR SERPL CREATININE-BSD FRML MDRD: > 90 ML/MIN/1.73M2
GLUCOSE SERPL-MCNC: 113 MG/DL (ref 70–108)
HCT VFR BLD AUTO: 35.5 % (ref 42–52)
HGB BLD-MCNC: 12 GM/DL (ref 14–18)
MCH RBC QN AUTO: 35.1 PG (ref 26–33)
MCHC RBC AUTO-ENTMCNC: 33.8 GM/DL (ref 32.2–35.5)
MCV RBC AUTO: 103.8 FL (ref 80–94)
PLATELET # BLD AUTO: 281 THOU/MM3 (ref 130–400)
PMV BLD AUTO: 10.4 FL (ref 9.4–12.4)
POTASSIUM SERPL-SCNC: 3.8 MEQ/L (ref 3.5–5.2)
RBC # BLD AUTO: 3.42 MILL/MM3 (ref 4.7–6.1)
SODIUM SERPL-SCNC: 139 MEQ/L (ref 135–145)
WBC # BLD AUTO: 16.4 THOU/MM3 (ref 4.8–10.8)

## 2025-02-17 PROCEDURE — 6360000002 HC RX W HCPCS: Performed by: NURSE PRACTITIONER

## 2025-02-17 PROCEDURE — 94761 N-INVAS EAR/PLS OXIMETRY MLT: CPT

## 2025-02-17 PROCEDURE — 2580000003 HC RX 258: Performed by: NURSE PRACTITIONER

## 2025-02-17 PROCEDURE — 94640 AIRWAY INHALATION TREATMENT: CPT

## 2025-02-17 PROCEDURE — 99024 POSTOP FOLLOW-UP VISIT: CPT | Performed by: REGISTERED NURSE

## 2025-02-17 PROCEDURE — 36415 COLL VENOUS BLD VENIPUNCTURE: CPT

## 2025-02-17 PROCEDURE — 2700000000 HC OXYGEN THERAPY PER DAY

## 2025-02-17 PROCEDURE — 85027 COMPLETE CBC AUTOMATED: CPT

## 2025-02-17 PROCEDURE — 99239 HOSP IP/OBS DSCHRG MGMT >30: CPT | Performed by: STUDENT IN AN ORGANIZED HEALTH CARE EDUCATION/TRAINING PROGRAM

## 2025-02-17 PROCEDURE — 6370000000 HC RX 637 (ALT 250 FOR IP): Performed by: NURSE PRACTITIONER

## 2025-02-17 PROCEDURE — 6370000000 HC RX 637 (ALT 250 FOR IP)

## 2025-02-17 PROCEDURE — 80048 BASIC METABOLIC PNL TOTAL CA: CPT

## 2025-02-17 PROCEDURE — 6370000000 HC RX 637 (ALT 250 FOR IP): Performed by: STUDENT IN AN ORGANIZED HEALTH CARE EDUCATION/TRAINING PROGRAM

## 2025-02-17 PROCEDURE — 94669 MECHANICAL CHEST WALL OSCILL: CPT

## 2025-02-17 PROCEDURE — 2500000003 HC RX 250 WO HCPCS: Performed by: NURSE PRACTITIONER

## 2025-02-17 RX ORDER — LOPERAMIDE HYDROCHLORIDE 2 MG/1
2 CAPSULE ORAL 4 TIMES DAILY PRN
Qty: 12 CAPSULE | Refills: 0 | Status: SHIPPED | OUTPATIENT
Start: 2025-02-17 | End: 2025-02-20

## 2025-02-17 RX ORDER — NEBULIZER ACCESSORIES
1 KIT MISCELLANEOUS 3 TIMES DAILY
Qty: 1 KIT | Refills: 0 | Status: SHIPPED | OUTPATIENT
Start: 2025-02-17

## 2025-02-17 RX ORDER — OXYCODONE HYDROCHLORIDE 5 MG/1
5 TABLET ORAL EVERY 4 HOURS PRN
Qty: 18 TABLET | Refills: 0 | Status: SHIPPED | OUTPATIENT
Start: 2025-02-17 | End: 2025-02-20

## 2025-02-17 RX ORDER — NEBULIZER ACCESSORIES
1 KIT MISCELLANEOUS 3 TIMES DAILY
Qty: 1 KIT | Refills: 0 | Status: SHIPPED | OUTPATIENT
Start: 2025-02-17 | End: 2025-02-17

## 2025-02-17 RX ORDER — CIPROFLOXACIN 500 MG/1
500 TABLET, FILM COATED ORAL 2 TIMES DAILY
Qty: 6 TABLET | Refills: 0 | Status: SHIPPED | OUTPATIENT
Start: 2025-02-17 | End: 2025-02-20

## 2025-02-17 RX ADMIN — OXYCODONE HYDROCHLORIDE 5 MG: 5 TABLET ORAL at 14:31

## 2025-02-17 RX ADMIN — TIOTROPIUM BROMIDE AND OLODATEROL 2 PUFF: 3.124; 2.736 SPRAY, METERED RESPIRATORY (INHALATION) at 09:16

## 2025-02-17 RX ADMIN — OXYCODONE HYDROCHLORIDE 5 MG: 5 TABLET ORAL at 10:18

## 2025-02-17 RX ADMIN — ISODIUM CHLORIDE 3 ML: 0.03 SOLUTION RESPIRATORY (INHALATION) at 12:43

## 2025-02-17 RX ADMIN — ISODIUM CHLORIDE 3 ML: 0.03 SOLUTION RESPIRATORY (INHALATION) at 09:16

## 2025-02-17 RX ADMIN — ALBUTEROL SULFATE 2 PUFF: 90 AEROSOL, METERED RESPIRATORY (INHALATION) at 12:43

## 2025-02-17 RX ADMIN — ALBUTEROL SULFATE 2 PUFF: 90 AEROSOL, METERED RESPIRATORY (INHALATION) at 09:16

## 2025-02-17 RX ADMIN — LOPERAMIDE HYDROCHLORIDE 2 MG: 2 CAPSULE ORAL at 13:35

## 2025-02-17 RX ADMIN — PANTOPRAZOLE SODIUM 40 MG: 40 TABLET, DELAYED RELEASE ORAL at 05:22

## 2025-02-17 RX ADMIN — LOPERAMIDE HYDROCHLORIDE 2 MG: 2 CAPSULE ORAL at 05:30

## 2025-02-17 RX ADMIN — PIPERACILLIN AND TAZOBACTAM 3375 MG: 3; .375 INJECTION, POWDER, FOR SOLUTION INTRAVENOUS at 05:25

## 2025-02-17 RX ADMIN — LISINOPRIL 20 MG: 20 TABLET ORAL at 07:55

## 2025-02-17 RX ADMIN — AMLODIPINE BESYLATE 5 MG: 5 TABLET ORAL at 07:54

## 2025-02-17 RX ADMIN — SODIUM CHLORIDE, PRESERVATIVE FREE 10 ML: 5 INJECTION INTRAVENOUS at 07:55

## 2025-02-17 ASSESSMENT — PAIN DESCRIPTION - DESCRIPTORS
DESCRIPTORS: THROBBING;DISCOMFORT
DESCRIPTORS: ACHING;THROBBING
DESCRIPTORS: ACHING
DESCRIPTORS: DISCOMFORT

## 2025-02-17 ASSESSMENT — PAIN - FUNCTIONAL ASSESSMENT
PAIN_FUNCTIONAL_ASSESSMENT: ACTIVITIES ARE NOT PREVENTED

## 2025-02-17 ASSESSMENT — PAIN DESCRIPTION - LOCATION
LOCATION: ABDOMEN
LOCATION: ABDOMEN
LOCATION: HEAD
LOCATION: ABDOMEN

## 2025-02-17 ASSESSMENT — PAIN SCALES - GENERAL
PAINLEVEL_OUTOF10: 4
PAINLEVEL_OUTOF10: 3
PAINLEVEL_OUTOF10: 0
PAINLEVEL_OUTOF10: 5
PAINLEVEL_OUTOF10: 4

## 2025-02-17 ASSESSMENT — PAIN DESCRIPTION - ONSET
ONSET: ON-GOING
ONSET: PROGRESSIVE

## 2025-02-17 ASSESSMENT — PAIN DESCRIPTION - PAIN TYPE
TYPE: OTHER (COMMENT)
TYPE: SURGICAL PAIN

## 2025-02-17 ASSESSMENT — PAIN DESCRIPTION - ORIENTATION
ORIENTATION: LOWER
ORIENTATION: RIGHT;LEFT
ORIENTATION: RIGHT
ORIENTATION: MID;LEFT

## 2025-02-17 ASSESSMENT — PAIN DESCRIPTION - FREQUENCY
FREQUENCY: INTERMITTENT
FREQUENCY: CONTINUOUS

## 2025-02-17 ASSESSMENT — PAIN SCALES - WONG BAKER: WONGBAKER_NUMERICALRESPONSE: NO HURT

## 2025-02-17 NOTE — TELEPHONE ENCOUNTER
I received a call from out patient pharmacy regarding this patient and she said that some of the orders Alecia placed need fixed some. She states that the nebulizer they can't bill so that it'll have to be ordered as a DME order to his home pharmacy as well as sodium chloride needs to be sent to home pharmacy as well with an attached diagnosis code. I told them I would let Alecia know this.

## 2025-02-17 NOTE — PLAN OF CARE
Problem: Discharge Planning  Goal: Discharge to home or other facility with appropriate resources  Outcome: Adequate for Discharge     Problem: Respiratory - Adult  Goal: Achieves optimal ventilation and oxygenation  Outcome: Adequate for Discharge     Problem: Cardiovascular - Adult  Goal: Maintains optimal cardiac output and hemodynamic stability  2/17/2025 1240 by Evelyn Antunez RN  Outcome: Adequate for Discharge  2/17/2025 0922 by Urbano Dominguez RCP  Outcome: Progressing  Goal: Absence of cardiac dysrhythmias or at baseline  2/17/2025 1240 by Evelyn Antunez RN  Outcome: Adequate for Discharge  2/17/2025 0922 by Urbano Dominguez RCP  Outcome: Progressing     Problem: Infection - Adult  Goal: Absence of infection during hospitalization  Outcome: Adequate for Discharge     Problem: Pain  Goal: Verbalizes/displays adequate comfort level or baseline comfort level  Outcome: Adequate for Discharge     Problem: Chronic Conditions and Co-morbidities  Goal: Patient's chronic conditions and co-morbidity symptoms are monitored and maintained or improved  Outcome: Adequate for Discharge     Problem: Safety - Adult  Goal: Free from fall injury  Outcome: Adequate for Discharge     Problem: Nutrition Deficit:  Goal: Optimize nutritional status  2/17/2025 1240 by Evelyn Antunez RN  Outcome: Adequate for Discharge  2/17/2025 1149 by Vivian Díaz RD, LD  Outcome: Progressing     Problem: Neurosensory - Adult  Goal: Achieves stable or improved neurological status  Outcome: Adequate for Discharge  Goal: Achieves maximal functionality and self care  Outcome: Adequate for Discharge     Problem: Skin/Tissue Integrity - Adult  Goal: Incisions, wounds, or drain sites healing without S/S of infection  Outcome: Adequate for Discharge     Problem: Musculoskeletal - Adult  Goal: Return mobility to safest level of function  Outcome: Adequate for Discharge  Goal: Maintain proper alignment of affected body

## 2025-02-17 NOTE — PROGRESS NOTES
Hospitalist Progress Note      Patient:  Masood Randhawa    Unit/Bed:3B-23/023-A  YOB: 1950  MRN: [id number]   Acct: [id number]   PCP: Samy Piper DO  Date of Admission: 2/8/2025      ASSESSMENT AND PLAN    Squamous cell carcinoma of the head and neck: Noted to have supraglottic airway obstruction with hoarseness and associated B-type symptoms.  Scheduled for resection.  Per ED, ENT physician has requested patient be admitted for close monitoring due to shortness of breath.  Patient denies ever receiving the HPV vaccine.    Mallampati 2  Plan for patient to remain inpatient for monitoring prior to procedure 2/12/25 with Dr. Daley for debulking of vocal cord mass   Severe unintentional weight loss: Likely from squamous cell carcinoma of the head and neck.  Has lost 60 pounds in 7 months with B-type symptoms including night sweats/chills.  He does endorse poor appetite.  BMI 19.37.  Chloride low due to poor oral intake.  Prealbumin ordered  Dietitian consulted  Aggressive protein diet  Moderate COPD/asthma: On albuterol per home medications.  Not on daily maintenance inhalers.  Continued.  Hypertension: Continue amlodipine and lisinopril.  Tobacco abuse: 50-pack-year history.  1 pack/day, quit smoking 1 month ago.      Level of care: [x]Step Down / []Med-Surg  Bed Status: [x]Inpatient / []Observation  Telemetry: [x]Yes / []No  PT/OT: []Yes / [x]No    DVT Prophylaxis: [x] Lovenox / [] Heparin / [] SCDs / [] Already on Systemic Anticoagulation / [] None     Expected discharge date:  tbd  Disposition: tbd     Code status: Full Code     ===================================================================    Chief Complaint: shortness of breath h     Subjective (past 24 hours):     - The patient denies any acute events overnight. However, he continues to experience fatigue, dysphagia, and shortness of breath, all of 
                                                                       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  Patient's procedure was done on 2/12/2025 and was transferred to the ICU for postoperative care and was later transferred to the floor on 2/13/2025.  Patient is currently on HFNC FiO2 30 at rate 30 L/min for humidification post surgery per ENT is now okay to wean, continue to hold Lovenox postsurgery maintain SCDs.  Patient currently has a NG tube tube in place and is awaiting to have his PEG tube placed later today by IR between 3 PM and 4 PM dietitian has been consulted.  Patient on Zosyn 3375 mg IV q8h started on 2/13/25 for post surgery prophylaxis.  OP consult to chemo/rad and PET scan   Close airway monitoring   Pain managed with Roxicodone PO with dilaudid for breakthrough pain and menthol lozenges for throat discomfort      COPD with hx of asthma: Last spirometry w/o bronchodilator was done in 3/2013 with FVC 88%, FEV1 77%, FEV1/FVC 70%. Pt is on albuterol prn at home. Started on Stiolto 2 puffs daily during this admission. Strongly recommend OP PFT's.      Mild macrocytic anemia: 2/2 anemia of chronic disease in the setting of cancer. Iron 61, iron sat. 28, TIBC 215, ferritin 529. Vitamin b12 380, folate 17.2. Hgb stable at 12.8 today. Continue monitor cbc daily.  Transfuse PRBC if hemoglobin less than 7 or hemodynamically stable.     Postoperative leukocytosis, improving: Increased to 28.5 on 2/13 post surgery and has decreased to 13.4 on 2/16. Continue to monitor with daily cbc. On 
   02/09/25 0759   RT Protocol   History Pulmonary Disease 2   Respiratory pattern 0   Breath sounds 2   Cough 0   Indications for Bronchodilator Therapy On home bronchodilators   Bronchodilator Assessment Score 4       
  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  ENT following.  HHFNC.  Wean as tolerable.  Continue holding Lovenox. SCDs.  NG tube in place, to start tube feeds. GI consult for PEG tube placement while inpatient.  Epiglottic stitch was removed. Zosyn for surgical prophylaxis. Ice chips for pleasure.  To be transferred to stepdown unit.  COPD/asthma:  Spirometry w/o bronchodilator 3/2013 - FVC 88%, FEV1 77%, FEV1/FVC 70%.  On albuterol prn at home. Start stiolto. Cont. Prn albuterol. Recommend OP PFTs.   Mild macrocytic anemia:  likely anemia of chronic disease in the setting of cancer as above. Check iron labs. B12/folate WNL. Monitor CBC.  HTN:  continue norvasc and lisinopril for benazepril per formulary.  GERD:  PPI    INITIAL H AND P AND ICU COURSE:  This 74-year-old male with PMH of BPH, COPD, GERD, HTN, PAD, OA, SCC of head and neck who presented to [facility] of fatigue, SOB, dysphagia, hoarseness.  He was seen by ENT and found to have a laryngeal mass with cervical lymph node involvement.  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  Never has received HPV vaccine.  Has a 50-pack-year smoking history and quit approximately 1 month ago.  60 lb weight loss in 7 mos. Positive night sweats. Underwent debulking of vocal cord mass 2/12 with Dr. Sykes and was admitted to the ICU for postoperative monitoring of his airway.  His ICU stay has been uneventful and he is stable for transfer to the stepdown unit.    2/13: Patient seen evaluated bedside.  Case was discussed with ENT.  ENT is planning on consulting for possible PEG tube. 
  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  Patient's procedure was done on 2/12/2025 and was transferred to the ICU for postoperative care and was later transferred to the floor on 2/13/2025.  Patient is currently on HFNC FiO2 30 at rate 30 L/min for humidification post surgery per ENT is now okay to wean, continue to hold Lovenox postsurgery maintain SCDs.  Patient currently has a NG tube tube in place and is awaiting to have his PEG tube placed later today by IR between 3 PM and 4 PM dietitian has been consulted.  Patient on Zosyn 3375 mg IV q8h started on 2/13/25 for post surgery prophylaxis.  OP consult to chemo/rad and PET scan   Close airway monitoring   Pain managed with Roxicodone PO with dilaudid for breakthrough pain and menthol lozenges for throat discomfort     COPD with hx of asthma: last spirometry w/o bronchodilator was done in 3/2013 with FVC 88%, FEV1 77%, FEV1/FVC 70%. Pt is on albuterol prn at home. Started on Stiolto 2 puffs daily during this admission. Strongly recommend OP PFT's.     Mild macrocytic anemia: 2/2 anemia of chronic disease in the setting of cancer. Iron 61, iron sat. 28, TIBC 215, ferritin 529. Vitamin b12 380, folate 17.2. continue monitor cbc daily.     Postoperative leukocytosis, improving:  increased to 28.5 2/13 post surgery and has decreased to 115.8 2/14. Continue monitor with daily cbc. On IV zosyn as stated above    Hypertension: continue amlodipine 5mg daily and lisonpril 20 mg 
  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  Patient's procedure was done on 2/12/2025 and was transferred to the ICU for postoperative care and was later transferred to the floor on 2/13/2025.  Patient is currently on HFNC FiO2 30 at rate 30 L/min for humidification post surgery per ENT is now okay to wean, continue to hold Lovenox postsurgery maintain SCDs.  Patient currently has a NG tube tube in place and is awaiting to have his PEG tube placed later today by IR between 3 PM and 4 PM dietitian has been consulted.  Patient on Zosyn 3375 mg IV q8h started on 2/13/25 for post surgery prophylaxis.  OP consult to chemo/rad and PET scan   Close airway monitoring   Pain managed with Roxicodone PO with dilaudid for breakthrough pain and menthol lozenges for throat discomfort      COPD with hx of asthma: last spirometry w/o bronchodilator was done in 3/2013 with FVC 88%, FEV1 77%, FEV1/FVC 70%. Pt is on albuterol prn at home. Started on Stiolto 2 puffs daily during this admission. Strongly recommend OP PFT's.      Mild macrocytic anemia: 2/2 anemia of chronic disease in the setting of cancer. Iron 61, iron sat. 28, TIBC 215, ferritin 529. Vitamin b12 380, folate 17.2. continue monitor cbc daily.      Postoperative leukocytosis, improving:  increased to 28.5 2/13 post surgery and has decreased to 12.6 2/15. Continue monitor with daily cbc. On IV zosyn as stated above     Hypertension: continue amlodipine 5mg daily and lisonpril 20 mg 
  Pharmacy Note - Extended Infusion Beta-Lactam Dose Adjustment    Piperacillin/Tazobactam 3375 mg q6h intermittent infusion ordered for the treatment of Surgical prophylaxis. Per [facility] Extended Infusion Beta-Lactam Policy, this will be changed to 3375 mg q8h extended infusion    Estimated Creatinine Clearance: Estimated Creatinine Clearance: 97 mL/min (based on SCr of 0.5 mg/dL).    Dialysis Status, QUENTIN, CKD: Normal    BMI: Body mass index is 19.05 kg/m².    Rationale for Adjustment: Dose adjusted per [facility] Extended Infusion Policy based on renal function and indication. The above medication is renally eliminated and demonstrates time-dependent effects on bacterial eradication. Extended-infusion dosing strategy aims to enhance microbiologic and clinical efficacy.     Pharmacy will monitor renal function daily and adjust dose as necessary.      Please call with any questions.    Thank you,  Sammie Rojas, PharmD  2/12/2025, 5:41 PM     
  Physician Progress Note      PATIENT:               PEMA BOLANOS  [facility] #:                  [id number]  :                       1950  ADMIT DATE:       2025 2:10 PM  DISCH DATE:  RESPONDING  PROVIDER #:        Jennifer Schmidt DO          QUERY TEXT:    Patient admitted with cancer of the larynx. Noted to have severe malnutrition   per ASPEN criteria in the dietician assessment note. If possible, please   document in progress notes and discharge summary if you are evaluating and /or   treating any of the following:    The medical record reflects the following:  Risk Factors: chronic illness  Clinical Indicators: ASPEN criteria met for severe malnutrition: Energy   Intake:  75% or less estimated energy requirements for 1 month or longer  Weight Loss:  10% over 6 months (12.6% loss over 6 months per EMR)  Body Fat Loss:  Severe body fat loss Orbital, Triceps  Muscle Mass Loss:  Severe muscle mass loss Temples (temporalis), Clavicles   (pectoralis & deltoids)  Treatment:  planning PEG, Jevity 1.5    ASPEN Criteria:    https://aspenjournals.onlinelibrary.[place].com/doi/full/10.1177/821744251422770  5  Options provided:  -- Protein calorie malnutrition severe  -- Other - I will add my own diagnosis  -- Disagree - Not applicable / Not valid  -- Disagree - Clinically unable to determine / Unknown  -- Refer to Clinical Documentation Reviewer    PROVIDER RESPONSE TEXT:    This patient has severe protein calorie malnutrition.    Query created by: Lucinda Nazario on 2025 8:59 AM      Electronically signed by:  Jennifer Schmidt DO 2025 12:00 PM          
1350 pt arrived to PACU, awakens to voice. Placed on HFNC 60L 60%. VSS. Bloody sputum suctioned from pt mouth. NG tube in place at 70 cm. Pt not answering whether in pain or not, just states \"I can't breathe\". Epiglottic strings in place, taped to R cheek  1355 Dr Daley at bedside. No new orders  1400 CXR completed  1415 pt denies pain or nausea at this time  1423 attempted to call report. No answer  1433 c/o pain 8/10, medicated with 50 mcg fentanyl  1438 no change in pain status, medicated with 50 mcg fentanyl  1443 pt states pain \"better\". VSS  1450 pt denies needs at this time. VSS  1508 report given to Kuldeep RN  1520 transported to [facility] by this RN in stable condition  1530 attempted to call wife, no answer  
1435 Patient received in IR for G tube placement.   1443 This procedure has been fully reviewed with the patient and written informed consent has been obtained.  1511 Procedure started with Dr. Strickland.   1522 Procedure completed; patient tolerated well. Dressing to abdomen; no bleeding noted.  1531 Patient on bed; comfort ensured.  1537 Patient taken to 4B via transport. Pt alert and oriented x3; follows commands. Skin pink, warm, and dry. Respirations easy, regular, and nonlabored. Report called to Nellie CARDOZA on 4B.                       
Comprehensive Nutrition Assessment    Type and Reason for Visit:  Initial, Positive nutrition screen, Consult (unplanned weight loss; poor appetite)    Nutrition Recommendations/Plan:   Consider MVI  ONS: 2 cartons Ensure Plus TID   Diet as ordered - consider swallow evaluation as appropriate      Malnutrition Assessment:  Malnutrition Status:  Severe malnutrition (02/11/25 1105)    Context:  Chronic Illness     Findings of the 6 clinical characteristics of malnutrition:  Energy Intake:  75% or less estimated energy requirements for 1 month or longer  Weight Loss:  10% over 6 months (12.6% loss over 6 months per EMR)     Body Fat Loss:  Severe body fat loss Orbital, Triceps   Muscle Mass Loss:  Severe muscle mass loss Temples (temporalis), Clavicles (pectoralis & deltoids)  Fluid Accumulation:  No fluid accumulation     Strength:  Not Performed    Nutrition Assessment:     Pt. severely malnourished AEB criteria listed above.  At risk for further nutritional compromise r/t admit with squamous cell carcinoma of head and neck (recently diagnosed per patient), plans for procedure by Dr Daley 2/12 and underlying medical condition (COPD, HTN).       Nutrition Related Findings:    Pt. Report/Treatments/Miscellaneous: Patient seen with wife, reports began 5 months ago with difficult to swallow food and worsened in the past 1-2 months with intake of mostly only liquids and thus weight loss during this time period, reports has good appetite but unable to take po, denies nausea, good acceptance of Ensure Plus and agrees to drink 2 cartons each meal, likes pudding, ice cream, applesauce    GI Status: BM 2/9 per patient  Pertinent Labs: glucose 101   Pertinent Meds: pepcid, protonix, prn senokot      Wound Type: None       Current Nutrition Intake & Therapies:    Average Meal Intake: 1-25%, 26-50%, 51-75%  Average Supplements Intake: %  ADULT DIET; Dysphagia - Soft and Bite Sized  ADULT ORAL NUTRITION SUPPLEMENT; 
Comprehensive Nutrition Assessment    Type and Reason for Visit:  Reassess (tubefeeding)    Nutrition Recommendations/Plan:   PEG to be placed in IR today.  Patient tolerated full bolus via NGT yesterday.  When okay to resume tubefeedings after PEG placement recommend: Jevity 1.5 bolus 237 ml (8oz container at home) 5 times daily - recommend at ~ 0800, 1100, 1400, 1700, 2000.   30 mls free water flush before and 30 mls after each bolus. Additional free water flush per Provider.   NPO at this time.  Yankers being used for secretions.  Home tubefeeding education provided verbally and written to patient and his wife.    Discussed home tubefeeding needs with Care Manager, plan outpatient consult to dietitian for follow-up.  [facility] for home tubefeeding supplies.       Malnutrition Assessment:  Malnutrition Status:  Severe malnutrition (02/11/25 1105)    Context:  Chronic Illness     Findings of the 6 clinical characteristics of malnutrition:  Energy Intake:  75% or less estimated energy requirements for 1 month or longer  Weight Loss:  10% over 6 months (12.6% loss over 6 months per EMR)     Body Fat Loss:  Severe body fat loss Orbital, Triceps   Muscle Mass Loss:  Severe muscle mass loss Temples (temporalis), Clavicles (pectoralis & deltoids)  Fluid Accumulation:  No fluid accumulation     Strength:  Not Performed    Nutrition Assessment:      Pt. severely malnourished AEB criteria listed above.  At risk for further nutritional compromise r/t admit with squamous cell carcinoma of head and neck (recently diagnosed per patient), 2/12 ENT procedure, plan PEG and home enteral feedings as main source of nutrition,  and underlying medical condition (COPD, HTN).     Nutrition Related Findings:    Pt. Report/Treatments/Miscellaneous: Patient and wife seen.  Explained plan for PEG tube later today and home tubefeeding plans. Wife reports she has some memory issues and shaking hands but patient states he would be able to pour 
Comprehensive Nutrition Assessment    Type and Reason for Visit:  Reassess, Nutrition support    Nutrition Recommendations/Plan:   Continue Jevity 1.5 bolus TF - goal of 240ml 5x daily - recommend 0800, 1100, 1400. 1700 and 2000  Bolus 30ml H20 before and after each bolus  Ice chips only po per ENT  Plan PEG - RD following for home TF education needs      Malnutrition Assessment:  Malnutrition Status:  Severe malnutrition (02/11/25 1105)    Context:  Chronic Illness     Findings of the 6 clinical characteristics of malnutrition:  Energy Intake:  75% or less estimated energy requirements for 1 month or longer  Weight Loss:  10% over 6 months (12.6% loss over 6 months per EMR)     Body Fat Loss:  Severe body fat loss Orbital, Triceps   Muscle Mass Loss:  Severe muscle mass loss Temples (temporalis), Clavicles (pectoralis & deltoids)  Fluid Accumulation:  No fluid accumulation     Strength:  Not Performed    Nutrition Assessment:     Pt. severely malnourished AEB criteria listed above.  At risk for further nutritional compromise r/t admit with squamous cell carcinoma of head and neck (recently diagnosed per patient), 2/12 ENT procedure, plan PEG and home TFs and underlying medical condition (COPD, HTN).     Nutrition Related Findings:    Pt. Report/Treatments/Miscellaneous: pt. Seen; mouthed and motioned he is getting a PEG tube before discharge; bolus TF started 2/12 evening - gradually increasing from 60 to 240ml volume - no abd discomfort per pt. With last bolus which was 180ml; ice chips for pleasure; wife not in room -  will educate pt. And wife re: home TF when appropriate; UO 800ml; on HFNC   GI Status: BM x2 2/11  Pertinent Labs: glucose 101  BUN 16  creatinine 0.6  Na 142  K+ 4.7  phosphorus 4.3    Pertinent Meds: protonix, zosyn    Wound Type: 2/12  Laryngoscopy with Right Transoral anterolateral laryngectomy, Advancement flap laryongoplasty     Current Nutrition Intake & Therapies:    Diet NPO Exceptions 
Comprehensive Nutrition Assessment    Type and Reason for Visit:  Reassess, Nutrition support, Patient education    Nutrition Recommendations/Plan:   Goal 237 ml (8oz container at home) Jevity 1.5, 5 times per day (suggested times~ 0800, 1100, 1400, 1700, 2000)  Flush with 50ml water before & after each tube feeding bolus.   Pt  & wife educated on  tube feeding today. Pt to [facility] supplies from [facility].    sent OP RD referral & I gave pt OP RD name & number for followup.  Consider probiotic as appropriate.       Malnutrition Assessment:  Malnutrition Status:  Severe malnutrition (02/11/25 1105)    Context:  Chronic Illness     Findings of the 6 clinical characteristics of malnutrition:  Energy Intake:  75% or less estimated energy requirements for 1 month or longer  Weight Loss:  10% over 6 months (12.6% loss over 6 months per EMR)     Body Fat Loss:  Severe body fat loss Orbital, Triceps   Muscle Mass Loss:  Severe muscle mass loss Temples (temporalis), Clavicles (pectoralis & deltoids)  Fluid Accumulation:  No fluid accumulation     Strength:  Not Performed    Nutrition Assessment:     Pt. Improving from a nutritional standpoint AEB tube feeds are at goal per RN.   At risk for further nutritional compromise r/t admit with squamous cell carcinoma of head and neck (recently diagnosed per patient), 2/12 ENT procedure, plan PEG and home enteral feedings as main source of nutrition, severe malnutrition and underlying medical condition (COPD, HTN)     Nutrition Related Findings:    Pt. Report/Treatments/Miscellaneous: Pt seen, mentions has been having some diarrhea& mentions he had been receiving an antibiotic. RN mentions pt receives imodium . I offered to switch pt to 6 boluses/day or use a pump over 1 hr span for each bolus tube feeding  however pt declined.  RN mentions pt takes meds by mouth.    GI Status: BMx1 (2/17)  Pertinent Labs: (2/13) Iron 61  Pertinent Meds: Imodium     Wound Type: Surgical 
Department of Otolaryngology  Progress Note    Chief Complaint:   Worsening shortness of breath in setting of metastatic squamous cell carcinoma with necrotic lymph nodes and laryngeal mass     SUBJECTIVE 2/11/25:  No acute events overnight/this morning. Patient eager for planned procedure tomorrow and hopeful for improved ability to breathe. He denies significant clinical benefit with scheduled saline nebs. Voices cough and breathing is stable. Persistent difficulty swallowing; does well with Ensure supplements. Patient overall very happy he is in hospital for close monitoring prior to procedure.    REVIEW OF SYSTEMS:    Pertinent positives as noted in the HPI. All other systems reviewed and negative.    OBJECTIVE      Physical  VITALS:  /81   Pulse 91   Temp 97.5 °F (36.4 °C) (Oral)   Resp 18   Ht 1.676 m (5' 6\")   Wt 53.5 kg (118 lb)   SpO2 99%   BMI 19.05 kg/m²     This is a 74 y.o. male. Patient is alert and oriented on all spheres resting in hospital bedside chair in no acute distress upon arrival. Hoarseness and brashness of voice. Difficulty speaking due to known mass. Intermittent inspiratory wheeze; not persistent. Decreased muscle mass/thin. Afebrile. On room air. Right lateral neck with known metastatic lymph nodes; firm to palpation.    Data  CBC with Differential:    Lab Results   Component Value Date/Time    WBC 8.9 02/11/2025 05:32 AM    RBC 3.99 02/11/2025 05:32 AM    RBC 4.56 02/22/2022 02:50 PM    RBC 14 02/22/2022 02:50 PM    HGB 13.9 02/11/2025 05:32 AM    HCT 40.0 02/11/2025 05:32 AM     02/11/2025 05:32 AM    .3 02/11/2025 05:32 AM    MCH 34.8 02/11/2025 05:32 AM    MCHC 34.8 02/11/2025 05:32 AM    RDW 14.6 12/19/2024 01:26 PM    NRBC 0 02/08/2025 03:01 PM    LYMPHOPCT 25.8 12/19/2024 01:26 PM    LYMPHOPCT 25.0 02/22/2022 02:50 PM    MONOPCT 8.0 02/08/2025 03:01 PM    EOSPCT 0.7 12/19/2024 01:26 PM    BASOPCT 0.7 12/19/2024 01:26 PM    MONOSABS 0.8 02/08/2025 03:01 
Department of Otolaryngology  Progress Note    Chief Complaint:  POD 2 Laryngoscopy with Right Transoral anterolateral laryngectomy, Advancement flap laryongoplasty     SUBJECTIVE 2/14/25:  Patient continues to deny any post surgical complications or concerns. Tolerating HFNC for humidification. No hemoptysis reported by patient. Managing oral secretions with as needed Yaunker utilized. Patient reports breathing well and no fevers/chills. Pain adequately controlled at this time. Planning for IR placed PEG later this afternoon.      REVIEW OF SYSTEMS:    Pertinent positives as noted in the HPI. All other systems reviewed and negative.    OBJECTIVE      Physical  VITALS:  /66   Pulse 91   Temp 98.2 °F (36.8 °C) (Oral)   Resp 18   Ht 1.676 m (5' 6\")   Wt 53.5 kg (118 lb)   SpO2 95%   BMI 19.05 kg/m²     This is a 74 y.o. male. Patient is alert and oriented to person, place and time. Patient chronically ill, thin resting in hospital bed. HFNC in place without audible stridor or wheezing. No acute dyspnea/respiratory distress. Voice hoarse/breathy.  NG tube secured to left nare. Oropharynx clear and intact without evidence of active/recent bleeding to posterior oropharynx. Mildly edematous/elongated uvula. Tongue pink without edema. Neck with palpable firm lymph node to right lateral side consistent with known SCC.     Data  CBC with Differential:    Lab Results   Component Value Date/Time    WBC 15.8 02/14/2025 03:54 AM    RBC 3.53 02/14/2025 03:54 AM    RBC 4.56 02/22/2022 02:50 PM    RBC 14 02/22/2022 02:50 PM    HGB 12.3 02/14/2025 03:54 AM    HCT 35.8 02/14/2025 03:54 AM     02/14/2025 03:54 AM    .4 02/14/2025 03:54 AM    MCH 34.8 02/14/2025 03:54 AM    MCHC 34.4 02/14/2025 03:54 AM    RDW 14.6 12/19/2024 01:26 PM    NRBC 0 02/08/2025 03:01 PM    LYMPHOPCT 25.8 12/19/2024 01:26 PM    LYMPHOPCT 25.0 02/22/2022 02:50 PM    MONOPCT 8.0 02/08/2025 03:01 PM    EOSPCT 0.7 12/19/2024 01:26 PM    
Department of Otolaryngology  Progress Note    Chief Complaint:  POD 3 Laryngoscopy with Right Transoral anterolateral laryngectomy, Advancement flap laryongoplasty     SUBJECTIVE 2/15/25:  Patient continues to deny any post surgical complications or concerns. Tolerating HFNC for humidification. No hemoptysis post operatively. As needed [place]; otherwise managing oral secretions. No shortness of breath or fevers/chills. Some diarrhea reported with tube feeding. G-tube placed by IR yesterday. Pain adequately controlled. Spouse at bedside reviewing discharge plan/home arrangements.      REVIEW OF SYSTEMS:    Pertinent positives as noted in the HPI. All other systems reviewed and negative.    OBJECTIVE      Physical  VITALS:  /68   Pulse 89   Temp 98 °F (36.7 °C) (Oral)   Resp 18   Ht 1.676 m (5' 6\")   Wt 53.5 kg (118 lb)   SpO2 98%   BMI 19.05 kg/m²     This is a 74 y.o. male. Patient is alert and oriented to person, place and time. Patient chronically ill, thin resting in hospital bed. HFNC in place without audible stridor or wheezing. No acute dyspnea/respiratory distress. Voice hoarse/breathy.  G-tube in place to stomach. Oropharynx clear and intact without evidence of active/recent bleeding to posterior oropharynx. Mildly edematous/elongated uvula. Tongue pink without edema. Neck with palpable firm lymph node to right lateral side consistent with known SCC.     Data  CBC with Differential:    Lab Results   Component Value Date/Time    WBC 12.6 02/15/2025 05:14 AM    RBC 3.72 02/15/2025 05:14 AM    RBC 4.56 02/22/2022 02:50 PM    RBC 14 02/22/2022 02:50 PM    HGB 12.9 02/15/2025 05:14 AM    HCT 37.7 02/15/2025 05:14 AM     02/15/2025 05:14 AM    .3 02/15/2025 05:14 AM    MCH 34.7 02/15/2025 05:14 AM    MCHC 34.2 02/15/2025 05:14 AM    RDW 14.6 12/19/2024 01:26 PM    NRBC 0 02/08/2025 03:01 PM    LYMPHOPCT 25.8 12/19/2024 01:26 PM    LYMPHOPCT 25.0 02/22/2022 02:50 PM    MONOPCT 8.0 
Department of Otolaryngology  Progress Note    Chief Complaint:  POD 4 Laryngoscopy with Right Transoral anterolateral laryngectomy, Advancement flap laryongoplasty     SUBJECTIVE 2/16/25:  Patient resting at time of rounding; left undisturbed as he had voiced poor quality sleep the night prior. Chart review notes no concern for post surgical events overnight/this morning. Spoke with nursing staff and only concern is loose bowel movements. Primary added Immodium. No breathing concerns or hemoptysis. Afebrile. Leukocytosis improving. Remains on tube feeding per G-tube and tolerating this well reportedly. Zosyn continues as well. Nursing states he took two pills orally this morning and no swallowing concerns.    OBJECTIVE      Physical  VITALS:  BP (!) 140/66   Pulse 90   Temp 97.9 °F (36.6 °C) (Oral)   Resp 20   Ht 1.676 m (5' 6\")   Wt 53.5 kg (118 lb)   SpO2 97%   BMI 19.05 kg/m²     This is a 74 y.o. male patient resting; left undisturbed.   Chart reviewed and spoke with nursing staff.   Remains on HFNC.  No hemoptysis or breathing concerns.   Afebrile.    Data  CBC with Differential:    Lab Results   Component Value Date/Time    WBC 13.4 02/16/2025 10:14 AM    RBC 3.65 02/16/2025 10:14 AM    RBC 4.56 02/22/2022 02:50 PM    RBC 14 02/22/2022 02:50 PM    HGB 12.8 02/16/2025 10:14 AM    HCT 37.9 02/16/2025 10:14 AM     02/16/2025 10:14 AM    .8 02/16/2025 10:14 AM    MCH 35.1 02/16/2025 10:14 AM    MCHC 33.8 02/16/2025 10:14 AM    RDW 14.6 12/19/2024 01:26 PM    NRBC 0 02/08/2025 03:01 PM    LYMPHOPCT 25.8 12/19/2024 01:26 PM    LYMPHOPCT 25.0 02/22/2022 02:50 PM    MONOPCT 8.0 02/08/2025 03:01 PM    EOSPCT 0.7 12/19/2024 01:26 PM    BASOPCT 0.7 12/19/2024 01:26 PM    MONOSABS 0.8 02/08/2025 03:01 PM    LYMPHSABS 2.2 02/08/2025 03:01 PM    EOSABS 0.0 02/08/2025 03:01 PM    BASOSABS 0.1 02/08/2025 03:01 PM     BMP:    Lab Results   Component Value Date/Time     02/16/2025 10:14 AM    K 3.5 
Department of Otolaryngology  Progress Note    Chief Complaint:  POD 5 Laryngoscopy with Right Transoral anterolateral laryngectomy, Advancement flap laryongoplasty     SUBJECTIVE 2/17/25:  No acute events overnight/this morning per patient. Main concern is loose stools. Breathing well and no hemoptysis. Pain adequately controlled at this time.       REVIEW OF SYSTEMS:    Pertinent positives as noted in the HPI. All other systems reviewed and negative.    OBJECTIVE      Physical  VITALS:  /71   Pulse 92   Temp 98.4 °F (36.9 °C) (Oral)   Resp 18   Ht 1.676 m (5' 6\")   Wt 53.5 kg (118 lb)   SpO2 97%   BMI 19.05 kg/m²     This is a 74 y.o. male chronically ill resting in hospital bed in no acute distress upon arrival. Removed HFNC at this time and no audible stridor or wheezing. Oropharynx clear without active/recent bleeding or purulent debris. Anterior neck stable without palpable crepitus, induration, or fluctuance. Known firm lymphadenopathy to right side consistent with metastasis.     Data  CBC with Differential:    Lab Results   Component Value Date/Time    WBC 16.4 02/17/2025 06:55 AM    RBC 3.42 02/17/2025 06:55 AM    RBC 4.56 02/22/2022 02:50 PM    RBC 14 02/22/2022 02:50 PM    HGB 12.0 02/17/2025 06:55 AM    HCT 35.5 02/17/2025 06:55 AM     02/17/2025 06:55 AM    .8 02/17/2025 06:55 AM    MCH 35.1 02/17/2025 06:55 AM    MCHC 33.8 02/17/2025 06:55 AM    RDW 14.6 12/19/2024 01:26 PM    NRBC 0 02/08/2025 03:01 PM    LYMPHOPCT 25.8 12/19/2024 01:26 PM    LYMPHOPCT 25.0 02/22/2022 02:50 PM    MONOPCT 8.0 02/08/2025 03:01 PM    EOSPCT 0.7 12/19/2024 01:26 PM    BASOPCT 0.7 12/19/2024 01:26 PM    MONOSABS 0.8 02/08/2025 03:01 PM    LYMPHSABS 2.2 02/08/2025 03:01 PM    EOSABS 0.0 02/08/2025 03:01 PM    BASOSABS 0.1 02/08/2025 03:01 PM     BMP:    Lab Results   Component Value Date/Time     02/17/2025 06:55 AM    K 3.8 02/17/2025 06:55 AM    CL 97 02/17/2025 06:55 AM    CO2 27 
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 Continues to report chronic shortness of breath worse with exertion, positional dyspnea and hoarseness with associated difficulty swallowing. No hypoxia evident. Electrolyte monitoring and replacement since admission. Planning for procedure Wednesday for debulking of vocal cord mass. Patient notes dry throat with improvement with lozenges.      REVIEW OF SYSTEMS:    Pertinent positives as noted in the HPI. All other systems reviewed and negative.    OBJECTIVE      Physical  VITALS:  /78   Pulse 89   Temp 98 °F (36.7 °C) (Oral)   Resp 20   Ht 1.676 m (5' 6\")   Wt 53.5 kg (118 lb)   SpO2 99%   BMI 19.05 kg/m²     This is a 74 y.o. male. Patient is alert and oriented on all spheres resting in hospital bedside chair in no acute distress upon arrival. Hoarseness and brashness of voice. Difficulty speaking due to known mass. Intermittent inspiratory wheeze; not persistent. Decreased muscle mass/thin. Afebrile. On room air.     Data  CBC with Differential:    Lab Results   Component Value Date/Time    WBC 9.1 02/10/2025 05:39 AM    RBC 3.87 02/10/2025 05:39 AM    RBC 4.56 02/22/2022 02:50 PM    RBC 14 02/22/2022 02:50 PM    HGB 13.5 02/10/2025 05:39 AM    HCT 38.9 02/10/2025 05:39 AM     02/10/2025 05:39 AM    .5 02/10/2025 05:39 AM    MCH 34.9 02/10/2025 05:39 AM    MCHC 34.7 02/10/2025 05:39 AM    RDW 14.6 12/19/2024 01:26 PM    NRBC 0 02/08/2025 03:01 PM    LYMPHOPCT 25.8 12/19/2024 01:26 PM    LYMPHOPCT 25.0 02/22/2022 02:50 PM    MONOPCT 8.0 02/08/2025 03:01 PM    EOSPCT 0.7 12/19/2024 01:26 PM    BASOPCT 0.7 12/19/2024 01:26 PM    MONOSABS 0.8 02/08/2025 03:01 PM    LYMPHSABS 2.2 02/08/2025 03:01 PM    EOSABS 0.0 02/08/2025 03:01 PM    BASOSABS 
Discharge paperwork gone over with patient and spouse. All questions answered.  Education performed about tube feed and peg tube care.  Spouse performed tube feed two times this shift.  Patient had BSC deliverd to room, this nurse placed in car. Infusion clinic aware.  
Hand-off report given to primary nurse Nusrat, patient resting comfortably in bed watching TV, call light in reach. No concerns at this time.  C Fair SN/RSC  
Hand-off report received from primary nurse Nusrat HUTCHINSON/RSC  
Pharmacy Medication History Note    List of current medications patient is taking is complete.    Source of information: Patient and Fill History     Changes made to medication list:  Medications removed as patient states he is not taking anymore:  Acetaminophen-aspirin buffered    Medications added/doses adjusted:  Asprin 81 mg 1 tab PO daily    Other notes:  Holding aspirin and multivitamin as instructed prior to surgery scheduled on 2/12/25  Denies use of other OTC or herbal medications.    Allergies reviewed    Electronically signed by Ara Aguilar on 2/10/2025 at 1:35 PM     
Spiritual Health History and Assessment/Progress Note  [facility]    (P) Initial Encounter, Pre-Procedural,  ,  ,      Name: Masood Randhawa MRN: [id number]    Age: 74 y.o.     Sex: male   Language: English   Alevism: Denominational   Head and neck cancer (HCC)     Date: 2/11/2025            Total Time Calculated: (P) 18 min              Spiritual Assessment began in [facility]        Referral/Consult From: (P) Rounding   Encounter Overview/Reason: (P) Initial Encounter, Pre-Procedural  Service Provided For: (P) Patient    Inge, Belief, Meaning:   Patient is connected with a inge tradition or spiritual practice  Family/Friends No family/friends present      Importance and Influence:  Patient has spiritual/personal beliefs that influence decisions regarding their health  Family/Friends No family/friends present    Community:  Patient is connected with a spiritual community  Family/Friends No family/friends present    Assessment and Plan of Care:   \"Ildefonso\" as he likes to be called is sitting at the edge of his bed.  He spoke of his cancer and stated tomorrow he is to have surgery.  He welcomed prayer and said afterwards that it really helped.  He is from [place] and is Denominational.   Patient Interventions include: Explored spiritual coping/struggle/distress  Family/Friends Interventions include: No family/friends present    Patient Plan of Care: surgery tomorrow due to cancer  Family/Friends Plan of Care: No family/friends present    Electronically signed by FLETCHER Yeh on 2/11/2025 at 6:10 PM   
after  the administration of intravenous contrast. Sagittal and coronal reconstructions  were obtained.    All CT scans at this facility use dose modulation, iterative reconstruction,  and/or weight-based dosing when appropriate to reduce radiation dose to as low  as reasonably achievable.      FINDINGS:    There is a stable 4.8 cm soft tissue mass centered in the the right vocal cord.  This extends into the supraglottic and subglottic airway. Superiorly in the  supraglottic airway, this extends up to the valleculae. This involves the  right-sided mucosal surfaces and fat. This involves the paraglottic fat on the  right. This surrounds the right arytenoid cartilage. The arytenoid cartilage is  sclerotic. This mass does narrow the supraglottic airway. This is unchanged.    There is no destruction of the thyroid cartilage. This mass abuts the cricoid  cartilage. There is no destruction of the cricoid cartilage. There is sclerosis  of the right aspect of the cricoid cartilage which can indicate involvement of  the cartilage.    There is a persistent cystic lesion superior to the mass arising from the  inferior aspect of the lingual tonsils. This abuts the posterior margin of the  hyoid bone and the ventral margin of the epiglottis. The epiglottis is not  thickened. This fills the anterior aspect of the piriform sinus on the right..  The lingual tonsils are heterogeneous bilaterally.    The soft tissues of the nasopharynx are normal. The upper oropharynx is within  acceptable limits. The oral tongue appears normal. Superiorly along the hard  palate there is linear gas density within the soft tissues. This is unchanged.    The thyroid gland, submandibular glands and parotid glands are within acceptable  limits.    On the right, there is a stable 2 cm necrotic lymph node seen deep to the  sternocleidomastoid muscle. This is at the level of the vocal cords. This  exhibits mass effect upon the internal jugular vein. The 
parenchyma.    Impression  1. Stable mass centered in the right vocal cord consistent with squamous cell  carcinoma. This involves the subglottic and the supraglottic airway. There is  involvement of the right arytenoid cartilage and possibly the cricoid cartilage.  2. Stable abnormal lymph nodes in the right neck at the level of the vocal cords  consistent with metastatic lymph nodes.  3. Stable abnormal appearing cystic lesion in the piriform sinus ventral to the  epiglottis and just superior to the patient's mass. This is inseparable from the  lingual tonsil.  4. No new abnormalities.      **This report has been created using voice recognition software. It may contain  minor errors which are inherent in voice recognition technology.**        Electronically signed by Dr. Aspen Christie     CT soft tissue neck 1/10/2025:            FINDINGS:     There is a mass which involves the right vocal cord. This extends below the  vocal cord and involves the supraglottic airway. This measures 4.5 x 2.4 x 2.5  cm. In the supraglottic airway, this extends up to the valleculae. This involves  the right-sided soft tissues. These are markedly thickened. The right arytenoid  cartilage appears abnormal. This is diffusely sclerotic. The thyroid cartilage  appears symmetric. There is no destruction of the thyroid cartilage. This mass  abuts the cricoid cartilage. There is no destruction of the cricoid cartilage.     At the level of the lingual tonsils and ventral to the epiglottis there is a 2.3  x 1.8 x 1.2 cm cystic lesion. There are some associated calcifications. This is  just superior to the mass on the right which is below this. This fills the  anterior aspect of the piriform sinus on the right. This is inseparable from the  lingual tonsils and may be arising from the inferior aspect of the right lingual  tonsil. This abuts the epiglottis. The lingual tonsils are heterogeneous  bilaterally.     The epiglottis is not thickened.   
are emphysematous changes in the lung apices. No suspicious osseous lesions are present. There are degenerative changes in the cervical spine. There is some mild mucosal thickening along the floor the right maxillary sinus. There is debris in the left external auditory canal. Debris in the right external auditory canal has been removed.. The mastoid air cells appear normally aerated. There are no gross abnormalities in the brain parenchyma.     1. Stable mass centered in the right vocal cord consistent with squamous cell carcinoma. This involves the subglottic and the supraglottic airway. There is involvement of the right arytenoid cartilage and possibly the cricoid cartilage. 2. Stable abnormal lymph nodes in the right neck at the level of the vocal cords consistent with metastatic lymph nodes. 3. Stable abnormal appearing cystic lesion in the piriform sinus ventral to the epiglottis and just superior to the patient's mass. This is inseparable from the lingual tonsil. 4. No new abnormalities. **This report has been created using voice recognition software. It may contain minor errors which are inherent in voice recognition technology.** Electronically signed by Dr. Aspen Christie    CT CHEST W CONTRAST    Result Date: 2/8/2025  PROCEDURE: CT CHEST W CONTRAST CLINICAL INFORMATION: Worsening SOB. New SCC Dx, worsening dypshagia. COMPARISON: No prior study. TECHNIQUE: 5 mm axial images were obtained through the chest after the administration of intravenous contrast. Sagittal and coronal reconstructions were obtained. All CT scans at this facility use dose modulation, iterative reconstruction, and/or weight-based dosing when appropriate to reduce radiation dose to as low as reasonably achievable. FINDINGS: Please refer to dictation of CT of the soft tissues of the neck from the same date for findings above the chest. The heart is normal in size. There are coronary artery calcifications. The thoracic aorta is normal in 
internal jugular vein. The jugular vein remains patent. 2 other enlarged lymph nodes on the right are stable. Each measures approximately 1.3 cm in long axis.. On the left, there are no abnormal lymph nodes. There is no upper mediastinal adenopathy. There are emphysematous changes in the lung apices. No suspicious osseous lesions are present. There are degenerative changes in the cervical spine. There is some mild mucosal thickening along the floor the right maxillary sinus. There is debris in the left external auditory canal. Debris in the right external auditory canal has been removed.. The mastoid air cells appear normally aerated. There are no gross abnormalities in the brain parenchyma.     1. Stable mass centered in the right vocal cord consistent with squamous cell carcinoma. This involves the subglottic and the supraglottic airway. There is involvement of the right arytenoid cartilage and possibly the cricoid cartilage. 2. Stable abnormal lymph nodes in the right neck at the level of the vocal cords consistent with metastatic lymph nodes. 3. Stable abnormal appearing cystic lesion in the piriform sinus ventral to the epiglottis and just superior to the patient's mass. This is inseparable from the lingual tonsil. 4. No new abnormalities. **This report has been created using voice recognition software. It may contain minor errors which are inherent in voice recognition technology.** Electronically signed by Dr. Aspen Christie    CT CHEST W CONTRAST    Result Date: 2/8/2025  PROCEDURE: CT CHEST W CONTRAST CLINICAL INFORMATION: Worsening SOB. New SCC Dx, worsening dypshagia. COMPARISON: No prior study. TECHNIQUE: 5 mm axial images were obtained through the chest after the administration of intravenous contrast. Sagittal and coronal reconstructions were obtained. All CT scans at this facility use dose modulation, iterative reconstruction, and/or weight-based dosing when appropriate to reduce radiation dose to as 
stable. Each measures approximately 1.3 cm in long axis.. On the left, there are no abnormal lymph nodes. There is no upper mediastinal adenopathy. There are emphysematous changes in the lung apices. No suspicious osseous lesions are present. There are degenerative changes in the cervical spine. There is some mild mucosal thickening along the floor the right maxillary sinus. There is debris in the left external auditory canal. Debris in the right external auditory canal has been removed.. The mastoid air cells appear normally aerated. There are no gross abnormalities in the brain parenchyma.     1. Stable mass centered in the right vocal cord consistent with squamous cell carcinoma. This involves the subglottic and the supraglottic airway. There is involvement of the right arytenoid cartilage and possibly the cricoid cartilage. 2. Stable abnormal lymph nodes in the right neck at the level of the vocal cords consistent with metastatic lymph nodes. 3. Stable abnormal appearing cystic lesion in the piriform sinus ventral to the epiglottis and just superior to the patient's mass. This is inseparable from the lingual tonsil. 4. No new abnormalities. **This report has been created using voice recognition software. It may contain minor errors which are inherent in voice recognition technology.** Electronically signed by Dr. Aspen Christie    CT CHEST W CONTRAST    Result Date: 2/8/2025  PROCEDURE: CT CHEST W CONTRAST CLINICAL INFORMATION: Worsening SOB. New SCC Dx, worsening dypshagia. COMPARISON: No prior study. TECHNIQUE: 5 mm axial images were obtained through the chest after the administration of intravenous contrast. Sagittal and coronal reconstructions were obtained. All CT scans at this facility use dose modulation, iterative reconstruction, and/or weight-based dosing when appropriate to reduce radiation dose to as low as reasonably achievable. FINDINGS: Please refer to dictation of CT of the soft tissues of the neck

## 2025-02-17 NOTE — DISCHARGE INSTR - COC
Continuity of Care Form    Patient Name: Masood Randhawa   :  1950  MRN:  [id number]    Admit date:  2025  Discharge date:  ***    Code Status Order: Full Code   Advance Directives:   Advance Care Flowsheet Documentation             Admitting Physician:  Kota Cardona MD  PCP: Samy Piper DO    Discharging Nurse: ***  Discharging Hospital Unit/Room#: 4K-20/020-A  Discharging Unit Phone Number: ***    Emergency Contact:   Extended Emergency Contact Information  Primary Emergency Contact: Soraya Randhawa  Address: [address] [place] [id number] [facility]  Home Phone: [phone]  Relation: Spouse    Past Surgical History:  Past Surgical History:   Procedure Laterality Date    CARPAL TUNNEL RELEASE Bilateral     2010    CHOLECYSTECTOMY  2006    COLONOSCOPY  2006    IR FLUORO GUIDED GASTROSTOMY TUBE INSERTION PERC W CONTRAST  2025    IR FLUORO GUIDED GASTROSTOMY TUBE INSERTION PERC W CONTRAST 2025 [facility] SPECIAL PROCEDURES    LARYNGOSCOPY N/A 2025    Laryngoscopy with Right Transoral anterolateral laryngectomy, Advancement flap laryongoplasty performed by Rickie Daley MD at [facility] OR    LUMBAR FUSION      Dr. Malone at [facility]     ROTATOR CUFF REPAIR  2021    US ASP/INJ THYROID CYST  2025    US ASP/INJ THYROID CYST 2025 [facility] ULTRASOUND       Immunization History:   Immunization History   Administered Date(s) Administered    COVID-19, MODERNA BLUE border, Primary or Immunocompromised, (age 12y+), IM, 100 mcg/0.5mL 2021, 2021    Pneumococcal, PCV-13, PREVNAR 13, (age 6w+), IM, 0.5mL 2017       Active Problems:  Patient Active Problem List   Diagnosis Code    HTN (hypertension) I10    Hypercholesteremia E78.00    PVD (peripheral vascular disease) (HCC) I73.9    Low back pain M54.50    ED (erectile dysfunction) N52.9    OA (osteoarthritis), multiple joints M19.90    BPH (benign prostatic

## 2025-02-17 NOTE — PLAN OF CARE
Problem: Cardiovascular - Adult  Goal: Maintains optimal cardiac output and hemodynamic stability  Outcome: Progressing     Problem: Cardiovascular - Adult  Goal: Absence of cardiac dysrhythmias or at baseline  Outcome: Progressing   Patient mutually agreed on goals.

## 2025-02-17 NOTE — CARE COORDINATION
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 Dietician. Dysphagia diet; NPO at midnight for planned OR tomorrow. Telemetry. Inhalers. SQ lovenox held.     PCP: Samy Piper, DO  Readmission Risk Score: 12.2    Patient Goals/Plan/Treatment Preferences: Return home w/ wife. Denies needs at this time.     
2/12/25, 1:40 PM EST    DISCHARGE ON GOING EVALUATION    [facility] day: 4  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 Dietician. NPO; down in OR. Needs monitoring r/t difficult airway.     PCP: Samy Piper DO  Readmission Risk Score: 12.2    Patient Goals/Plan/Treatment Preferences: Return home w/ wife. Denies needs at this time.        
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 GI consulted for PEG; recommended IR place PEG and GI signed off. Transferring to stepdown this shift. On HFNC, 60L, 40% FIO2, sats 100%. Afebrile. NSR. Ox4. ENT and Intensivist following. Dietitian following. Telemetry, NG w/bolus TF, SCDs. Prn IV dilaudid, prn roxicodone, IV zosyn Q8H, inhaler, Electrolyte replacement protocols. WBC 25.7, hgb 12.8.     PCP: Samy Piper,   Readmission Risk Score: 11.9    Patient Goals/Plan/Treatment Preferences: Return home w/ wife. Monitor for needs. Plan for PEG and bolus TF.     1450 CM Called and spoke with Leonid at [facility]; asked to check if has coverage and can accept for bolus TF.     1520 Received call back from Leonid at [facility]; states with pts MCR & 2nd insurance he has full coverage for TF as long as it is his sole source of nutrition.       
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 On HFNC, 30L, 30% FIO2, sats 91%. Afebrile. NSR. Ox4. ENT and Intensivist following. Dietitian following. Telemetry, NG w/bolus TF, SCDs. Inhaler, Prn IV dilaudid, prn roxicodone, IV zosyn Q8H, nebs, Electrolyte replacement protocols. WBC 15.8, hgb 12.3.     PCP: Samy Piper,   Readmission Risk Score: 14.8    Patient Goals/Plan/Treatment Preferences: Home w/wife and new [facility]  for RN. [facility] for (bolus) Tube feedings. Will need OP Dietitian order and HH order.      Spoke with Masood and his wife; [facility]. Pt also needs OP Dietitian consult.     Referral made to [facility]  and notified of plan for d/c Monday 2/17; pt was accepted. YANIQUE called and spoke with Leonid at [facility]; reported plan for TF to be sole source of nutrition and plan for discharge Monday 2/17. YANIQUE called and spoke with Velma, primary RN; updated and reported that will need to start teaching pt/wife on how to administer and care for PEG tube once it is placed. YANIQUE sent Perfect [employer] message to Alecia Seymour ENT APRN-CNP, updating that will need HH RN order and OP Dietitian order at discharge.   
2/17/25, 10:46 AM EST    Patient goals/plan/ treatment preferences discussed by  and .  Patient goals/plan/ treatment preferences reviewed with patient/ family.  Patient/ family verbalize understanding of discharge plan and are in agreement with goal/plan/treatment preferences.  Understanding was demonstrated using the teach back method.  AVS provided by RN at time of discharge, which includes all necessary medical information pertaining to the patients current course of illness, treatment, post-discharge goals of care, and treatment preferences.     Services At/After Discharge: DME and Home Health     Returning home with [facility] for tube feeding, and [facility] nursing, PT/OT. New DME order for [facility].  HME to deliver to bedside.     CM notified Leonid at [facility] of discharge today.     OP dietician referral made.  
Masood Randhawa requires a bedside commode due to being confined to a single room, and is physically incapable of utilizing regular toilet facilities. Current body weight: Weight - Scale: 53.5 kg (118 lb).      
transportation. Uses a cane. Denies needs for DME, HH or OP services. Plans to return home w/ his wife.      02/10/25 0930   Service Assessment   Patient Orientation Alert and Oriented   Cognition Alert   History Provided By Patient   Primary Caregiver Self   Accompanied By/Relationship n/a   Support Systems Spouse/Significant Other   Patient's Healthcare Decision Maker is: Patient Declined (Legal Next of Kin Remains as Decision Maker)   PCP Verified by CM Yes   Last Visit to PCP Within last 3 months   Prior Functional Level Independent in ADLs/IADLs   Current Functional Level Independent in ADLs/IADLs   Can patient return to prior living arrangement Yes   Ability to make needs known: Good   Family able to assist with home care needs: Yes   Would you like for me to discuss the discharge plan with any other family members/significant others, and if so, who? No   Financial Resources Medicare;Other (Comment)  (Secondary: BCBS)   Community Resources None   CM/SW Referral ADLs/IADLs;DME   Social/Functional History   Lives With Spouse   Type of Home House   Active  No   Patient's  Info wife   Discharge Planning   Type of Residence House   Living Arrangements Spouse/Significant Other   Current Services Prior To Admission Durable Medical Equipment   Current DME Prior to Arrival Cane   Potential Assistance Needed N/A   DME Ordered? No   Potential Assistance Purchasing Medications No   Type of Home Care Services None   Patient expects to be discharged to: House   History of falls? 1   Services At/After Discharge   Mode of Transport at Discharge Other (see comment)  (Family)   Confirm Follow Up Transport Family   Condition of Participation: Discharge Planning   The Plan for Transition of Care is related to the following treatment goals: \"Get me to surgery Wednesday\"

## 2025-02-17 NOTE — DISCHARGE SUMMARY
hypertension: Continue amlodipine 5mg daily and lisonpril 20 mg (substituted for home benazepril).     Severe protein calorie malnutrition: PEG tube placed in IR yesterday tube feeds per dietitian. OP follow-up with dietitian plan to use [facility] for home tub feeding supplies.     GERD: Continue home PPI.     PVD: Noted. Recommend outpatient follow-up.      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] 02/09/2025    Head and neck cancer (HCC) [C76.0] 02/08/2025    Hoarseness of voice [R49.0] 01/10/2025    History of smoking greater than 50 pack years [Z87.891] 01/10/2025    Unintentional weight loss [R63.4] 01/10/2025    Night sweats [R61] 01/10/2025    Abnormal findings on laryngoscopy [R09.89] 01/10/2025    Gastroesophageal reflux disease with apnea without esophagitis [K21.9, R06.81] 01/10/2025    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    Hospital Course:   Masood Randhawa is a 74 y.o. male admitted to [facility] on 2/8/2025 for sore throat.        Per previous note \"This 74-year-old male with PMH of BPH, COPD, GERD, HTN, PAD, OA, SCC of head and neck who presented to [facility] of fatigue, SOB, dysphagia, hoarseness.  He was seen by ENT and found to have a laryngeal mass with cervical lymph node involvement.  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  Never has received HPV vaccine.  Has a 50-pack-year smoking history and quit approximately 1 month ago.  60 lb weight loss in 7 mos. Positive night sweats.

## 2025-02-17 NOTE — TELEPHONE ENCOUNTER
Referrals have been faxed, I will call patient tomorrow to schedule PET scan because it looks like he may have been discharged today.

## 2025-02-18 ENCOUNTER — CARE COORDINATION (OUTPATIENT)
Dept: CASE MANAGEMENT | Age: 75
End: 2025-02-18

## 2025-02-18 DIAGNOSIS — R62.7 FAILURE TO THRIVE IN ADULT: Primary | ICD-10-CM

## 2025-02-18 DIAGNOSIS — R09.89 ABNORMAL FINDINGS ON LARYNGOSCOPY: ICD-10-CM

## 2025-02-18 PROCEDURE — 1111F DSCHRG MED/CURRENT MED MERGE: CPT | Performed by: FAMILY MEDICINE

## 2025-02-18 NOTE — TELEPHONE ENCOUNTER
I received a call from Masood' wife this morning and she has some questions for Alecia.  She said that the nebulizer tubing and mouth piece is not available at [facility] and she was not sure what she should do.  She also needs the machine and seems unsure where to obtain these things.    She also said the top of his feeding tube piece broke last night when he went to use the bathroom and she doesn't know how to proceed in getting that fixed.  She is afraid that she won't be able to use it to feed him.  She mentioned that the home health nurse would be coming, but she is not sure when.    She asked to speak to Alecia directly since she is familiar with his case.  Please advise.

## 2025-02-18 NOTE — CARE COORDINATION
Care Transitions Note    Initial Call - Call within 2 business days of discharge: Yes    Patient Current Location:  Home: [address] [place] [address]    Care Transition Nurse contacted the spouse/partner  by telephone to perform post hospital discharge assessment, verified name and  as identifiers. Provided introduction to self, and explanation of the Care Transition Nurse role.     Patient: Masood Randhawa    Patient : 1950   MRN: [id number]    Reason for Admission: failure to thrive in adult s/p laryngectomy, G tube placement  Discharge Date: 25  RURS: Readmission Risk Score: 16.5      Last Discharge Facility       Date Complaint Diagnosis Description Type Department Provider    25 Shortness of Breath; Weight Loss Failure to thrive in adult ... ED to Hosp-Admission (Discharged) (ADMITTED) LISANDRA ChouK Jennifer Schmidt, ; Aaron Ramirez...            Was this an external facility discharge? No    Additional needs identified to be addressed with provider   No needs identified             Method of communication with provider: none.    Patients top risk factors for readmission: lack of knowledge about disease, medical condition-throat Ca, HTN, COPD, and polypharmacy    Interventions to address risk factors:   Education: peg tube feedings  Review of patient management of conditions/medications:    Home Health: [facility]   DME: walker, [facility]  Referrals: RD   Communication with providers: HFU post op scheduled    Care Summary Note:  Spoke with wife, Soraya, said Masood is doing ok.   nurse is there now.  Something had broke off the peg tube but it was not a part that would interfere with feedings.  She is giving him bolus feeds 5 times/day with water flushes.  He is able to take his meds by mouth.  Reviewed medications/changes.  She had issue getting the nebulizer but spoke with AUGUSTIN Alston and said he may not need anyway.    Masood denies fever, chills, chest pain, dyspnea, dizziness,

## 2025-02-18 NOTE — TELEPHONE ENCOUNTER
I spoke with Soraya and provided some resources/phone numbers including home health services and the number for home medical equipment.   Then nebulizer was placed as a DME order for outpatient and only needed for thick secretions; she voices she will call today and is going to call home health to come out and assess the G-tube site and provide recommendations for finding new part for the port that got broken off.     All questions answered via wife today and she is grateful for further resources.

## 2025-02-18 NOTE — TELEPHONE ENCOUNTER
Okay to go 4 hours without the bolus tube feeding  And can give an extra bolus flush of water around this 4 hour ketty prior to the PET scan.

## 2025-02-18 NOTE — TELEPHONE ENCOUNTER
I called and spoke with patient's wife, Soraya, and we scheduled patient's PET scan for 3/10/2025. Patient had a feeding tube placed by Dr. Daley on 2/12/2025 and gets fed every 3 hours. Soraya is concerned about the preps for the CT scan because of this.     According to scan preps, patient is to have nothing to eat or drink for 4-6 hours prior to scan. It is also recommended that patient drink 16-24 oz of water 2-3 hours before the scan.    Please advise if these preps are ok for this patient.

## 2025-02-19 NOTE — TELEPHONE ENCOUNTER
I called and spoke with the patients wife and let her know what Alecia said. Patients wife verbalized understanding and thanked me for calling.

## 2025-02-20 DIAGNOSIS — R63.4 UNINTENDED WEIGHT LOSS: ICD-10-CM

## 2025-02-20 DIAGNOSIS — R63.0 DECREASED APPETITE: Primary | ICD-10-CM

## 2025-02-20 DIAGNOSIS — R62.7 FAILURE TO THRIVE IN ADULT: ICD-10-CM

## 2025-02-21 ENCOUNTER — TELEPHONE (OUTPATIENT)
Dept: ENT CLINIC | Age: 75
End: 2025-02-21

## 2025-02-21 ENCOUNTER — HOSPITAL ENCOUNTER (OUTPATIENT)
Dept: PET IMAGING | Age: 75
Discharge: HOME | End: 2025-02-21
Attending: REGISTERED NURSE
Payer: MEDICARE

## 2025-02-21 DIAGNOSIS — C32.0 SQUAMOUS CELL CARCINOMA OF GLOTTIS (HCC): ICD-10-CM

## 2025-02-21 PROCEDURE — A9609 HC RX DIAGNOSTIC RADIOPHARMACEUTICAL: HCPCS | Performed by: REGISTERED NURSE

## 2025-02-21 PROCEDURE — 78815 PET IMAGE W/CT SKULL-THIGH: CPT

## 2025-02-21 PROCEDURE — 3430000000 HC RX DIAGNOSTIC RADIOPHARMACEUTICAL: Performed by: REGISTERED NURSE

## 2025-02-21 RX ORDER — FLUDEOXYGLUCOSE F 18 200 MCI/ML
9.9 INJECTION, SOLUTION INTRAVENOUS
Status: COMPLETED | OUTPATIENT
Start: 2025-02-21 | End: 2025-02-21

## 2025-02-21 RX ADMIN — FLUDEOXYGLUCOSE F 18 9.9 MILLICURIE: 200 INJECTION, SOLUTION INTRAVENOUS at 09:52

## 2025-02-21 NOTE — TELEPHONE ENCOUNTER
Soraya was calling in lacy Fregoso.  When he was sent home from the hospital he had a few diapers.  He had used those so she got him some Depends and he broke out in a terrible rash.    She was calling today asking if you or anyone knew where they could get non-allergenic diapers.    Please advise

## 2025-02-21 NOTE — TELEPHONE ENCOUNTER
This is something Soraya should reach out to home health regarding to see if their insurance would cover alternate options.

## 2025-02-21 NOTE — TELEPHONE ENCOUNTER
I left a detailed VM to patients wife advising that she call home health in regards to this. I advised the patients wife to call back with any questions or concerns.

## 2025-02-24 ENCOUNTER — HOSPITAL ENCOUNTER (OUTPATIENT)
Dept: RADIATION ONCOLOGY | Age: 75
Discharge: HOME | End: 2025-02-24
Payer: MEDICARE

## 2025-02-24 ENCOUNTER — CLINICAL DOCUMENTATION (OUTPATIENT)
Dept: CASE MANAGEMENT | Age: 75
End: 2025-02-24

## 2025-02-24 ENCOUNTER — OFFICE VISIT (OUTPATIENT)
Dept: ENT CLINIC | Age: 75
End: 2025-02-24

## 2025-02-24 ENCOUNTER — PREP FOR PROCEDURE (OUTPATIENT)
Dept: ENT CLINIC | Age: 75
End: 2025-02-24

## 2025-02-24 VITALS
BODY MASS INDEX: 20.15 KG/M2 | HEART RATE: 88 BPM | SYSTOLIC BLOOD PRESSURE: 108 MMHG | DIASTOLIC BLOOD PRESSURE: 62 MMHG | HEIGHT: 66 IN | TEMPERATURE: 98.9 F | WEIGHT: 125.4 LBS | RESPIRATION RATE: 16 BRPM | OXYGEN SATURATION: 95 %

## 2025-02-24 VITALS
TEMPERATURE: 97.7 F | SYSTOLIC BLOOD PRESSURE: 96 MMHG | OXYGEN SATURATION: 92 % | HEART RATE: 99 BPM | DIASTOLIC BLOOD PRESSURE: 50 MMHG

## 2025-02-24 DIAGNOSIS — R63.4 UNINTENTIONAL WEIGHT LOSS: ICD-10-CM

## 2025-02-24 DIAGNOSIS — G89.3 CANCER ASSOCIATED PAIN: Primary | ICD-10-CM

## 2025-02-24 DIAGNOSIS — R13.10 DYSPHAGIA, UNSPECIFIED TYPE: ICD-10-CM

## 2025-02-24 DIAGNOSIS — R49.0 HOARSENESS OF VOICE: ICD-10-CM

## 2025-02-24 DIAGNOSIS — R59.0 LYMPHADENOPATHY OF HEAD AND NECK REGION: ICD-10-CM

## 2025-02-24 DIAGNOSIS — C32.0 CANCER OF GLOTTIS (HCC): Primary | ICD-10-CM

## 2025-02-24 DIAGNOSIS — Z87.891 HISTORY OF SMOKING GREATER THAN 50 PACK YEARS: ICD-10-CM

## 2025-02-24 DIAGNOSIS — R09.89 ABNORMAL FINDINGS ON LARYNGOSCOPY: ICD-10-CM

## 2025-02-24 DIAGNOSIS — C32.0 SQUAMOUS CELL CARCINOMA OF GLOTTIS (HCC): ICD-10-CM

## 2025-02-24 PROCEDURE — 99202 OFFICE O/P NEW SF 15 MIN: CPT | Performed by: RADIOLOGY

## 2025-02-24 PROCEDURE — 99024 POSTOP FOLLOW-UP VISIT: CPT | Performed by: OTOLARYNGOLOGY

## 2025-02-24 PROCEDURE — 99204 OFFICE O/P NEW MOD 45 MIN: CPT

## 2025-02-24 RX ORDER — OXYCODONE AND ACETAMINOPHEN 5; 325 MG/1; MG/1
1 TABLET ORAL EVERY 8 HOURS PRN
Qty: 21 TABLET | Refills: 0 | Status: SHIPPED | OUTPATIENT
Start: 2025-02-24 | End: 2025-03-03

## 2025-02-24 ASSESSMENT — ENCOUNTER SYMPTOMS
BACK PAIN: 0
TROUBLE SWALLOWING: 0
NAUSEA: 0
SORE THROAT: 0
SINUS PAIN: 0
VOMITING: 0
RECTAL PAIN: 0
SHORTNESS OF BREATH: 0
VOICE CHANGE: 1
COUGH: 1
ABDOMINAL PAIN: 1
SINUS PRESSURE: 0
FACIAL SWELLING: 0
BLOOD IN STOOL: 0

## 2025-02-24 ASSESSMENT — PAIN DESCRIPTION - LOCATION: LOCATION: BUTTOCKS

## 2025-02-24 ASSESSMENT — PAIN SCALES - GENERAL: PAINLEVEL_OUTOF10: 5

## 2025-02-24 NOTE — PROGRESS NOTES
[facility]           [address], [address]        [place] [id number]        O: [phone]        F: [phone]       [employer]            INITIAL CONSULTATION    Date of Service: 2025  Patient ID: Masood Randhawa   : 1950  MRN: [id number]   Acct Number: [id number]         Requesting Provider:  PAULO Seymour CNP (ENT)  Reason for request: Evaluation for the potential role of definitive or adjuvant radiation therapy.     CONSULTANT: Catalina Colin PA-C    CHIEF COMPLAINT: Evaluation for the potential role of definitive or adjuvant radiation therapy.    ASSESSMENT:   Cancer Staging   Cancer of glottis (HCC)  Staging form: Larynx - Glottis, AJCC 8th Edition  - Clinical: cT4a - Unsigned      PLAN:    Masood Randhawa is a 74 y.o. 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 The patient first noticed hoarseness approximately one year ago and has lost 60 pounds since. CT neck 25 demonstrated 4.5 cm mass of the right vocal cord, with abnormal right cervical lymph nodes. Right cervical lymph node 25 was positive for metastatic SCC with extensive necrosis, and then biopsies with debulking of the glottic mass revealed invasive moderately differentiated SCC, with LVI. He states that he is breathing well since his surgery, and is also about to swallow without choking, but he is currently taking in all nutrition via PEG and only water by mouth. He also reports a buttocks rash and chronic aches with bowel/bladder incontinence. Patient is edentulous - No dental clearance needed. The patient no longer smokes.     Notes, imaging, and surgical/pathologic reports were reviewed. We discussed that per ENT, his staging is cT4; in this case, surgery is his best option, if feasible, with possible adjuvant radiation (and/or

## 2025-02-25 ENCOUNTER — TELEPHONE (OUTPATIENT)
Dept: FAMILY MEDICINE CLINIC | Age: 75
End: 2025-02-25

## 2025-02-25 ENCOUNTER — CASE MANAGEMENT (OUTPATIENT)
Dept: CASE MANAGEMENT | Age: 75
End: 2025-02-25

## 2025-02-25 ENCOUNTER — SOCIAL WORK (OUTPATIENT)
Dept: RADIATION ONCOLOGY | Age: 75
End: 2025-02-25

## 2025-02-25 ENCOUNTER — CARE COORDINATION (OUTPATIENT)
Dept: CARE COORDINATION | Age: 75
End: 2025-02-25

## 2025-02-25 DIAGNOSIS — R53.81 PHYSICAL DECONDITIONING: ICD-10-CM

## 2025-02-25 DIAGNOSIS — C76.0 HEAD AND NECK CANCER (HCC): Primary | ICD-10-CM

## 2025-02-25 DIAGNOSIS — J44.9 CHRONIC OBSTRUCTIVE PULMONARY DISEASE, UNSPECIFIED COPD TYPE (HCC): ICD-10-CM

## 2025-02-25 DIAGNOSIS — R53.1 GENERAL WEAKNESS: ICD-10-CM

## 2025-02-25 NOTE — TELEPHONE ENCOUNTER
Wife Soraya on HIPAA calling and requesting order for hospital bed be faxed to [facility] for patient.  Patient with recent cancer surgery and laying in the recliner is very uncomfortable.  He is unable to go up the stairs at this time.  All their bedrooms are located on the 2nd floor of their home.  Please advise and call evaristo Lira

## 2025-02-25 NOTE — PROGRESS NOTES
Name: Masood Randhawa  : 1950  MRN: [id number]    Oncology Navigation     Contact Type:  Telephone    Notes: arely called spouse informing change Med Onc apt 3/7 @1230pm d/t tumor conference 3/4.  She informed-apt dietary Jacqueline Levine 3/5 . Saw Dr. Daley -surgery scheduled 3/14     Electronically signed by Cheryl Hughes RN on 2025 at 11:26 AM

## 2025-02-25 NOTE — PROGRESS NOTES
[facility] [place] [facility]  [facility], NOSE [facility]AT  [address]  [address]  [facility] [address]  Dept: [phone]  Dept Fax: [phone]  Loc: [phone]    Masood Randhawa is a 74 y.o. male who was referred by No ref. provider found for:  Chief Complaint   Patient presents with    Post-Op Check     Patient here for a post op. Patient would like to know when he can have a beer, pudding and aplesauce. Patient would like to know when he can start taking Zaida -C. Patient states that he is out of pain medication.        HPI:       History of Present Illness  Masood Randhawa is a 74 y.o. 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  He underwent an emergency debulking of this disease in order to avoid an emergency tracheostomy.  Subsequent to this procedure he also underwent a gastrostomy tube placement for nutrition given his his limited ability to protect his airway from his transoral vertical Que laryngectomy.  He is accompanied by his wife.    He has already seen the Cancer Center people to get an intake over there. He reports that Dr. Gordillo recommended surgical removal of the larynx. A PET scan revealed additional lymph nodes in the abdomen or chest, which were not clearly identified as likely or unlikely to be related to his larynx cancer. He has a scheduled appointment with Dr. Alvarez in medical oncology on 27th.     His wife notes that he has been experiencing voice loss for approximately one year to a severe degree with more subtle symptoms of hoarseness and throat complaints dating back 2 to 3 years.  He continues to experience excessive salivation. He has a history of heavy smoking and heavy alcohol consumption but has abstained from these habits since his diagnosis. His current diet is primarily liquid, consisting of water

## 2025-02-25 NOTE — CARE COORDINATION
Care Transitions Note    Follow Up Call     Patient Current Location:  Home: [id number] [place] [address]    Care Transition Nurse contacted the spouse/partner  by telephone. Verified name and  as identifiers.    Additional needs identified to be addressed with provider   No needs identified                 Method of communication with provider: none.    Care Summary Note:  Soraya, wife, returned call.  Said Ildefonso is doing pretty good.  HH nurse is coming later today.  His tube feedings are going well.  Saw ENT yesterday and is going to do more surgery, scheduled 3/14.  Denies fever, chills, chest pain, dyspnea, dizziness, n/v/d.  Discussed not able to get nebulizer from [facility].  Said she saw Ildefonso use his inhaler and thought he should get it to have on hand.  He is using the IS/[employer]ella also.  Suggested asking ENT to send order to DME.  An order was sent to [facility] for a hospital bed.  She will ask if they supply nebulizer and get order from ENT.    No other issues to report.  Denies any other needs.  No other questions or concerns at this time.  Will continue to follow, agreeable to calls.    Plan of care updates since last contact:  No changes       Advance Care Planning:   Does patient have an Advance Directive: not on file; education provided - not ready to do yet .    Medication Review:  Full medication reconciliation completed during previous call. and No changes since last call.     Remote Patient Monitoring:  Offered patient enrollment in the Remote Patient Monitoring (RPM) program for in-home monitoring: previously declined.    Assessments:  No changes since last call    Follow Up Appointment:   Reviewed upcoming appointment(s).  Future Appointments         Provider Specialty Dept Phone    3/5/2025 3:00 PM Jacqueline Levine RD, LD Internal Medicine [phone]    3/7/2025 12:30 PM Carmen Villavicencio MD Oncology [phone]    3/7/2025 12:30 PM STR OUT PT ONC CMA Infusion

## 2025-02-25 NOTE — TELEPHONE ENCOUNTER
Order faxed to [facility] at fax# [phone] along with last office note from 12/9/2024

## 2025-02-25 NOTE — PROGRESS NOTES
Oncology Social Work    Date: 2/25/2025  Time: 10:36 AM  Name: Masood Randhawa  MRN: [id number]     Contact Type: Distress Tool Follow-up    Note:   Situation: This staff called Masood Randhawa (goes by Ildefonso) via phone support to introduce myself as the Oncology Social Worker.     Background: Ildefonso was referred to this staff by the Radiation Dept after a recent appointment here. This staff was calling to review the Distress Thermometer provided during their visit.    Coping Score 2    Areas of Concern Identified    Physical Concern: Pain or muscle tension, Sleep disturbance, Fatigue or excessive sweating, Nausea or diarrhea, and Loss or change of physical abilities    Expressive Concern: Edginess, anger or agitation    Social Concern: None selected    Practical Concern: Taking care of myself and Treatment decisions    Existential Concern: None selected    Assessment: This staff had the opportunity to speak with Ildefonso's wife (Soraya).. She seemed pleasant as we discussed the call's purpose was to educate about the services provided by the . She shared Ildefonso does have concerns and mostly they are related to the frustration of not being able to speak very loud. He is planning another surgery, where they will remove his voice box and provide him with a device that he will be able to talk again.   - She also expressed concern over his need to sleep in the recliner because he is too weak to go up the stairs. He has developed a serious skin irritation as a result of using the Depends. A referral was suggested for home medical supply. Soraya explained they obtained the referral through his PCP and are working with [facility] [employer] to be bring a hospital bed and bedside commode. Ildefonso currently uses a walker.   - Soraya was asked if she would like Home Health to evaluate and she shared she is working with [facility] [place] [facility]. Then I asked about  requesting help from her neighbors since [facility] for

## 2025-02-26 ENCOUNTER — CLINICAL DOCUMENTATION (OUTPATIENT)
Dept: NUTRITION | Age: 75
End: 2025-02-26

## 2025-02-26 ENCOUNTER — TELEPHONE (OUTPATIENT)
Dept: ONCOLOGY | Age: 75
End: 2025-02-26

## 2025-02-26 DIAGNOSIS — Z91.89 ADJUSTMENT TO LIFE THREATENING ILLNESS: Primary | ICD-10-CM

## 2025-02-26 NOTE — PROGRESS NOTES
Oncology Nutrition    Contact Type: referral to Oncology Nutrition Services    Contact Reason: Patient referred to nutrition services for cancer of glottis. Patient is EN dependent and has been seen by inpatient RDs on 2/17/2025. The patient has an appointment scheduled with the OP RD on 3/3/2025 (Jacqu[facility] Levine RD [facility])    Electronically signed by Hope Bain RD, LD on 2/26/2025 at 8:21 AM  [phone]

## 2025-02-26 NOTE — TELEPHONE ENCOUNTER
Oncology Social Work     Date: 2/25/2025  Time: 10:36 AM  Name: Masood Randhawa  MRN: [id number]      Contact Type: Distress Tool Follow-up     Note:   Situation: This staff called Masood Randhawa (goes by Ildefonso) via phone support to introduce myself as the Oncology Social Worker.      Background: Ildefonso was referred to this staff by the Radiation Dept after a recent appointment here. This staff was calling to review the Distress Thermometer provided during their visit.     Coping Score 2     Areas of Concern Identified     Physical Concern: Pain or muscle tension, Sleep disturbance, Fatigue or excessive sweating, Nausea or diarrhea, and Loss or change of physical abilities     Expressive Concern: Edginess, anger or agitation     Social Concern: None selected     Practical Concern: Taking care of myself and Treatment decisions     Existential Concern: None selected     Assessment: This staff had the opportunity to speak with Ildefonso's wife (Soraya).. She seemed pleasant as we discussed the call's purpose was to educate about the services provided by the . She shared Ildefonso does have concerns and mostly they are related to the frustration of not being able to speak very loud. He is planning another surgery, where they will remove his voice box and provide him with a device that he will be able to talk again.   - She also expressed concern over his need to sleep in the recliner because he is too weak to go up the stairs. He has developed a serious skin irritation as a result of using the Depends. A referral was suggested for home medical supply. Soraya explained they obtained the referral through his PCP and are working with [employer] to be bring a hospital bed and bedside commode. Ildefonso currently uses a walker.   - Soraya was asked if she would like Home Health to evaluate and she shared she is working with [facility]. Then I asked about  requesting help from her neighbors since nicolás

## 2025-02-28 DIAGNOSIS — C32.0 SQUAMOUS CELL CARCINOMA OF GLOTTIS (HCC): Primary | ICD-10-CM

## 2025-03-04 ENCOUNTER — HOSPITAL ENCOUNTER (INPATIENT)
Age: 75
LOS: 6 days | Discharge: HOME | DRG: 871 | End: 2025-03-10
Attending: EMERGENCY MEDICINE | Admitting: STUDENT IN AN ORGANIZED HEALTH CARE EDUCATION/TRAINING PROGRAM
Payer: MEDICARE

## 2025-03-04 ENCOUNTER — TELEPHONE (OUTPATIENT)
Dept: ENT CLINIC | Age: 75
End: 2025-03-04

## 2025-03-04 ENCOUNTER — APPOINTMENT (OUTPATIENT)
Dept: GENERAL RADIOLOGY | Age: 75
DRG: 871 | End: 2025-03-04
Payer: MEDICARE

## 2025-03-04 ENCOUNTER — APPOINTMENT (OUTPATIENT)
Dept: CT IMAGING | Age: 75
DRG: 871 | End: 2025-03-04
Payer: MEDICARE

## 2025-03-04 ENCOUNTER — TELEPHONE (OUTPATIENT)
Dept: FAMILY MEDICINE CLINIC | Age: 75
End: 2025-03-04

## 2025-03-04 DIAGNOSIS — R65.10 SIRS (SYSTEMIC INFLAMMATORY RESPONSE SYNDROME) (HCC): Primary | ICD-10-CM

## 2025-03-04 DIAGNOSIS — J44.1 ACUTE EXACERBATION OF COPD WITH ASTHMA (HCC): ICD-10-CM

## 2025-03-04 DIAGNOSIS — J96.01 ACUTE RESPIRATORY FAILURE WITH HYPOXIA (HCC): ICD-10-CM

## 2025-03-04 DIAGNOSIS — J18.9 PNEUMONIA OF RIGHT UPPER LOBE DUE TO INFECTIOUS ORGANISM: ICD-10-CM

## 2025-03-04 DIAGNOSIS — J44.1 CHRONIC OBSTRUCTIVE PULMONARY DISEASE WITH ACUTE EXACERBATION (HCC): ICD-10-CM

## 2025-03-04 DIAGNOSIS — J44.89 COPD WITH ASTHMA (HCC): ICD-10-CM

## 2025-03-04 LAB
ALBUMIN SERPL BCG-MCNC: 3.3 G/DL (ref 3.4–4.9)
ALP SERPL-CCNC: 117 U/L (ref 40–129)
ALT SERPL W/O P-5'-P-CCNC: 22 U/L (ref 10–50)
ANION GAP SERPL CALC-SCNC: 15 MEQ/L (ref 8–16)
ANISOCYTOSIS BLD QL SMEAR: PRESENT
ARTERIAL PATENCY WRIST A: POSITIVE
AST SERPL-CCNC: 24 U/L (ref 10–50)
BASE EXCESS BLDA CALC-SCNC: 5.6 MMOL/L (ref -2.5–2.5)
BASOPHILS ABSOLUTE: 0.1 THOU/MM3 (ref 0–0.1)
BASOPHILS NFR BLD AUTO: 0.3 %
BDY SITE: ABNORMAL
BILIRUB CONJ SERPL-MCNC: 0.4 MG/DL (ref 0–0.2)
BILIRUB SERPL-MCNC: 0.8 MG/DL (ref 0.3–1.2)
BUN SERPL-MCNC: 34 MG/DL (ref 8–23)
CALCIUM SERPL-MCNC: 8.7 MG/DL (ref 8.8–10.2)
CHLORIDE SERPL-SCNC: 93 MEQ/L (ref 98–111)
CO2 SERPL-SCNC: 25 MEQ/L (ref 22–29)
COLLECTED BY:: ABNORMAL
CREAT SERPL-MCNC: 0.9 MG/DL (ref 0.7–1.2)
DEPRECATED RDW RBC AUTO: 49.2 FL (ref 35–45)
DEVICE: ABNORMAL
EKG ATRIAL RATE: 129 BPM
EKG P AXIS: 75 DEGREES
EKG P-R INTERVAL: 120 MS
EKG Q-T INTERVAL: 312 MS
EKG QRS DURATION: 78 MS
EKG QTC CALCULATION (BAZETT): 457 MS
EKG R AXIS: 69 DEGREES
EKG T AXIS: 47 DEGREES
EKG VENTRICULAR RATE: 129 BPM
EOSINOPHIL NFR BLD AUTO: 0 %
EOSINOPHILS ABSOLUTE: 0 THOU/MM3 (ref 0–0.4)
ERYTHROCYTE [DISTWIDTH] IN BLOOD BY AUTOMATED COUNT: 13.3 % (ref 11.5–14.5)
FIO2 ON VENT O2 ANALYZER: 70 %
FLUAV RNA RESP QL NAA+PROBE: NOT DETECTED
FLUBV RNA RESP QL NAA+PROBE: NOT DETECTED
GFR SERPL CREATININE-BSD FRML MDRD: 89 ML/MIN/1.73M2
GLUCOSE BLD STRIP.AUTO-MCNC: 182 MG/DL (ref 70–108)
GLUCOSE SERPL-MCNC: 215 MG/DL (ref 74–109)
HCO3 BLDA-SCNC: 28 MMOL/L (ref 23–28)
HCT VFR BLD AUTO: 33.9 % (ref 42–52)
HGB BLD-MCNC: 11.7 GM/DL (ref 14–18)
HYPOCHROMIA BLD QL SMEAR: PRESENT
IMM GRANULOCYTES # BLD AUTO: 0.73 THOU/MM3 (ref 0–0.07)
IMM GRANULOCYTES NFR BLD AUTO: 1.8 %
LACTIC ACID, SEPSIS: 1.5 MMOL/L (ref 0.5–1.9)
LACTIC ACID, SEPSIS: 2.2 MMOL/L (ref 0.5–1.9)
LACTIC ACID, SEPSIS: 2.6 MMOL/L (ref 0.5–1.9)
LACTIC ACID, SEPSIS: 2.7 MMOL/L (ref 0.5–1.9)
LACTIC ACID, SEPSIS: 3.3 MMOL/L (ref 0.5–1.9)
LACTIC ACID, SEPSIS: 3.4 MMOL/L (ref 0.5–1.9)
LYMPHOCYTES ABSOLUTE: 0.9 THOU/MM3 (ref 1–4.8)
LYMPHOCYTES NFR BLD AUTO: 2.3 %
MAGNESIUM SERPL-MCNC: 2 MG/DL (ref 1.6–2.6)
MCH RBC QN AUTO: 34.8 PG (ref 26–33)
MCHC RBC AUTO-ENTMCNC: 34.5 GM/DL (ref 32.2–35.5)
MCV RBC AUTO: 100.9 FL (ref 80–94)
MONOCYTES ABSOLUTE: 3.7 THOU/MM3 (ref 0.4–1.3)
MONOCYTES NFR BLD AUTO: 9 %
NEUTROPHILS ABSOLUTE: 35.7 THOU/MM3 (ref 1.8–7.7)
NEUTROPHILS NFR BLD AUTO: 86.6 %
NEUTS HYPERSEG BLD QL SMEAR: PRESENT
NRBC BLD AUTO-RTO: 0 /100 WBC
OSMOLALITY SERPL CALC.SUM OF ELEC: 280.5 MOSMOL/KG (ref 275–300)
PATHOLOGIST REVIEW: ABNORMAL
PCO2 BLDA: 34 MMHG (ref 35–45)
PH BLDA: 7.53 [PH] (ref 7.35–7.45)
PLATELET # BLD AUTO: 396 THOU/MM3 (ref 130–400)
PLATELET BLD QL SMEAR: ADEQUATE
PMV BLD AUTO: 11.1 FL (ref 9.4–12.4)
PO2 BLDA: 67 MMHG (ref 71–104)
POIKILOCYTES: ABNORMAL
POTASSIUM SERPL-SCNC: 4 MEQ/L (ref 3.5–5.2)
PROCALCITONIN SERPL IA-MCNC: 5.84 NG/ML (ref 0.01–0.09)
PROT SERPL-MCNC: 7.6 G/DL (ref 6.4–8.3)
RBC # BLD AUTO: 3.36 MILL/MM3 (ref 4.7–6.1)
SAO2 % BLDA: 95 %
SARS-COV-2 RNA RESP QL NAA+PROBE: NOT DETECTED
SCAN OF BLOOD SMEAR: NORMAL
SODIUM SERPL-SCNC: 133 MEQ/L (ref 135–145)
TROPONIN, HIGH SENSITIVITY: 27 NG/L (ref 0–12)
VARIANT LYMPHS BLD QL SMEAR: ABNORMAL %
VENTILATION MODE VENT: ABNORMAL
WBC # BLD AUTO: 41.2 THOU/MM3 (ref 4.8–10.8)

## 2025-03-04 PROCEDURE — 84484 ASSAY OF TROPONIN QUANT: CPT

## 2025-03-04 PROCEDURE — 2140000000 HC CCU INTERMEDIATE R&B

## 2025-03-04 PROCEDURE — 5A0945A ASSISTANCE WITH RESPIRATORY VENTILATION, 24-96 CONSECUTIVE HOURS, HIGH NASAL FLOW/VELOCITY: ICD-10-PCS | Performed by: STUDENT IN AN ORGANIZED HEALTH CARE EDUCATION/TRAINING PROGRAM

## 2025-03-04 PROCEDURE — 71045 X-RAY EXAM CHEST 1 VIEW: CPT

## 2025-03-04 PROCEDURE — 94640 AIRWAY INHALATION TREATMENT: CPT

## 2025-03-04 PROCEDURE — 96375 TX/PRO/DX INJ NEW DRUG ADDON: CPT

## 2025-03-04 PROCEDURE — 2580000003 HC RX 258: Performed by: EMERGENCY MEDICINE

## 2025-03-04 PROCEDURE — 36415 COLL VENOUS BLD VENIPUNCTURE: CPT

## 2025-03-04 PROCEDURE — 71275 CT ANGIOGRAPHY CHEST: CPT

## 2025-03-04 PROCEDURE — 82948 REAGENT STRIP/BLOOD GLUCOSE: CPT

## 2025-03-04 PROCEDURE — 99223 1ST HOSP IP/OBS HIGH 75: CPT | Performed by: STUDENT IN AN ORGANIZED HEALTH CARE EDUCATION/TRAINING PROGRAM

## 2025-03-04 PROCEDURE — 2700000000 HC OXYGEN THERAPY PER DAY

## 2025-03-04 PROCEDURE — 84145 PROCALCITONIN (PCT): CPT

## 2025-03-04 PROCEDURE — 2580000003 HC RX 258

## 2025-03-04 PROCEDURE — 87636 SARSCOV2 & INF A&B AMP PRB: CPT

## 2025-03-04 PROCEDURE — 6360000002 HC RX W HCPCS: Performed by: EMERGENCY MEDICINE

## 2025-03-04 PROCEDURE — 93005 ELECTROCARDIOGRAM TRACING: CPT | Performed by: EMERGENCY MEDICINE

## 2025-03-04 PROCEDURE — 99285 EMERGENCY DEPT VISIT HI MDM: CPT

## 2025-03-04 PROCEDURE — 6370000000 HC RX 637 (ALT 250 FOR IP): Performed by: EMERGENCY MEDICINE

## 2025-03-04 PROCEDURE — 82248 BILIRUBIN DIRECT: CPT

## 2025-03-04 PROCEDURE — 83735 ASSAY OF MAGNESIUM: CPT

## 2025-03-04 PROCEDURE — 82803 BLOOD GASES ANY COMBINATION: CPT

## 2025-03-04 PROCEDURE — 87040 BLOOD CULTURE FOR BACTERIA: CPT

## 2025-03-04 PROCEDURE — 6370000000 HC RX 637 (ALT 250 FOR IP)

## 2025-03-04 PROCEDURE — 6360000004 HC RX CONTRAST MEDICATION

## 2025-03-04 PROCEDURE — 96374 THER/PROPH/DIAG INJ IV PUSH: CPT

## 2025-03-04 PROCEDURE — 36600 WITHDRAWAL OF ARTERIAL BLOOD: CPT

## 2025-03-04 PROCEDURE — 2500000003 HC RX 250 WO HCPCS: Performed by: EMERGENCY MEDICINE

## 2025-03-04 PROCEDURE — 85025 COMPLETE CBC W/AUTO DIFF WBC: CPT

## 2025-03-04 PROCEDURE — 80053 COMPREHEN METABOLIC PANEL: CPT

## 2025-03-04 PROCEDURE — 83605 ASSAY OF LACTIC ACID: CPT

## 2025-03-04 PROCEDURE — 94761 N-INVAS EAR/PLS OXIMETRY MLT: CPT

## 2025-03-04 PROCEDURE — 6360000002 HC RX W HCPCS

## 2025-03-04 RX ORDER — GLUCAGON 1 MG/ML
1 KIT INJECTION PRN
Status: DISCONTINUED | OUTPATIENT
Start: 2025-03-04 | End: 2025-03-10 | Stop reason: HOSPADM

## 2025-03-04 RX ORDER — LEVALBUTEROL INHALATION SOLUTION 1.25 MG/3ML
1.25 SOLUTION RESPIRATORY (INHALATION) EVERY 8 HOURS PRN
Status: DISCONTINUED | OUTPATIENT
Start: 2025-03-04 | End: 2025-03-10 | Stop reason: HOSPADM

## 2025-03-04 RX ORDER — AMLODIPINE BESYLATE 5 MG/1
5 TABLET ORAL DAILY
Status: DISCONTINUED | OUTPATIENT
Start: 2025-03-05 | End: 2025-03-10 | Stop reason: HOSPADM

## 2025-03-04 RX ORDER — ACETAMINOPHEN 650 MG/1
650 SUPPOSITORY RECTAL EVERY 6 HOURS PRN
Status: DISCONTINUED | OUTPATIENT
Start: 2025-03-04 | End: 2025-03-10 | Stop reason: HOSPADM

## 2025-03-04 RX ORDER — POTASSIUM CHLORIDE 7.45 MG/ML
10 INJECTION INTRAVENOUS PRN
Status: DISCONTINUED | OUTPATIENT
Start: 2025-03-04 | End: 2025-03-10 | Stop reason: HOSPADM

## 2025-03-04 RX ORDER — INSULIN LISPRO 100 [IU]/ML
0-4 INJECTION, SOLUTION INTRAVENOUS; SUBCUTANEOUS
Status: DISCONTINUED | OUTPATIENT
Start: 2025-03-04 | End: 2025-03-10 | Stop reason: HOSPADM

## 2025-03-04 RX ORDER — SODIUM CHLORIDE 9 MG/ML
INJECTION, SOLUTION INTRAVENOUS PRN
Status: DISCONTINUED | OUTPATIENT
Start: 2025-03-04 | End: 2025-03-10 | Stop reason: HOSPADM

## 2025-03-04 RX ORDER — METRONIDAZOLE 500 MG/100ML
500 INJECTION, SOLUTION INTRAVENOUS EVERY 8 HOURS
Status: DISCONTINUED | OUTPATIENT
Start: 2025-03-04 | End: 2025-03-07

## 2025-03-04 RX ORDER — SODIUM CHLORIDE 0.9 % (FLUSH) 0.9 %
5-40 SYRINGE (ML) INJECTION EVERY 12 HOURS SCHEDULED
Status: DISCONTINUED | OUTPATIENT
Start: 2025-03-04 | End: 2025-03-10 | Stop reason: HOSPADM

## 2025-03-04 RX ORDER — IOPAMIDOL 755 MG/ML
80 INJECTION, SOLUTION INTRAVASCULAR
Status: COMPLETED | OUTPATIENT
Start: 2025-03-04 | End: 2025-03-04

## 2025-03-04 RX ORDER — ALBUTEROL SULFATE 90 UG/1
2 INHALANT RESPIRATORY (INHALATION) EVERY 6 HOURS PRN
Status: DISCONTINUED | OUTPATIENT
Start: 2025-03-04 | End: 2025-03-10 | Stop reason: HOSPADM

## 2025-03-04 RX ORDER — IPRATROPIUM BROMIDE AND ALBUTEROL SULFATE 2.5; .5 MG/3ML; MG/3ML
1 SOLUTION RESPIRATORY (INHALATION)
Status: DISCONTINUED | OUTPATIENT
Start: 2025-03-04 | End: 2025-03-04

## 2025-03-04 RX ORDER — DEXTROSE MONOHYDRATE 100 MG/ML
INJECTION, SOLUTION INTRAVENOUS CONTINUOUS PRN
Status: DISCONTINUED | OUTPATIENT
Start: 2025-03-04 | End: 2025-03-10 | Stop reason: HOSPADM

## 2025-03-04 RX ORDER — PREDNISONE 20 MG/1
40 TABLET ORAL DAILY
Status: DISCONTINUED | OUTPATIENT
Start: 2025-03-05 | End: 2025-03-05

## 2025-03-04 RX ORDER — IPRATROPIUM BROMIDE AND ALBUTEROL SULFATE 2.5; .5 MG/3ML; MG/3ML
1 SOLUTION RESPIRATORY (INHALATION)
Status: DISCONTINUED | OUTPATIENT
Start: 2025-03-04 | End: 2025-03-08

## 2025-03-04 RX ORDER — ALBUTEROL SULFATE 90 UG/1
2 INHALANT RESPIRATORY (INHALATION) EVERY 6 HOURS PRN
Status: DISCONTINUED | OUTPATIENT
Start: 2025-03-04 | End: 2025-03-04

## 2025-03-04 RX ORDER — 0.9 % SODIUM CHLORIDE 0.9 %
1000 INTRAVENOUS SOLUTION INTRAVENOUS ONCE
Status: COMPLETED | OUTPATIENT
Start: 2025-03-04 | End: 2025-03-04

## 2025-03-04 RX ORDER — SODIUM CHLORIDE 9 MG/ML
INJECTION, SOLUTION INTRAVENOUS CONTINUOUS
Status: DISCONTINUED | OUTPATIENT
Start: 2025-03-04 | End: 2025-03-05

## 2025-03-04 RX ORDER — ENOXAPARIN SODIUM 100 MG/ML
40 INJECTION SUBCUTANEOUS EVERY 24 HOURS
Status: DISCONTINUED | OUTPATIENT
Start: 2025-03-04 | End: 2025-03-10 | Stop reason: HOSPADM

## 2025-03-04 RX ORDER — POLYETHYLENE GLYCOL 3350 17 G/17G
17 POWDER, FOR SOLUTION ORAL DAILY PRN
Status: DISCONTINUED | OUTPATIENT
Start: 2025-03-04 | End: 2025-03-10 | Stop reason: HOSPADM

## 2025-03-04 RX ORDER — ONDANSETRON 2 MG/ML
4 INJECTION INTRAMUSCULAR; INTRAVENOUS EVERY 6 HOURS PRN
Status: DISCONTINUED | OUTPATIENT
Start: 2025-03-04 | End: 2025-03-10 | Stop reason: HOSPADM

## 2025-03-04 RX ORDER — ASPIRIN 81 MG/1
81 TABLET ORAL DAILY
Status: DISCONTINUED | OUTPATIENT
Start: 2025-03-05 | End: 2025-03-10 | Stop reason: HOSPADM

## 2025-03-04 RX ORDER — POTASSIUM CHLORIDE 1500 MG/1
40 TABLET, EXTENDED RELEASE ORAL PRN
Status: DISCONTINUED | OUTPATIENT
Start: 2025-03-04 | End: 2025-03-10 | Stop reason: HOSPADM

## 2025-03-04 RX ORDER — PANTOPRAZOLE SODIUM 40 MG/10ML
40 INJECTION, POWDER, LYOPHILIZED, FOR SOLUTION INTRAVENOUS DAILY
Status: DISCONTINUED | OUTPATIENT
Start: 2025-03-05 | End: 2025-03-08 | Stop reason: ALTCHOICE

## 2025-03-04 RX ORDER — 0.9 % SODIUM CHLORIDE 0.9 %
500 INTRAVENOUS SOLUTION INTRAVENOUS ONCE
Status: DISCONTINUED | OUTPATIENT
Start: 2025-03-04 | End: 2025-03-06

## 2025-03-04 RX ORDER — IPRATROPIUM BROMIDE AND ALBUTEROL SULFATE 2.5; .5 MG/3ML; MG/3ML
3 SOLUTION RESPIRATORY (INHALATION) ONCE
Status: COMPLETED | OUTPATIENT
Start: 2025-03-04 | End: 2025-03-04

## 2025-03-04 RX ORDER — LISINOPRIL 20 MG/1
20 TABLET ORAL DAILY
Status: DISCONTINUED | OUTPATIENT
Start: 2025-03-05 | End: 2025-03-10 | Stop reason: HOSPADM

## 2025-03-04 RX ORDER — ACETAMINOPHEN 325 MG/1
650 TABLET ORAL EVERY 6 HOURS PRN
Status: DISCONTINUED | OUTPATIENT
Start: 2025-03-04 | End: 2025-03-10 | Stop reason: HOSPADM

## 2025-03-04 RX ORDER — ONDANSETRON 4 MG/1
4 TABLET, ORALLY DISINTEGRATING ORAL EVERY 8 HOURS PRN
Status: DISCONTINUED | OUTPATIENT
Start: 2025-03-04 | End: 2025-03-10 | Stop reason: HOSPADM

## 2025-03-04 RX ORDER — SODIUM CHLORIDE 0.9 % (FLUSH) 0.9 %
5-40 SYRINGE (ML) INJECTION PRN
Status: DISCONTINUED | OUTPATIENT
Start: 2025-03-04 | End: 2025-03-10 | Stop reason: HOSPADM

## 2025-03-04 RX ORDER — MAGNESIUM SULFATE IN WATER 40 MG/ML
2000 INJECTION, SOLUTION INTRAVENOUS PRN
Status: DISCONTINUED | OUTPATIENT
Start: 2025-03-04 | End: 2025-03-10 | Stop reason: HOSPADM

## 2025-03-04 RX ADMIN — IPRATROPIUM BROMIDE AND ALBUTEROL SULFATE 1 DOSE: .5; 3 SOLUTION RESPIRATORY (INHALATION) at 15:54

## 2025-03-04 RX ADMIN — CEFEPIME 2000 MG: 2 INJECTION, POWDER, FOR SOLUTION INTRAVENOUS at 21:37

## 2025-03-04 RX ADMIN — SODIUM CHLORIDE: 0.9 INJECTION, SOLUTION INTRAVENOUS at 20:24

## 2025-03-04 RX ADMIN — METRONIDAZOLE 500 MG: 500 INJECTION, SOLUTION INTRAVENOUS at 23:22

## 2025-03-04 RX ADMIN — VANCOMYCIN HYDROCHLORIDE 1250 MG: 5 INJECTION, POWDER, LYOPHILIZED, FOR SOLUTION INTRAVENOUS at 13:23

## 2025-03-04 RX ADMIN — Medication 3 MG: at 20:26

## 2025-03-04 RX ADMIN — IOPAMIDOL 80 ML: 755 INJECTION, SOLUTION INTRAVENOUS at 13:12

## 2025-03-04 RX ADMIN — IPRATROPIUM BROMIDE AND ALBUTEROL SULFATE 1 DOSE: .5; 3 SOLUTION RESPIRATORY (INHALATION) at 15:55

## 2025-03-04 RX ADMIN — WATER 1000 MG: 1 INJECTION INTRAMUSCULAR; INTRAVENOUS; SUBCUTANEOUS at 12:52

## 2025-03-04 RX ADMIN — ACETAMINOPHEN 650 MG: 325 TABLET ORAL at 20:26

## 2025-03-04 RX ADMIN — ENOXAPARIN SODIUM 40 MG: 100 INJECTION SUBCUTANEOUS at 20:26

## 2025-03-04 RX ADMIN — IPRATROPIUM BROMIDE AND ALBUTEROL SULFATE 3 DOSE: .5; 3 SOLUTION RESPIRATORY (INHALATION) at 12:35

## 2025-03-04 RX ADMIN — SODIUM CHLORIDE 1000 ML: 9 INJECTION, SOLUTION INTRAVENOUS at 14:35

## 2025-03-04 RX ADMIN — IPRATROPIUM BROMIDE AND ALBUTEROL SULFATE 1 DOSE: .5; 3 SOLUTION RESPIRATORY (INHALATION) at 19:57

## 2025-03-04 ASSESSMENT — PAIN DESCRIPTION - DESCRIPTORS: DESCRIPTORS: ACHING

## 2025-03-04 ASSESSMENT — PAIN SCALES - GENERAL
PAINLEVEL_OUTOF10: 4
PAINLEVEL_OUTOF10: 0

## 2025-03-04 ASSESSMENT — PAIN DESCRIPTION - LOCATION: LOCATION: GENERALIZED

## 2025-03-04 NOTE — H&P
History & Physical  Internal Medicine Resident         Patient: Masood Randhawa 74 y.o. male      : 1950  Date of Admission: 3/4/2025  Date of Service: Pt seen/examined on 25 and Admitted to Inpatient with expected LOS greater than two midnights due to medical therapy.     ASSESSMENT AND PLAN  Acute hypoxic respiratory failure: Based on room air.  Found to be hypoxic, requiring supplemental oxygen all the way up to heated high flow nasal cannula.  Presented with shortness of breath as well hypoxia at 83% on room air.  Symptoms made in the last 24 to 48 hours.  Secondary to right upper lobe CAP.  Continue broad-spectrum antibiotics, wean O2 as tolerated  Incentive spirometry, Acapella  In the setting of patient's tachycardia will use Xopenex.  Community-acquired pneumonia, right upper lobe: POA.  Seen on CT scan 3/4/25 shows right upper lobe infiltration. Leukocytosis, elevated at 40.  Procalcitonin 5.84. Initial lactic acid 3.4.  With recent hospital stay, will cover for pseudomonal coverage with adding of cefepime.  Patient did receive initial dose of Rocephin and vancomycin by ED.  CXR as well as CT scan shows right upper lobe pneumonia.  Maxipime 2 g every 12 hours x 7 days  Continue Flagyl 500 mg every 8 hours x 7 days  Respiratory cultures and pneumonia molecular panel to be collected.  Following sensitivities adjust antibiotics as indicated  Sepsis: SIRS 3/4.  Leukocytosis 40, tachycardia 124 on arrival.  Respiratory rate 31.  Source community-acquired pneumonia.  Start initial empiric antibiotics of vancomycin and Rocephin.  Antibiotics escalated to cefepime for broad-spectrum coverage.  Additionally Flagyl was added for anaerobic coverage.  Patient did receive sepsis fluid bolus.  Patient did have initial lactic acidosis, downtrending with IV fluids.  COPD W/Hx asthma: With exacerbation patient denies any wheezing.  Previous spirometry without bronchodilator 3/2013 showed FVC 88%, FEV1

## 2025-03-04 NOTE — ED NOTES
Pt resting in bed. Family at side. Updated on bed status. Denies other needs. Call light in [facility].

## 2025-03-04 NOTE — TELEPHONE ENCOUNTER
[facility] stating she went out this morning to see pt. Wife advised he wasn't feeling well and pt woke up very short of breath. Ellen says his oxygen was 70% and pulse 130s. She called the squad and pt was taken to [facility] ED.    Any questions call Ellen back at [phone].

## 2025-03-04 NOTE — TELEPHONE ENCOUNTER
The patients home health nurse called today stating that the patient was short of breath and oxygen level was 70 hear rate was 130. She stated that she just wanted to let Dr henderson know what was going on.

## 2025-03-04 NOTE — ED PROVIDER NOTES
[facility] EMERGENCY DEPARTMENT  EMERGENCY DEPARTMENT ENCOUNTER          Pt Name: Masood Randhawa  MRN: [id number]  Birthdate 1950  Date of evaluation: 3/4/2025  Physician: Myron Bateman MD  Supervising Attending Physician: Thony Vidal DO       CHIEF COMPLAINT       Chief Complaint   Patient presents with    Shortness of Breath         HISTORY OF PRESENT ILLNESS    HPI  Masood Randhawa is a 74 y.o. male who presents to the emergency department from home, brought in by EMS for evaluation of shortness of breath.  Patient reports that starting yesterday he began to get short of breath.  States that during this time he is also had a productive cough.  Denies any actual chest pain.  Denies any fever but does report subjective chills.  At baseline, he is not on any oxygen.  He stated however that at home his O2 saturation was going down.  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  He stated that later this month he is also supposed to have another surgery.  He denies any abdominal pain.  He denies any history of blood clots.  He is not on any blood thinners.  He reports that he has smoked for 50 years.  The patient has no other acute complaints at this time.            PAST MEDICAL AND SURGICAL HISTORY     Past Medical History:   Diagnosis Date    BPH (benign prostatic hyperplasia)     Chronic back pain     COPD (chronic obstructive pulmonary disease) (HCC)     Erectile dysfunction     Gastroesophageal reflux disease with apnea without esophagitis 01/10/2025    Head and neck cancer (HCC) 02/08/2025    Hypertension     Osteoarthritis     Pt denies    Personal history of colonic polyps 2006    PVD (peripheral vascular disease)     PVD (peripheral vascular disease) 02/14/2012     Past Surgical History:   Procedure Laterality Date    CARPAL TUNNEL RELEASE Bilateral     2010    COLONOSCOPY  07/19/2006    IR FLUORO GUIDED

## 2025-03-04 NOTE — ED NOTES
Pt placed on 4LNC, SPO2 86%. Pt placed on 6LNC, SPO2 87%. Resp at bedside. Discussed using high flow.

## 2025-03-04 NOTE — ED NOTES
Pt to er. Pt c/o SOB. Pt recently had throat surgery in Feb. Pt states has also had a cough since the surgery. Pt arrived with non-rebreather in place. Pt was given 125mg solu medrol and breathing tx in route. Pt resp labored. Resp at bedside.

## 2025-03-04 NOTE — ED NOTES
Medicated pt per orders. Wife concerned of rash on pt buttocks and scrotum. Dr. Bateman notified and in room to eval. Denies other needs. Resp regular. Call light in reach.

## 2025-03-05 PROBLEM — R65.10 SIRS (SYSTEMIC INFLAMMATORY RESPONSE SYNDROME) (HCC): Status: ACTIVE | Noted: 2025-03-05

## 2025-03-05 LAB
ACINETOBACTER CALCOACETICUS-BAUMANNII BY PCR: NOT DETECTED
ANION GAP SERPL CALC-SCNC: 9 MEQ/L (ref 8–16)
B PERT DNA NPH QL NAA+PROBE: NOT DETECTED
BASOPHILS ABSOLUTE: 0.1 THOU/MM3 (ref 0–0.1)
BASOPHILS NFR BLD AUTO: 0.2 %
BLACTX-M ISLT/SPM QL: ABNORMAL
BLAIMP ISLT/SPM QL: ABNORMAL
BLAKPC ISLT/SPM QL: ABNORMAL
BLAOXA-48-LIKE ISLT/SPM QL: ABNORMAL
BLAVIM ISLT/SPM QL: ABNORMAL
BORDETELLA PARAPERTUSSIS BY PCR: NOT DETECTED
BUN SERPL-MCNC: 33 MG/DL (ref 8–23)
C PNEUM DNA LOWER RESP QL NAA+NON-PROBE: NOT DETECTED
C PNEUM DNA SPEC QL NAA+PROBE: NOT DETECTED
CA-I BLD ISE-SCNC: 1.11 MMOL/L (ref 1.12–1.32)
CALCIUM SERPL-MCNC: 8.5 MG/DL (ref 8.8–10.2)
CHLORIDE SERPL-SCNC: 104 MEQ/L (ref 98–111)
CO2 SERPL-SCNC: 26 MEQ/L (ref 22–29)
CREAT SERPL-MCNC: 0.6 MG/DL (ref 0.7–1.2)
DEPRECATED RDW RBC AUTO: 48.3 FL (ref 35–45)
DOHLE BOD BLD QL SMEAR: PRESENT
ENTEROBACTER CLOACAE COMPLEX BY PCR: NOT DETECTED
EOSINOPHIL NFR BLD AUTO: 0.2 %
EOSINOPHILS ABSOLUTE: 0.1 THOU/MM3 (ref 0–0.4)
ERYTHROCYTE [DISTWIDTH] IN BLOOD BY AUTOMATED COUNT: 13.3 % (ref 11.5–14.5)
ESCHERICHIA COLI BY PCR: NOT DETECTED
FLUAV RNA LOWER RESP QL NAA+NON-PROBE: NOT DETECTED
FLUAV RNA NPH QL NAA+PROBE: NOT DETECTED
FLUBV RNA LOWER RESP QL NAA+NON-PROBE: NOT DETECTED
FLUBV RNA NPH QL NAA+PROBE: NOT DETECTED
GFR SERPL CREATININE-BSD FRML MDRD: > 90 ML/MIN/1.73M2
GLUCOSE BLD STRIP.AUTO-MCNC: 111 MG/DL (ref 70–108)
GLUCOSE BLD STRIP.AUTO-MCNC: 114 MG/DL (ref 70–108)
GLUCOSE BLD STRIP.AUTO-MCNC: 120 MG/DL (ref 70–108)
GLUCOSE BLD STRIP.AUTO-MCNC: 122 MG/DL (ref 70–108)
GLUCOSE SERPL-MCNC: 133 MG/DL (ref 74–109)
HADV DNA LOWER RESP QL NAA+NON-PROBE: NOT DETECTED
HADV DNA NPH QL NAA+PROBE: NOT DETECTED
HAEMOPHILUS INFLUENZAE BY PCR: DETECTED
HCOV 229E RNA SPEC QL NAA+PROBE: NOT DETECTED
HCOV HKU1 RNA SPEC QL NAA+PROBE: NOT DETECTED
HCOV NL63 RNA SPEC QL NAA+PROBE: NOT DETECTED
HCOV OC43 RNA SPEC QL NAA+PROBE: NOT DETECTED
HCOV RNA LOWER RESP QL NAA+NON-PROBE: NOT DETECTED
HCT VFR BLD AUTO: 29 % (ref 42–52)
HGB BLD-MCNC: 10.1 GM/DL (ref 14–18)
HMPV RNA LOWER RESP QL NAA+NON-PROBE: NOT DETECTED
HMPV RNA NPH QL NAA+PROBE: NOT DETECTED
HPIV RNA LOWER RESP QL NAA+NON-PROBE: NOT DETECTED
HPIV1 RNA NPH QL NAA+PROBE: NOT DETECTED
HPIV2 RNA NPH QL NAA+PROBE: NOT DETECTED
HPIV3 RNA NPH QL NAA+PROBE: NOT DETECTED
HPIV4 RNA NPH QL NAA+PROBE: NOT DETECTED
HYPOCHROMIA BLD QL SMEAR: PRESENT
IMM GRANULOCYTES # BLD AUTO: 0.48 THOU/MM3 (ref 0–0.07)
IMM GRANULOCYTES NFR BLD AUTO: 1.2 %
KLEBSIELLA AEROGENES BY PCR: NOT DETECTED
KLEBSIELLA OXYTOCA BY PCR: NOT DETECTED
KLEBSIELLA PNEUMONIAE GROUP BY PCR: NOT DETECTED
L PNEUMO DNA LOWER RESP QL NAA+NON-PROBE: NOT DETECTED
L PNEUMO1 AG UR QL IA.RAPID: NEGATIVE
LYMPHOCYTES ABSOLUTE: 1.5 THOU/MM3 (ref 1–4.8)
LYMPHOCYTES NFR BLD AUTO: 3.6 %
M PNEUMO DNA LOWER RESP QL NAA+NON-PROBE: NOT DETECTED
M PNEUMO DNA SPEC QL NAA+PROBE: NOT DETECTED
MAGNESIUM SERPL-MCNC: 2.3 MG/DL (ref 1.6–2.6)
MCH RBC QN AUTO: 34.6 PG (ref 26–33)
MCHC RBC AUTO-ENTMCNC: 34.8 GM/DL (ref 32.2–35.5)
MCV RBC AUTO: 99.3 FL (ref 80–94)
MONOCYTES ABSOLUTE: 2.6 THOU/MM3 (ref 0.4–1.3)
MONOCYTES NFR BLD AUTO: 6.3 %
MORAXELLA CATARRHALIS BY PCR: NOT DETECTED
NEUTROPHILS ABSOLUTE: 36.2 THOU/MM3 (ref 1.8–7.7)
NEUTROPHILS NFR BLD AUTO: 88.5 %
NRBC BLD AUTO-RTO: 0 /100 WBC
PLATELET # BLD AUTO: 355 THOU/MM3 (ref 130–400)
PLATELET BLD QL SMEAR: ADEQUATE
PMV BLD AUTO: 10.8 FL (ref 9.4–12.4)
POTASSIUM SERPL-SCNC: 4 MEQ/L (ref 3.5–5.2)
PROTEUS SPECIES BY PCR: NOT DETECTED
PSEUDOMONAS AERUGINOSA BY PCR: NOT DETECTED
RBC # BLD AUTO: 2.92 MILL/MM3 (ref 4.7–6.1)
RESISTANT GENE MECA/C & MREJ BY PCR: DETECTED
RESISTANT GENE NDM BY PCR: ABNORMAL
REVIEWED BY: NORMAL
RSV RNA LOWER RESP QL NAA+NON-PROBE: NOT DETECTED
RSV RNA NPH QL NAA+PROBE: NOT DETECTED
RV+EV RNA LOWER RESP QL NAA+NON-PROBE: NOT DETECTED
RV+EV RNA SPEC QL NAA+PROBE: NOT DETECTED
SARS-COV-2 RNA NPH QL NAA+NON-PROBE: NOT DETECTED
SCAN OF BLOOD SMEAR: NORMAL
SERRATIA MARCESCENS BY PCR: NOT DETECTED
SMEAR REVIEW: NORMAL
SODIUM SERPL-SCNC: 139 MEQ/L (ref 135–145)
SOURCE: ABNORMAL
SPECIMEN ACCEPTABILITY: ABNORMAL
STAPH AUREUS BY PCR: DETECTED
STREP AGALACTIAE BY PCR: NOT DETECTED
STREP PNEUMONIAE BY PCR: NOT DETECTED
STREP PYOGENES BY PCR: NOT DETECTED
TOXIC GRANULES BLD QL SMEAR: PRESENT
WBC # BLD AUTO: 40.9 THOU/MM3 (ref 4.8–10.8)

## 2025-03-05 PROCEDURE — 87077 CULTURE AEROBIC IDENTIFY: CPT

## 2025-03-05 PROCEDURE — 6360000002 HC RX W HCPCS

## 2025-03-05 PROCEDURE — 94640 AIRWAY INHALATION TREATMENT: CPT

## 2025-03-05 PROCEDURE — 87631 RESP VIRUS 3-5 TARGETS: CPT

## 2025-03-05 PROCEDURE — 6370000000 HC RX 637 (ALT 250 FOR IP)

## 2025-03-05 PROCEDURE — 87641 MR-STAPH DNA AMP PROBE: CPT

## 2025-03-05 PROCEDURE — 2500000003 HC RX 250 WO HCPCS

## 2025-03-05 PROCEDURE — 80048 BASIC METABOLIC PNL TOTAL CA: CPT

## 2025-03-05 PROCEDURE — 87798 DETECT AGENT NOS DNA AMP: CPT

## 2025-03-05 PROCEDURE — 87449 NOS EACH ORGANISM AG IA: CPT

## 2025-03-05 PROCEDURE — 87070 CULTURE OTHR SPECIMN AEROBIC: CPT

## 2025-03-05 PROCEDURE — 83735 ASSAY OF MAGNESIUM: CPT

## 2025-03-05 PROCEDURE — 6360000002 HC RX W HCPCS: Performed by: INTERNAL MEDICINE

## 2025-03-05 PROCEDURE — 87186 SC STD MICRODIL/AGAR DIL: CPT

## 2025-03-05 PROCEDURE — 82330 ASSAY OF CALCIUM: CPT

## 2025-03-05 PROCEDURE — 2060000000 HC ICU INTERMEDIATE R&B

## 2025-03-05 PROCEDURE — 82948 REAGENT STRIP/BLOOD GLUCOSE: CPT

## 2025-03-05 PROCEDURE — 6370000000 HC RX 637 (ALT 250 FOR IP): Performed by: INTERNAL MEDICINE

## 2025-03-05 PROCEDURE — 87486 CHLMYD PNEUM DNA AMP PROBE: CPT

## 2025-03-05 PROCEDURE — 87147 CULTURE TYPE IMMUNOLOGIC: CPT

## 2025-03-05 PROCEDURE — 2500000003 HC RX 250 WO HCPCS: Performed by: INTERNAL MEDICINE

## 2025-03-05 PROCEDURE — 92610 EVALUATE SWALLOWING FUNCTION: CPT | Performed by: SPEECH-LANGUAGE PATHOLOGIST

## 2025-03-05 PROCEDURE — 2700000000 HC OXYGEN THERAPY PER DAY

## 2025-03-05 PROCEDURE — 87541 LEGION PNEUMO DNA AMP PROB: CPT

## 2025-03-05 PROCEDURE — 94761 N-INVAS EAR/PLS OXIMETRY MLT: CPT

## 2025-03-05 PROCEDURE — 87150 DNA/RNA AMPLIFIED PROBE: CPT

## 2025-03-05 PROCEDURE — 99233 SBSQ HOSP IP/OBS HIGH 50: CPT | Performed by: INTERNAL MEDICINE

## 2025-03-05 PROCEDURE — 2580000003 HC RX 258

## 2025-03-05 PROCEDURE — 0202U NFCT DS 22 TRGT SARS-COV-2: CPT

## 2025-03-05 PROCEDURE — 87899 AGENT NOS ASSAY W/OPTIC: CPT

## 2025-03-05 PROCEDURE — 87581 M.PNEUMON DNA AMP PROBE: CPT

## 2025-03-05 PROCEDURE — 6370000000 HC RX 637 (ALT 250 FOR IP): Performed by: EMERGENCY MEDICINE

## 2025-03-05 PROCEDURE — 36415 COLL VENOUS BLD VENIPUNCTURE: CPT

## 2025-03-05 PROCEDURE — 87205 SMEAR GRAM STAIN: CPT

## 2025-03-05 PROCEDURE — 85025 COMPLETE CBC W/AUTO DIFF WBC: CPT

## 2025-03-05 RX ORDER — MULTIVITAMIN WITH IRON
1 TABLET ORAL DAILY
Status: DISCONTINUED | OUTPATIENT
Start: 2025-03-05 | End: 2025-03-10 | Stop reason: HOSPADM

## 2025-03-05 RX ORDER — PREDNISONE 20 MG/1
40 TABLET ORAL DAILY
Status: COMPLETED | OUTPATIENT
Start: 2025-03-05 | End: 2025-03-09

## 2025-03-05 RX ORDER — LACTOBACILLUS RHAMNOSUS GG 10B CELL
1 CAPSULE ORAL
Status: DISCONTINUED | OUTPATIENT
Start: 2025-03-06 | End: 2025-03-10 | Stop reason: HOSPADM

## 2025-03-05 RX ADMIN — VANCOMYCIN HYDROCHLORIDE 1000 MG: 1 INJECTION, POWDER, LYOPHILIZED, FOR SOLUTION INTRAVENOUS at 08:09

## 2025-03-05 RX ADMIN — ENOXAPARIN SODIUM 40 MG: 100 INJECTION SUBCUTANEOUS at 21:05

## 2025-03-05 RX ADMIN — WATER 2000 MG: 1 INJECTION INTRAMUSCULAR; INTRAVENOUS; SUBCUTANEOUS at 21:04

## 2025-03-05 RX ADMIN — VANCOMYCIN HYDROCHLORIDE 1000 MG: 1 INJECTION, POWDER, LYOPHILIZED, FOR SOLUTION INTRAVENOUS at 20:55

## 2025-03-05 RX ADMIN — METRONIDAZOLE 500 MG: 500 INJECTION, SOLUTION INTRAVENOUS at 06:20

## 2025-03-05 RX ADMIN — METRONIDAZOLE 500 MG: 500 INJECTION, SOLUTION INTRAVENOUS at 22:28

## 2025-03-05 RX ADMIN — IPRATROPIUM BROMIDE AND ALBUTEROL SULFATE 1 DOSE: .5; 3 SOLUTION RESPIRATORY (INHALATION) at 21:20

## 2025-03-05 RX ADMIN — IPRATROPIUM BROMIDE AND ALBUTEROL SULFATE 1 DOSE: .5; 3 SOLUTION RESPIRATORY (INHALATION) at 15:57

## 2025-03-05 RX ADMIN — SODIUM CHLORIDE, PRESERVATIVE FREE 10 ML: 5 INJECTION INTRAVENOUS at 07:48

## 2025-03-05 RX ADMIN — ASPIRIN 81 MG: 81 TABLET, COATED ORAL at 07:48

## 2025-03-05 RX ADMIN — CEFEPIME 2000 MG: 2 INJECTION, POWDER, FOR SOLUTION INTRAVENOUS at 10:45

## 2025-03-05 RX ADMIN — IPRATROPIUM BROMIDE AND ALBUTEROL SULFATE 1 DOSE: .5; 3 SOLUTION RESPIRATORY (INHALATION) at 09:02

## 2025-03-05 RX ADMIN — Medication 3 MG: at 21:05

## 2025-03-05 RX ADMIN — CEFEPIME 2000 MG: 2 INJECTION, POWDER, FOR SOLUTION INTRAVENOUS at 17:05

## 2025-03-05 RX ADMIN — Medication 1 TABLET: at 15:19

## 2025-03-05 RX ADMIN — SODIUM CHLORIDE, PRESERVATIVE FREE 10 ML: 5 INJECTION INTRAVENOUS at 21:06

## 2025-03-05 RX ADMIN — PREDNISONE 40 MG: 20 TABLET ORAL at 07:48

## 2025-03-05 RX ADMIN — IPRATROPIUM BROMIDE AND ALBUTEROL SULFATE 1 DOSE: .5; 3 SOLUTION RESPIRATORY (INHALATION) at 12:14

## 2025-03-05 RX ADMIN — PANTOPRAZOLE SODIUM 40 MG: 40 INJECTION, POWDER, FOR SOLUTION INTRAVENOUS at 07:48

## 2025-03-05 RX ADMIN — METRONIDAZOLE 500 MG: 500 INJECTION, SOLUTION INTRAVENOUS at 15:13

## 2025-03-05 ASSESSMENT — PAIN SCALES - WONG BAKER: WONGBAKER_NUMERICALRESPONSE: NO HURT

## 2025-03-05 ASSESSMENT — PAIN SCALES - GENERAL
PAINLEVEL_OUTOF10: 0

## 2025-03-05 NOTE — PLAN OF CARE
Problem: Respiratory - Adult  Goal: Achieves optimal ventilation and oxygenation  Flowsheets  Taken 3/5/2025 0440 by Naomie Montes RCP  Achieves optimal ventilation and oxygenation:   Assess for changes in respiratory status   Position to facilitate oxygenation and minimize respiratory effort   Respiratory therapy support as indicated   Assess and instruct to report shortness of breath or any respiratory difficulty   Encourage broncho-pulmonary hygiene including cough, deep breathe, incentive spirometry   Assess for changes in mentation and behavior

## 2025-03-05 NOTE — PLAN OF CARE
Problem: Discharge Planning  Goal: Discharge to home or other facility with appropriate resources  Outcome: Progressing  Flowsheets (Taken 3/4/2025 2047)  Discharge to home or other facility with appropriate resources: Identify barriers to discharge with patient and caregiver     Problem: Safety - Adult  Goal: Free from fall injury  Outcome: Progressing     Problem: Nutrition Deficit:  Goal: Optimize nutritional status  Outcome: Progressing     Problem: Chronic Conditions and Co-morbidities  Goal: Patient's chronic conditions and co-morbidity symptoms are monitored and maintained or improved  Outcome: Progressing  Flowsheets (Taken 3/4/2025 2047)  Care Plan - Patient's Chronic Conditions and Co-Morbidity Symptoms are Monitored and Maintained or Improved: Monitor and assess patient's chronic conditions and comorbid symptoms for stability, deterioration, or improvement

## 2025-03-05 NOTE — PALLIATIVE CARE
Initial Evaluation        Patient:   Masood Randhawa  YOB: 1950  Age:  74 y.o.  Room:  [facility]011-  MRN:  [id number]   Acct: [id number]    Date of Admission:  3/4/2025 11:41 AM  Date of Service:  3/5/2025  Completed By:  Mariann White RN        Reason for Palliative Care Evaluation:-   Goals of Care and ?outpatient PC     Current Concerns   Denies any c/o discomfort at present     Palliative Performance Scale   60%  Ambulation reduced; Significant disease; Can't do hobbies/housework; intake normal or reduced; occasional assist; LOC full/confusion     History    Patient with history of COPD, HTN, HLD, smoking, alcohol use, PEG tube placement on 02/16/2025 and recent surgery for 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  Patient admitted with shortness of breath and found to have sepsis/likely pneumonia.     Goals of Care Discussions and Plan         Advance Care Planning   Goals of Care/Advance Care Planning (ACP) Conversation    Date of Conversation: 03/05/25    Individuals present for the conversation: Patient with decision making capacity and Spouse Soraya     ACP documents on file prior to discussion:  -None    Previously completed document/s not on file: Patient / participant reports that there are no previously executed ACP documents.    Healthcare Power of /Healthcare Surrogate Decision Makers:  Per [place] hierarchy of surrogate decision makers: wife Soraya.  Patient does have 3 children in which he is estranged from 2 of these children.     Conversation Summary: Patient resting in bed and he is alert and oriented to all spheres.  Wife is at the bedside.  Palliative care introduced.  Patient/wife express feelings of being overwhelmed with recent diagnosis and surgery.   Per wife, patient is to have another surgery on March 14, 2025 with Dr. Daley to remove the voicebox and to evaluate if they are able to get \"all of the cancer.\"  Wife shares that they

## 2025-03-05 NOTE — CARE COORDINATION
Case Management Assessment Initial Evaluation    Date/Time of Evaluation: 3/5/2025 3:39 PM  Assessment Completed by: Swathi Gonzalez RN    If patient is discharged prior to next notation, then this note serves as note for discharge by case management.    Patient Name: Masood Randhawa                   YOB: 1950  Diagnosis: SIRS (systemic inflammatory response syndrome) ([facility]) [R65.10]  Acute respiratory failure with hypoxia (HCC) [J96.01]  Pneumonia of right upper lobe due to infectious organism [J18.9]  Acute hypoxic respiratory failure (HCC) [J96.01]                   Date / Time: 3/4/2025 11:41 AM  Location: [facility]/011     Patient Admission Status: Inpatient   Readmission Risk Low 0-14, Mod 15-19), High > 20: Readmission Risk Score: 22.3    Current PCP: Samy Piper DO  Health Care Decision Makers:   Primary Decision Maker: Soraya Randhawa - [facility] - [phone]    Additional Case Management Notes: Sent to ED by  nurse who found patient to have low O2 sats. Pt was SOB for 2 days, had diarrhea on Sunday, developed fatigue, then on 3/3 new cough. Sats in 80's in ED and was placed on HFNC. Admitted to .     Remains on HFNC, 55L, 40% FIO2, sats 98%. Tmax 99.1. NSR. Ox4. Follows commands. PT/OT. Dietitian and Palliative Care consulted. Telemetry, PEG. IV cefepime Q12H, lovenox, SSI, nebs, IV flagyl Q8H, lactobacillus, IV protonix, prednisone, IV Vancomycin, Electrolyte replacement protocols. Blood and respiratory cultures sent. Rapid flu & COVID 19 were negative. LA 3.3 - now 1.5, procal 5.84, trop 27, alb 3.3, direct 0.4, wbc 41.2 - now 40.9, hgb 11.7 - now 10.1.     Procedures: none    Imaging:   3/4 CXR: 1. Lungs hyperinflated. Canesten COPD. Normal heart size.  2. Severe atelectasis/pneumonia involving the entire right upper lobe. Question  mild additional infiltrate right lung base. Question tiny effusion right side.  3/4 CTA Chest: 1. No filling defects are noted within the

## 2025-03-05 NOTE — CARE COORDINATION
03/05/25 1530   Readmission Assessment   Number of Days since last admission? 8-30 days   Previous Disposition Home with Home Health  ([facility])   Who is being Interviewed Caregiver  (wife)   What was the patient's/caregiver's perception as to why they think they needed to return back to the hospital? Other (Comment)  (Fatigue and shortness of breath, oxygen sats low at home)   Did you visit your Primary Care Physician after you left the hospital, before you returned this time? No   Why weren't you able to visit your PCP? Did not have an appointment   Did you see a specialist, such as Cardiac, Pulmonary, Orthopedic Physician, etc. after you left the hospital? Yes  (ENT)   Who advised the patient to return to the hospital? Home Health Staff   Does the patient report anything that got in the way of taking their medications? No   In our efforts to provide the best possible care to you and others like you, can you think of anything that we could have done to help you after you left the hospital the first time, so that you might not have needed to return so soon? Other (Comment)  (Wife states she \"could have been told what to do to keep him from getting this.\")     Wife also reports the nebulizer machine order was sent to [facility] along with neb med order; [facility] called and told her they don't carry the machine, but didn't give her the order so she could go somewhere else to get one or tell her where she could get one. So she states they got the neb medication, but didn't have a machine to use it in.

## 2025-03-05 NOTE — PLAN OF CARE
Problem: Discharge Planning  Goal: Discharge to home or other facility with appropriate resources  Outcome: Progressing  Flowsheets (Taken 3/5/2025 0800)  Discharge to home or other facility with appropriate resources:   Identify barriers to discharge with patient and caregiver   Arrange for needed discharge resources and transportation as appropriate   Identify discharge learning needs (meds, wound care, etc)     Problem: Safety - Adult  Goal: Free from fall injury  Outcome: Progressing     Problem: Nutrition Deficit:  Goal: Optimize nutritional status  Outcome: Progressing     Problem: Chronic Conditions and Co-morbidities  Goal: Patient's chronic conditions and co-morbidity symptoms are monitored and maintained or improved  Outcome: Progressing  Flowsheets (Taken 3/5/2025 0800)  Care Plan - Patient's Chronic Conditions and Co-Morbidity Symptoms are Monitored and Maintained or Improved:   Monitor and assess patient's chronic conditions and comorbid symptoms for stability, deterioration, or improvement   Collaborate with multidisciplinary team to address chronic and comorbid conditions and prevent exacerbation or deterioration   Update acute care plan with appropriate goals if chronic or comorbid symptoms are exacerbated and prevent overall improvement and discharge     Problem: Skin/Tissue Integrity  Goal: Skin integrity remains intact  Description: 1.  Monitor for areas of redness and/or skin breakdown  2.  Assess vascular access sites hourly  3.  Every 4-6 hours minimum:  Change oxygen saturation probe site  4.  Every 4-6 hours:  If on nasal continuous positive airway pressure, respiratory therapy assess nares and determine need for appliance change or resting period  Outcome: Progressing  Flowsheets (Taken 3/5/2025 0800)  Skin Integrity Remains Intact:   Monitor for areas of redness and/or skin breakdown   Assess vascular access sites hourly   Every 4-6 hours minimum: Change oxygen saturation probe site

## 2025-03-06 ENCOUNTER — APPOINTMENT (OUTPATIENT)
Dept: GENERAL RADIOLOGY | Age: 75
DRG: 871 | End: 2025-03-06
Payer: MEDICARE

## 2025-03-06 LAB
ANION GAP SERPL CALC-SCNC: 8 MEQ/L (ref 8–16)
BUN SERPL-MCNC: 33 MG/DL (ref 8–23)
CALCIUM SERPL-MCNC: 8.4 MG/DL (ref 8.8–10.2)
CHLORIDE SERPL-SCNC: 105 MEQ/L (ref 98–111)
CO2 SERPL-SCNC: 25 MEQ/L (ref 22–29)
CREAT SERPL-MCNC: 0.5 MG/DL (ref 0.7–1.2)
DEPRECATED RDW RBC AUTO: 48.8 FL (ref 35–45)
ERYTHROCYTE [DISTWIDTH] IN BLOOD BY AUTOMATED COUNT: 13.3 % (ref 11.5–14.5)
GFR SERPL CREATININE-BSD FRML MDRD: > 90 ML/MIN/1.73M2
GLUCOSE BLD STRIP.AUTO-MCNC: 128 MG/DL (ref 70–108)
GLUCOSE BLD STRIP.AUTO-MCNC: 131 MG/DL (ref 70–108)
GLUCOSE BLD STRIP.AUTO-MCNC: 135 MG/DL (ref 70–108)
GLUCOSE SERPL-MCNC: 119 MG/DL (ref 74–109)
HCT VFR BLD AUTO: 30.4 % (ref 42–52)
HGB BLD-MCNC: 10.5 GM/DL (ref 14–18)
MAGNESIUM SERPL-MCNC: 2.3 MG/DL (ref 1.6–2.6)
MCH RBC QN AUTO: 34.7 PG (ref 26–33)
MCHC RBC AUTO-ENTMCNC: 34.5 GM/DL (ref 32.2–35.5)
MCV RBC AUTO: 100.3 FL (ref 80–94)
MRSA DNA SPEC QL NAA+PROBE: POSITIVE
PLATELET # BLD AUTO: 382 THOU/MM3 (ref 130–400)
PMV BLD AUTO: 10.9 FL (ref 9.4–12.4)
POTASSIUM SERPL-SCNC: 3.6 MEQ/L (ref 3.5–5.2)
RBC # BLD AUTO: 3.03 MILL/MM3 (ref 4.7–6.1)
REVIEWED BY: NORMAL
SCAN OF BLOOD SMEAR: NORMAL
SMEAR REVIEW: NORMAL
SODIUM SERPL-SCNC: 138 MEQ/L (ref 135–145)
STREP PNEUMO AG, UR: NEGATIVE
VANCOMYCIN SERPL-MCNC: 25.3 UG/ML (ref 10–39.9)
WBC # BLD AUTO: 39 THOU/MM3 (ref 4.8–10.8)

## 2025-03-06 PROCEDURE — 6360000002 HC RX W HCPCS: Performed by: INTERNAL MEDICINE

## 2025-03-06 PROCEDURE — 6360000002 HC RX W HCPCS

## 2025-03-06 PROCEDURE — 6370000000 HC RX 637 (ALT 250 FOR IP)

## 2025-03-06 PROCEDURE — 80048 BASIC METABOLIC PNL TOTAL CA: CPT

## 2025-03-06 PROCEDURE — 2580000003 HC RX 258

## 2025-03-06 PROCEDURE — 6370000000 HC RX 637 (ALT 250 FOR IP): Performed by: EMERGENCY MEDICINE

## 2025-03-06 PROCEDURE — 97116 GAIT TRAINING THERAPY: CPT

## 2025-03-06 PROCEDURE — 82948 REAGENT STRIP/BLOOD GLUCOSE: CPT

## 2025-03-06 PROCEDURE — 2060000000 HC ICU INTERMEDIATE R&B

## 2025-03-06 PROCEDURE — 94640 AIRWAY INHALATION TREATMENT: CPT

## 2025-03-06 PROCEDURE — 97535 SELF CARE MNGMENT TRAINING: CPT

## 2025-03-06 PROCEDURE — 97110 THERAPEUTIC EXERCISES: CPT

## 2025-03-06 PROCEDURE — 2500000003 HC RX 250 WO HCPCS

## 2025-03-06 PROCEDURE — 2700000000 HC OXYGEN THERAPY PER DAY

## 2025-03-06 PROCEDURE — 85027 COMPLETE CBC AUTOMATED: CPT

## 2025-03-06 PROCEDURE — 97530 THERAPEUTIC ACTIVITIES: CPT

## 2025-03-06 PROCEDURE — 80202 ASSAY OF VANCOMYCIN: CPT

## 2025-03-06 PROCEDURE — 83735 ASSAY OF MAGNESIUM: CPT

## 2025-03-06 PROCEDURE — 74230 X-RAY XM SWLNG FUNCJ C+: CPT

## 2025-03-06 PROCEDURE — 6370000000 HC RX 637 (ALT 250 FOR IP): Performed by: INTERNAL MEDICINE

## 2025-03-06 PROCEDURE — 92611 MOTION FLUOROSCOPY/SWALLOW: CPT

## 2025-03-06 PROCEDURE — 36415 COLL VENOUS BLD VENIPUNCTURE: CPT

## 2025-03-06 PROCEDURE — 2500000003 HC RX 250 WO HCPCS: Performed by: INTERNAL MEDICINE

## 2025-03-06 PROCEDURE — 97166 OT EVAL MOD COMPLEX 45 MIN: CPT

## 2025-03-06 PROCEDURE — 99233 SBSQ HOSP IP/OBS HIGH 50: CPT | Performed by: STUDENT IN AN ORGANIZED HEALTH CARE EDUCATION/TRAINING PROGRAM

## 2025-03-06 PROCEDURE — 92526 ORAL FUNCTION THERAPY: CPT

## 2025-03-06 PROCEDURE — 2500000003 HC RX 250 WO HCPCS: Performed by: STUDENT IN AN ORGANIZED HEALTH CARE EDUCATION/TRAINING PROGRAM

## 2025-03-06 PROCEDURE — 97162 PT EVAL MOD COMPLEX 30 MIN: CPT

## 2025-03-06 PROCEDURE — 94761 N-INVAS EAR/PLS OXIMETRY MLT: CPT

## 2025-03-06 RX ADMIN — Medication 1 CAPSULE: at 10:00

## 2025-03-06 RX ADMIN — SODIUM CHLORIDE, PRESERVATIVE FREE 10 ML: 5 INJECTION INTRAVENOUS at 10:00

## 2025-03-06 RX ADMIN — BARIUM SULFATE 20 ML: 400 PASTE ORAL at 11:30

## 2025-03-06 RX ADMIN — IPRATROPIUM BROMIDE AND ALBUTEROL SULFATE 1 DOSE: .5; 3 SOLUTION RESPIRATORY (INHALATION) at 16:24

## 2025-03-06 RX ADMIN — ASPIRIN 81 MG: 81 TABLET, COATED ORAL at 10:00

## 2025-03-06 RX ADMIN — Medication 1 TABLET: at 10:00

## 2025-03-06 RX ADMIN — PANTOPRAZOLE SODIUM 40 MG: 40 INJECTION, POWDER, FOR SOLUTION INTRAVENOUS at 10:00

## 2025-03-06 RX ADMIN — METRONIDAZOLE 500 MG: 500 INJECTION, SOLUTION INTRAVENOUS at 17:11

## 2025-03-06 RX ADMIN — BARIUM SULFATE 20 ML: 400 SUSPENSION ORAL at 11:30

## 2025-03-06 RX ADMIN — SODIUM CHLORIDE, PRESERVATIVE FREE 10 ML: 5 INJECTION INTRAVENOUS at 20:03

## 2025-03-06 RX ADMIN — METRONIDAZOLE 500 MG: 500 INJECTION, SOLUTION INTRAVENOUS at 23:43

## 2025-03-06 RX ADMIN — WATER 2000 MG: 1 INJECTION INTRAMUSCULAR; INTRAVENOUS; SUBCUTANEOUS at 17:06

## 2025-03-06 RX ADMIN — IPRATROPIUM BROMIDE AND ALBUTEROL SULFATE 1 DOSE: .5; 3 SOLUTION RESPIRATORY (INHALATION) at 07:37

## 2025-03-06 RX ADMIN — IPRATROPIUM BROMIDE AND ALBUTEROL SULFATE 1 DOSE: .5; 3 SOLUTION RESPIRATORY (INHALATION) at 12:16

## 2025-03-06 RX ADMIN — Medication 3 MG: at 20:02

## 2025-03-06 RX ADMIN — ENOXAPARIN SODIUM 40 MG: 100 INJECTION SUBCUTANEOUS at 20:02

## 2025-03-06 RX ADMIN — IPRATROPIUM BROMIDE AND ALBUTEROL SULFATE 1 DOSE: .5; 3 SOLUTION RESPIRATORY (INHALATION) at 20:10

## 2025-03-06 RX ADMIN — VANCOMYCIN HYDROCHLORIDE 1000 MG: 1 INJECTION, POWDER, LYOPHILIZED, FOR SOLUTION INTRAVENOUS at 10:20

## 2025-03-06 RX ADMIN — PREDNISONE 40 MG: 20 TABLET ORAL at 10:00

## 2025-03-06 RX ADMIN — VANCOMYCIN HYDROCHLORIDE 1000 MG: 1 INJECTION, POWDER, LYOPHILIZED, FOR SOLUTION INTRAVENOUS at 19:53

## 2025-03-06 RX ADMIN — METRONIDAZOLE 500 MG: 500 INJECTION, SOLUTION INTRAVENOUS at 06:52

## 2025-03-06 RX ADMIN — BARIUM SULFATE 30 ML: 0.81 POWDER, FOR SUSPENSION ORAL at 11:30

## 2025-03-06 ASSESSMENT — PAIN SCALES - GENERAL
PAINLEVEL_OUTOF10: 0

## 2025-03-06 ASSESSMENT — PAIN SCALES - WONG BAKER
WONGBAKER_NUMERICALRESPONSE: NO HURT

## 2025-03-06 NOTE — CARE COORDINATION
DISCHARGE PLANNING EVALUATION  3/6/25, 2:37 PM EST    Reason for Referral: Current with [facility] for RN/PT/OT  Decision Maker: pt makes his own decisions with the support of his wife.  Current Services: [facility] and tube feed supplies.  New Services Requested:  wife requesting Hospital Bed and nebulizer. Pt has a walker, w/c, shower chair, commode.  Family/ Social/ Home environment: Assessment completed with pts wifeSoraya, she states pt has stage 4 throat cancer and is planning surgery on March 14th. Wife states she has been caring for pt at home and doing all household chores, drives, etc. Wife states she has a strong support system of neighbors and friends, family lives [place] of state. Wife states pt will be going home at discharge and will not consider a SNF.   Payment Source:Medicare and [place] [employer] Medicare Supp.  Transportation at Discharge: wife  Post-acute (PAC) provider list was provided to patient. Patient was informed of their freedom to choose PAC provider. Discussed and offered to show the patient the relevant PAC Providers quality and resource use measures on Medicare Compare web site via computer based on patient's goals of care and treatment preferences. Questions regarding selection process were answered.      Teach Back Method used with pts wife regarding care plan and discharge planning.  Patients wife verbalized understanding of the plan of care and contribute to goal setting.       Patient preferences and discharge plan: Planning home at discharge with [facility] to continue. Wife declining SNF.    Electronically signed by IAIN Sanchez on 3/6/2025 at 2:37 PM

## 2025-03-06 NOTE — PLAN OF CARE
Problem: Respiratory - Adult  Goal: Achieves optimal ventilation and oxygenation  3/6/2025 0740 by Marisol Cutler RCP  Outcome: Progressing

## 2025-03-06 NOTE — CARE COORDINATION
3/6/25, 2:58 PM EST    DISCHARGE PLANNING EVALUATION     Sw notified [facility] via Epic that pt is on 4B and no discharge date at this time.

## 2025-03-07 LAB
ANION GAP SERPL CALC-SCNC: 10 MEQ/L (ref 8–16)
BACTERIA SPEC RESP CULT: ABNORMAL
BUN SERPL-MCNC: 22 MG/DL (ref 8–23)
CALCIUM SERPL-MCNC: 8.3 MG/DL (ref 8.8–10.2)
CHLORIDE SERPL-SCNC: 103 MEQ/L (ref 98–111)
CO2 SERPL-SCNC: 24 MEQ/L (ref 22–29)
CREAT SERPL-MCNC: 0.5 MG/DL (ref 0.7–1.2)
DEPRECATED RDW RBC AUTO: 49.2 FL (ref 35–45)
ERYTHROCYTE [DISTWIDTH] IN BLOOD BY AUTOMATED COUNT: 13.3 % (ref 11.5–14.5)
GFR SERPL CREATININE-BSD FRML MDRD: > 90 ML/MIN/1.73M2
GLUCOSE BLD STRIP.AUTO-MCNC: 140 MG/DL (ref 70–108)
GLUCOSE BLD STRIP.AUTO-MCNC: 182 MG/DL (ref 70–108)
GLUCOSE BLD STRIP.AUTO-MCNC: 191 MG/DL (ref 70–108)
GLUCOSE BLD STRIP.AUTO-MCNC: 239 MG/DL (ref 70–108)
GLUCOSE SERPL-MCNC: 124 MG/DL (ref 74–109)
GRAM STN SPEC: ABNORMAL
HCT VFR BLD AUTO: 31.8 % (ref 42–52)
HGB BLD-MCNC: 10.7 GM/DL (ref 14–18)
MAGNESIUM SERPL-MCNC: 2 MG/DL (ref 1.6–2.6)
MCH RBC QN AUTO: 33.8 PG (ref 26–33)
MCHC RBC AUTO-ENTMCNC: 33.6 GM/DL (ref 32.2–35.5)
MCV RBC AUTO: 100.3 FL (ref 80–94)
ORGANISM: ABNORMAL
PLATELET # BLD AUTO: 420 THOU/MM3 (ref 130–400)
PMV BLD AUTO: 11.1 FL (ref 9.4–12.4)
POTASSIUM SERPL-SCNC: 3 MEQ/L (ref 3.5–5.2)
POTASSIUM SERPL-SCNC: 3.6 MEQ/L (ref 3.5–5.2)
RBC # BLD AUTO: 3.17 MILL/MM3 (ref 4.7–6.1)
SODIUM SERPL-SCNC: 137 MEQ/L (ref 135–145)
VANCOMYCIN SERPL-MCNC: 14.5 UG/ML (ref 10–39.9)
WBC # BLD AUTO: 26 THOU/MM3 (ref 4.8–10.8)

## 2025-03-07 PROCEDURE — 83735 ASSAY OF MAGNESIUM: CPT

## 2025-03-07 PROCEDURE — 2500000003 HC RX 250 WO HCPCS

## 2025-03-07 PROCEDURE — 94640 AIRWAY INHALATION TREATMENT: CPT

## 2025-03-07 PROCEDURE — 6370000000 HC RX 637 (ALT 250 FOR IP): Performed by: INTERNAL MEDICINE

## 2025-03-07 PROCEDURE — 85027 COMPLETE CBC AUTOMATED: CPT

## 2025-03-07 PROCEDURE — 84132 ASSAY OF SERUM POTASSIUM: CPT

## 2025-03-07 PROCEDURE — 6360000002 HC RX W HCPCS: Performed by: INTERNAL MEDICINE

## 2025-03-07 PROCEDURE — 80048 BASIC METABOLIC PNL TOTAL CA: CPT

## 2025-03-07 PROCEDURE — 2060000000 HC ICU INTERMEDIATE R&B

## 2025-03-07 PROCEDURE — 6360000002 HC RX W HCPCS: Performed by: STUDENT IN AN ORGANIZED HEALTH CARE EDUCATION/TRAINING PROGRAM

## 2025-03-07 PROCEDURE — 99233 SBSQ HOSP IP/OBS HIGH 50: CPT | Performed by: STUDENT IN AN ORGANIZED HEALTH CARE EDUCATION/TRAINING PROGRAM

## 2025-03-07 PROCEDURE — 82948 REAGENT STRIP/BLOOD GLUCOSE: CPT

## 2025-03-07 PROCEDURE — 80202 ASSAY OF VANCOMYCIN: CPT

## 2025-03-07 PROCEDURE — 94669 MECHANICAL CHEST WALL OSCILL: CPT

## 2025-03-07 PROCEDURE — 6370000000 HC RX 637 (ALT 250 FOR IP)

## 2025-03-07 PROCEDURE — 2500000003 HC RX 250 WO HCPCS: Performed by: INTERNAL MEDICINE

## 2025-03-07 PROCEDURE — 36415 COLL VENOUS BLD VENIPUNCTURE: CPT

## 2025-03-07 PROCEDURE — 94761 N-INVAS EAR/PLS OXIMETRY MLT: CPT

## 2025-03-07 PROCEDURE — 6360000002 HC RX W HCPCS

## 2025-03-07 PROCEDURE — 92526 ORAL FUNCTION THERAPY: CPT

## 2025-03-07 PROCEDURE — 6370000000 HC RX 637 (ALT 250 FOR IP): Performed by: EMERGENCY MEDICINE

## 2025-03-07 PROCEDURE — 2580000003 HC RX 258

## 2025-03-07 PROCEDURE — 2700000000 HC OXYGEN THERAPY PER DAY

## 2025-03-07 PROCEDURE — 6370000000 HC RX 637 (ALT 250 FOR IP): Performed by: STUDENT IN AN ORGANIZED HEALTH CARE EDUCATION/TRAINING PROGRAM

## 2025-03-07 RX ORDER — POTASSIUM CHLORIDE 7.45 MG/ML
10 INJECTION INTRAVENOUS
Status: DISPENSED | OUTPATIENT
Start: 2025-03-07 | End: 2025-03-07

## 2025-03-07 RX ADMIN — ASPIRIN 81 MG: 81 TABLET, COATED ORAL at 08:44

## 2025-03-07 RX ADMIN — IPRATROPIUM BROMIDE AND ALBUTEROL SULFATE 1 DOSE: .5; 3 SOLUTION RESPIRATORY (INHALATION) at 11:36

## 2025-03-07 RX ADMIN — POTASSIUM CHLORIDE 10 MEQ: 7.46 INJECTION, SOLUTION INTRAVENOUS at 12:48

## 2025-03-07 RX ADMIN — Medication 3 MG: at 21:39

## 2025-03-07 RX ADMIN — AMLODIPINE BESYLATE 5 MG: 5 TABLET ORAL at 09:20

## 2025-03-07 RX ADMIN — PREDNISONE 40 MG: 20 TABLET ORAL at 08:54

## 2025-03-07 RX ADMIN — IPRATROPIUM BROMIDE AND ALBUTEROL SULFATE 1 DOSE: .5; 3 SOLUTION RESPIRATORY (INHALATION) at 20:13

## 2025-03-07 RX ADMIN — VANCOMYCIN HYDROCHLORIDE 1000 MG: 1 INJECTION, POWDER, LYOPHILIZED, FOR SOLUTION INTRAVENOUS at 09:13

## 2025-03-07 RX ADMIN — ACETAMINOPHEN 650 MG: 325 TABLET ORAL at 21:39

## 2025-03-07 RX ADMIN — INSULIN LISPRO 1 UNITS: 100 INJECTION, SOLUTION INTRAVENOUS; SUBCUTANEOUS at 21:39

## 2025-03-07 RX ADMIN — SODIUM CHLORIDE, PRESERVATIVE FREE 10 ML: 5 INJECTION INTRAVENOUS at 08:45

## 2025-03-07 RX ADMIN — IPRATROPIUM BROMIDE AND ALBUTEROL SULFATE 1 DOSE: .5; 3 SOLUTION RESPIRATORY (INHALATION) at 07:30

## 2025-03-07 RX ADMIN — METRONIDAZOLE 500 MG: 500 INJECTION, SOLUTION INTRAVENOUS at 09:09

## 2025-03-07 RX ADMIN — Medication 1 CAPSULE: at 08:44

## 2025-03-07 RX ADMIN — IPRATROPIUM BROMIDE AND ALBUTEROL SULFATE 1 DOSE: .5; 3 SOLUTION RESPIRATORY (INHALATION) at 15:28

## 2025-03-07 RX ADMIN — VANCOMYCIN HYDROCHLORIDE 1000 MG: 1 INJECTION, POWDER, LYOPHILIZED, FOR SOLUTION INTRAVENOUS at 21:45

## 2025-03-07 RX ADMIN — Medication 1 TABLET: at 08:44

## 2025-03-07 RX ADMIN — POTASSIUM CHLORIDE 10 MEQ: 7.46 INJECTION, SOLUTION INTRAVENOUS at 11:26

## 2025-03-07 RX ADMIN — SODIUM CHLORIDE: 0.9 INJECTION, SOLUTION INTRAVENOUS at 09:07

## 2025-03-07 RX ADMIN — POTASSIUM CHLORIDE 10 MEQ: 7.46 INJECTION, SOLUTION INTRAVENOUS at 09:07

## 2025-03-07 RX ADMIN — WATER 2000 MG: 1 INJECTION INTRAMUSCULAR; INTRAVENOUS; SUBCUTANEOUS at 21:35

## 2025-03-07 RX ADMIN — PANTOPRAZOLE SODIUM 40 MG: 40 INJECTION, POWDER, FOR SOLUTION INTRAVENOUS at 08:45

## 2025-03-07 RX ADMIN — ENOXAPARIN SODIUM 40 MG: 100 INJECTION SUBCUTANEOUS at 21:39

## 2025-03-07 RX ADMIN — LISINOPRIL 20 MG: 20 TABLET ORAL at 09:20

## 2025-03-07 RX ADMIN — POTASSIUM CHLORIDE 10 MEQ: 7.46 INJECTION, SOLUTION INTRAVENOUS at 10:13

## 2025-03-07 RX ADMIN — INSULIN LISPRO 1 UNITS: 100 INJECTION, SOLUTION INTRAVENOUS; SUBCUTANEOUS at 13:05

## 2025-03-07 RX ADMIN — POTASSIUM CHLORIDE 10 MEQ: 7.46 INJECTION, SOLUTION INTRAVENOUS at 14:01

## 2025-03-07 RX ADMIN — SODIUM CHLORIDE, PRESERVATIVE FREE 10 ML: 5 INJECTION INTRAVENOUS at 21:46

## 2025-03-07 ASSESSMENT — PAIN SCALES - GENERAL
PAINLEVEL_OUTOF10: 0

## 2025-03-07 ASSESSMENT — PAIN DESCRIPTION - DESCRIPTORS: DESCRIPTORS: ACHING

## 2025-03-07 ASSESSMENT — PAIN DESCRIPTION - LOCATION: LOCATION: GENERALIZED

## 2025-03-07 NOTE — PLAN OF CARE
Masood Randhawa was evaluated today and a DME order was entered for a semi-electric hospital bed because he requires assistance for positioning needs not possible in an ordinary bed.  Patient requires frequent and immediate positioning changes for relief of pain and care needs related to medical diagnoses.  Patient needs variability of bed height requirements to perform patient transfers and for eating, ambulating, dressing upper body, and dressing lower body.  Current body Weight - Scale: 55 kg (121 lb 4.1 oz).  The need for this equipment was discussed with the patient and he understands and is in agreement.

## 2025-03-07 NOTE — PLAN OF CARE
Problem: Respiratory - Adult  Goal: Achieves optimal ventilation and oxygenation  3/7/2025 0555 by Sammie Head, SEMAJ  Outcome: Progressing

## 2025-03-07 NOTE — ACP (ADVANCE CARE PLANNING)
Advance Care Planning     Advance Care Planning Inpatient Note  [facility]    Today's Date: 3/7/2025  Unit: [facility]B    Received request from Other: Social work .  Upon review of chart and communication with care team, patient's decision making abilities are not in question.. Patient and Spouse was/were present in the room during visit.    Goals of ACP Conversation:  Discuss advance care planning documents  Facilitate a discussion related to patient's goals of care as they align with the patient's values and beliefs.    Health Care Decision Makers:       Primary Decision Maker: Soraya Randhawa - Spouse - [phone]    Secondary Decision Maker: SydneeIldefonso - Child - [phone]    Supplemental (Other) Decision Maker: Manoj Malone - Brother/Sister - [phone]  Summary:  Verified Documents  Verified Healthcare Decision Maker  Updated Healthcare Decision Maker  Documented Next of Kin, per patient report    Advance Care Planning Documents (Patient Wishes):  Healthcare Power of /Advance Directive Appointment of Health Care Agent  Living Will/Advance Directive  Legal Guardianship Document     Assessment:  The patient's documents were verified and ensured to be in order. The documents were witnessed and ensured were the patient's desires.     Interventions:  Assisted in the completion of documents according to patient's wishes at this time  Encouraged ongoing ACP conversation with future decision makers and loved ones    Care Preferences Communicated:   No    Outcomes/Plan:  New advance directive completed.  Returned original document(s) to patient, as well as copies for distribution to appointed agents  Copy of advance directive given to staff to scan into medical record.  Routed ACP note to attending provider or other IDT member.  Teach Back Method used to verify the patient's and/or Healthcare Decision Maker's understanding of key information in the advance directive

## 2025-03-07 NOTE — PLAN OF CARE
Problem: Discharge Planning  Goal: Discharge to home or other facility with appropriate resources  Outcome: Progressing     Problem: Safety - Adult  Goal: Free from fall injury  Outcome: Progressing     Problem: Nutrition Deficit:  Goal: Optimize nutritional status  Outcome: Progressing     Problem: Chronic Conditions and Co-morbidities  Goal: Patient's chronic conditions and co-morbidity symptoms are monitored and maintained or improved  Outcome: Progressing     Problem: Skin/Tissue Integrity  Goal: Skin integrity remains intact  Description: 1.  Monitor for areas of redness and/or skin breakdown  2.  Assess vascular access sites hourly  3.  Every 4-6 hours minimum:  Change oxygen saturation probe site  4.  Every 4-6 hours:  If on nasal continuous positive airway pressure, respiratory therapy assess nares and determine need for appliance change or resting period  Outcome: Progressing     Problem: Respiratory - Adult  Goal: Achieves optimal ventilation and oxygenation  Outcome: Progressing     Problem: Skin/Tissue Integrity - Adult  Goal: Skin integrity remains intact  Description: 1.  Monitor for areas of redness and/or skin breakdown  2.  Assess vascular access sites hourly  3.  Every 4-6 hours minimum:  Change oxygen saturation probe site  4.  Every 4-6 hours:  If on nasal continuous positive airway pressure, respiratory therapy assess nares and determine need for appliance change or resting period  Outcome: Progressing  Goal: Incisions, wounds, or drain sites healing without S/S of infection  Outcome: Progressing  Goal: Oral mucous membranes remain intact  Outcome: Progressing     Problem: Musculoskeletal - Adult  Goal: Return mobility to safest level of function  Outcome: Progressing  Goal: Maintain proper alignment of affected body part  Outcome: Progressing  Goal: Return ADL status to a safe level of function  Outcome: Progressing     Problem: Gastrointestinal - Adult  Goal: Minimal or absence of nausea and

## 2025-03-07 NOTE — CARE COORDINATION
3/7/25, 4:05 PM EST    Patient goals/plan/ treatment preferences discussed by  and .  Patient goals/plan/ treatment preferences reviewed with patient/ family.  Patient/ family verbalize understanding of discharge plan and are in agreement with goal/plan/treatment preferences.  Understanding was demonstrated using the teach back method.  AVS provided by RN at time of discharge, which includes all necessary medical information pertaining to the patients current course of illness, treatment, post-discharge goals of care, and treatment preferences.     Services At/After Discharge: Home Health    Mirela spoke with Joyce with [facility], possible discharge on Sunday. MIRELA also spoke with wife, she is having friends move furniture around to fit hospital bed that is coming.

## 2025-03-07 NOTE — PLAN OF CARE
Problem: Discharge Planning  Goal: Discharge to home or other facility with appropriate resources  3/7/2025 0317 by Ally Gustafson RN  Outcome: Progressing  3/7/2025 0019 by Ally Gustafson RN  Outcome: Progressing     Problem: Safety - Adult  Goal: Free from fall injury  3/7/2025 0317 by Ally Gustafson RN  Outcome: Progressing  3/7/2025 0019 by Ally Gustafson RN  Outcome: Progressing     Problem: Nutrition Deficit:  Goal: Optimize nutritional status  3/7/2025 0317 by Ally Gustafson RN  Outcome: Progressing  3/7/2025 0019 by Ally Gustafson RN  Outcome: Progressing     Problem: Chronic Conditions and Co-morbidities  Goal: Patient's chronic conditions and co-morbidity symptoms are monitored and maintained or improved  3/7/2025 0317 by Ally Gustafson RN  Outcome: Progressing  3/7/2025 0019 by Ally Gustafson RN  Outcome: Progressing     Problem: Skin/Tissue Integrity  Goal: Skin integrity remains intact  Description: 1.  Monitor for areas of redness and/or skin breakdown  2.  Assess vascular access sites hourly  3.  Every 4-6 hours minimum:  Change oxygen saturation probe site  4.  Every 4-6 hours:  If on nasal continuous positive airway pressure, respiratory therapy assess nares and determine need for appliance change or resting period  3/7/2025 0317 by Ally Gustafson RN  Outcome: Progressing  3/7/2025 0019 by Ally Gustafson RN  Outcome: Progressing     Problem: Respiratory - Adult  Goal: Achieves optimal ventilation and oxygenation  3/7/2025 0317 by Ally Gustafson RN  Outcome: Progressing  3/7/2025 0019 by Ally Gustafson RN  Outcome: Progressing     Problem: Skin/Tissue Integrity - Adult  Goal: Skin integrity remains intact  Description: 1.  Monitor for areas of redness and/or skin breakdown  2.  Assess vascular access sites hourly  3.  Every 4-6 hours minimum:  Change oxygen saturation probe site  4.  Every 4-6 hours:  If on nasal continuous positive airway pressure, respiratory

## 2025-03-08 LAB
ANION GAP SERPL CALC-SCNC: 11 MEQ/L (ref 8–16)
BUN SERPL-MCNC: 17 MG/DL (ref 8–23)
CALCIUM SERPL-MCNC: 8.4 MG/DL (ref 8.8–10.2)
CHLORIDE SERPL-SCNC: 102 MEQ/L (ref 98–111)
CO2 SERPL-SCNC: 26 MEQ/L (ref 22–29)
CREAT SERPL-MCNC: 0.4 MG/DL (ref 0.7–1.2)
DEPRECATED RDW RBC AUTO: 49.1 FL (ref 35–45)
ERYTHROCYTE [DISTWIDTH] IN BLOOD BY AUTOMATED COUNT: 13.3 % (ref 11.5–14.5)
GFR SERPL CREATININE-BSD FRML MDRD: > 90 ML/MIN/1.73M2
GLUCOSE BLD STRIP.AUTO-MCNC: 165 MG/DL (ref 70–108)
GLUCOSE BLD STRIP.AUTO-MCNC: 210 MG/DL (ref 70–108)
GLUCOSE BLD STRIP.AUTO-MCNC: 219 MG/DL (ref 70–108)
GLUCOSE BLD STRIP.AUTO-MCNC: 84 MG/DL (ref 70–108)
GLUCOSE SERPL-MCNC: 91 MG/DL (ref 74–109)
HCT VFR BLD AUTO: 32.5 % (ref 42–52)
HGB BLD-MCNC: 11.1 GM/DL (ref 14–18)
MAGNESIUM SERPL-MCNC: 2.1 MG/DL (ref 1.6–2.6)
MCH RBC QN AUTO: 34.2 PG (ref 26–33)
MCHC RBC AUTO-ENTMCNC: 34.2 GM/DL (ref 32.2–35.5)
MCV RBC AUTO: 100 FL (ref 80–94)
PLATELET # BLD AUTO: 443 THOU/MM3 (ref 130–400)
PMV BLD AUTO: 10.9 FL (ref 9.4–12.4)
POTASSIUM SERPL-SCNC: 3.5 MEQ/L (ref 3.5–5.2)
RBC # BLD AUTO: 3.25 MILL/MM3 (ref 4.7–6.1)
SODIUM SERPL-SCNC: 139 MEQ/L (ref 135–145)
WBC # BLD AUTO: 26.8 THOU/MM3 (ref 4.8–10.8)

## 2025-03-08 PROCEDURE — 83735 ASSAY OF MAGNESIUM: CPT

## 2025-03-08 PROCEDURE — 6370000000 HC RX 637 (ALT 250 FOR IP): Performed by: STUDENT IN AN ORGANIZED HEALTH CARE EDUCATION/TRAINING PROGRAM

## 2025-03-08 PROCEDURE — 6370000000 HC RX 637 (ALT 250 FOR IP): Performed by: INTERNAL MEDICINE

## 2025-03-08 PROCEDURE — 94760 N-INVAS EAR/PLS OXIMETRY 1: CPT

## 2025-03-08 PROCEDURE — 80048 BASIC METABOLIC PNL TOTAL CA: CPT

## 2025-03-08 PROCEDURE — 94761 N-INVAS EAR/PLS OXIMETRY MLT: CPT

## 2025-03-08 PROCEDURE — 2500000003 HC RX 250 WO HCPCS: Performed by: INTERNAL MEDICINE

## 2025-03-08 PROCEDURE — 94669 MECHANICAL CHEST WALL OSCILL: CPT

## 2025-03-08 PROCEDURE — 2060000000 HC ICU INTERMEDIATE R&B

## 2025-03-08 PROCEDURE — 2580000003 HC RX 258

## 2025-03-08 PROCEDURE — 6360000002 HC RX W HCPCS

## 2025-03-08 PROCEDURE — 6360000002 HC RX W HCPCS: Performed by: INTERNAL MEDICINE

## 2025-03-08 PROCEDURE — 36415 COLL VENOUS BLD VENIPUNCTURE: CPT

## 2025-03-08 PROCEDURE — 85027 COMPLETE CBC AUTOMATED: CPT

## 2025-03-08 PROCEDURE — 2500000003 HC RX 250 WO HCPCS

## 2025-03-08 PROCEDURE — 82948 REAGENT STRIP/BLOOD GLUCOSE: CPT

## 2025-03-08 PROCEDURE — 94640 AIRWAY INHALATION TREATMENT: CPT

## 2025-03-08 PROCEDURE — 6370000000 HC RX 637 (ALT 250 FOR IP)

## 2025-03-08 RX ORDER — IPRATROPIUM BROMIDE AND ALBUTEROL SULFATE 2.5; .5 MG/3ML; MG/3ML
1 SOLUTION RESPIRATORY (INHALATION)
Status: DISCONTINUED | OUTPATIENT
Start: 2025-03-08 | End: 2025-03-10 | Stop reason: HOSPADM

## 2025-03-08 RX ORDER — ALBUTEROL SULFATE 0.83 MG/ML
2.5 SOLUTION RESPIRATORY (INHALATION) EVERY 4 HOURS PRN
Status: DISCONTINUED | OUTPATIENT
Start: 2025-03-08 | End: 2025-03-10 | Stop reason: HOSPADM

## 2025-03-08 RX ORDER — IPRATROPIUM BROMIDE AND ALBUTEROL SULFATE 2.5; .5 MG/3ML; MG/3ML
3 SOLUTION RESPIRATORY (INHALATION)
Qty: 180 ML | Refills: 0 | Status: ON HOLD | OUTPATIENT
Start: 2025-03-08 | End: 2025-04-07

## 2025-03-08 RX ORDER — IPRATROPIUM BROMIDE AND ALBUTEROL SULFATE 2.5; .5 MG/3ML; MG/3ML
1 SOLUTION RESPIRATORY (INHALATION)
Status: DISCONTINUED | OUTPATIENT
Start: 2025-03-08 | End: 2025-03-08

## 2025-03-08 RX ORDER — LANSOPRAZOLE 30 MG/1
30 TABLET, ORALLY DISINTEGRATING, DELAYED RELEASE ORAL
Status: DISCONTINUED | OUTPATIENT
Start: 2025-03-09 | End: 2025-03-10 | Stop reason: HOSPADM

## 2025-03-08 RX ADMIN — PREDNISONE 40 MG: 20 TABLET ORAL at 08:54

## 2025-03-08 RX ADMIN — LISINOPRIL 20 MG: 20 TABLET ORAL at 08:54

## 2025-03-08 RX ADMIN — SODIUM CHLORIDE, PRESERVATIVE FREE 10 ML: 5 INJECTION INTRAVENOUS at 21:57

## 2025-03-08 RX ADMIN — WATER 2000 MG: 1 INJECTION INTRAMUSCULAR; INTRAVENOUS; SUBCUTANEOUS at 17:53

## 2025-03-08 RX ADMIN — ASPIRIN 81 MG: 81 TABLET, COATED ORAL at 09:03

## 2025-03-08 RX ADMIN — PANTOPRAZOLE SODIUM 40 MG: 40 INJECTION, POWDER, FOR SOLUTION INTRAVENOUS at 09:03

## 2025-03-08 RX ADMIN — VANCOMYCIN HYDROCHLORIDE 1000 MG: 1 INJECTION, POWDER, LYOPHILIZED, FOR SOLUTION INTRAVENOUS at 09:12

## 2025-03-08 RX ADMIN — IPRATROPIUM BROMIDE AND ALBUTEROL SULFATE 1 DOSE: .5; 3 SOLUTION RESPIRATORY (INHALATION) at 20:58

## 2025-03-08 RX ADMIN — Medication 3 MG: at 21:55

## 2025-03-08 RX ADMIN — INSULIN LISPRO 1 UNITS: 100 INJECTION, SOLUTION INTRAVENOUS; SUBCUTANEOUS at 12:13

## 2025-03-08 RX ADMIN — Medication 1 TABLET: at 08:54

## 2025-03-08 RX ADMIN — Medication 1 CAPSULE: at 08:54

## 2025-03-08 RX ADMIN — VANCOMYCIN HYDROCHLORIDE 1000 MG: 1 INJECTION, POWDER, LYOPHILIZED, FOR SOLUTION INTRAVENOUS at 21:58

## 2025-03-08 RX ADMIN — SODIUM CHLORIDE, PRESERVATIVE FREE 10 ML: 5 INJECTION INTRAVENOUS at 08:53

## 2025-03-08 RX ADMIN — ENOXAPARIN SODIUM 40 MG: 100 INJECTION SUBCUTANEOUS at 21:55

## 2025-03-08 RX ADMIN — INSULIN LISPRO 1 UNITS: 100 INJECTION, SOLUTION INTRAVENOUS; SUBCUTANEOUS at 17:52

## 2025-03-08 RX ADMIN — AMLODIPINE BESYLATE 5 MG: 5 TABLET ORAL at 08:54

## 2025-03-08 RX ADMIN — IPRATROPIUM BROMIDE AND ALBUTEROL SULFATE 1 DOSE: .5; 3 SOLUTION RESPIRATORY (INHALATION) at 09:43

## 2025-03-08 RX ADMIN — ACETAMINOPHEN 650 MG: 325 TABLET ORAL at 21:55

## 2025-03-08 ASSESSMENT — PAIN SCALES - GENERAL
PAINLEVEL_OUTOF10: 0
PAINLEVEL_OUTOF10: 3
PAINLEVEL_OUTOF10: 0

## 2025-03-08 ASSESSMENT — PAIN DESCRIPTION - LOCATION: LOCATION: GENERALIZED

## 2025-03-08 NOTE — PLAN OF CARE
Problem: Safety - Adult  Goal: Free from fall injury  Outcome: Progressing     Problem: Skin/Tissue Integrity  Goal: Skin integrity remains intact  Description: 1.  Monitor for areas of redness and/or skin breakdown  2.  Assess vascular access sites hourly  3.  Every 4-6 hours minimum:  Change oxygen saturation probe site  4.  Every 4-6 hours:  If on nasal continuous positive airway pressure, respiratory therapy assess nares and determine need for appliance change or resting period  Outcome: Progressing     Problem: Respiratory - Adult  Goal: Achieves optimal ventilation and oxygenation  3/7/2025 2028 by Feng Sandoval, RN  Outcome: Progressing  3/7/2025 2014 by Susan Gutiérrez, SEMAJ  Outcome: Progressing     Problem: Skin/Tissue Integrity - Adult  Goal: Skin integrity remains intact  Description: 1.  Monitor for areas of redness and/or skin breakdown  2.  Assess vascular access sites hourly  3.  Every 4-6 hours minimum:  Change oxygen saturation probe site  4.  Every 4-6 hours:  If on nasal continuous positive airway pressure, respiratory therapy assess nares and determine need for appliance change or resting period  Outcome: Progressing     Problem: Pain  Goal: Verbalizes/displays adequate comfort level or baseline comfort level  Outcome: Progressing

## 2025-03-08 NOTE — PLAN OF CARE
Problem: Respiratory - Adult  Goal: Clear lung sounds  Outcome: Progressing   Pt continues on aerosols for maintenance of COPD, to clear lung sounds/improve aeration and pt does MDI at home. Patient mutually agreed on goals.

## 2025-03-08 NOTE — RT PROTOCOL NOTE
every 4 hours PRN for wheezing or increased work of breathing using Per Protocol order mode.        4-6 - enter or revise RT Bronchodilator order(s) to two equivalent RT bronchodilator orders with one order with BID Frequency and one order with Frequency of every 4 hours PRN wheezing or increased work of breathing using Per Protocol order mode.        7-10 - enter or revise RT Bronchodilator order(s) to two equivalent RT bronchodilator orders with one order with TID Frequency and one order with Frequency of every 4 hours PRN wheezing or increased work of breathing using Per Protocol order mode.       11-13 - enter or revise RT Bronchodilator order(s) to one equivalent RT bronchodilator order with QID Frequency and an Albuterol order with Frequency of every 4 hours PRN wheezing or increased work of breathing using Per Protocol order mode.      Greater than 13 - enter or revise RT Bronchodilator order(s) to one equivalent RT bronchodilator order with every 4 hours Frequency and an Albuterol order with Frequency of every 2 hours PRN wheezing or increased work of breathing using Per Protocol order mode.     RT to enter RT Home Evaluation for COPD & MDI Assessment order using Per Protocol order mode.    Electronically signed by Carmen Rao RCP on 3/8/2025 at 9:46 AM

## 2025-03-09 PROBLEM — R19.7 DIARRHEA: Status: ACTIVE | Noted: 2025-03-09

## 2025-03-09 LAB
ANION GAP SERPL CALC-SCNC: 9 MEQ/L (ref 8–16)
BACTERIA BLD AEROBE CULT: NORMAL
BACTERIA BLD AEROBE CULT: NORMAL
BUN SERPL-MCNC: 22 MG/DL (ref 8–23)
CALCIUM SERPL-MCNC: 8.5 MG/DL (ref 8.8–10.2)
CHLORIDE SERPL-SCNC: 102 MEQ/L (ref 98–111)
CO2 SERPL-SCNC: 28 MEQ/L (ref 22–29)
CREAT SERPL-MCNC: 0.4 MG/DL (ref 0.7–1.2)
DEPRECATED RDW RBC AUTO: 47.7 FL (ref 35–45)
ERYTHROCYTE [DISTWIDTH] IN BLOOD BY AUTOMATED COUNT: 13.2 % (ref 11.5–14.5)
GFR SERPL CREATININE-BSD FRML MDRD: > 90 ML/MIN/1.73M2
GLUCOSE BLD STRIP.AUTO-MCNC: 160 MG/DL (ref 70–108)
GLUCOSE BLD STRIP.AUTO-MCNC: 165 MG/DL (ref 70–108)
GLUCOSE BLD STRIP.AUTO-MCNC: 214 MG/DL (ref 70–108)
GLUCOSE BLD STRIP.AUTO-MCNC: 74 MG/DL (ref 70–108)
GLUCOSE SERPL-MCNC: 100 MG/DL (ref 74–109)
HCT VFR BLD AUTO: 32.6 % (ref 42–52)
HGB BLD-MCNC: 11.2 GM/DL (ref 14–18)
MAGNESIUM SERPL-MCNC: 2.1 MG/DL (ref 1.6–2.6)
MCH RBC QN AUTO: 33.8 PG (ref 26–33)
MCHC RBC AUTO-ENTMCNC: 34.4 GM/DL (ref 32.2–35.5)
MCV RBC AUTO: 98.5 FL (ref 80–94)
PLATELET # BLD AUTO: 482 THOU/MM3 (ref 130–400)
PMV BLD AUTO: 10.4 FL (ref 9.4–12.4)
POTASSIUM SERPL-SCNC: 4 MEQ/L (ref 3.5–5.2)
RBC # BLD AUTO: 3.31 MILL/MM3 (ref 4.7–6.1)
SODIUM SERPL-SCNC: 139 MEQ/L (ref 135–145)
VANCOMYCIN SERPL-MCNC: 19 UG/ML (ref 10–39.9)
WBC # BLD AUTO: 26.6 THOU/MM3 (ref 4.8–10.8)

## 2025-03-09 PROCEDURE — 2500000003 HC RX 250 WO HCPCS: Performed by: INTERNAL MEDICINE

## 2025-03-09 PROCEDURE — 2500000003 HC RX 250 WO HCPCS

## 2025-03-09 PROCEDURE — 6370000000 HC RX 637 (ALT 250 FOR IP)

## 2025-03-09 PROCEDURE — 36415 COLL VENOUS BLD VENIPUNCTURE: CPT

## 2025-03-09 PROCEDURE — 6370000000 HC RX 637 (ALT 250 FOR IP): Performed by: INTERNAL MEDICINE

## 2025-03-09 PROCEDURE — 82948 REAGENT STRIP/BLOOD GLUCOSE: CPT

## 2025-03-09 PROCEDURE — 80202 ASSAY OF VANCOMYCIN: CPT

## 2025-03-09 PROCEDURE — 87507 IADNA-DNA/RNA PROBE TQ 12-25: CPT

## 2025-03-09 PROCEDURE — 6370000000 HC RX 637 (ALT 250 FOR IP): Performed by: STUDENT IN AN ORGANIZED HEALTH CARE EDUCATION/TRAINING PROGRAM

## 2025-03-09 PROCEDURE — 85027 COMPLETE CBC AUTOMATED: CPT

## 2025-03-09 PROCEDURE — 94640 AIRWAY INHALATION TREATMENT: CPT

## 2025-03-09 PROCEDURE — 6360000002 HC RX W HCPCS: Performed by: INTERNAL MEDICINE

## 2025-03-09 PROCEDURE — 2580000003 HC RX 258: Performed by: PHARMACIST

## 2025-03-09 PROCEDURE — 6360000002 HC RX W HCPCS: Performed by: PHARMACIST

## 2025-03-09 PROCEDURE — 2060000000 HC ICU INTERMEDIATE R&B

## 2025-03-09 PROCEDURE — 80048 BASIC METABOLIC PNL TOTAL CA: CPT

## 2025-03-09 PROCEDURE — 83735 ASSAY OF MAGNESIUM: CPT

## 2025-03-09 PROCEDURE — 99232 SBSQ HOSP IP/OBS MODERATE 35: CPT | Performed by: STUDENT IN AN ORGANIZED HEALTH CARE EDUCATION/TRAINING PROGRAM

## 2025-03-09 PROCEDURE — 6360000002 HC RX W HCPCS

## 2025-03-09 RX ADMIN — AMLODIPINE BESYLATE 5 MG: 5 TABLET ORAL at 09:17

## 2025-03-09 RX ADMIN — Medication 1250 MG: at 10:13

## 2025-03-09 RX ADMIN — SODIUM CHLORIDE, PRESERVATIVE FREE 10 ML: 5 INJECTION INTRAVENOUS at 19:57

## 2025-03-09 RX ADMIN — LISINOPRIL 20 MG: 20 TABLET ORAL at 09:18

## 2025-03-09 RX ADMIN — PREDNISONE 40 MG: 20 TABLET ORAL at 09:18

## 2025-03-09 RX ADMIN — Medication 1 CAPSULE: at 09:18

## 2025-03-09 RX ADMIN — Medication 1 TABLET: at 09:17

## 2025-03-09 RX ADMIN — IPRATROPIUM BROMIDE AND ALBUTEROL SULFATE 1 DOSE: .5; 3 SOLUTION RESPIRATORY (INHALATION) at 20:06

## 2025-03-09 RX ADMIN — WATER 2000 MG: 1 INJECTION INTRAMUSCULAR; INTRAVENOUS; SUBCUTANEOUS at 17:47

## 2025-03-09 RX ADMIN — SODIUM CHLORIDE, PRESERVATIVE FREE 10 ML: 5 INJECTION INTRAVENOUS at 09:18

## 2025-03-09 RX ADMIN — INSULIN LISPRO 1 UNITS: 100 INJECTION, SOLUTION INTRAVENOUS; SUBCUTANEOUS at 17:43

## 2025-03-09 RX ADMIN — LANSOPRAZOLE 30 MG: 30 TABLET, ORALLY DISINTEGRATING, DELAYED RELEASE ORAL at 09:18

## 2025-03-09 RX ADMIN — ENOXAPARIN SODIUM 40 MG: 100 INJECTION SUBCUTANEOUS at 22:23

## 2025-03-09 RX ADMIN — Medication 1250 MG: at 19:57

## 2025-03-09 RX ADMIN — ASPIRIN 81 MG: 81 TABLET, COATED ORAL at 09:18

## 2025-03-09 RX ADMIN — Medication 3 MG: at 22:23

## 2025-03-09 ASSESSMENT — PAIN SCALES - GENERAL: PAINLEVEL_OUTOF10: 0

## 2025-03-09 NOTE — PLAN OF CARE
Problem: Safety - Adult  Goal: Free from fall injury  Outcome: Progressing     Problem: Skin/Tissue Integrity  Goal: Skin integrity remains intact  Description: 1.  Monitor for areas of redness and/or skin breakdown  2.  Assess vascular access sites hourly  3.  Every 4-6 hours minimum:  Change oxygen saturation probe site  4.  Every 4-6 hours:  If on nasal continuous positive airway pressure, respiratory therapy assess nares and determine need for appliance change or resting period  Outcome: Progressing     Problem: Respiratory - Adult  Goal: Clear lung sounds  3/8/2025 2101 by Susan Gutiérrez, P  Outcome: Progressing  3/8/2025 0949 by Carmen Rao P  Outcome: Progressing     Problem: Skin/Tissue Integrity - Adult  Goal: Skin integrity remains intact  Description: 1.  Monitor for areas of redness and/or skin breakdown  2.  Assess vascular access sites hourly  3.  Every 4-6 hours minimum:  Change oxygen saturation probe site  4.  Every 4-6 hours:  If on nasal continuous positive airway pressure, respiratory therapy assess nares and determine need for appliance change or resting period  Outcome: Progressing     Problem: Pain  Goal: Verbalizes/displays adequate comfort level or baseline comfort level  Outcome: Progressing

## 2025-03-09 NOTE — PLAN OF CARE
Problem: Respiratory - Adult  Goal: Clear lung sounds  3/8/2025 2101 by Susan Gutiérrez RCP  Outcome: Progressing   Patient mutually agreed on goals.

## 2025-03-09 NOTE — PLAN OF CARE
Problem: Discharge Planning  Goal: Discharge to home or other facility with appropriate resources  Outcome: Progressing     Problem: Safety - Adult  Goal: Free from fall injury  Outcome: Progressing     Problem: Nutrition Deficit:  Goal: Optimize nutritional status  Outcome: Progressing     Problem: Chronic Conditions and Co-morbidities  Goal: Patient's chronic conditions and co-morbidity symptoms are monitored and maintained or improved  Outcome: Progressing     Problem: Skin/Tissue Integrity  Goal: Skin integrity remains intact  Description: 1.  Monitor for areas of redness and/or skin breakdown  2.  Assess vascular access sites hourly  3.  Every 4-6 hours minimum:  Change oxygen saturation probe site  4.  Every 4-6 hours:  If on nasal continuous positive airway pressure, respiratory therapy assess nares and determine need for appliance change or resting period  Outcome: Progressing     Problem: Skin/Tissue Integrity - Adult  Goal: Skin integrity remains intact  Description: 1.  Monitor for areas of redness and/or skin breakdown  2.  Assess vascular access sites hourly  3.  Every 4-6 hours minimum:  Change oxygen saturation probe site  4.  Every 4-6 hours:  If on nasal continuous positive airway pressure, respiratory therapy assess nares and determine need for appliance change or resting period  Outcome: Progressing  Goal: Incisions, wounds, or drain sites healing without S/S of infection  Outcome: Progressing  Goal: Oral mucous membranes remain intact  Outcome: Progressing     Problem: Musculoskeletal - Adult  Goal: Return mobility to safest level of function  Outcome: Progressing  Goal: Maintain proper alignment of affected body part  Outcome: Progressing  Goal: Return ADL status to a safe level of function  Outcome: Progressing     Problem: Gastrointestinal - Adult  Goal: Minimal or absence of nausea and vomiting  Outcome: Progressing  Goal: Maintains or returns to baseline bowel function  Outcome:

## 2025-03-10 ENCOUNTER — TELEPHONE (OUTPATIENT)
Dept: ENT CLINIC | Age: 75
End: 2025-03-10

## 2025-03-10 VITALS
BODY MASS INDEX: 19.49 KG/M2 | OXYGEN SATURATION: 92 % | HEART RATE: 106 BPM | SYSTOLIC BLOOD PRESSURE: 134 MMHG | WEIGHT: 121.25 LBS | RESPIRATION RATE: 18 BRPM | TEMPERATURE: 98.3 F | HEIGHT: 66 IN | DIASTOLIC BLOOD PRESSURE: 64 MMHG

## 2025-03-10 DIAGNOSIS — R49.0 HOARSENESS OF VOICE: Primary | ICD-10-CM

## 2025-03-10 DIAGNOSIS — C32.0 SQUAMOUS CELL CARCINOMA OF GLOTTIS (HCC): ICD-10-CM

## 2025-03-10 LAB
ANION GAP SERPL CALC-SCNC: 10 MEQ/L (ref 8–16)
BUN SERPL-MCNC: 25 MG/DL (ref 8–23)
C CAYETANENSIS DNA SPEC QL NAA+PROBE: NOT DETECTED
C DIFF GDH STL QL: NEGATIVE
CALCIUM SERPL-MCNC: 8.5 MG/DL (ref 8.8–10.2)
CAMPY SP DNA.DIARRHEA STL QL NAA+PROBE: NOT DETECTED
CHLORIDE SERPL-SCNC: 101 MEQ/L (ref 98–111)
CO2 SERPL-SCNC: 27 MEQ/L (ref 22–29)
CREAT SERPL-MCNC: 0.5 MG/DL (ref 0.7–1.2)
CRYPTOSP DNA SPEC QL NAA+PROBE: NOT DETECTED
DEPRECATED RDW RBC AUTO: 48.6 FL (ref 35–45)
E COLI O157H7 DNA SPEC QL NAA+PROBE: NORMAL
E HISTOLYT DNA SPEC QL NAA+PROBE: NOT DETECTED
EAEC PAA PLAS AGGR+AATA ST NAA+NON-PRB: NOT DETECTED
EC STX1+STX2 + H7 FLIC SPEC NAA+PROBE: NOT DETECTED
EPEC EAE GENE STL QL NAA+NON-PROBE: NOT DETECTED
ERYTHROCYTE [DISTWIDTH] IN BLOOD BY AUTOMATED COUNT: 13.2 % (ref 11.5–14.5)
ETEC LTA+ST1A+ST1B TOX ST NAA+NON-PROBE: NOT DETECTED
G LAMBLIA DNA SPEC QL NAA+PROBE: NOT DETECTED
GFR SERPL CREATININE-BSD FRML MDRD: > 90 ML/MIN/1.73M2
GLUCOSE SERPL-MCNC: 87 MG/DL (ref 74–109)
HADV DNA SPEC QL NAA+PROBE: NOT DETECTED
HASTV RNA SPEC QL NAA+PROBE: NOT DETECTED
HCT VFR BLD AUTO: 32.4 % (ref 42–52)
HGB BLD-MCNC: 11.2 GM/DL (ref 14–18)
MAGNESIUM SERPL-MCNC: 2.1 MG/DL (ref 1.6–2.6)
MCH RBC QN AUTO: 34.7 PG (ref 26–33)
MCHC RBC AUTO-ENTMCNC: 34.6 GM/DL (ref 32.2–35.5)
MCV RBC AUTO: 100.3 FL (ref 80–94)
NOROVIRUS GI + GII RNA STL NAA+PROBE: NOT DETECTED
P SHIGELLOIDES DNA STL QL NAA+PROBE: NOT DETECTED
PLATELET # BLD AUTO: 504 THOU/MM3 (ref 130–400)
PMV BLD AUTO: 10.3 FL (ref 9.4–12.4)
POTASSIUM SERPL-SCNC: 3.8 MEQ/L (ref 3.5–5.2)
RBC # BLD AUTO: 3.23 MILL/MM3 (ref 4.7–6.1)
RV RNA SPEC QL NAA+PROBE: NOT DETECTED
SALMONELLA DNA SPEC QL NAA+PROBE: NOT DETECTED
SAPOVIRUS RNA SPEC QL NAA+PROBE: NOT DETECTED
SHIGELLA SP+EIEC IPAH ST NAA+NON-PROBE: NOT DETECTED
SODIUM SERPL-SCNC: 138 MEQ/L (ref 135–145)
V CHOLERAE DNA SPEC QL NAA+PROBE: NOT DETECTED
VIBRIO DNA SPEC NAA+PROBE: NOT DETECTED
WBC # BLD AUTO: 22.4 THOU/MM3 (ref 4.8–10.8)
Y ENTERO RECN STL QL NAA+PROBE: NOT DETECTED

## 2025-03-10 PROCEDURE — 6370000000 HC RX 637 (ALT 250 FOR IP): Performed by: STUDENT IN AN ORGANIZED HEALTH CARE EDUCATION/TRAINING PROGRAM

## 2025-03-10 PROCEDURE — 94640 AIRWAY INHALATION TREATMENT: CPT

## 2025-03-10 PROCEDURE — 94669 MECHANICAL CHEST WALL OSCILL: CPT

## 2025-03-10 PROCEDURE — 36415 COLL VENOUS BLD VENIPUNCTURE: CPT

## 2025-03-10 PROCEDURE — 94761 N-INVAS EAR/PLS OXIMETRY MLT: CPT

## 2025-03-10 PROCEDURE — 2500000003 HC RX 250 WO HCPCS

## 2025-03-10 PROCEDURE — 6360000002 HC RX W HCPCS: Performed by: PHARMACIST

## 2025-03-10 PROCEDURE — 92526 ORAL FUNCTION THERAPY: CPT

## 2025-03-10 PROCEDURE — 6370000000 HC RX 637 (ALT 250 FOR IP): Performed by: INTERNAL MEDICINE

## 2025-03-10 PROCEDURE — 87449 NOS EACH ORGANISM AG IA: CPT

## 2025-03-10 PROCEDURE — 83735 ASSAY OF MAGNESIUM: CPT

## 2025-03-10 PROCEDURE — 6370000000 HC RX 637 (ALT 250 FOR IP)

## 2025-03-10 PROCEDURE — 85027 COMPLETE CBC AUTOMATED: CPT

## 2025-03-10 PROCEDURE — 80048 BASIC METABOLIC PNL TOTAL CA: CPT

## 2025-03-10 RX ADMIN — IPRATROPIUM BROMIDE AND ALBUTEROL SULFATE 1 DOSE: .5; 3 SOLUTION RESPIRATORY (INHALATION) at 07:55

## 2025-03-10 RX ADMIN — Medication 1 CAPSULE: at 09:19

## 2025-03-10 RX ADMIN — ASPIRIN 81 MG: 81 TABLET, COATED ORAL at 09:19

## 2025-03-10 RX ADMIN — SODIUM CHLORIDE, PRESERVATIVE FREE 10 ML: 5 INJECTION INTRAVENOUS at 09:20

## 2025-03-10 RX ADMIN — LANSOPRAZOLE 30 MG: 30 TABLET, ORALLY DISINTEGRATING, DELAYED RELEASE ORAL at 06:36

## 2025-03-10 RX ADMIN — Medication 1 TABLET: at 09:20

## 2025-03-10 RX ADMIN — Medication 1250 MG: at 09:30

## 2025-03-10 RX ADMIN — LISINOPRIL 20 MG: 20 TABLET ORAL at 09:19

## 2025-03-10 RX ADMIN — AMLODIPINE BESYLATE 5 MG: 5 TABLET ORAL at 09:20

## 2025-03-10 ASSESSMENT — PAIN SCALES - WONG BAKER: WONGBAKER_NUMERICALRESPONSE: NO HURT

## 2025-03-10 ASSESSMENT — PAIN SCALES - GENERAL: PAINLEVEL_OUTOF10: 0

## 2025-03-10 NOTE — DISCHARGE SUMMARY
Hospital Medicine Discharge Summary      Patient Identification:   Masood Randhawa   : 1950  MRN: [id number]   Account: [id number]      Patient's PCP: Samy Piper DO    Admit Date: 3/4/2025     Discharge Date: 3/10/2025      Admitting Physician: No admitting provider for patient encounter.     Discharge Physician: Emma Castillo MD     Discharge Diagnoses:     RUL Pneumonia/ COPD-Asthma overlap syndrome with exacerbation   Found to be hypoxic in the ED with O2 sat of 80% on room air, requiring high flow nasal cannula. Initial workup concerning for leukocytosis, elevated Pro-Lico, presence of right upper lobe consolidation on chest imaging consistent with CAP. Legionella urine Ag negative. Pneumonia panel + Haemophilus influenzae, Staph with mec gene. Respiratory culture + MRSA. BCX NGTD. S/p Prednisone x 5 days. S/p Vancomycin x 7 days and Ceftriaxone x 5 days.  Previous spirometry without bronchodilator 3/2013 showed FVC 88%, FEV1 77%, FEV1/FVC 70%.  No follow-up with pulmonology after recent discharge.   -Continue Duonebs every 4 hours WA, incentive spirometry and acapella. DME Nebulizer machine on discharge.   -Aspiration precautions, pulmonary hygiene  -Will benefit from pulmonary rehab on discharge, referral sent  -Needs updated PFTs. PFT ordered and Pulm referral placed on discharge.     Leukocytosis, downtrending  WBC 41.2 neutrophil predominance with left shift on admission, now WBC 26. Likely due to sepsis and severe pneumonia in the setting of recent post-surgical reactive process. 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 MBS 3/6 showed \"Laryngeal penetration and tracheal aspiration were observed with thin barium.\"  -Dysphagia diet + TF via PEG per

## 2025-03-10 NOTE — PROGRESS NOTES
Hospitalist Progress Note      Patient:  Masood Randhawa    Unit/Bed:4B-10/010-A  YOB: 1950  MRN: [id number]   Acct: [id number]   PCP: Samy Piper,   Date of Admission: 3/4/2025    Assessment/Plan:    Acute hypoxic respiratory failure/ Sepsis secondary to RUL pneumonia  COPD/ Asthma overlap syndrome with exacerbation   Found to be hypoxic in the ED with O2 sat of 80% on room air, requiring high flow nasal cannula. Initial workup concerning for leukocytosis, elevated Pro-Lico, presence of right upper lobe consolidation on chest imaging consistent with CAP. Legionella urine Ag negative. Pneumonia panel + Haemophilus influenzae, Staph with mec gene. Respiratory culture growing staphylococcus coagulase positive with moderate growth. BCX NGTD. Lactic acidosis resolved.  Previous spirometry without bronchodilator 3/2013 showed FVC 88%, FEV1 77%, FEV1/FVC 70%.  No follow-up with pulmonology after recent discharge .  -Continue Vancomycin, Ceftriaxone, Flagyl for total 5 day course given comorbidities   -Continue Prednisone x 5 days, Duonebs every 4 hours WA, incentive spirometry and acapella   -Aspiration precautions, pulmonary hygiene   -Wean off oxygen as tolerated with SpO2 goal 88-92% (>90% preferably)  -Will benefit from pulmonary rehab on discharge  -Will need to follow-up with pulmonology on discharge, needs updated PFTs     Leukocytosis WBC 40s, downtrending  WBC 41.2 neutrophil predominance with left shift on admission, now WBC 39. 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 MBS 3/6 showed \"Laryngeal penetration and tracheal aspiration were observed with thin barium.\"  -Dysphagia diet + 
                                                   Hospitalist Progress Note      Patient:  Masood Randhawa    Unit/Bed:4B-10/010-A  YOB: 1950  MRN: [id number]   Acct: [id number]   PCP: Samy Piper DO  Date of Admission: 3/4/2025    Assessment/Plan:    Acute hypoxic respiratory failure/ Sepsis secondary to RUL pneumonia  COPD/ Asthma overlap syndrome with exacerbation   Found to be hypoxic in the ED with O2 sat of 80% on room air, requiring high flow nasal cannula. Initial workup concerning for leukocytosis, elevated Pro-Lico, presence of right upper lobe consolidation on chest imaging consistent with CAP. Legionella urine Ag negative. Pneumonia panel + Haemophilus influenzae, Staph with mec gene. Respiratory culture + MRSA. BCX NGTD. Lactic acidosis resolved.  Previous spirometry without bronchodilator 3/2013 showed FVC 88%, FEV1 77%, FEV1/FVC 70%.  No follow-up with pulmonology after recent discharge .  -Discontinue Flagyl  -Continue Vancomycin and Ceftriaxone for total 5 day course given comorbidities   -Continue Prednisone x 5 days, Duonebs every 4 hours WA, incentive spirometry and acapella   -Aspiration precautions, pulmonary hygiene   -Wean off oxygen as tolerated with SpO2 goal 88-92% (>90% preferably)  -Will benefit from pulmonary rehab on discharge  -Will need to follow-up with pulmonology on discharge, needs updated PFTs     Leukocytosis, downtrending  WBC 41.2 neutrophil predominance with left shift on admission, now WBC 26. 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 MBS 3/6 showed \"Laryngeal penetration and tracheal aspiration were observed with thin 
                                [facility] Adult Intravenous to Oral Protocol    pantoprazole changed to PO per [facility] P&T IV to PO protocol.    Patient meets criteria based on the following:  Tolerating diet more advanced than clear liquids  Tolerating other oral medications  Not on vasopressors  No nausea/vomiting within past 24 hours  No active GI bleed  No gastrectomy, gastric outlet or bowel obstruction, ileus, malabsorption syndrome  No seizures for 72 hours (antiepileptics only)  8.  Patient has feeding tube    Thank you,  Teresa Melchor R.Ph., BCPS., 3/8/2025,9:07 AM      
  Pharmacy Note - Extended Infusion Beta-Lactam Dose Adjustment    Cefepime 2000 mg q12h intermittent infusion for treatment of Community acquired pneumonia. Per [facility] Extended Infusion Beta-Lactam Policy, cefepime will be changed to 2000 mg loading dose followed by 2000 mg q12h extended infusion    Estimated Creatinine Clearance: Estimated Creatinine Clearance: 56 mL/min (based on SCr of 0.9 mg/dL).    Dialysis Status, QUENTIN, CKD: N/A    BMI: Body mass index is 19.86 kg/m².    Rationale for Adjustment: Dose adjusted per [facility] Extended Infusion Policy based on renal function and indication. The above medication is renally eliminated and demonstrates time-dependent effects on bacterial eradication. Extended-infusion dosing strategy aims to enhance microbiologic and clinical efficacy.     Pharmacy will monitor renal function daily and adjust dose as necessary.    Please call with any questions.    Thank you,  Gladys aBrron, PharmD  PGY2 Resident   3/4/2025 3:11 PM      
Advance Care Planning     Advance Care Planning Inpatient Note  Spiritual Care Department    Today's Date: 3/5/2025  Unit: [facility] CCU 3A    Received request from IDT Member.  Upon review of chart and communication with care team, Spiritual Care will defer advance care planning with patient at this time.. Patient was/were present in the room during visit.    Goals of ACP Conversation:  Discuss advance care planning documents    Health Care Decision Makers:       Primary Decision Maker: Soraya Randhawa - Spouse - [phone]  Summary:  No Decision Maker named by patient at this timeNone     Assessment:  This is a Spiritual Consult to help Masood, a 74 year old male to complete ACP document and encourage him to name his POA/Decision makers. Patient is also encouraged to tell his preferred medical code status for when and if patient was to get into high level medical crisis and not able to speak for himself. This  shared the [place] DNR Decision Tree with DNR-CCA and DNR-CC options available to patient. Patient would like to wait until his wife is in the hospital to be part of the conversation.  Patient is aware that spiritual Health Chaplains will return to complete this consult. Spiritual Health  service remain available to patient at this time.      Interventions:  Encouraged ongoing ACP conversation with future decision makers and loved ones    Care Preferences Communicated:   No    Outcomes/Plan:  ACP Discussion: Postponed    Electronically signed by FLETCHER Morales on 3/5/2025 at 4:56 PM           
[facility]  INPATIENT SPEECH THERAPY  STRZ CVICU 4B  DAILY NOTE    Discharge Recommendations: Home Health    SLP Individual Minutes  Time In: 1143  Time Out: 1224  Minutes: 41  Timed Code Treatment Minutes: 0 Minutes       Date: 3/10/2025  Patient Name: Masood Randhawa      CSN: [id number]   : 1950  (74 y.o.)  Gender: male   Referring Physician:  Emma Castillo MD  Diagnosis: Acute hypoxic respiratory failure (HCC)  Precautions: fall risk, HIGH aspiration risk   Current Diet: PEG with pleasure feeds of puree + thin liquids STRICT R Head turn  Respiratory Status: Nasal Canula: 3LPM  Swallowing Strategies:  Small Bite/Sip, Head Turn to RIGHT, Alternate Solids and Liquids, Limit Distractions, Monitor for Fatigue, and cough every 2-3 bites/sips   Date of Last MBS/FEES:  25    Pain:  No pain reported.    Subjective:  Session approved by Derick CARDOZA. Reports plans for patient discharge this date. Wife with questions r/t how to puree foods, stipulations for pleasure feedings and ongoing ST follow up at discharge. Upon ST arrival, patient seen upright in bed with wife present. Pleasant and cooperative.     Wife was provided with detailed handouts and verbal explanation on how to puree foods as well as determining appropriate consistency/texture with utilization of fork and spoon testing (I.e. does not pass through prongs of fork, easily slides off spoon when tilted). Written instructions on how to puree various food textures (I.e. meats, vegetables, fruits, desserts, etc) provided along with appropriate liquid additives to assist with blending (I.e. stock/broths, water, milk) and thickening agents (I.e. cornstarch, flour) as needed. Education re: need for homogenous texture for preparation of soups and blended items without presence of clumps. Re-iterated importance for utilization of R head turn with PO consumption. ST recommendations for pleasure feedings 3x/day (breakfast, lunch, dinner) with 
Comprehensive Nutrition Assessment    Type and Reason for Visit:  Initial, Consult, Nutrition support (Tube Feeding Ordering and Management)    Nutrition Recommendations/Plan:   Recommend to remain NPO pending SLP evaluation - wife notes she had been giving pt pudding and applesauce at home as he was taking PO meds  Messaged attending regarding TF start - Will start continuous feeding while on HFNC and resume bolus feedings when appropriate  Start Jevity 1.5 at 10 mL/hr, advancing 10 mL/hr every 8 hours as tolerated to reach goal rate of 50 mL/hr for 24 hours.   Additional free H2O per provider - recommend 100 mL q 4 hours  When able to resume bolus feedings, will resume home regimen (confirmed with [facility] yesterday): 237 mL bolus of Jevity 1.5, 5x/day (suggested times 8AM, 11AM, 2PM, 5PM, & 8PM); Flush 50 mL free H2O before and 50 mL free H2O after each bolus feeding  Recommend probiotic and MVI  Pt with buttocks wound - will monitor for additional protein needs as appropriate.   Pt had outpatient dietitian appointment 3/3 but was unable to make it - pt will need to follow with Jacqueline Levine upon discharge for tube feeding management.      Malnutrition Assessment:  Malnutrition Status:  At risk for malnutrition (03/05/25 1121)    Context:  Chronic Illness     Findings of the 6 clinical characteristics of malnutrition:  Energy Intake:   (meeting 100% of nutrition needs via PEG enteral feedings)  Weight Loss:   (significant losses over last 6 months; appears to have gained ~3# since 2/9/25)     Body Fat Loss:  Severe body fat loss (noted prior to enteral feeding start; now meeting 100% of estimated nutritional needs via TF) Orbital, Buccal region, Triceps, Fat Overlying Ribs   Muscle Mass Loss:  Severe muscle mass loss (noted prior to enteral feeding start; now meeting 100% of estimated nutritional needs via TF) Temples (temporalis), Clavicles (pectoralis & deltoids), Scapula (trapezius), Thigh (quadriceps), Calf 
Comprehensive Nutrition Assessment    Type and Reason for Visit:  Reassess, Consult, Patient education (tubefeeding, pleasure feed (1-2 items/tray) pureed diet)    Nutrition Recommendations/Plan:   Home tubefeeding and pleasure feeding education provided to patient and wife in conjunction with SLP present. Written tubefeeding material provided as well.  [facility] providing home tubefeeding supplies, adequate supplies at home at present per spouse.  Encouraged pt/spouse to follow up with Jacqueline Levine outpatient dietitian (previously patient had appointment 3/3/25 and was unable to make it).  Continue current home TF regimen: Home bolus regimen is: 237 mL bolus of Jevity 1.5, 5x/day (suggested times 8AM, 11AM, 2PM, 5PM, & 8PM); Flush 50 mL free H2O before and 50 mL free H2O after each bolus feeding (confirmed with [facility] on 3/4).  Pleasure pureed feedings 1-2 items only per meal, SLP provided extensive education, written material given as well.  Planning OP SLP.   Continue probiotic and MVI.   Further ENT surgery planned 3/14/25  as outpatient.     Malnutrition Assessment:  Malnutrition Status:  At risk for malnutrition (03/05/25 1121)    Context:  Chronic Illness     Findings of the 6 clinical characteristics of malnutrition:  Energy Intake:   (meeting 100% of nutrition needs via PEG enteral feedings)  Weight Loss:   (significant losses over last 6 months; appears to have gained ~3# since 2/9/25)     Body Fat Loss:  Severe body fat loss (noted prior to enteral feeding start; now meeting 100% of estimated nutritional needs via TF) Orbital, Buccal region, Triceps, Fat Overlying Ribs   Muscle Mass Loss:  Severe muscle mass loss (noted prior to enteral feeding start; now meeting 100% of estimated nutritional needs via TF) Temples (temporalis), Clavicles (pectoralis & deltoids), Scapula (trapezius), Thigh (quadriceps), Calf (gastrocnemius)  Fluid Accumulation:  No fluid accumulation     Strength:  Not Performed    Nutrition 
Comprehensive Nutrition Assessment    Type and Reason for Visit: Reassess, Nutrition support    Nutrition Recommendations/Plan:   Continue diet per SLP recommendations.   Messaged attending, ok to transition continuous tube feeds to bolus tube feeds now that he is off of HFNC.   Home bolus regimen is: 237 mL bolus of Jevity 1.5, 5x/day (suggested times 8AM, 11AM, 2PM, 5PM, & 8PM); Flush 50 mL free H2O before and 50 mL free H2O after each bolus feeding (confirmed with [facility] on 3/4).  Continue probiotic and MVI  Pt with buttocks wound - will monitor for additional protein needs as appropriate.   Pt had outpatient dietitian appointment 3/3 but was unable to make it - pt will need to follow with Jacqueline Levine upon discharge for tube feeding management.      Malnutrition Assessment:  Malnutrition Status: At risk for malnutrition  Context: Chronic Illness       Findings of the 6 clinical characteristics of malnutrition:  Energy Intake:   (meeting 100% of nutrition needs via PEG enteral feedings)  Weight Loss:   (significant losses over last 6 months; appears to have gained ~3# since 2/9/25)     Body Fat Loss:  Severe body fat loss (noted prior to enteral feeding start; now meeting 100% of estimated nutritional needs via TF) Orbital, Buccal region, Triceps, Fat Overlying Ribs   Muscle Mass Loss:  Severe muscle mass loss (noted prior to enteral feeding start; now meeting 100% of estimated nutritional needs via TF) Temples (temporalis), Clavicles (pectoralis & deltoids), Scapula (trapezius), Thigh (quadriceps), Calf (gastrocnemius)  Fluid Accumulation:  No fluid accumulation     Strength:  Not Performed    Nutrition Assessment:    Pt improving from a nutritional standpoint AEB tolerating continuous tube feeds and now approved to transition to bolus tube feeds, SLP approved for modified diet. Remains at risk for further nutritional compromise r/t admit with RUL PNA, COPD exacerbation, SCC of glottis - pending bilateral 
[facility]  INPATIENT OCCUPATIONAL THERAPY  STRZ CVICU 4B  EVALUATION      Discharge Recommendations: Home with assist PRN, Home with Home health OT (pending continued progress towards goals)  Equipment Recommendations:   will continue to assess pending progress;    Time In: 737  Time Out: 834  Timed Code Treatment Minutes: 42 Minutes  Minutes: 57          Date: 3/6/2025  Patient Name: Masood Randhawa,   Gender: male      MRN: [id number]  : 1950  (74 y.o.)  Referring Practitioner: Jarett Gorman DO  Diagnosis: Acute hypoxic respiratory failure (HCC)  Additional Pertinent Hx: Per H&P on 3/4: Masood Randhawa is a 74 y.o. male with PMHx of COPD, hypertension, GERD, SCC of glottis who presents to [facility] for evaluation and treatment of new onset worsening shortness of breath.  Patient states the symptoms began approximately 24 to 48 hours before he arrived.  On 3/2/2025 he started to have some overall fatigue and was not feeling good.  On 3/3/2025 patient had new onset productive cough.  He denies any known exposure no family friends coworkers have similar colds or infection.  Patient has no other associated symptoms besides fatigue and shortness of breath.  At baseline patient is not on supplemental oxygen but he noted when he was home his oxygen saturation was low and he presented to the ED for further evaluation.  On arrival patient's oxygen was found to be low in the 80s and he was quickly escalated to heated high flow nasal cannula.  CT scan was obtained that showed right upper lobe consolidation concerning for community-acquired pneumonia.  Patient did receive broad-spectrum antibiotics of vancomycin as well as Rocephin.  Patient does have past medical history pertinent for SCC of larynx status post resection which was several weeks ago with plan for further resection in about 1 week.    Restrictions/Precautions:  Restrictions/Precautions: Fall Risk, 
[facility]  SPEECH THERAPY  STRZ CCU 3A  Clinical Swallow Evaluation    Discharge Recommendations:  Pending results of instrumental assessment   DIET ORDER RECOMMENDATIONS AFTER EVALUATION: NPO, Continue enteral nutrition via PEG  Strategies:  Recommend NPO     SLP Individual Minutes  Time In: 1501  Time Out: 1511  Minutes: 10  Timed Code Treatment Minutes: 0 Minutes       Date: 3/5/2025  Patient Name: Masood Randhawa      CSN: [id number]   : 1950  (74 y.o.)  Gender: male   Referring Physician:  Jarett Gorman DO    Diagnosis: Acute hypoxic respiratory failure (HCC)    History of Present Illness/Injury: Patient admitted with the above diagnosis. Per physician H&P: \"Masood Randhawa is a 74 y.o. male with PMHx of COPD, hypertension, GERD, SCC of glottis who presents to [facility] for evaluation and treatment of new onset worsening shortness of breath. Patient states the symptoms began approximately 24 to 48 hours before he arrived. On 3/2/2025 he started to have some overall fatigue and was not feeling good. On 3/3/2025 patient had new onset productive cough. He denies any known exposure no family friends coworkers have similar colds or infection. Patient has no other associated symptoms besides fatigue and shortness of breath. At baseline patient is not on supplemental oxygen but he noted when he was home his oxygen saturation was low and he presented to the ED for further evaluation. On arrival patient's oxygen was found to be low in the 80s and he was quickly escalated to heated high flow nasal cannula. CT scan was obtained that showed right upper lobe consolidation concerning for community-acquired pneumonia. Patient did receive broad-spectrum antibiotics of vancomycin as well as Rocephin. Patient does have past medical history pertinent for SCC of larynx status post resection which was several weeks ago with plan for further resection in about 1 week. Patient was seen 
[facility]   Pharmacy Pharmacokinetic Monitoring Service - Vancomycin     Masood Randhawa is a 74 y.o. male starting on vancomycin therapy for Pneumonia. Pharmacy consulted by Dr. Lebron for monitoring and adjustment.    Target Concentration: Goal AUC/JACQUES 400-600 mg*hr/L    Additional Antimicrobials: Cefepime, Flagyl    Pertinent Laboratory Values:   Wt Readings from Last 1 Encounters:   03/04/25 55 kg (121 lb 4.1 oz)     Temp Readings from Last 1 Encounters:   03/04/25 97.8 °F (36.6 °C)     Estimated Creatinine Clearance: 55 mL/min (based on SCr of 0.9 mg/dL).  Recent Labs     03/04/25  1158   CREATININE 0.9   BUN 34*   WBC 41.2*     Pertinent Cultures:  Date Source Results   3/3/25 BC x2 sent   3/3/25 sputum sent       Plan:  Dosing recommendations based on Bayesian software  Start vancomycin 1250 mg loading dose, followed by 1000 mg IV q18h  Anticipated AUC of 511 and trough concentration of 12.9 at steady state  Renal labs as indicated   Pharmacy will continue to monitor patient and adjust therapy as indicated    Thank you for the consult,  Thank you,  Mike Veras [facility]  3/4/2025, 8:31 PM        
[facility]   Pharmacy Pharmacokinetic Monitoring Service - Vancomycin    Indication: Pneumonia  Target Concentration: Goal AUC/JACQUES 400-600 mg*hr/L  Day of Therapy: 4  Additional Antimicrobials: Rocephin     Pertinent Laboratory Values:   Wt Readings from Last 1 Encounters:   03/07/25 55 kg (121 lb 4.1 oz)     Temp Readings from Last 1 Encounters:   03/07/25 98.6 °F (37 °C) (Oral)     Estimated Creatinine Clearance: 99 mL/min (A) (based on SCr of 0.5 mg/dL (L)).  Recent Labs     03/06/25  0345 03/07/25  0358   CREATININE 0.5* 0.5*   BUN 33* 22   WBC 39.0* 26.0*     Pertinent Cultures:  Date Source Results   3/4/25 BC x2 NGTD   3/3/25 sputum MRSA   3/5/25 PNA Panel MRSA, H Flu    MRSA Nasal Swab: showed MRSA positive result on 3/5/25     Recent vancomycin administrations                     vancomycin (VANCOCIN) 1,000 mg in sodium chloride 0.9 % 250 mL IVPB ([id number]) (mg) 1,000 mg New Bag 03/06/25 1953     1,000 mg New Bag  1020     1,000 mg New Bag 03/05/25 2055    vancomycin (VANCOCIN) 1,000 mg in sodium chloride 0.9 % 250 mL IVPB ([id number]) (mg) 1,000 mg New Bag 03/05/25 0809    vancomycin (VANCOCIN) 1250 mg in sodium chloride 0.9% 250 mL IVPB (mg) 1,250 mg New Bag 03/04/25 1323                  Assessment:  Date/Time Current Dose Concentration Timing of Concentration AUC C trough,ss   3/7/25 1000 mg IVPB Q12H 14.5 mcg/mL ~ 8 hr after last dose 457 10.3 mcg/mL   Note: Serum concentrations collected for AUC dosing may appear elevated if collected in close proximity to the dose administered, this is not necessarily an indication of toxicity    Plan:  Current dosing regimen is therapeutic  Continue current dose of Vancomycin 1000 mg IVPB Q12H  Repeat vancomycin concentration to be ordered in a few days based on clinical outlook of patient  Pharmacy will monitor renal function daily and adjust therapy as indicated.    Thank you for the consult,    Cr Matthews, OsbaldoD, BCPS  3/7/2025  8:18 AM      
[facility]   Pharmacy Pharmacokinetic Monitoring Service - Vancomycin    Indication: PNA  Target Concentration: Goal AUC/JACQUES 400-600 mg*hr/L  Day of Therapy: 6  of 7  Additional Antimicrobials: Rocephin    Pertinent Laboratory Values:   Wt Readings from Last 1 Encounters:   03/07/25 55 kg (121 lb 4.1 oz)     Temp Readings from Last 1 Encounters:   03/09/25 98 °F (36.7 °C) (Oral)     Estimated Creatinine Clearance: 124 mL/min (A) (based on SCr of 0.4 mg/dL (L)).  Recent Labs     03/08/25  0344 03/09/25  0347   CREATININE 0.4* 0.4*   BUN 17 22   WBC 26.8* 26.6*     Pertinent Cultures:  Date Source Results   3/4/25 BC x2 NGTD   3/3/25 sputum MRSA   3/5/25 PNA Panel MRSA, H Flu        Recent vancomycin administrations                     vancomycin (VANCOCIN) 1,000 mg in sodium chloride 0.9 % 250 mL IVPB ([id number]) (mg) 1,000 mg New Bag 03/08/25 2158     1,000 mg New Bag  0912     1,000 mg New Bag 03/07/25 2145     1,000 mg New Bag  0913     1,000 mg New Bag 03/06/25 1953     1,000 mg New Bag  1020                  Assessment:  Date/Time Current Dose Concentration Timing of Concentration AUC C trough,ss   3/9/25 @ 3047 1000 mg q12h 19 ~ 5 hr post dose 416 8.6   Note: Serum concentrations collected for AUC dosing may appear elevated if collected in close proximity to the dose administered, this is not necessarily an indication of toxicity    Plan:  Current dosing regimen is sub-therapeutic  Increase dose to vancomycin to 1250mg q12h for estimated AUC of 516 & trough of 10.9  Pharmacy will monitor renal function daily and adjust therapy as indicated.    Thank you for the consult,  Teresa LEIJA.Ph., BCPS., 3/9/2025,6:39 AM      
[facility]  INPATIENT PHYSICAL THERAPY  EVALUATION  [facility] CVICU 4B - 4B-10/010-A    Discharge Recommendations: Continue to assess pending progress (anticipate return home)  Equipment Recommendations: No               Time In: 0853  Time Out: 0910  Timed Code Treatment Minutes: 9 Minutes  Minutes: 17          Date: 3/6/2025  Patient Name: Masood Randhawa,  Gender:  male        MRN: [id number]  : 1950  (74 y.o.)      Referring Practitioner: Jarett Gorman DO  Diagnosis: Acute hypoxic respiratory failure (HCC)  Additional Pertinent Hx: 74 y.o. male with PMHx of COPD, hypertension, GERD, SCC of glottis who presents to [facility] for evaluation and treatment of new onset worsening shortness of breath.  Patient states the symptoms began approximately 24 to 48 hours before he arrived.  On 3/2/2025 he started to have some overall fatigue and was not feeling good.  On 3/3/2025 patient had new onset productive cough.  He denies any known exposure no family friends coworkers have similar colds or infection.  Patient has no other associated symptoms besides fatigue and shortness of breath.  At baseline patient is not on supplemental oxygen but he noted when he was home his oxygen saturation was low and he presented to the ED for further evaluation.  On arrival patient's oxygen was found to be low in the 80s and he was quickly escalated to heated high flow nasal cannula.  CT scan was obtained that showed right upper lobe consolidation concerning for community-acquired pneumonia.  Patient did receive broad-spectrum antibiotics of vancomycin as well as Rocephin.  Patient does have past medical history pertinent for SCC of larynx status post resection which was several weeks ago with plan for further resection in about 1 week.  Patient was seen evaluate emergency department where he states he is resting comfortably.  He is on heated high flow nasal cannula but denies any respiratory symptoms.  
Pt discharge instructions reviewed with wife at bedside, pt and wife spoke with dietician and speech therapist prior to d/c regarding tube feeding/food recommendations, also educated pt on medication changes, follow-up appointments, and what to do in case of emergency. Pt and wife voiced understanding, no further questions or concerns at this time. IV's removed, telemetry removed, all pt belongings gathered and sent with pt. Pt escorted via wheelchair out to ride home with wife.  
[facility]  INPATIENT SPEECH THERAPY  [facility] CVICU 4B  DAILY NOTE    Discharge Recommendations: Home Health    SLP Individual Minutes  Time In: 1457  Time Out: 1510  Minutes: 13  Timed Code Treatment Minutes: 0 Minutes       Date: 3/7/2025  Patient Name: Masood Randhawa      CSN: [id number]   : 1950  (74 y.o.)  Gender: male   Referring Physician:  Emma Castillo MD  Diagnosis: Acute hypoxic respiratory failure (HCC)  Precautions: fall risk, HIGH aspiration risk   Current Diet: PEG with pleasure feeds of puree + thin liquids STRICT R Head turn  Respiratory Status: Nasal Canula: 3LPM  Swallowing Strategies:  Small Bite/Sip, Head Turn to RIGHT, Alternate Solids and Liquids, Limit Distractions, Monitor for Fatigue, and cough every 2-3 bites/sips   Date of Last MBS/FEES:  25    Pain:  No pain reported.    Subjective:  Patient seen with RN approval. Following session, RN reports patient's spouse inquiring about puree diet. Wife asking RN if \"hospital will provide\" puree solids at discharge. Will continue to provide education.  Patient upright in bed, pleasant and cooperative with ST assessment. No family present at bedside.     Short-Term Goals:  SHORT TERM GOAL #1:  Goal 1: Patient will consume PLEASURE feeds of puree and thin liquids with STRICT adherence to head turn to RIGHT with no overt s/s of aspiration or pulmonary distress to aide in satiation and QOL measures  INTERVENTIONS: Patient with independent recall of need for R head turn for PO intake. Consumption of thin liquids via cup x2 largely unremarkable with R head turn. Subsequent trials (x2) with R head turn resulting in immediate cough/gag with anterior expulsion of partial bolus/sputum into cup. HIGHLY suspect airway invasion events occurring; however, certainly cannot fully determine without supportive imaging.     High level of skilled education provided r/t findings from previous date's MBS. Discussed potential outcomes of 
Spiritual Health History and Assessment/Progress Note  [facility]    Advance Care Planning,  ,  ,      Name: Masood Randhawa MRN: [id number]    Age: 74 y.o.     Sex: male   Language: English   Yarsanism: Hindu   Acute hypoxic respiratory failure (HCC)     Date: 3/7/2025            Total Time Calculated: (P) 56 min              Spiritual Assessment continued in [facility] CVICU 4B        Referral/Consult From: Nurse   Encounter Overview/Reason: Advance Care Planning  Service Provided For: Patient, Significant other, Patient and family together    Inge, Belief, Meaning:   Patient unable to assess at this time  Family/Friends Other: Unable      Importance and Influence:  Patient unable to assess at this time  Family/Friends Other: Unable    Community:  Patient feels well-supported. Support system includes: Spouse/Partner, Children, and Extended family  Family/Friends feel well-supported. Support system includes: Spouse/Partner and Children    Assessment and Plan of Care:     Patient Interventions include: Assisted in Advance Care Planning conversation  Family/Friends Interventions include: Assisted in Advance Care Planning conversation    Patient Plan of Care: Spiritual Care available upon further referral  Family/Friends Plan of Care: Spiritual Care available upon further referral    Electronically signed by Chaplain Poly on 3/7/2025 at 1:06 PM    
[facility]  OCCUPATIONAL THERAPY MISSED TREATMENT NOTE  [facility] CVICU 4B  [id number]-10/[id number]      Date: 3/7/2025  Patient Name: Masood Randhawa        CSN: [id number]   : 1950  (74 y.o.)  Gender: male   Referring Practitioner: Jarett Gorman DO            REASON FOR MISSED TREATMENT:  Patient laying in bed upon arrival; politely declines at this time stating \"I had a rough night last night\".  Will attempt next available time.                  
Tube feeding Jevity 1.5 started at 10 mL/hour started at this time. Due to increase tube feeding at 2100 per order as patient tolerates.   
right upper lobe consolidation concerning for community-acquired pneumonia.  Patient did receive broad-spectrum antibiotics of vancomycin as well as Rocephin.  Patient does have past medical history pertinent for SCC of larynx status post resection which was several weeks ago with plan for further resection in about 1 week.  Patient was seen evaluate emergency department where he states he is resting comfortably.  He is on heated high flow nasal cannula but denies any respiratory symptoms.  He fever, chills, difficulty breathing, cough at this time.  No previous respiratory culture and pneumonia molecular panel was obtained, this is ordered for further evaluation and further direction of antibiotics.      Hospital Course  3/4: Patient admitted for hypoxia and shortness of breath.  Started on broad-spectrum antibiotics.  Concern for pneumonia.      has a past medical history of BPH (benign prostatic hyperplasia), Chronic back pain, COPD (chronic obstructive pulmonary disease) ([facility]), Erectile dysfunction, Gastroesophageal reflux disease with apnea without esophagitis, Head and neck cancer (HCC), Hypertension, Osteoarthritis, Personal history of colonic polyps, PVD (peripheral vascular disease), and PVD (peripheral vascular disease).  Current Diet: NPO+PEG    Pain: No pain reported.    SUBJECTIVE:  Patient seen in fluoro suite in wheelchair accompanied by RNDerick. Patient pleasant and cooperative with assessment. Ongoing dysphonia evident     OBJECTIVE:    Respiratory Status:  Nasal Canula:      Behavioral Observation:  Alert and Oriented    PATIENT WAS EVALUATED USING:  Barium:Thin Liquids, Mildly Thick Liquids, and Puree    ORAL PHASE BERTA SCORE: (Dysphagia outcome and severity scale)  6 = WFL/Modified Independent - Normal Diet - May have mild oral delay    PHARYNGEAL PHASE BERTA SCORE: (Dysphagia outcome and severity scale)  3 = Moderate Dysphagia - Two or more diet consistencies restricted - May exhibit one or more 
Numerous patchy reticular nodular airspace opacities are noted   in the right lower lobe, likely infectious/inflammatory. Close clinical   follow-up and a 3-month follow-up CT thorax is advised to assess for resolution.      2. Chronic findings are discussed.            **This report has been created using voice recognition software.  It may contain   minor errors which are inherent in voice recognition technology.**      Electronically signed by Dr. Aby Bueno      XR CHEST PORTABLE   Final Result   1. Lungs hyperinflated. Canesten COPD. Normal heart size.   2. Severe atelectasis/pneumonia involving the entire right upper lobe. Question   mild additional infiltrate right lung base. Question tiny effusion right side.            **This report has been created using voice recognition software.  It may contain   minor errors which are inherent in voice recognition technology.**      Electronically signed by Dr. Fredo Strickland        CTA Chest W/WO Contrast Pulmonary Embolism  Eval    Result Date: 3/4/2025  PROCEDURE: CTA CHEST W WO CONTRAST CLINICAL INFORMATION: Shortness of breath. Abnormal chest x-ray. Pneumonia. COMPARISON: CT chest 2/8/2025. Chest x-ray 3/4/2025. TECHNIQUE: 1.5 mm axial images were obtained through the chest after the administration of IV contrast. A non-contrast localizer was obtained. 2mm MIP reconstructions of the chest performed in coronal and sagittal planes. All CT scans at this facility use dose modulation, iterative reconstruction, and/or weight based dosing when appropriate to reduce the radiation dose to as low as reasonably achievable. CONTRAST: 80 cc Isovue-370. FINDINGS: Pulmonary arteries: No filling defects are identified within the pulmonary arterial vasculature to suggest the presence of pulmonary embolus. Heart/mediastinum: The heart size is normal. Coronary artery calcifications are observed. No pericardial effusion is identified. No aortic aneurysm or dissection is present. 
again there is extensive   consolidation in the right upper lobe suggesting pneumonia in the appropriate   clinical setting. Numerous patchy reticular nodular airspace opacities are noted   in the right lower lobe, likely infectious/inflammatory. Close clinical   follow-up and a 3-month follow-up CT thorax is advised to assess for resolution.      2. Chronic findings are discussed.            **This report has been created using voice recognition software.  It may contain   minor errors which are inherent in voice recognition technology.**      Electronically signed by Dr. Aby Bueno      XR CHEST PORTABLE   Final Result   1. Lungs hyperinflated. Canesten COPD. Normal heart size.   2. Severe atelectasis/pneumonia involving the entire right upper lobe. Question   mild additional infiltrate right lung base. Question tiny effusion right side.            **This report has been created using voice recognition software.  It may contain   minor errors which are inherent in voice recognition technology.**      Electronically signed by Dr. Fredo Strickland        CTA Chest W/WO Contrast Pulmonary Embolism  Eval    Result Date: 3/4/2025  PROCEDURE: CTA CHEST W WO CONTRAST CLINICAL INFORMATION: Shortness of breath. Abnormal chest x-ray. Pneumonia. COMPARISON: CT chest 2/8/2025. Chest x-ray 3/4/2025. TECHNIQUE: 1.5 mm axial images were obtained through the chest after the administration of IV contrast. A non-contrast localizer was obtained. 2mm MIP reconstructions of the chest performed in coronal and sagittal planes. All CT scans at this facility use dose modulation, iterative reconstruction, and/or weight based dosing when appropriate to reduce the radiation dose to as low as reasonably achievable. CONTRAST: 80 cc Isovue-370. FINDINGS: Pulmonary arteries: No filling defects are identified within the pulmonary arterial vasculature to suggest the presence of pulmonary embolus. Heart/mediastinum: The heart size is normal. 
pleural effusion or pneumothorax is identified. The left lung is clear. Upper abdomen: No acute findings are visualized in the limited images through the upper abdomen. Musculoskeletal: Multilevel degenerative disc disease is observed. The visualized skeletal structures appear intact.     1. No filling defects are noted within the pulmonary arterial vasculature to suggest the presence of pulmonary embolus. Once again there is extensive consolidation in the right upper lobe suggesting pneumonia in the appropriate clinical setting. Numerous patchy reticular nodular airspace opacities are noted in the right lower lobe, likely infectious/inflammatory. Close clinical follow-up and a 3-month follow-up CT thorax is advised to assess for resolution. 2. Chronic findings are discussed. **This report has been created using voice recognition software.  It may contain minor errors which are inherent in voice recognition technology.** Electronically signed by Dr. Aby Bueno    XR CHEST PORTABLE    Result Date: 3/4/2025  PROCEDURE: XR CHEST PORTABLE CLINICAL INFORMATION: sob\ COMPARISON: 2/12/2025 TECHNIQUE: A single mobile view of the chest was obtained.     1. Lungs hyperinflated. Canesten COPD. Normal heart size. 2. Severe atelectasis/pneumonia involving the entire right upper lobe. Question mild additional infiltrate right lung base. Question tiny effusion right side. **This report has been created using voice recognition software.  It may contain minor errors which are inherent in voice recognition technology.** Electronically signed by Dr. Fredo Strickland      Time Spent is more than 35 minutes in the examination, evaluation, counseling and review of medications and plan.    Electronically signed by Emma Castillo MD 3/8/2025

## 2025-03-10 NOTE — PLAN OF CARE
Problem: Discharge Planning  Goal: Discharge to home or other facility with appropriate resources  3/10/2025 0132 by Aruna Melchor RN  Outcome: Progressing  Flowsheets (Taken 3/10/2025 0132)  Discharge to home or other facility with appropriate resources:   Identify barriers to discharge with patient and caregiver   Identify discharge learning needs (meds, wound care, etc)   Refer to discharge planning if patient needs post-hospital services based on physician order or complex needs related to functional status, cognitive ability or social support system   Arrange for needed discharge resources and transportation as appropriate  3/9/2025 1654 by Mili López RN  Outcome: Progressing     Problem: Safety - Adult  Goal: Free from fall injury  3/10/2025 0132 by Aruna Melchor RN  Outcome: Progressing  Flowsheets (Taken 3/10/2025 0132)  Free From Fall Injury: Instruct family/caregiver on patient safety  3/9/2025 1654 by Mili López RN  Outcome: Progressing     Problem: Nutrition Deficit:  Goal: Optimize nutritional status  3/10/2025 0132 by Aruna Melchor RN  Outcome: Progressing  Flowsheets (Taken 3/10/2025 0132)  Nutrient intake appropriate for improving, restoring, or maintaining nutritional needs:   Assess nutritional status and recommend course of action   Monitor oral intake, labs, and treatment plans   Recommend appropriate diets, oral nutritional supplements, and vitamin/mineral supplements   Provide specific nutrition education to patient or family as appropriate  3/9/2025 1654 by Mili López RN  Outcome: Progressing     Problem: Chronic Conditions and Co-morbidities  Goal: Patient's chronic conditions and co-morbidity symptoms are monitored and maintained or improved  3/10/2025 0132 by Aruna Melchor RN  Outcome: Progressing  Flowsheets (Taken 3/10/2025 0132)  Care Plan - Patient's Chronic Conditions and Co-Morbidity Symptoms are Monitored and Maintained or Improved:   Monitor and

## 2025-03-10 NOTE — TELEPHONE ENCOUNTER
Joyce de la torre/ [facility] called and said that patient was receiving speech therapy services while he was admitted. Do you still ant him to receive these services and if so could you place an order for this. Joyce would like to be notified either way so we need to contact her once we know.

## 2025-03-11 ENCOUNTER — PREP FOR PROCEDURE (OUTPATIENT)
Dept: ENT CLINIC | Age: 75
End: 2025-03-11

## 2025-03-11 ENCOUNTER — TELEPHONE (OUTPATIENT)
Dept: ENT CLINIC | Age: 75
End: 2025-03-11

## 2025-03-11 ENCOUNTER — TELEPHONE (OUTPATIENT)
Dept: FAMILY MEDICINE CLINIC | Age: 75
End: 2025-03-11

## 2025-03-11 ENCOUNTER — CARE COORDINATION (OUTPATIENT)
Dept: CARE COORDINATION | Age: 75
End: 2025-03-11

## 2025-03-11 DIAGNOSIS — Z87.891 HISTORY OF SMOKING GREATER THAN 50 PACK YEARS: ICD-10-CM

## 2025-03-11 DIAGNOSIS — R49.0 HOARSENESS OF VOICE: Primary | ICD-10-CM

## 2025-03-11 DIAGNOSIS — C32.0 SQUAMOUS CELL CARCINOMA OF GLOTTIS (HCC): ICD-10-CM

## 2025-03-11 DIAGNOSIS — R09.89 ABNORMAL FINDINGS ON LARYNGOSCOPY: ICD-10-CM

## 2025-03-11 DIAGNOSIS — G89.3 CANCER ASSOCIATED PAIN: ICD-10-CM

## 2025-03-11 DIAGNOSIS — R59.0 LYMPHADENOPATHY OF HEAD AND NECK REGION: ICD-10-CM

## 2025-03-11 DIAGNOSIS — R65.10 SIRS (SYSTEMIC INFLAMMATORY RESPONSE SYNDROME) (HCC): Primary | ICD-10-CM

## 2025-03-11 DIAGNOSIS — R63.4 UNINTENTIONAL WEIGHT LOSS: ICD-10-CM

## 2025-03-11 PROCEDURE — 1111F DSCHRG MED/CURRENT MED MERGE: CPT | Performed by: FAMILY MEDICINE

## 2025-03-11 RX ORDER — SODIUM CHLORIDE FOR INHALATION 0.9 %
3 VIAL, NEBULIZER (ML) INHALATION PRN
COMMUNITY
Start: 2025-02-17

## 2025-03-11 NOTE — TELEPHONE ENCOUNTER
Eugenia from [facility] called to make sure Dr Daley was aware the Masood was just discharged from the hospital yesterday for RUL Pneumonia/ COPD-Asthma overlap syndrome with exacerbation.    Came in through ED on 3/5/25 and discharged on 3/10/25.    He is scheduled for surgery on Friday 3/14/25 for total laryngectomy with neck dissection.

## 2025-03-11 NOTE — TELEPHONE ENCOUNTER
Patient wife called and stated that patient was in the hospital and he was discharged yesterday. She stated that patient has to have surgery 3/14/2025. So she doesn't think patient can follow up at this time.     She stated that she received a call from pulmonary to schedule an appointment and pulmonary testing. However with patient having upcoming surgery she didn't know if this could wait. She didn't know when to schedule. They advised her to reach out to our office.

## 2025-03-11 NOTE — CARE COORDINATION
Care Transitions Note    Initial Call - Call within 2 business days of discharge: Yes    Patient Current Location:  Home: [address] [place] [address]    Care Transition Nurse contacted the spouse/partner by telephone to perform post hospital discharge assessment, verified name and  as identifiers. Provided introduction to self, and explanation of the Care Transition Nurse role.     Patient: Masood Randhawa    Patient : 1950   MRN: [id number]    Reason for Admission: SIRS  Discharge Date: 3/10/25  RURS: Readmission Risk Score: 21.9      Last Discharge Facility       Date Complaint Diagnosis Description Type Department Provider    3/4/25 Shortness of Breath SIRS (systemic inflammatory response syndrome) (HCC) ... ED to Hosp-Admission (Discharged) (ADMITTED) Emma Matthews MD; Kt Vidal...            Was this an external facility discharge? No    Additional needs identified to be addressed with provider   No needs identified             Method of communication with provider: none.    Patients top risk factors for readmission: lack of knowledge about disease, medical condition-throat Ca, COPD, HTN, and polypharmacy    Interventions to address risk factors:   Review of patient management of conditions/medications:    Home Health: [facility]   Communication with providers: surgery 3/14    Care Summary Note:  Spoke with wife, Soraya, said Ildefonso is doing pretty good.  Denies fever, chills, chest pain, dyspnea, dizziness, n/v.  Is having some diarrhea, didn't know that he went, took Imodium.  She received the hospital bed and nebulizer.  He continues with TF boluses.  Said surgeon called and he doesn't want him pleasure eating right now.  He will have his surgery on Friday-bilateral laryngectomy.  He had an order to get PFT's and f/u with pulmonary but PCP said to hold off for now until recovered from surgery.  Had PFT in January.  Reviewed medications/changes.  The  nurse was there this

## 2025-03-13 ENCOUNTER — ANESTHESIA EVENT (OUTPATIENT)
Dept: OPERATING ROOM | Age: 75
End: 2025-03-13
Payer: MEDICARE

## 2025-03-13 RX ORDER — SODIUM CHLORIDE 0.9 % (FLUSH) 0.9 %
5-40 SYRINGE (ML) INJECTION PRN
Status: CANCELLED | OUTPATIENT
Start: 2025-03-13

## 2025-03-13 RX ORDER — SODIUM CHLORIDE 0.9 % (FLUSH) 0.9 %
5-40 SYRINGE (ML) INJECTION EVERY 12 HOURS SCHEDULED
Status: CANCELLED | OUTPATIENT
Start: 2025-03-13

## 2025-03-13 RX ORDER — IPRATROPIUM BROMIDE AND ALBUTEROL SULFATE 2.5; .5 MG/3ML; MG/3ML
1 SOLUTION RESPIRATORY (INHALATION) EVERY 4 HOURS PRN
Status: CANCELLED | OUTPATIENT
Start: 2025-03-14

## 2025-03-13 RX ORDER — SODIUM CHLORIDE 9 MG/ML
INJECTION, SOLUTION INTRAVENOUS PRN
Status: CANCELLED | OUTPATIENT
Start: 2025-03-13

## 2025-03-13 NOTE — H&P
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  Lung sounds clear throughout  No new symptoms    Past Medical History:   Diagnosis Date    BPH (benign prostatic hyperplasia)     Chronic back pain     COPD (chronic obstructive pulmonary disease) (HCC)     Erectile dysfunction     Gastroesophageal reflux disease with apnea without esophagitis 01/10/2025    Head and neck cancer (HCC) 02/08/2025    Hypertension     Osteoarthritis     Pt denies    Personal history of colonic polyps 2006    Pneumonia     3/2025    PVD (peripheral vascular disease)     PVD (peripheral vascular disease) 02/14/2012       Past Surgical History:   Procedure Laterality Date    CARPAL TUNNEL RELEASE Bilateral     2010    COLONOSCOPY  07/19/2006    IR FLUORO GUIDED GASTROSTOMY TUBE INSERTION PERC W CONTRAST  02/14/2025    IR FLUORO GUIDED GASTROSTOMY TUBE INSERTION PERC W CONTRAST 2/14/2025 [facility] SPECIAL PROCEDURES    LARYNGOSCOPY N/A 02/12/2025    Laryngoscopy with Right Transoral anterolateral laryngectomy, Advancement flap laryongoplasty performed by Rickie Daley MD at [facility] OR    LUMBAR FUSION      Dr. Malone at [facility]     ROTATOR CUFF REPAIR  02/04/2021    US ASP/INJ THYROID CYST  02/04/2025    US ASP/INJ THYROID CYST 2/4/2025 [facility] ULTRASOUND       Allergies   Allergen Reactions    Pcn [Penicillins] Other (See Comments)     Was told he was allergic since he was a child       Current Facility-Administered Medications   Medication Dose Route Frequency Provider Last Rate Last Admin    piperacillin-tazobactam (ZOSYN) 3,375 mg in sodium chloride 0.9 % 50 mL IVPB (addEASE)  3,375 mg IntraVENous Once Cherelle Pat APRN -  mL/hr at 03/14/25 0742 3,375 mg at 03/14/25 0742    ipratropium 0.5 mg-albuterol 2.5 mg (DUONEB) nebulizer solution 1 Dose  1 Dose Inhalation Q4H PRN Cherelle Pat APRN - CNP   1 Dose at 03/14/25 0738    sodium chloride flush 0.9 %

## 2025-03-14 ENCOUNTER — ANESTHESIA (OUTPATIENT)
Dept: OPERATING ROOM | Age: 75
End: 2025-03-14
Payer: MEDICARE

## 2025-03-14 ENCOUNTER — HOSPITAL ENCOUNTER (INPATIENT)
Age: 75
LOS: 10 days | Discharge: HOME | DRG: 011 | End: 2025-03-24
Attending: OTOLARYNGOLOGY | Admitting: INTERNAL MEDICINE
Payer: MEDICARE

## 2025-03-14 DIAGNOSIS — C32.0 SQUAMOUS CELL CARCINOMA OF GLOTTIS: ICD-10-CM

## 2025-03-14 DIAGNOSIS — Z90.02 STATUS POST LARYNGECTOMY: Primary | ICD-10-CM

## 2025-03-14 DIAGNOSIS — Z93.0 STATUS POST TRACHEOSTOMY (HCC): ICD-10-CM

## 2025-03-14 DIAGNOSIS — C76.0 HEAD AND NECK CANCER (HCC): ICD-10-CM

## 2025-03-14 PROBLEM — C32.9: Status: ACTIVE | Noted: 2025-03-14

## 2025-03-14 LAB
ABO GROUP BLD: NORMAL
ANION GAP SERPL CALC-SCNC: 8 MEQ/L (ref 8–16)
BASE EXCESS BLDA CALC-SCNC: 0.6 MMOL/L (ref -2.5–2.5)
BASOPHILS ABSOLUTE: 0.1 THOU/MM3 (ref 0–0.1)
BASOPHILS NFR BLD AUTO: 0.6 %
BDY SITE: ABNORMAL
BUN SERPL-MCNC: 19 MG/DL (ref 8–23)
CA-I BLD ISE-SCNC: 1.07 MMOL/L (ref 1.12–1.32)
CA-I BLD ISE-SCNC: 1.1 MMOL/L (ref 1.12–1.32)
CALCIUM SERPL-MCNC: 8.7 MG/DL (ref 8.8–10.2)
CHLORIDE SERPL-SCNC: 99 MEQ/L (ref 98–111)
CO2 SERPL-SCNC: 27 MEQ/L (ref 22–29)
COLLECTED BY:: ABNORMAL
CREAT SERPL-MCNC: 0.6 MG/DL (ref 0.7–1.2)
DEPRECATED RDW RBC AUTO: 49.9 FL (ref 35–45)
DEVICE: ABNORMAL
EOSINOPHIL NFR BLD AUTO: 1.5 %
EOSINOPHILS ABSOLUTE: 0.4 THOU/MM3 (ref 0–0.4)
ERYTHROCYTE [DISTWIDTH] IN BLOOD BY AUTOMATED COUNT: 13.5 % (ref 11.5–14.5)
GFR SERPL CREATININE-BSD FRML MDRD: > 90 ML/MIN/1.73M2
GLUCOSE BLD-MCNC: 162 MG/DL (ref 70–108)
GLUCOSE SERPL-MCNC: 108 MG/DL (ref 74–109)
HCO3 BLDA-SCNC: 26 MMOL/L (ref 23–28)
HCT VFR BLD AUTO: 33.7 % (ref 42–52)
HEMOGLOBIN FINGERSTICK, POC: 8.6 G/DL (ref 14–18)
HGB BLD-MCNC: 11.4 GM/DL (ref 14–18)
IAT IGG-SP REAG SERPL QL: NORMAL
IMM GRANULOCYTES # BLD AUTO: 0.8 THOU/MM3 (ref 0–0.07)
IMM GRANULOCYTES NFR BLD AUTO: 3.4 %
KCT BLD-ACNC: 152 SECONDS (ref 1–150)
LYMPHOCYTES ABSOLUTE: 2.6 THOU/MM3 (ref 1–4.8)
LYMPHOCYTES NFR BLD AUTO: 11.3 %
MCH RBC QN AUTO: 34.4 PG (ref 26–33)
MCHC RBC AUTO-ENTMCNC: 33.8 GM/DL (ref 32.2–35.5)
MCV RBC AUTO: 101.8 FL (ref 80–94)
MONOCYTES ABSOLUTE: 1.5 THOU/MM3 (ref 0.4–1.3)
MONOCYTES NFR BLD AUTO: 6.3 %
NEUTROPHILS ABSOLUTE: 18 THOU/MM3 (ref 1.8–7.7)
NEUTROPHILS NFR BLD AUTO: 76.9 %
NRBC BLD AUTO-RTO: 0 /100 WBC
PCO2 BLDA: 43 MMHG (ref 35–45)
PH BLDA: 7.38 [PH] (ref 7.35–7.45)
PLATELET # BLD AUTO: 555 THOU/MM3 (ref 130–400)
PLATELET BLD QL SMEAR: ADEQUATE
PMV BLD AUTO: 10 FL (ref 9.4–12.4)
PO2 BLDA: 211 MMHG (ref 71–104)
POTASSIUM BLD-SCNC: 4.7 MEQ/L (ref 3.5–4.9)
POTASSIUM SERPL-SCNC: 4.9 MEQ/L (ref 3.5–5.2)
PTH-INTACT SERPL-MCNC: 35 PG/ML (ref 15–65)
RBC # BLD AUTO: 3.31 MILL/MM3 (ref 4.7–6.1)
RH BLD: NORMAL
SAO2 % BLDA: 100 %
SCAN OF BLOOD SMEAR: NORMAL
SODIUM BLD-SCNC: 137 MEQ/L (ref 138–146)
SODIUM SERPL-SCNC: 134 MEQ/L (ref 135–145)
VENTILATION MODE VENT: ABNORMAL
WBC # BLD AUTO: 23.4 THOU/MM3 (ref 4.8–10.8)

## 2025-03-14 PROCEDURE — 86901 BLOOD TYPING SEROLOGIC RH(D): CPT

## 2025-03-14 PROCEDURE — P9045 ALBUMIN (HUMAN), 5%, 250 ML: HCPCS | Performed by: NURSE ANESTHETIST, CERTIFIED REGISTERED

## 2025-03-14 PROCEDURE — 6360000002 HC RX W HCPCS: Performed by: NURSE PRACTITIONER

## 2025-03-14 PROCEDURE — 88307 TISSUE EXAM BY PATHOLOGIST: CPT

## 2025-03-14 PROCEDURE — 2580000003 HC RX 258: Performed by: NURSE ANESTHETIST, CERTIFIED REGISTERED

## 2025-03-14 PROCEDURE — 99223 1ST HOSP IP/OBS HIGH 75: CPT

## 2025-03-14 PROCEDURE — 94640 AIRWAY INHALATION TREATMENT: CPT

## 2025-03-14 PROCEDURE — 86900 BLOOD TYPING SEROLOGIC ABO: CPT

## 2025-03-14 PROCEDURE — APPNB45 APP NON BILLABLE 31-45 MINUTES: Performed by: NURSE PRACTITIONER

## 2025-03-14 PROCEDURE — 2500000003 HC RX 250 WO HCPCS

## 2025-03-14 PROCEDURE — 2709999900 HC NON-CHARGEABLE SUPPLY: Performed by: OTOLARYNGOLOGY

## 2025-03-14 PROCEDURE — 3700000001 HC ADD 15 MINUTES (ANESTHESIA): Performed by: OTOLARYNGOLOGY

## 2025-03-14 PROCEDURE — 84295 ASSAY OF SERUM SODIUM: CPT

## 2025-03-14 PROCEDURE — 85025 COMPLETE CBC W/AUTO DIFF WBC: CPT

## 2025-03-14 PROCEDURE — 86885 COOMBS TEST INDIRECT QUAL: CPT

## 2025-03-14 PROCEDURE — 2580000003 HC RX 258: Performed by: NURSE PRACTITIONER

## 2025-03-14 PROCEDURE — 6370000000 HC RX 637 (ALT 250 FOR IP): Performed by: OTOLARYNGOLOGY

## 2025-03-14 PROCEDURE — 3700000000 HC ANESTHESIA ATTENDED CARE: Performed by: OTOLARYNGOLOGY

## 2025-03-14 PROCEDURE — 6360000002 HC RX W HCPCS

## 2025-03-14 PROCEDURE — 2000000000 HC ICU R&B

## 2025-03-14 PROCEDURE — 36415 COLL VENOUS BLD VENIPUNCTURE: CPT

## 2025-03-14 PROCEDURE — 6360000002 HC RX W HCPCS: Performed by: OTOLARYNGOLOGY

## 2025-03-14 PROCEDURE — 87081 CULTURE SCREEN ONLY: CPT

## 2025-03-14 PROCEDURE — 94761 N-INVAS EAR/PLS OXIMETRY MLT: CPT

## 2025-03-14 PROCEDURE — 82330 ASSAY OF CALCIUM: CPT

## 2025-03-14 PROCEDURE — 2500000003 HC RX 250 WO HCPCS: Performed by: NURSE ANESTHETIST, CERTIFIED REGISTERED

## 2025-03-14 PROCEDURE — 88309 TISSUE EXAM BY PATHOLOGIST: CPT

## 2025-03-14 PROCEDURE — 85018 HEMOGLOBIN: CPT

## 2025-03-14 PROCEDURE — 2780000010 HC IMPLANT OTHER: Performed by: OTOLARYNGOLOGY

## 2025-03-14 PROCEDURE — 87086 URINE CULTURE/COLONY COUNT: CPT

## 2025-03-14 PROCEDURE — 6370000000 HC RX 637 (ALT 250 FOR IP): Performed by: NURSE PRACTITIONER

## 2025-03-14 PROCEDURE — 3600000014 HC SURGERY LEVEL 4 ADDTL 15MIN: Performed by: OTOLARYNGOLOGY

## 2025-03-14 PROCEDURE — 6360000002 HC RX W HCPCS: Performed by: NURSE ANESTHETIST, CERTIFIED REGISTERED

## 2025-03-14 PROCEDURE — 94002 VENT MGMT INPAT INIT DAY: CPT

## 2025-03-14 PROCEDURE — 80048 BASIC METABOLIC PNL TOTAL CA: CPT

## 2025-03-14 PROCEDURE — 2720000010 HC SURG SUPPLY STERILE: Performed by: OTOLARYNGOLOGY

## 2025-03-14 PROCEDURE — 2580000003 HC RX 258: Performed by: OTOLARYNGOLOGY

## 2025-03-14 PROCEDURE — 88305 TISSUE EXAM BY PATHOLOGIST: CPT

## 2025-03-14 PROCEDURE — 82947 ASSAY GLUCOSE BLOOD QUANT: CPT

## 2025-03-14 PROCEDURE — 82803 BLOOD GASES ANY COMBINATION: CPT

## 2025-03-14 PROCEDURE — 85347 COAGULATION TIME ACTIVATED: CPT

## 2025-03-14 PROCEDURE — 84132 ASSAY OF SERUM POTASSIUM: CPT

## 2025-03-14 PROCEDURE — 5A1935Z RESPIRATORY VENTILATION, LESS THAN 24 CONSECUTIVE HOURS: ICD-10-PCS

## 2025-03-14 PROCEDURE — 3600000004 HC SURGERY LEVEL 4 BASE: Performed by: OTOLARYNGOLOGY

## 2025-03-14 PROCEDURE — 88304 TISSUE EXAM BY PATHOLOGIST: CPT

## 2025-03-14 PROCEDURE — 6370000000 HC RX 637 (ALT 250 FOR IP): Performed by: NURSE ANESTHETIST, CERTIFIED REGISTERED

## 2025-03-14 PROCEDURE — 83970 ASSAY OF PARATHORMONE: CPT

## 2025-03-14 RX ORDER — SODIUM CHLORIDE 0.9 % (FLUSH) 0.9 %
5-40 SYRINGE (ML) INJECTION PRN
Status: DISCONTINUED | OUTPATIENT
Start: 2025-03-14 | End: 2025-03-24 | Stop reason: HOSPADM

## 2025-03-14 RX ORDER — ACETAMINOPHEN 325 MG/1
650 TABLET ORAL EVERY 4 HOURS PRN
Status: DISCONTINUED | OUTPATIENT
Start: 2025-03-14 | End: 2025-03-15

## 2025-03-14 RX ORDER — EPINEPHRINE IN SOD CHLOR,ISO 1 MG/10 ML
SYRINGE (ML) INTRAVENOUS PRN
Status: DISCONTINUED | OUTPATIENT
Start: 2025-03-14 | End: 2025-03-14 | Stop reason: ALTCHOICE

## 2025-03-14 RX ORDER — ROCURONIUM BROMIDE 10 MG/ML
INJECTION, SOLUTION INTRAVENOUS
Status: DISCONTINUED | OUTPATIENT
Start: 2025-03-14 | End: 2025-03-14 | Stop reason: SDUPTHER

## 2025-03-14 RX ORDER — ALBUMIN HUMAN 50 G/1000ML
SOLUTION INTRAVENOUS
Status: DISCONTINUED | OUTPATIENT
Start: 2025-03-14 | End: 2025-03-14 | Stop reason: SDUPTHER

## 2025-03-14 RX ORDER — SODIUM CHLORIDE 0.9 % (FLUSH) 0.9 %
5-40 SYRINGE (ML) INJECTION EVERY 12 HOURS SCHEDULED
Status: DISCONTINUED | OUTPATIENT
Start: 2025-03-14 | End: 2025-03-14 | Stop reason: HOSPADM

## 2025-03-14 RX ORDER — SODIUM CHLORIDE 9 MG/ML
INJECTION, SOLUTION INTRAVENOUS PRN
Status: DISCONTINUED | OUTPATIENT
Start: 2025-03-14 | End: 2025-03-14 | Stop reason: HOSPADM

## 2025-03-14 RX ORDER — OXYCODONE HYDROCHLORIDE 5 MG/1
5 TABLET ORAL EVERY 4 HOURS PRN
Status: DISCONTINUED | OUTPATIENT
Start: 2025-03-14 | End: 2025-03-24 | Stop reason: HOSPADM

## 2025-03-14 RX ORDER — LIDOCAINE HYDROCHLORIDE 20 MG/ML
INJECTION, SOLUTION INTRAVENOUS
Status: DISCONTINUED | OUTPATIENT
Start: 2025-03-14 | End: 2025-03-14 | Stop reason: SDUPTHER

## 2025-03-14 RX ORDER — SODIUM CHLORIDE 0.9 % (FLUSH) 0.9 %
5-40 SYRINGE (ML) INJECTION EVERY 12 HOURS SCHEDULED
Status: DISCONTINUED | OUTPATIENT
Start: 2025-03-14 | End: 2025-03-24 | Stop reason: HOSPADM

## 2025-03-14 RX ORDER — MINERAL OIL AND WHITE PETROLATUM 150; 830 MG/G; MG/G
OINTMENT OPHTHALMIC
Status: DISCONTINUED | OUTPATIENT
Start: 2025-03-14 | End: 2025-03-14 | Stop reason: SDUPTHER

## 2025-03-14 RX ORDER — ONDANSETRON 2 MG/ML
INJECTION INTRAMUSCULAR; INTRAVENOUS
Status: DISCONTINUED | OUTPATIENT
Start: 2025-03-14 | End: 2025-03-14 | Stop reason: SDUPTHER

## 2025-03-14 RX ORDER — SODIUM CHLORIDE 0.9 % (FLUSH) 0.9 %
5-40 SYRINGE (ML) INJECTION PRN
Status: DISCONTINUED | OUTPATIENT
Start: 2025-03-14 | End: 2025-03-14 | Stop reason: HOSPADM

## 2025-03-14 RX ORDER — ONDANSETRON 4 MG/1
4 TABLET, ORALLY DISINTEGRATING ORAL EVERY 8 HOURS PRN
Status: DISCONTINUED | OUTPATIENT
Start: 2025-03-14 | End: 2025-03-24 | Stop reason: HOSPADM

## 2025-03-14 RX ORDER — MIDAZOLAM HYDROCHLORIDE 1 MG/ML
INJECTION, SOLUTION INTRAMUSCULAR; INTRAVENOUS
Status: DISCONTINUED | OUTPATIENT
Start: 2025-03-14 | End: 2025-03-14 | Stop reason: SDUPTHER

## 2025-03-14 RX ORDER — FENTANYL CITRATE 50 UG/ML
INJECTION, SOLUTION INTRAMUSCULAR; INTRAVENOUS
Status: DISCONTINUED | OUTPATIENT
Start: 2025-03-14 | End: 2025-03-14 | Stop reason: SDUPTHER

## 2025-03-14 RX ORDER — ONDANSETRON 2 MG/ML
4 INJECTION INTRAMUSCULAR; INTRAVENOUS EVERY 6 HOURS PRN
Status: DISCONTINUED | OUTPATIENT
Start: 2025-03-14 | End: 2025-03-24 | Stop reason: HOSPADM

## 2025-03-14 RX ORDER — DEXAMETHASONE SODIUM PHOSPHATE 10 MG/ML
10 INJECTION, SOLUTION INTRAMUSCULAR; INTRAVENOUS ONCE
Status: COMPLETED | OUTPATIENT
Start: 2025-03-14 | End: 2025-03-14

## 2025-03-14 RX ORDER — OXYCODONE HYDROCHLORIDE 5 MG/1
10 TABLET ORAL EVERY 4 HOURS PRN
Status: DISCONTINUED | OUTPATIENT
Start: 2025-03-14 | End: 2025-03-24 | Stop reason: HOSPADM

## 2025-03-14 RX ORDER — SODIUM CHLORIDE 9 MG/ML
INJECTION, SOLUTION INTRAMUSCULAR; INTRAVENOUS; SUBCUTANEOUS PRN
Status: DISCONTINUED | OUTPATIENT
Start: 2025-03-14 | End: 2025-03-14 | Stop reason: ALTCHOICE

## 2025-03-14 RX ORDER — PIPERACILLIN SODIUM, TAZOBACTAM SODIUM 3; .375 G/15ML; G/15ML
INJECTION, POWDER, LYOPHILIZED, FOR SOLUTION INTRAVENOUS
Status: DISCONTINUED | OUTPATIENT
Start: 2025-03-14 | End: 2025-03-14 | Stop reason: SDUPTHER

## 2025-03-14 RX ORDER — LIDOCAINE HYDROCHLORIDE 40 MG/ML
SOLUTION TOPICAL
Status: DISCONTINUED | OUTPATIENT
Start: 2025-03-14 | End: 2025-03-14 | Stop reason: SDUPTHER

## 2025-03-14 RX ORDER — MIDAZOLAM HYDROCHLORIDE 1 MG/ML
2 INJECTION, SOLUTION INTRAMUSCULAR; INTRAVENOUS
Status: DISCONTINUED | OUTPATIENT
Start: 2025-03-14 | End: 2025-03-15

## 2025-03-14 RX ORDER — SODIUM CHLORIDE 9 MG/ML
INJECTION, SOLUTION INTRAVENOUS
Status: DISCONTINUED | OUTPATIENT
Start: 2025-03-14 | End: 2025-03-14 | Stop reason: SDUPTHER

## 2025-03-14 RX ORDER — SODIUM CHLORIDE, SODIUM LACTATE, POTASSIUM CHLORIDE, CALCIUM CHLORIDE 600; 310; 30; 20 MG/100ML; MG/100ML; MG/100ML; MG/100ML
INJECTION, SOLUTION INTRAVENOUS
Status: DISCONTINUED | OUTPATIENT
Start: 2025-03-14 | End: 2025-03-14 | Stop reason: SDUPTHER

## 2025-03-14 RX ORDER — MUPIROCIN 20 MG/G
OINTMENT TOPICAL 3 TIMES DAILY
Status: DISCONTINUED | OUTPATIENT
Start: 2025-03-14 | End: 2025-03-24 | Stop reason: HOSPADM

## 2025-03-14 RX ORDER — GINSENG 100 MG
CAPSULE ORAL PRN
Status: DISCONTINUED | OUTPATIENT
Start: 2025-03-14 | End: 2025-03-14 | Stop reason: ALTCHOICE

## 2025-03-14 RX ORDER — EPHEDRINE SULFATE/0.9% NACL/PF 25 MG/5 ML
SYRINGE (ML) INTRAVENOUS
Status: DISCONTINUED | OUTPATIENT
Start: 2025-03-14 | End: 2025-03-14 | Stop reason: SDUPTHER

## 2025-03-14 RX ORDER — FENTANYL CITRATE-0.9 % NACL/PF 10 MCG/ML
25-200 PLASTIC BAG, INJECTION (ML) INTRAVENOUS CONTINUOUS
Refills: 0 | Status: DISCONTINUED | OUTPATIENT
Start: 2025-03-14 | End: 2025-03-15

## 2025-03-14 RX ORDER — SODIUM CHLORIDE 9 MG/ML
INJECTION, SOLUTION INTRAVENOUS PRN
Status: DISCONTINUED | OUTPATIENT
Start: 2025-03-14 | End: 2025-03-24 | Stop reason: HOSPADM

## 2025-03-14 RX ORDER — IPRATROPIUM BROMIDE AND ALBUTEROL SULFATE 2.5; .5 MG/3ML; MG/3ML
1 SOLUTION RESPIRATORY (INHALATION) EVERY 4 HOURS PRN
Status: DISCONTINUED | OUTPATIENT
Start: 2025-03-14 | End: 2025-03-14 | Stop reason: HOSPADM

## 2025-03-14 RX ORDER — PHENYLEPHRINE HCL IN 0.9% NACL 1 MG/10 ML
SYRINGE (ML) INTRAVENOUS
Status: DISCONTINUED | OUTPATIENT
Start: 2025-03-14 | End: 2025-03-14 | Stop reason: SDUPTHER

## 2025-03-14 RX ORDER — DEXAMETHASONE SODIUM PHOSPHATE 4 MG/ML
INJECTION, SOLUTION INTRA-ARTICULAR; INTRALESIONAL; INTRAMUSCULAR; INTRAVENOUS; SOFT TISSUE
Status: DISCONTINUED | OUTPATIENT
Start: 2025-03-14 | End: 2025-03-14 | Stop reason: SDUPTHER

## 2025-03-14 RX ORDER — PROPOFOL 10 MG/ML
INJECTION, EMULSION INTRAVENOUS
Status: DISCONTINUED | OUTPATIENT
Start: 2025-03-14 | End: 2025-03-14 | Stop reason: SDUPTHER

## 2025-03-14 RX ADMIN — MUPIROCIN: 20 OINTMENT TOPICAL at 22:46

## 2025-03-14 RX ADMIN — EPHEDRINE SULFATE 5 MG: 5 INJECTION INTRAVENOUS at 09:09

## 2025-03-14 RX ADMIN — DEXAMETHASONE SODIUM PHOSPHATE 8 MG: 4 INJECTION, SOLUTION INTRAMUSCULAR; INTRAVENOUS at 09:31

## 2025-03-14 RX ADMIN — EPHEDRINE SULFATE 10 MG: 5 INJECTION INTRAVENOUS at 08:48

## 2025-03-14 RX ADMIN — SODIUM CHLORIDE: 9 INJECTION, SOLUTION INTRAVENOUS at 11:58

## 2025-03-14 RX ADMIN — IPRATROPIUM BROMIDE AND ALBUTEROL SULFATE 1 DOSE: .5; 3 SOLUTION RESPIRATORY (INHALATION) at 07:38

## 2025-03-14 RX ADMIN — FENTANYL CITRATE 100 MCG: 50 INJECTION, SOLUTION INTRAMUSCULAR; INTRAVENOUS at 08:39

## 2025-03-14 RX ADMIN — LIDOCAINE HYDROCHLORIDE 50 MG: 20 INJECTION, SOLUTION INTRAVENOUS at 08:39

## 2025-03-14 RX ADMIN — PHENYLEPHRINE HYDROCHLORIDE 200 MCG: 10 INJECTION INTRAVENOUS at 17:53

## 2025-03-14 RX ADMIN — DEXAMETHASONE SODIUM PHOSPHATE 10 MG: 10 INJECTION, SOLUTION INTRAMUSCULAR; INTRAVENOUS at 07:32

## 2025-03-14 RX ADMIN — PHENYLEPHRINE HYDROCHLORIDE 100 MCG: 10 INJECTION INTRAVENOUS at 08:55

## 2025-03-14 RX ADMIN — Medication 100 MCG: at 17:35

## 2025-03-14 RX ADMIN — PHENYLEPHRINE HYDROCHLORIDE 100 MCG: 10 INJECTION INTRAVENOUS at 20:10

## 2025-03-14 RX ADMIN — PHENYLEPHRINE HYDROCHLORIDE 200 MCG: 10 INJECTION INTRAVENOUS at 08:48

## 2025-03-14 RX ADMIN — PIPERACILLIN AND TAZOBACTAM 3375 MG: 3; .375 INJECTION, POWDER, FOR SOLUTION INTRAVENOUS at 07:42

## 2025-03-14 RX ADMIN — ALBUMIN (HUMAN) 12.5 G: 12.5 INJECTION, SOLUTION INTRAVENOUS at 11:57

## 2025-03-14 RX ADMIN — PIPERACILLIN SODIUM AND TAZOBACTAM SODIUM 3.38 G: 3; .375 INJECTION, POWDER, LYOPHILIZED, FOR SOLUTION INTRAVENOUS at 13:24

## 2025-03-14 RX ADMIN — PHENYLEPHRINE HYDROCHLORIDE 200 MCG: 10 INJECTION INTRAVENOUS at 09:12

## 2025-03-14 RX ADMIN — PHENYLEPHRINE HYDROCHLORIDE 25 MCG/MIN: 10 INJECTION INTRAVENOUS at 10:48

## 2025-03-14 RX ADMIN — PHENYLEPHRINE HYDROCHLORIDE 100 MCG: 10 INJECTION INTRAVENOUS at 20:51

## 2025-03-14 RX ADMIN — ALBUMIN (HUMAN) 12.5 G: 12.5 INJECTION, SOLUTION INTRAVENOUS at 11:06

## 2025-03-14 RX ADMIN — PHENYLEPHRINE HYDROCHLORIDE 200 MCG: 10 INJECTION INTRAVENOUS at 21:00

## 2025-03-14 RX ADMIN — ROCURONIUM BROMIDE 50 MG: 10 INJECTION INTRAVENOUS at 08:39

## 2025-03-14 RX ADMIN — Medication 200 MCG: at 18:02

## 2025-03-14 RX ADMIN — Medication 200 MCG: at 18:18

## 2025-03-14 RX ADMIN — ONDANSETRON 4 MG: 2 INJECTION INTRAMUSCULAR; INTRAVENOUS at 09:31

## 2025-03-14 RX ADMIN — PHENYLEPHRINE HYDROCHLORIDE 200 MCG: 10 INJECTION INTRAVENOUS at 09:46

## 2025-03-14 RX ADMIN — SODIUM CHLORIDE: 9 INJECTION, SOLUTION INTRAVENOUS at 18:38

## 2025-03-14 RX ADMIN — PROPOFOL 75 MCG/KG/MIN: 10 INJECTION, EMULSION INTRAVENOUS at 09:16

## 2025-03-14 RX ADMIN — PHENYLEPHRINE HYDROCHLORIDE 100 MCG: 10 INJECTION INTRAVENOUS at 20:44

## 2025-03-14 RX ADMIN — PIPERACILLIN SODIUM AND TAZOBACTAM SODIUM 3.38 G: 3; .375 INJECTION, POWDER, LYOPHILIZED, FOR SOLUTION INTRAVENOUS at 11:24

## 2025-03-14 RX ADMIN — PROPOFOL 150 MG: 10 INJECTION, EMULSION INTRAVENOUS at 08:39

## 2025-03-14 RX ADMIN — Medication 100 MCG: at 17:29

## 2025-03-14 RX ADMIN — PIPERACILLIN SODIUM AND TAZOBACTAM SODIUM 3.38 G: 3; .375 INJECTION, POWDER, LYOPHILIZED, FOR SOLUTION INTRAVENOUS at 19:48

## 2025-03-14 RX ADMIN — Medication 200 MCG: at 18:39

## 2025-03-14 RX ADMIN — PHENYLEPHRINE HYDROCHLORIDE 200 MCG: 10 INJECTION INTRAVENOUS at 10:28

## 2025-03-14 RX ADMIN — PIPERACILLIN AND TAZOBACTAM 3375 MG: 3; .375 INJECTION, POWDER, FOR SOLUTION INTRAVENOUS at 22:46

## 2025-03-14 RX ADMIN — PIPERACILLIN SODIUM AND TAZOBACTAM SODIUM 3.38 G: 3; .375 INJECTION, POWDER, LYOPHILIZED, FOR SOLUTION INTRAVENOUS at 15:24

## 2025-03-14 RX ADMIN — MIDAZOLAM 2 MG: 1 INJECTION INTRAMUSCULAR; INTRAVENOUS at 20:54

## 2025-03-14 RX ADMIN — PHENYLEPHRINE HYDROCHLORIDE 200 MCG: 10 INJECTION INTRAVENOUS at 09:07

## 2025-03-14 RX ADMIN — Medication 50 MCG/HR: at 21:58

## 2025-03-14 RX ADMIN — PHENYLEPHRINE HYDROCHLORIDE 200 MCG: 10 INJECTION INTRAVENOUS at 09:42

## 2025-03-14 RX ADMIN — SODIUM CHLORIDE, POTASSIUM CHLORIDE, SODIUM LACTATE AND CALCIUM CHLORIDE: 600; 310; 30; 20 INJECTION, SOLUTION INTRAVENOUS at 15:08

## 2025-03-14 RX ADMIN — MIDAZOLAM 2 MG: 1 INJECTION INTRAMUSCULAR; INTRAVENOUS at 21:58

## 2025-03-14 RX ADMIN — PHENYLEPHRINE HYDROCHLORIDE 200 MCG: 10 INJECTION INTRAVENOUS at 09:37

## 2025-03-14 RX ADMIN — PHENYLEPHRINE HYDROCHLORIDE 100 MCG: 10 INJECTION INTRAVENOUS at 10:24

## 2025-03-14 RX ADMIN — ONDANSETRON 4 MG: 2 INJECTION INTRAMUSCULAR; INTRAVENOUS at 16:30

## 2025-03-14 RX ADMIN — PHENYLEPHRINE HYDROCHLORIDE 200 MCG: 10 INJECTION INTRAVENOUS at 17:45

## 2025-03-14 RX ADMIN — EPHEDRINE SULFATE 10 MG: 5 INJECTION INTRAVENOUS at 09:19

## 2025-03-14 RX ADMIN — PHENYLEPHRINE HYDROCHLORIDE 200 MCG: 10 INJECTION INTRAVENOUS at 17:59

## 2025-03-14 RX ADMIN — PHENYLEPHRINE HYDROCHLORIDE 200 MCG: 10 INJECTION INTRAVENOUS at 09:18

## 2025-03-14 RX ADMIN — PHENYLEPHRINE HYDROCHLORIDE 300 MCG: 10 INJECTION INTRAVENOUS at 10:18

## 2025-03-14 RX ADMIN — PHENYLEPHRINE HYDROCHLORIDE 200 MCG: 10 INJECTION INTRAVENOUS at 21:15

## 2025-03-14 RX ADMIN — PHENYLEPHRINE HYDROCHLORIDE 200 MCG: 10 INJECTION INTRAVENOUS at 21:07

## 2025-03-14 RX ADMIN — SODIUM CHLORIDE: 9 INJECTION, SOLUTION INTRAVENOUS at 07:22

## 2025-03-14 RX ADMIN — PIPERACILLIN SODIUM AND TAZOBACTAM SODIUM 3.38 G: 3; .375 INJECTION, POWDER, LYOPHILIZED, FOR SOLUTION INTRAVENOUS at 09:24

## 2025-03-14 RX ADMIN — PHENYLEPHRINE HYDROCHLORIDE 200 MCG: 10 INJECTION INTRAVENOUS at 18:16

## 2025-03-14 RX ADMIN — PHENYLEPHRINE HYDROCHLORIDE 200 MCG: 10 INJECTION INTRAVENOUS at 09:23

## 2025-03-14 RX ADMIN — ROCURONIUM BROMIDE 50 MG: 10 INJECTION INTRAVENOUS at 16:24

## 2025-03-14 RX ADMIN — PHENYLEPHRINE HYDROCHLORIDE 200 MCG: 10 INJECTION INTRAVENOUS at 10:01

## 2025-03-14 RX ADMIN — MIDAZOLAM 2 MG: 1 INJECTION INTRAMUSCULAR; INTRAVENOUS at 08:35

## 2025-03-14 RX ADMIN — MINERAL OIL AND WHITE PETROLATUM 2 APPLICATOR: 150; 830 OINTMENT OPHTHALMIC at 08:43

## 2025-03-14 RX ADMIN — PHENYLEPHRINE HYDROCHLORIDE 100 MCG: 10 INJECTION INTRAVENOUS at 20:25

## 2025-03-14 RX ADMIN — PHENYLEPHRINE HYDROCHLORIDE 200 MCG: 10 INJECTION INTRAVENOUS at 10:09

## 2025-03-14 RX ADMIN — DEXAMETHASONE SODIUM PHOSPHATE 8 MG: 4 INJECTION, SOLUTION INTRAMUSCULAR; INTRAVENOUS at 16:30

## 2025-03-14 RX ADMIN — Medication 200 MCG: at 18:43

## 2025-03-14 RX ADMIN — SODIUM CHLORIDE: 9 INJECTION, SOLUTION INTRAVENOUS at 09:22

## 2025-03-14 RX ADMIN — PHENYLEPHRINE HYDROCHLORIDE 100 MCG: 10 INJECTION INTRAVENOUS at 19:36

## 2025-03-14 RX ADMIN — PHENYLEPHRINE HYDROCHLORIDE 100 MCG: 10 INJECTION INTRAVENOUS at 19:44

## 2025-03-14 RX ADMIN — PROPOFOL 50 MG: 10 INJECTION, EMULSION INTRAVENOUS at 09:03

## 2025-03-14 RX ADMIN — FENTANYL CITRATE 100 MCG: 50 INJECTION, SOLUTION INTRAMUSCULAR; INTRAVENOUS at 16:25

## 2025-03-14 RX ADMIN — PIPERACILLIN SODIUM AND TAZOBACTAM SODIUM 3.38 G: 3; .375 INJECTION, POWDER, LYOPHILIZED, FOR SOLUTION INTRAVENOUS at 17:48

## 2025-03-14 RX ADMIN — SODIUM CHLORIDE: 9 INJECTION, SOLUTION INTRAVENOUS at 11:42

## 2025-03-14 RX ADMIN — LIDOCAINE HYDROCHLORIDE 4 ML: 40 SOLUTION TOPICAL at 08:41

## 2025-03-14 RX ADMIN — PHENYLEPHRINE HYDROCHLORIDE 300 MCG: 10 INJECTION INTRAVENOUS at 09:29

## 2025-03-14 ASSESSMENT — PULMONARY FUNCTION TESTS: PIF_VALUE: 17

## 2025-03-14 ASSESSMENT — PAIN - FUNCTIONAL ASSESSMENT: PAIN_FUNCTIONAL_ASSESSMENT: 0-10

## 2025-03-14 NOTE — ANESTHESIA PRE PROCEDURE
Department of Anesthesiology  Preprocedure Note       Name:  Masood Randhawa   Age:  74 y.o.  :  1950                                          MRN:  [id number]         Date:  3/14/2025      Surgeon: Surgeon(s):  Rickie Daley MD    Procedure: Procedure(s):  Total Bilateral Laryngectomy with radical neck dissection    Medications prior to admission:   Prior to Admission medications    Medication Sig Start Date End Date Taking? Authorizing Provider   sodium chloride nebulizer 0.9 % solution Take 3 mLs by nebulization as needed (throat/ airway secretions) 25  Yes Hilary Peralta MD   ipratropium 0.5 mg-albuterol 2.5 mg (DUONEB) 0.5-2.5 (3) MG/3ML SOLN nebulizer solution Inhale 3 mLs into the lungs in the morning and 3 mLs in the evening. 3/8/25 4/7/25 Yes Emma Castillo MD   aspirin 81 MG EC tablet Take 1 tablet by mouth daily   Yes ProviderHilary MD   albuterol sulfate HFA (PROVENTIL;VENTOLIN;PROAIR) 108 (90 Base) MCG/ACT inhaler Inhale 2 puffs into the lungs every 6 hours as needed for Wheezing 25  Yes Samy Piper, DO   pantoprazole (PROTONIX) 40 MG tablet Take 1 tablet by mouth every morning (before breakfast) 25  Yes Alecia Seymour APRN - CNP   amLODIPine (NORVASC) 5 MG tablet Take 1 tablet by mouth daily 24  Yes Samy Piper, DO   benazepril (LOTENSIN) 20 MG tablet Take 1 tablet by mouth daily 24  Yes Samy Piper, DO   Multiple Vitamin (MULTI VITAMIN PO) Take 1 tablet by mouth daily   Yes Hilary Peralta MD   Respiratory Therapy Supplies (NEBULIZER/TUBING/MOUTHPIECE) KIT 1 kit by Does not apply route in the morning, at noon, and at bedtime 25   Alecia Seymour APRN - CNP   famotidine (PEPCID) 40 MG tablet Take 1 tablet by mouth every evening 25   Alecia Seymour APRN - CNP       Current medications:    Current Facility-Administered Medications   Medication Dose Route Frequency Provider Last Rate Last Admin   • ipratropium

## 2025-03-15 PROBLEM — E87.1 HYPONATREMIA: Status: ACTIVE | Noted: 2025-03-15

## 2025-03-15 PROBLEM — J44.89 COPD WITH ASTHMA (HCC): Status: ACTIVE | Noted: 2025-03-15

## 2025-03-15 PROBLEM — Z93.0 STATUS POST TRACHEOSTOMY (HCC): Status: ACTIVE | Noted: 2025-03-15

## 2025-03-15 PROBLEM — R53.81 PHYSICAL DECONDITIONING: Status: ACTIVE | Noted: 2025-03-15

## 2025-03-15 LAB
ALBUMIN SERPL BCG-MCNC: 2.9 G/DL (ref 3.4–4.9)
ALP SERPL-CCNC: 65 U/L (ref 40–129)
ALT SERPL W/O P-5'-P-CCNC: 15 U/L (ref 10–50)
ANION GAP SERPL CALC-SCNC: 12 MEQ/L (ref 8–16)
ANION GAP SERPL CALC-SCNC: NORMAL MEQ/L (ref 8–16)
AST SERPL-CCNC: 23 U/L (ref 10–50)
BILIRUB SERPL-MCNC: < 0.2 MG/DL (ref 0.3–1.2)
BUN SERPL-MCNC: 15 MG/DL (ref 8–23)
BUN SERPL-MCNC: 16 MG/DL (ref 8–23)
CA-I BLD ISE-SCNC: 1.12 MMOL/L (ref 1.12–1.32)
CALCIUM SERPL-MCNC: 7.6 MG/DL (ref 8.8–10.2)
CALCIUM SERPL-MCNC: 7.9 MG/DL (ref 8.8–10.2)
CHLORIDE SERPL-SCNC: 102 MEQ/L (ref 98–111)
CHLORIDE SERPL-SCNC: 107 MEQ/L (ref 98–111)
CO2 SERPL-SCNC: 24 MEQ/L (ref 22–29)
CO2 SERPL-SCNC: ABNORMAL MEQ/L (ref 22–29)
CREAT SERPL-MCNC: 0.6 MG/DL (ref 0.7–1.2)
CREAT SERPL-MCNC: 0.6 MG/DL (ref 0.7–1.2)
DEPRECATED RDW RBC AUTO: 51.4 FL (ref 35–45)
EKG ATRIAL RATE: 97 BPM
EKG P AXIS: 83 DEGREES
EKG P-R INTERVAL: 132 MS
EKG Q-T INTERVAL: 342 MS
EKG QRS DURATION: 74 MS
EKG QTC CALCULATION (BAZETT): 434 MS
EKG R AXIS: 54 DEGREES
EKG T AXIS: 29 DEGREES
EKG VENTRICULAR RATE: 97 BPM
ERYTHROCYTE [DISTWIDTH] IN BLOOD BY AUTOMATED COUNT: 13.7 % (ref 11.5–14.5)
GFR SERPL CREATININE-BSD FRML MDRD: > 90 ML/MIN/1.73M2
GFR SERPL CREATININE-BSD FRML MDRD: > 90 ML/MIN/1.73M2
GLUCOSE SERPL-MCNC: 149 MG/DL (ref 74–109)
GLUCOSE SERPL-MCNC: 166 MG/DL (ref 74–109)
HCT VFR BLD AUTO: 26.4 % (ref 42–52)
HGB BLD-MCNC: 8.8 GM/DL (ref 14–18)
MAGNESIUM SERPL-MCNC: 2.1 MG/DL (ref 1.6–2.6)
MCH RBC QN AUTO: 34.2 PG (ref 26–33)
MCHC RBC AUTO-ENTMCNC: 33.3 GM/DL (ref 32.2–35.5)
MCV RBC AUTO: 102.7 FL (ref 80–94)
PLATELET # BLD AUTO: 436 THOU/MM3 (ref 130–400)
PMV BLD AUTO: 10 FL (ref 9.4–12.4)
POTASSIUM SERPL-SCNC: 4.1 MEQ/L (ref 3.5–5.2)
POTASSIUM SERPL-SCNC: 4.2 MEQ/L (ref 3.5–5.2)
PROCALCITONIN SERPL IA-MCNC: 0.07 NG/ML (ref 0.01–0.09)
PROT SERPL-MCNC: 6.5 G/DL (ref 6.4–8.3)
RBC # BLD AUTO: 2.57 MILL/MM3 (ref 4.7–6.1)
SODIUM SERPL-SCNC: 138 MEQ/L (ref 135–145)
SODIUM SERPL-SCNC: 138 MEQ/L (ref 135–145)
WBC # BLD AUTO: 21 THOU/MM3 (ref 4.8–10.8)

## 2025-03-15 PROCEDURE — 36415 COLL VENOUS BLD VENIPUNCTURE: CPT

## 2025-03-15 PROCEDURE — 2700000000 HC OXYGEN THERAPY PER DAY

## 2025-03-15 PROCEDURE — 82947 ASSAY GLUCOSE BLOOD QUANT: CPT

## 2025-03-15 PROCEDURE — 84295 ASSAY OF SERUM SODIUM: CPT

## 2025-03-15 PROCEDURE — 6360000002 HC RX W HCPCS: Performed by: NURSE PRACTITIONER

## 2025-03-15 PROCEDURE — 99024 POSTOP FOLLOW-UP VISIT: CPT | Performed by: NURSE PRACTITIONER

## 2025-03-15 PROCEDURE — 83735 ASSAY OF MAGNESIUM: CPT

## 2025-03-15 PROCEDURE — 6360000002 HC RX W HCPCS: Performed by: INTERNAL MEDICINE

## 2025-03-15 PROCEDURE — 6370000000 HC RX 637 (ALT 250 FOR IP): Performed by: NURSE PRACTITIONER

## 2025-03-15 PROCEDURE — 94761 N-INVAS EAR/PLS OXIMETRY MLT: CPT

## 2025-03-15 PROCEDURE — 07T10ZZ RESECTION OF RIGHT NECK LYMPHATIC, OPEN APPROACH: ICD-10-PCS | Performed by: OTOLARYNGOLOGY

## 2025-03-15 PROCEDURE — 07T20ZZ RESECTION OF LEFT NECK LYMPHATIC, OPEN APPROACH: ICD-10-PCS | Performed by: OTOLARYNGOLOGY

## 2025-03-15 PROCEDURE — 94640 AIRWAY INHALATION TREATMENT: CPT

## 2025-03-15 PROCEDURE — 6370000000 HC RX 637 (ALT 250 FOR IP)

## 2025-03-15 PROCEDURE — 84145 PROCALCITONIN (PCT): CPT

## 2025-03-15 PROCEDURE — 82565 ASSAY OF CREATININE: CPT

## 2025-03-15 PROCEDURE — 94003 VENT MGMT INPAT SUBQ DAY: CPT

## 2025-03-15 PROCEDURE — 6370000000 HC RX 637 (ALT 250 FOR IP): Performed by: INTERNAL MEDICINE

## 2025-03-15 PROCEDURE — 84520 ASSAY OF UREA NITROGEN: CPT

## 2025-03-15 PROCEDURE — 0GTH0ZZ RESECTION OF RIGHT THYROID GLAND LOBE, OPEN APPROACH: ICD-10-PCS | Performed by: OTOLARYNGOLOGY

## 2025-03-15 PROCEDURE — 2000000000 HC ICU R&B

## 2025-03-15 PROCEDURE — 84132 ASSAY OF SERUM POTASSIUM: CPT

## 2025-03-15 PROCEDURE — 99291 CRITICAL CARE FIRST HOUR: CPT | Performed by: INTERNAL MEDICINE

## 2025-03-15 PROCEDURE — 2580000003 HC RX 258: Performed by: NURSE PRACTITIONER

## 2025-03-15 PROCEDURE — 6360000002 HC RX W HCPCS

## 2025-03-15 PROCEDURE — 82435 ASSAY OF BLOOD CHLORIDE: CPT

## 2025-03-15 PROCEDURE — 0CTS0ZZ RESECTION OF LARYNX, OPEN APPROACH: ICD-10-PCS | Performed by: OTOLARYNGOLOGY

## 2025-03-15 PROCEDURE — 85027 COMPLETE CBC AUTOMATED: CPT

## 2025-03-15 PROCEDURE — 2580000003 HC RX 258

## 2025-03-15 PROCEDURE — 82310 ASSAY OF CALCIUM: CPT

## 2025-03-15 PROCEDURE — F0DZ4VZ VOICE PROSTHETIC DEVICE FITTING USING SPEECH PROSTHESIS: ICD-10-PCS | Performed by: OTOLARYNGOLOGY

## 2025-03-15 PROCEDURE — 93005 ELECTROCARDIOGRAM TRACING: CPT

## 2025-03-15 PROCEDURE — 80053 COMPREHEN METABOLIC PANEL: CPT

## 2025-03-15 PROCEDURE — 82330 ASSAY OF CALCIUM: CPT

## 2025-03-15 RX ORDER — PANTOPRAZOLE SODIUM 40 MG/10ML
40 INJECTION, POWDER, LYOPHILIZED, FOR SOLUTION INTRAVENOUS DAILY
Status: DISCONTINUED | OUTPATIENT
Start: 2025-03-15 | End: 2025-03-22

## 2025-03-15 RX ORDER — ACETAMINOPHEN 500 MG
1000 TABLET ORAL EVERY 8 HOURS SCHEDULED
Status: DISCONTINUED | OUTPATIENT
Start: 2025-03-15 | End: 2025-03-24 | Stop reason: HOSPADM

## 2025-03-15 RX ORDER — CHLORHEXIDINE GLUCONATE ORAL RINSE 1.2 MG/ML
15 SOLUTION DENTAL 2 TIMES DAILY
Status: DISCONTINUED | OUTPATIENT
Start: 2025-03-15 | End: 2025-03-16

## 2025-03-15 RX ORDER — IPRATROPIUM BROMIDE AND ALBUTEROL SULFATE 2.5; .5 MG/3ML; MG/3ML
1 SOLUTION RESPIRATORY (INHALATION)
Status: DISCONTINUED | OUTPATIENT
Start: 2025-03-15 | End: 2025-03-24 | Stop reason: HOSPADM

## 2025-03-15 RX ORDER — PANTOPRAZOLE SODIUM 40 MG/1
40 TABLET, DELAYED RELEASE ORAL
Status: DISCONTINUED | OUTPATIENT
Start: 2025-03-15 | End: 2025-03-15

## 2025-03-15 RX ORDER — LISINOPRIL 5 MG/1
5 TABLET ORAL DAILY
Status: DISCONTINUED | OUTPATIENT
Start: 2025-03-15 | End: 2025-03-24 | Stop reason: HOSPADM

## 2025-03-15 RX ORDER — AMLODIPINE BESYLATE 5 MG/1
5 TABLET ORAL DAILY
Status: DISCONTINUED | OUTPATIENT
Start: 2025-03-15 | End: 2025-03-24 | Stop reason: HOSPADM

## 2025-03-15 RX ADMIN — ACETAMINOPHEN 1000 MG: 325 TABLET ORAL at 17:03

## 2025-03-15 RX ADMIN — MUPIROCIN: 20 OINTMENT TOPICAL at 20:33

## 2025-03-15 RX ADMIN — AMLODIPINE BESYLATE 5 MG: 5 TABLET ORAL at 09:21

## 2025-03-15 RX ADMIN — MUPIROCIN: 20 OINTMENT TOPICAL at 09:22

## 2025-03-15 RX ADMIN — OXYCODONE 10 MG: 5 TABLET ORAL at 09:19

## 2025-03-15 RX ADMIN — IPRATROPIUM BROMIDE AND ALBUTEROL SULFATE 1 DOSE: .5; 3 SOLUTION RESPIRATORY (INHALATION) at 20:54

## 2025-03-15 RX ADMIN — IPRATROPIUM BROMIDE AND ALBUTEROL SULFATE 1 DOSE: .5; 3 SOLUTION RESPIRATORY (INHALATION) at 12:49

## 2025-03-15 RX ADMIN — CHLORHEXIDINE GLUCONATE 15 ML: 1.2 LIQUID BUCCAL at 09:19

## 2025-03-15 RX ADMIN — IPRATROPIUM BROMIDE AND ALBUTEROL SULFATE 1 DOSE: .5; 3 SOLUTION RESPIRATORY (INHALATION) at 16:56

## 2025-03-15 RX ADMIN — PIPERACILLIN AND TAZOBACTAM 3375 MG: 3; .375 INJECTION, POWDER, FOR SOLUTION INTRAVENOUS at 17:08

## 2025-03-15 RX ADMIN — Medication 100 MCG/HR: at 08:08

## 2025-03-15 RX ADMIN — PANTOPRAZOLE SODIUM 40 MG: 40 INJECTION, POWDER, FOR SOLUTION INTRAVENOUS at 09:19

## 2025-03-15 RX ADMIN — OXYCODONE 10 MG: 5 TABLET ORAL at 20:32

## 2025-03-15 RX ADMIN — IPRATROPIUM BROMIDE AND ALBUTEROL SULFATE 1 DOSE: .5; 3 SOLUTION RESPIRATORY (INHALATION) at 09:03

## 2025-03-15 RX ADMIN — CHLORHEXIDINE GLUCONATE 15 ML: 1.2 LIQUID BUCCAL at 20:32

## 2025-03-15 RX ADMIN — PIPERACILLIN AND TAZOBACTAM 3375 MG: 3; .375 INJECTION, POWDER, FOR SOLUTION INTRAVENOUS at 08:59

## 2025-03-15 RX ADMIN — MUPIROCIN: 20 OINTMENT TOPICAL at 17:03

## 2025-03-15 RX ADMIN — ACETAMINOPHEN 1000 MG: 325 TABLET ORAL at 23:04

## 2025-03-15 ASSESSMENT — PAIN SCALES - GENERAL: PAINLEVEL_OUTOF10: 7

## 2025-03-15 ASSESSMENT — PAIN DESCRIPTION - ORIENTATION: ORIENTATION: MID

## 2025-03-15 ASSESSMENT — PAIN DESCRIPTION - DESCRIPTORS: DESCRIPTORS: SHARP;ACHING

## 2025-03-15 ASSESSMENT — PAIN DESCRIPTION - LOCATION: LOCATION: NECK

## 2025-03-15 ASSESSMENT — PULMONARY FUNCTION TESTS
PIF_VALUE: 17
PIF_VALUE: 13
PIF_VALUE: 13

## 2025-03-15 NOTE — CARE COORDINATION
03/15/25 1416   Service Assessment   Patient Orientation Alert and Oriented   Cognition Alert   History Provided By Unable to Assess;Medical Record  (patient does not verbalize well d/t laryngectomy, he asks CM to contact wife. VM message left.)   Primary Caregiver Spouse   Support Systems Spouse/Significant Other;Home Care Staff   Patient's Healthcare Decision Maker is: Named in Scanned ACP Document   PCP Verified by CM Yes   Prior Functional Level Assistance with the following:;Bathing;Dressing;Toileting;Feeding;Cooking;Housework;Mobility   Current Functional Level Assistance with the following:;Bathing;Dressing;Toileting;Feeding;Mobility   Can patient return to prior living arrangement Unknown at present   Ability to make needs known: Fair   Family able to assist with home care needs: Yes   Financial Resources Medicare   Community Resources ECF/Home Care   Discharge Planning   Current Services Prior To Admission Home Infusion;Durable Medical Equipment;Home Care   Current DME Prior to Arrival Cane;Walker;Bedside Commode   Potential Assistance Needed N/A   DME Ordered? No   Potential Assistance Purchasing Medications No   Type of Home Care Services Nursing Services;PT;OT;IV Therapy   Patient expects to be discharged to: House   Condition of Participation: Discharge Planning   The Plan for Transition of Care is related to the following treatment goals: surgery recovery     Patient Goals/Plan/Treatment Preferences: Met with Ildefosno, he had total laryngectomy 3/14 and is unable to verbalize well. He indicates CM should call his wife for discharge assessment and planning. Wife did not answer, voicemail message left requesting callback. Previous assessment indicates that Ildefonso is from home with his wife Soraay, and [facility]     If patient is discharged prior to next notation, then this note serves as note for discharge by case management.

## 2025-03-15 NOTE — H&P
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  Tracheoesophageal puncture fistula construction and TEP placement.  Right hemithyroidectomy.:  ENT following.  -Patient underwent significant surgery by ENT.  Discussed the case with the ENT.  -Supportive care.  Pain control.-  -Vent/trach.  Will wean off as able from FiO2.  -Telemetry pulse ox.  -Daily CBC BMP.  Daily weights TRUPTI's.  Fall/aspiration precautions  -PT OT.  Speech.  Dietitian consulted.  Mild macrocytic anemia.  Likely worsened due to blood loss and OR.  Will continue monitoring, daily CBC.  Checking vitamin B12/folic acid levels.  If needed will supplement vitamin B12/folic acid.   Trach/vent dependent.  Tracheostomy placed by ENT.  Discussed the case.  Mild hyponatremia: Due to poor oral intake.  Sodium 134.  Will continue check.  Daily BMP.  Mild thrombocytosis: Likely reactive.  Will continue monitoring with daily CBC.  COPD with a history of bronchial asthma.:  Noted.  Symbicort started.  As needed albuterol/DuoNeb breathing treatments.  For now continue monitoring.  Severe malnutrition, calorie/protein: Noted.  Speech eval for dysphagia, dietitian consult for possible tube feeding.  Deconditioning/debility: Due to multiple comorbidities and advanced age.  GERD.:  Pepcid resumed.  On PPI  Primary hypertension: Meds resumed with parameters.       INITIAL H AND P AND ICU COURSE:  Initial HPI.  Per EMR. Masood Randhawa is a 74 y.o. male who presents for his first post-hospital visit after having been admitted for near complete airway obstruction from a very advanced

## 2025-03-15 NOTE — ANESTHESIA POSTPROCEDURE EVALUATION
Department of Anesthesiology  Postprocedure Note    Patient: Masood Randhawa  MRN: [id number]  YOB: 1950  Date of evaluation: 3/15/2025    Procedure Summary       Date: 03/14/25 Room / Location: [facility] OR [address] [place] [address] OR    Anesthesia Start: 0834 Anesthesia Stop: 2130    Procedure: Total Bilateral Laryngectomy with radical neck dissection. Left Selective Neck Dissection. Tracheoesphogeal Puncture Fistula Construction and TEP Placement. Right Hemithyroidectomy. (Bilateral: Neck) Diagnosis:       Squamous cell carcinoma of glottis (HCC)      (Squamous cell carcinoma of glottis (HCC) [C32.0])    Surgeons: Rickie Daley MD Responsible Provider: Du Warren DO    Anesthesia Type: general ASA Status: 4            Anesthesia Type: No value filed.    Edenilson Phase I: Edenilson Score: 10    Edenilson Phase II:      Anesthesia Post Evaluation    Patient location during evaluation: bedside  Patient participation: complete - patient participated  Level of consciousness: awake  Airway patency: patent  Nausea & Vomiting: no nausea and no vomiting  Cardiovascular status: hemodynamically stable  Respiratory status: acceptable  Hydration status: stable  Pain management: adequate    No notable events documented.

## 2025-03-15 NOTE — OP NOTE
sternocleidomastoid muscle.  I began at the level of the omohyoid muscle division on the left side and dissected the fibrovascular tissues off of the carotid sheath and the internal jugular vein circumferentially.  I reflected these tissues superiorly and dissected the submuscular recess which had a very large volume of fiber lymphatic tissues that could be felt through direct palpation.  When reflected these tissues anteriorly preserving the hypoglossal and accessory nerves and reflecting these structures anteriorly and medially.  I dissected a very large submandibular gland mass out from under the mylohyoid along with its fibrovascular structures and lymphatics to incorporate it with the left side staging neck dissection.  Once these structures were sufficiently isolated I  this portion of the neck section separate specimen and had it placed in formalin for permanent section.    With both sectioned neck dissections and laryngectomy now completed I turned my attention to reconstructing the patient's stoma.  I placed an array of ten 3-0 PDS sutures on an RB1 taper needle through the skin of the inferior flap and then through the cartilage of the trachea beneath the mucosa and coming out of the interspace on the deep surface to pass through the trachea second time and then back up through the ring to the skin in a horizontal mattress configuration intended to allow the neck skin to cover the exposed cartilage.  After placing all 10 of these sutures I tied them down and placed separate 4-0 Monocryl sutures in a figure-of-eight configuration between each of the 2 sutures in order to imbricate the skin more tightly to the end stoma.  This was an additional 8 sutures.  This configured the anterior and lateral aspects of the stoma in preparation for the TEP placement.    Tracheoesophageal fistula construction and TEP prosthesis placement: Using the a ATOS device I passed the plastic esophageal protection device

## 2025-03-15 NOTE — CARE COORDINATION
03/15/25 1416   Service Assessment   Patient Orientation Alert and Oriented   Cognition Alert   History Provided By Unable to Assess;Medical Record  (patient does not verbalize well d/t laryngectomy, he asks CM to contact wife. VM message left.)   Primary Caregiver Spouse   Support Systems Spouse/Significant Other;Home Care Staff   Patient's Healthcare Decision Maker is: Named in Scanned ACP Document   PCP Verified by CM Yes   Prior Functional Level Assistance with the following:;Bathing;Dressing;Toileting;Feeding;Cooking;Housework;Mobility   Current Functional Level Assistance with the following:;Bathing;Dressing;Toileting;Feeding;Mobility   Can patient return to prior living arrangement Unknown at present   Ability to make needs known: Fair   Family able to assist with home care needs: Yes   Would you like for me to discuss the discharge plan with any other family members/significant others, and if so, who? Yes  (wife Soraya)   Financial Resources Medicare   Community Resources ECF/Home Care   Discharge Planning   Current Services Prior To Admission Home Infusion;Durable Medical Equipment;Home Care   Current DME Prior to Arrival Cane;Walker;Bedside Commode;Hospital Bed;Shower Chair   Potential Assistance Needed N/A   DME Ordered? No   Potential Assistance Purchasing Medications No   Type of Home Care Services Nursing Services;PT;OT;IV Therapy   Patient expects to be discharged to: House   Condition of Participation: Discharge Planning   The Plan for Transition of Care is related to the following treatment goals: surgery recovery       Patient Goals/Plan/Treatment Preferences: Met with Ildefonso, he had total laryngectomy 3/14 and is unable to verbalize well. He indicates CM should call his wife for discharge assessment and planning. Wife did not answer, voicemail message left requesting callback. Previous assessment indicates that Ildefonso is from home with his wife Soraya, and [facility]

## 2025-03-15 NOTE — PROCEDURES
PROCEDURE NOTE  Date: 3/15/2025   Name: Masood Randhawa  YOB: 1950    Procedures        EKG was completed and handed to RN

## 2025-03-16 LAB
BACTERIA UR CULT: NORMAL
DEPRECATED RDW RBC AUTO: 52 FL (ref 35–45)
ERYTHROCYTE [DISTWIDTH] IN BLOOD BY AUTOMATED COUNT: 13.8 % (ref 11.5–14.5)
HCT VFR BLD AUTO: 27.9 % (ref 42–52)
HGB BLD-MCNC: 9.2 GM/DL (ref 14–18)
MCH RBC QN AUTO: 33.9 PG (ref 26–33)
MCHC RBC AUTO-ENTMCNC: 33 GM/DL (ref 32.2–35.5)
MCV RBC AUTO: 103 FL (ref 80–94)
PLATELET # BLD AUTO: 470 THOU/MM3 (ref 130–400)
PMV BLD AUTO: 10.1 FL (ref 9.4–12.4)
RBC # BLD AUTO: 2.71 MILL/MM3 (ref 4.7–6.1)
WBC # BLD AUTO: 15.9 THOU/MM3 (ref 4.8–10.8)

## 2025-03-16 PROCEDURE — 6360000002 HC RX W HCPCS: Performed by: NURSE PRACTITIONER

## 2025-03-16 PROCEDURE — 6370000000 HC RX 637 (ALT 250 FOR IP): Performed by: INTERNAL MEDICINE

## 2025-03-16 PROCEDURE — 36415 COLL VENOUS BLD VENIPUNCTURE: CPT

## 2025-03-16 PROCEDURE — 2500000003 HC RX 250 WO HCPCS: Performed by: NURSE PRACTITIONER

## 2025-03-16 PROCEDURE — 85027 COMPLETE CBC AUTOMATED: CPT

## 2025-03-16 PROCEDURE — 6370000000 HC RX 637 (ALT 250 FOR IP)

## 2025-03-16 PROCEDURE — 6360000002 HC RX W HCPCS: Performed by: INTERNAL MEDICINE

## 2025-03-16 PROCEDURE — 2000000000 HC ICU R&B

## 2025-03-16 PROCEDURE — 99024 POSTOP FOLLOW-UP VISIT: CPT | Performed by: NURSE PRACTITIONER

## 2025-03-16 PROCEDURE — 2700000000 HC OXYGEN THERAPY PER DAY

## 2025-03-16 PROCEDURE — 2580000003 HC RX 258: Performed by: NURSE PRACTITIONER

## 2025-03-16 PROCEDURE — 5A0955A ASSISTANCE WITH RESPIRATORY VENTILATION, GREATER THAN 96 CONSECUTIVE HOURS, HIGH NASAL FLOW/VELOCITY: ICD-10-PCS | Performed by: NURSE PRACTITIONER

## 2025-03-16 PROCEDURE — 99291 CRITICAL CARE FIRST HOUR: CPT | Performed by: INTERNAL MEDICINE

## 2025-03-16 PROCEDURE — 94640 AIRWAY INHALATION TREATMENT: CPT

## 2025-03-16 PROCEDURE — 94761 N-INVAS EAR/PLS OXIMETRY MLT: CPT

## 2025-03-16 RX ADMIN — MUPIROCIN: 20 OINTMENT TOPICAL at 15:42

## 2025-03-16 RX ADMIN — PANTOPRAZOLE SODIUM 40 MG: 40 INJECTION, POWDER, FOR SOLUTION INTRAVENOUS at 08:32

## 2025-03-16 RX ADMIN — PIPERACILLIN AND TAZOBACTAM 3375 MG: 3; .375 INJECTION, POWDER, FOR SOLUTION INTRAVENOUS at 08:21

## 2025-03-16 RX ADMIN — IPRATROPIUM BROMIDE AND ALBUTEROL SULFATE 1 DOSE: .5; 3 SOLUTION RESPIRATORY (INHALATION) at 09:48

## 2025-03-16 RX ADMIN — MUPIROCIN: 20 OINTMENT TOPICAL at 08:29

## 2025-03-16 RX ADMIN — AMLODIPINE BESYLATE 5 MG: 5 TABLET ORAL at 08:18

## 2025-03-16 RX ADMIN — IPRATROPIUM BROMIDE AND ALBUTEROL SULFATE 1 DOSE: .5; 3 SOLUTION RESPIRATORY (INHALATION) at 17:12

## 2025-03-16 RX ADMIN — MUPIROCIN: 20 OINTMENT TOPICAL at 21:36

## 2025-03-16 RX ADMIN — PIPERACILLIN AND TAZOBACTAM 3375 MG: 3; .375 INJECTION, POWDER, FOR SOLUTION INTRAVENOUS at 17:12

## 2025-03-16 RX ADMIN — IPRATROPIUM BROMIDE AND ALBUTEROL SULFATE 1 DOSE: .5; 3 SOLUTION RESPIRATORY (INHALATION) at 13:40

## 2025-03-16 RX ADMIN — IPRATROPIUM BROMIDE AND ALBUTEROL SULFATE 1 DOSE: .5; 3 SOLUTION RESPIRATORY (INHALATION) at 20:31

## 2025-03-16 RX ADMIN — ACETAMINOPHEN 1000 MG: 325 TABLET ORAL at 21:35

## 2025-03-16 RX ADMIN — SODIUM CHLORIDE, PRESERVATIVE FREE 5 ML: 5 INJECTION INTRAVENOUS at 21:36

## 2025-03-16 RX ADMIN — ACETAMINOPHEN 1000 MG: 325 TABLET ORAL at 15:42

## 2025-03-16 RX ADMIN — LISINOPRIL 5 MG: 5 TABLET ORAL at 08:18

## 2025-03-16 RX ADMIN — ACETAMINOPHEN 1000 MG: 325 TABLET ORAL at 04:16

## 2025-03-16 RX ADMIN — SODIUM CHLORIDE, PRESERVATIVE FREE 10 ML: 5 INJECTION INTRAVENOUS at 08:32

## 2025-03-16 RX ADMIN — PIPERACILLIN AND TAZOBACTAM 3375 MG: 3; .375 INJECTION, POWDER, FOR SOLUTION INTRAVENOUS at 00:58

## 2025-03-16 ASSESSMENT — PAIN DESCRIPTION - LOCATION: LOCATION: NECK

## 2025-03-16 ASSESSMENT — PAIN DESCRIPTION - DESCRIPTORS: DESCRIPTORS: ACHING

## 2025-03-16 ASSESSMENT — PAIN DESCRIPTION - ORIENTATION: ORIENTATION: MID

## 2025-03-16 ASSESSMENT — PAIN - FUNCTIONAL ASSESSMENT: PAIN_FUNCTIONAL_ASSESSMENT: ACTIVITIES ARE NOT PREVENTED

## 2025-03-16 ASSESSMENT — PAIN DESCRIPTION - FREQUENCY: FREQUENCY: INTERMITTENT

## 2025-03-16 ASSESSMENT — PAIN SCALES - GENERAL: PAINLEVEL_OUTOF10: 0

## 2025-03-17 LAB
ALBUMIN SERPL BCG-MCNC: 2.9 G/DL (ref 3.4–4.9)
ALP SERPL-CCNC: 69 U/L (ref 40–129)
ALT SERPL W/O P-5'-P-CCNC: 20 U/L (ref 10–50)
ANION GAP SERPL CALC-SCNC: 6 MEQ/L (ref 8–16)
AST SERPL-CCNC: 21 U/L (ref 10–50)
BILIRUB SERPL-MCNC: < 0.2 MG/DL (ref 0.3–1.2)
BUN SERPL-MCNC: 21 MG/DL (ref 8–23)
CALCIUM SERPL-MCNC: 8.5 MG/DL (ref 8.8–10.2)
CHLORIDE SERPL-SCNC: 102 MEQ/L (ref 98–111)
CO2 SERPL-SCNC: 29 MEQ/L (ref 22–29)
CREAT SERPL-MCNC: 0.5 MG/DL (ref 0.7–1.2)
DEPRECATED RDW RBC AUTO: 50.2 FL (ref 35–45)
ERYTHROCYTE [DISTWIDTH] IN BLOOD BY AUTOMATED COUNT: 13.7 % (ref 11.5–14.5)
GFR SERPL CREATININE-BSD FRML MDRD: > 90 ML/MIN/1.73M2
GLUCOSE SERPL-MCNC: 126 MG/DL (ref 74–109)
HCT VFR BLD AUTO: 27.6 % (ref 42–52)
HGB BLD-MCNC: 9.4 GM/DL (ref 14–18)
MCH RBC QN AUTO: 34.2 PG (ref 26–33)
MCHC RBC AUTO-ENTMCNC: 34.1 GM/DL (ref 32.2–35.5)
MCV RBC AUTO: 100.4 FL (ref 80–94)
MRSA SPEC QL CULT: NORMAL
PLATELET # BLD AUTO: 502 THOU/MM3 (ref 130–400)
PMV BLD AUTO: 10.1 FL (ref 9.4–12.4)
POTASSIUM SERPL-SCNC: 4 MEQ/L (ref 3.5–5.2)
PROT SERPL-MCNC: 6.3 G/DL (ref 6.4–8.3)
RBC # BLD AUTO: 2.75 MILL/MM3 (ref 4.7–6.1)
SODIUM SERPL-SCNC: 137 MEQ/L (ref 135–145)
WBC # BLD AUTO: 15.5 THOU/MM3 (ref 4.8–10.8)

## 2025-03-17 PROCEDURE — 6370000000 HC RX 637 (ALT 250 FOR IP): Performed by: OTOLARYNGOLOGY

## 2025-03-17 PROCEDURE — 6370000000 HC RX 637 (ALT 250 FOR IP)

## 2025-03-17 PROCEDURE — 92522 EVALUATE SPEECH PRODUCTION: CPT

## 2025-03-17 PROCEDURE — 97535 SELF CARE MNGMENT TRAINING: CPT

## 2025-03-17 PROCEDURE — 94761 N-INVAS EAR/PLS OXIMETRY MLT: CPT

## 2025-03-17 PROCEDURE — 97530 THERAPEUTIC ACTIVITIES: CPT

## 2025-03-17 PROCEDURE — 2700000000 HC OXYGEN THERAPY PER DAY

## 2025-03-17 PROCEDURE — 85027 COMPLETE CBC AUTOMATED: CPT

## 2025-03-17 PROCEDURE — 36415 COLL VENOUS BLD VENIPUNCTURE: CPT

## 2025-03-17 PROCEDURE — 6360000002 HC RX W HCPCS: Performed by: INTERNAL MEDICINE

## 2025-03-17 PROCEDURE — 97163 PT EVAL HIGH COMPLEX 45 MIN: CPT

## 2025-03-17 PROCEDURE — 6370000000 HC RX 637 (ALT 250 FOR IP): Performed by: NURSE PRACTITIONER

## 2025-03-17 PROCEDURE — 2500000003 HC RX 250 WO HCPCS: Performed by: NURSE PRACTITIONER

## 2025-03-17 PROCEDURE — 97167 OT EVAL HIGH COMPLEX 60 MIN: CPT

## 2025-03-17 PROCEDURE — 99233 SBSQ HOSP IP/OBS HIGH 50: CPT | Performed by: INTERNAL MEDICINE

## 2025-03-17 PROCEDURE — 2000000000 HC ICU R&B

## 2025-03-17 PROCEDURE — 2580000003 HC RX 258: Performed by: NURSE PRACTITIONER

## 2025-03-17 PROCEDURE — 99024 POSTOP FOLLOW-UP VISIT: CPT

## 2025-03-17 PROCEDURE — 94640 AIRWAY INHALATION TREATMENT: CPT

## 2025-03-17 PROCEDURE — 80053 COMPREHEN METABOLIC PANEL: CPT

## 2025-03-17 PROCEDURE — 6360000002 HC RX W HCPCS: Performed by: NURSE PRACTITIONER

## 2025-03-17 PROCEDURE — 6370000000 HC RX 637 (ALT 250 FOR IP): Performed by: INTERNAL MEDICINE

## 2025-03-17 RX ORDER — LIDOCAINE 4 G/G
1 PATCH TOPICAL DAILY
Status: DISCONTINUED | OUTPATIENT
Start: 2025-03-17 | End: 2025-03-24 | Stop reason: HOSPADM

## 2025-03-17 RX ADMIN — PIPERACILLIN AND TAZOBACTAM 3375 MG: 3; .375 INJECTION, POWDER, FOR SOLUTION INTRAVENOUS at 16:55

## 2025-03-17 RX ADMIN — ACETAMINOPHEN 1000 MG: 325 TABLET ORAL at 05:18

## 2025-03-17 RX ADMIN — AMLODIPINE BESYLATE 5 MG: 5 TABLET ORAL at 07:41

## 2025-03-17 RX ADMIN — HYDROMORPHONE HYDROCHLORIDE 0.5 MG: 1 INJECTION, SOLUTION INTRAMUSCULAR; INTRAVENOUS; SUBCUTANEOUS at 09:58

## 2025-03-17 RX ADMIN — MUPIROCIN: 20 OINTMENT TOPICAL at 13:23

## 2025-03-17 RX ADMIN — PANTOPRAZOLE SODIUM 40 MG: 40 INJECTION, POWDER, FOR SOLUTION INTRAVENOUS at 07:41

## 2025-03-17 RX ADMIN — OXYCODONE 10 MG: 5 TABLET ORAL at 11:24

## 2025-03-17 RX ADMIN — MUPIROCIN: 20 OINTMENT TOPICAL at 07:42

## 2025-03-17 RX ADMIN — IPRATROPIUM BROMIDE AND ALBUTEROL SULFATE 1 DOSE: .5; 3 SOLUTION RESPIRATORY (INHALATION) at 12:39

## 2025-03-17 RX ADMIN — MUPIROCIN: 20 OINTMENT TOPICAL at 20:28

## 2025-03-17 RX ADMIN — ACETAMINOPHEN 1000 MG: 325 TABLET ORAL at 13:21

## 2025-03-17 RX ADMIN — OXYCODONE 10 MG: 5 TABLET ORAL at 21:22

## 2025-03-17 RX ADMIN — PIPERACILLIN AND TAZOBACTAM 3375 MG: 3; .375 INJECTION, POWDER, FOR SOLUTION INTRAVENOUS at 07:45

## 2025-03-17 RX ADMIN — ACETAMINOPHEN 1000 MG: 325 TABLET ORAL at 20:28

## 2025-03-17 RX ADMIN — OXYCODONE 10 MG: 5 TABLET ORAL at 02:24

## 2025-03-17 RX ADMIN — OXYCODONE 10 MG: 5 TABLET ORAL at 07:41

## 2025-03-17 RX ADMIN — IPRATROPIUM BROMIDE AND ALBUTEROL SULFATE 1 DOSE: .5; 3 SOLUTION RESPIRATORY (INHALATION) at 16:04

## 2025-03-17 RX ADMIN — PIPERACILLIN AND TAZOBACTAM 3375 MG: 3; .375 INJECTION, POWDER, FOR SOLUTION INTRAVENOUS at 00:45

## 2025-03-17 RX ADMIN — SODIUM CHLORIDE, PRESERVATIVE FREE 10 ML: 5 INJECTION INTRAVENOUS at 20:29

## 2025-03-17 RX ADMIN — IPRATROPIUM BROMIDE AND ALBUTEROL SULFATE 1 DOSE: .5; 3 SOLUTION RESPIRATORY (INHALATION) at 21:05

## 2025-03-17 RX ADMIN — OXYCODONE 10 MG: 5 TABLET ORAL at 16:57

## 2025-03-17 RX ADMIN — LISINOPRIL 5 MG: 5 TABLET ORAL at 07:41

## 2025-03-17 ASSESSMENT — PAIN SCALES - GENERAL
PAINLEVEL_OUTOF10: 5
PAINLEVEL_OUTOF10: 7
PAINLEVEL_OUTOF10: 3

## 2025-03-17 ASSESSMENT — PAIN DESCRIPTION - LOCATION: LOCATION: NECK

## 2025-03-17 ASSESSMENT — PAIN DESCRIPTION - ORIENTATION: ORIENTATION: ANTERIOR

## 2025-03-17 NOTE — CARE COORDINATION
Case Management Assessment Initial Evaluation    Date/Time of Evaluation: 3/17/2025 10:00 AM  Assessment Completed by: Swathi Gonzalez RN    If patient is discharged prior to next notation, then this note serves as note for discharge by case management.    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 Admitted to ICU post-op on vent. Extubated on 3/15. #8 Laryngectomy tube placed on 3/16. On HFNC per protocol today. Order to transfer to stepdown; awaiting bed.     HFNC, 10L, 21% with sats 95% to laryngectomy tube. Afebrile. 's. Ox4. PETER x1. SLP/PT/OT. Intensivist and ENT following. Telemetry, drain care, wound care, PEG w/bolus TF, SCDs. Prn IV dilaudid, nebs, lidoderm patch, bactroban, prn roxicodone, IV protonix, IV zosyn Q8H. Urine sent for culture. Alb 2.9, total pro 6.3, wbc 23.4 - now 15.5, hgb 11.4 - now 9.4.     Procedures:   3/14 Total bilateral laryngectomy with radical neck dissection, Left selective neck dissection, Right hemithyroidectomy. Tracheoesphogeal puncture fistula construction and TEP placement   3/15 Extubated  3/16 #8 LGT placed    Imaging: none    Patient Goals/Plan/Treatment Preferences: Home w/wife. [facility]  for RN/PT/OT. SW consulted. Need order for HH SLP at discharge.

## 2025-03-18 ENCOUNTER — APPOINTMENT (OUTPATIENT)
Dept: GENERAL RADIOLOGY | Age: 75
DRG: 011 | End: 2025-03-18
Attending: OTOLARYNGOLOGY
Payer: MEDICARE

## 2025-03-18 LAB
ALBUMIN SERPL BCG-MCNC: 2.9 G/DL (ref 3.4–4.9)
ALP SERPL-CCNC: 69 U/L (ref 40–129)
ALT SERPL W/O P-5'-P-CCNC: 20 U/L (ref 10–50)
ANION GAP SERPL CALC-SCNC: 8 MEQ/L (ref 8–16)
AST SERPL-CCNC: 20 U/L (ref 10–50)
BILIRUB SERPL-MCNC: < 0.2 MG/DL (ref 0.3–1.2)
BUN SERPL-MCNC: 25 MG/DL (ref 8–23)
CALCIUM SERPL-MCNC: 8.5 MG/DL (ref 8.8–10.2)
CHLORIDE SERPL-SCNC: 99 MEQ/L (ref 98–111)
CO2 SERPL-SCNC: 29 MEQ/L (ref 22–29)
CREAT SERPL-MCNC: 0.6 MG/DL (ref 0.7–1.2)
DEPRECATED RDW RBC AUTO: 51.8 FL (ref 35–45)
ERYTHROCYTE [DISTWIDTH] IN BLOOD BY AUTOMATED COUNT: 13.7 % (ref 11.5–14.5)
FOLATE SERPL-MCNC: 9.8 NG/ML (ref 4.6–34.8)
GFR SERPL CREATININE-BSD FRML MDRD: > 90 ML/MIN/1.73M2
GLUCOSE SERPL-MCNC: 102 MG/DL (ref 74–109)
HCT VFR BLD AUTO: 29.7 % (ref 42–52)
HGB BLD-MCNC: 9.8 GM/DL (ref 14–18)
MCH RBC QN AUTO: 34 PG (ref 26–33)
MCHC RBC AUTO-ENTMCNC: 33 GM/DL (ref 32.2–35.5)
MCV RBC AUTO: 103.1 FL (ref 80–94)
PLATELET # BLD AUTO: 528 THOU/MM3 (ref 130–400)
PMV BLD AUTO: 10.2 FL (ref 9.4–12.4)
POTASSIUM SERPL-SCNC: 4.2 MEQ/L (ref 3.5–5.2)
PROT SERPL-MCNC: 6.7 G/DL (ref 6.4–8.3)
RBC # BLD AUTO: 2.88 MILL/MM3 (ref 4.7–6.1)
SODIUM SERPL-SCNC: 136 MEQ/L (ref 135–145)
VIT B12 SERPL-MCNC: 503 PG/ML (ref 232–1245)
WBC # BLD AUTO: 14.6 THOU/MM3 (ref 4.8–10.8)

## 2025-03-18 PROCEDURE — 6370000000 HC RX 637 (ALT 250 FOR IP): Performed by: INTERNAL MEDICINE

## 2025-03-18 PROCEDURE — 6360000002 HC RX W HCPCS: Performed by: NURSE PRACTITIONER

## 2025-03-18 PROCEDURE — 85027 COMPLETE CBC AUTOMATED: CPT

## 2025-03-18 PROCEDURE — 6370000000 HC RX 637 (ALT 250 FOR IP)

## 2025-03-18 PROCEDURE — 92507 TX SP LANG VOICE COMM INDIV: CPT | Performed by: SPEECH-LANGUAGE PATHOLOGIST

## 2025-03-18 PROCEDURE — 6370000000 HC RX 637 (ALT 250 FOR IP): Performed by: OTOLARYNGOLOGY

## 2025-03-18 PROCEDURE — 99024 POSTOP FOLLOW-UP VISIT: CPT

## 2025-03-18 PROCEDURE — 36415 COLL VENOUS BLD VENIPUNCTURE: CPT

## 2025-03-18 PROCEDURE — 94761 N-INVAS EAR/PLS OXIMETRY MLT: CPT

## 2025-03-18 PROCEDURE — 6370000000 HC RX 637 (ALT 250 FOR IP): Performed by: NURSE PRACTITIONER

## 2025-03-18 PROCEDURE — 97110 THERAPEUTIC EXERCISES: CPT

## 2025-03-18 PROCEDURE — 82746 ASSAY OF FOLIC ACID SERUM: CPT

## 2025-03-18 PROCEDURE — 97116 GAIT TRAINING THERAPY: CPT

## 2025-03-18 PROCEDURE — 94640 AIRWAY INHALATION TREATMENT: CPT

## 2025-03-18 PROCEDURE — 82607 VITAMIN B-12: CPT

## 2025-03-18 PROCEDURE — 2060000000 HC ICU INTERMEDIATE R&B

## 2025-03-18 PROCEDURE — 97530 THERAPEUTIC ACTIVITIES: CPT

## 2025-03-18 PROCEDURE — 2580000003 HC RX 258: Performed by: NURSE PRACTITIONER

## 2025-03-18 PROCEDURE — 6360000002 HC RX W HCPCS: Performed by: INTERNAL MEDICINE

## 2025-03-18 PROCEDURE — 2500000003 HC RX 250 WO HCPCS: Performed by: NURSE PRACTITIONER

## 2025-03-18 PROCEDURE — 2700000000 HC OXYGEN THERAPY PER DAY

## 2025-03-18 PROCEDURE — 71045 X-RAY EXAM CHEST 1 VIEW: CPT

## 2025-03-18 PROCEDURE — 80053 COMPREHEN METABOLIC PANEL: CPT

## 2025-03-18 PROCEDURE — 99233 SBSQ HOSP IP/OBS HIGH 50: CPT | Performed by: INTERNAL MEDICINE

## 2025-03-18 RX ORDER — LOPERAMIDE HYDROCHLORIDE 2 MG/1
2 CAPSULE ORAL 4 TIMES DAILY PRN
Status: DISCONTINUED | OUTPATIENT
Start: 2025-03-18 | End: 2025-03-24 | Stop reason: HOSPADM

## 2025-03-18 RX ADMIN — SODIUM CHLORIDE, PRESERVATIVE FREE 10 ML: 5 INJECTION INTRAVENOUS at 20:38

## 2025-03-18 RX ADMIN — MUPIROCIN: 20 OINTMENT TOPICAL at 14:09

## 2025-03-18 RX ADMIN — AMLODIPINE BESYLATE 5 MG: 5 TABLET ORAL at 09:39

## 2025-03-18 RX ADMIN — SODIUM CHLORIDE, PRESERVATIVE FREE 10 ML: 5 INJECTION INTRAVENOUS at 09:40

## 2025-03-18 RX ADMIN — LISINOPRIL 5 MG: 5 TABLET ORAL at 09:39

## 2025-03-18 RX ADMIN — IPRATROPIUM BROMIDE AND ALBUTEROL SULFATE 1 DOSE: .5; 3 SOLUTION RESPIRATORY (INHALATION) at 08:33

## 2025-03-18 RX ADMIN — PIPERACILLIN AND TAZOBACTAM 3375 MG: 3; .375 INJECTION, POWDER, FOR SOLUTION INTRAVENOUS at 09:38

## 2025-03-18 RX ADMIN — ACETAMINOPHEN 1000 MG: 325 TABLET ORAL at 13:59

## 2025-03-18 RX ADMIN — PANTOPRAZOLE SODIUM 40 MG: 40 INJECTION, POWDER, FOR SOLUTION INTRAVENOUS at 09:39

## 2025-03-18 RX ADMIN — ACETAMINOPHEN 1000 MG: 325 TABLET ORAL at 23:00

## 2025-03-18 RX ADMIN — IPRATROPIUM BROMIDE AND ALBUTEROL SULFATE 1 DOSE: .5; 3 SOLUTION RESPIRATORY (INHALATION) at 13:09

## 2025-03-18 RX ADMIN — MUPIROCIN: 20 OINTMENT TOPICAL at 20:38

## 2025-03-18 RX ADMIN — OXYCODONE 10 MG: 5 TABLET ORAL at 01:33

## 2025-03-18 RX ADMIN — PIPERACILLIN AND TAZOBACTAM 3375 MG: 3; .375 INJECTION, POWDER, FOR SOLUTION INTRAVENOUS at 01:43

## 2025-03-18 RX ADMIN — MUPIROCIN: 20 OINTMENT TOPICAL at 09:39

## 2025-03-18 RX ADMIN — IPRATROPIUM BROMIDE AND ALBUTEROL SULFATE 1 DOSE: .5; 3 SOLUTION RESPIRATORY (INHALATION) at 16:18

## 2025-03-18 RX ADMIN — IPRATROPIUM BROMIDE AND ALBUTEROL SULFATE 1 DOSE: .5; 3 SOLUTION RESPIRATORY (INHALATION) at 23:03

## 2025-03-18 RX ADMIN — ACETAMINOPHEN 1000 MG: 325 TABLET ORAL at 06:45

## 2025-03-18 RX ADMIN — LOPERAMIDE HYDROCHLORIDE 2 MG: 2 CAPSULE ORAL at 13:59

## 2025-03-18 ASSESSMENT — PAIN DESCRIPTION - LOCATION: LOCATION: NECK

## 2025-03-18 ASSESSMENT — PAIN SCALES - GENERAL
PAINLEVEL_OUTOF10: 0
PAINLEVEL_OUTOF10: 5
PAINLEVEL_OUTOF10: 0

## 2025-03-19 ENCOUNTER — APPOINTMENT (OUTPATIENT)
Dept: CT IMAGING | Age: 75
DRG: 011 | End: 2025-03-19
Attending: OTOLARYNGOLOGY
Payer: MEDICARE

## 2025-03-19 ENCOUNTER — APPOINTMENT (OUTPATIENT)
Dept: GENERAL RADIOLOGY | Age: 75
DRG: 011 | End: 2025-03-19
Attending: OTOLARYNGOLOGY
Payer: MEDICARE

## 2025-03-19 LAB
ALBUMIN SERPL BCG-MCNC: 3 G/DL (ref 3.4–4.9)
ALBUMIN SERPL BCG-MCNC: 3.2 G/DL (ref 3.4–4.9)
ALP SERPL-CCNC: 75 U/L (ref 40–129)
ALP SERPL-CCNC: 86 U/L (ref 40–129)
ALT SERPL W/O P-5'-P-CCNC: 18 U/L (ref 10–50)
ALT SERPL W/O P-5'-P-CCNC: 19 U/L (ref 10–50)
ANION GAP SERPL CALC-SCNC: 8 MEQ/L (ref 8–16)
ANION GAP SERPL CALC-SCNC: 9 MEQ/L (ref 8–16)
ARTERIAL PATENCY WRIST A: POSITIVE
AST SERPL-CCNC: 16 U/L (ref 10–50)
AST SERPL-CCNC: 23 U/L (ref 10–50)
BASE EXCESS BLDA CALC-SCNC: 2.7 MMOL/L (ref -2.5–2.5)
BASOPHILS ABSOLUTE: 0.1 THOU/MM3 (ref 0–0.1)
BASOPHILS NFR BLD AUTO: 0.9 %
BDY SITE: ABNORMAL
BILIRUB SERPL-MCNC: 0.3 MG/DL (ref 0.3–1.2)
BILIRUB SERPL-MCNC: 0.4 MG/DL (ref 0.3–1.2)
BUN SERPL-MCNC: 20 MG/DL (ref 8–23)
BUN SERPL-MCNC: 24 MG/DL (ref 8–23)
CALCIUM SERPL-MCNC: 8.9 MG/DL (ref 8.8–10.2)
CALCIUM SERPL-MCNC: 9.2 MG/DL (ref 8.8–10.2)
CHLORIDE SERPL-SCNC: 100 MEQ/L (ref 98–111)
CHLORIDE SERPL-SCNC: 99 MEQ/L (ref 98–111)
CO2 SERPL-SCNC: 27 MEQ/L (ref 22–29)
CO2 SERPL-SCNC: 28 MEQ/L (ref 22–29)
COLLECTED BY:: ABNORMAL
CREAT SERPL-MCNC: 0.6 MG/DL (ref 0.7–1.2)
CREAT SERPL-MCNC: 0.6 MG/DL (ref 0.7–1.2)
DEPRECATED RDW RBC AUTO: 50.2 FL (ref 35–45)
DEVICE: ABNORMAL
EOSINOPHIL NFR BLD AUTO: 1.3 %
EOSINOPHILS ABSOLUTE: 0.2 THOU/MM3 (ref 0–0.4)
ERYTHROCYTE [DISTWIDTH] IN BLOOD BY AUTOMATED COUNT: 13.6 % (ref 11.5–14.5)
FIO2 ON VENT O2 ANALYZER: 40 %
GFR SERPL CREATININE-BSD FRML MDRD: > 90 ML/MIN/1.73M2
GFR SERPL CREATININE-BSD FRML MDRD: > 90 ML/MIN/1.73M2
GLUCOSE SERPL-MCNC: 142 MG/DL (ref 74–109)
GLUCOSE SERPL-MCNC: 98 MG/DL (ref 74–109)
HCO3 BLDA-SCNC: 26 MMOL/L (ref 23–28)
HCT VFR BLD AUTO: 28 % (ref 42–52)
HGB BLD-MCNC: 9.4 GM/DL (ref 14–18)
IMM GRANULOCYTES # BLD AUTO: 0.13 THOU/MM3 (ref 0–0.07)
IMM GRANULOCYTES NFR BLD AUTO: 1 %
LACTIC ACID, SEPSIS: 1.1 MMOL/L (ref 0.5–1.9)
LACTIC ACID, SEPSIS: 1.5 MMOL/L (ref 0.5–1.9)
LYMPHOCYTES ABSOLUTE: 1.6 THOU/MM3 (ref 1–4.8)
LYMPHOCYTES NFR BLD AUTO: 12.4 %
MCH RBC QN AUTO: 34.2 PG (ref 26–33)
MCHC RBC AUTO-ENTMCNC: 33.6 GM/DL (ref 32.2–35.5)
MCV RBC AUTO: 101.8 FL (ref 80–94)
MONOCYTES ABSOLUTE: 0.9 THOU/MM3 (ref 0.4–1.3)
MONOCYTES NFR BLD AUTO: 6.9 %
NEUTROPHILS ABSOLUTE: 9.7 THOU/MM3 (ref 1.8–7.7)
NEUTROPHILS NFR BLD AUTO: 77.5 %
NRBC BLD AUTO-RTO: 0 /100 WBC
PCO2 BLDA: 35 MMHG (ref 35–45)
PH BLDA: 7.48 [PH] (ref 7.35–7.45)
PLATELET # BLD AUTO: 540 THOU/MM3 (ref 130–400)
PMV BLD AUTO: 10 FL (ref 9.4–12.4)
PO2 BLDA: 71 MMHG (ref 71–104)
POTASSIUM SERPL-SCNC: 4 MEQ/L (ref 3.5–5.2)
POTASSIUM SERPL-SCNC: 4.1 MEQ/L (ref 3.5–5.2)
PROCALCITONIN SERPL IA-MCNC: 0.07 NG/ML (ref 0.01–0.09)
PROT SERPL-MCNC: 6.7 G/DL (ref 6.4–8.3)
PROT SERPL-MCNC: 7 G/DL (ref 6.4–8.3)
RBC # BLD AUTO: 2.75 MILL/MM3 (ref 4.7–6.1)
SAO2 % BLDA: 95 %
SODIUM SERPL-SCNC: 135 MEQ/L (ref 135–145)
SODIUM SERPL-SCNC: 136 MEQ/L (ref 135–145)
VENTILATION MODE VENT: ABNORMAL
WBC # BLD AUTO: 12.5 THOU/MM3 (ref 4.8–10.8)

## 2025-03-19 PROCEDURE — 6370000000 HC RX 637 (ALT 250 FOR IP)

## 2025-03-19 PROCEDURE — 97530 THERAPEUTIC ACTIVITIES: CPT

## 2025-03-19 PROCEDURE — 83605 ASSAY OF LACTIC ACID: CPT

## 2025-03-19 PROCEDURE — 2060000000 HC ICU INTERMEDIATE R&B

## 2025-03-19 PROCEDURE — 99024 POSTOP FOLLOW-UP VISIT: CPT

## 2025-03-19 PROCEDURE — 97110 THERAPEUTIC EXERCISES: CPT

## 2025-03-19 PROCEDURE — 94761 N-INVAS EAR/PLS OXIMETRY MLT: CPT

## 2025-03-19 PROCEDURE — 99233 SBSQ HOSP IP/OBS HIGH 50: CPT | Performed by: STUDENT IN AN ORGANIZED HEALTH CARE EDUCATION/TRAINING PROGRAM

## 2025-03-19 PROCEDURE — 6370000000 HC RX 637 (ALT 250 FOR IP): Performed by: INTERNAL MEDICINE

## 2025-03-19 PROCEDURE — 2580000003 HC RX 258: Performed by: NURSE PRACTITIONER

## 2025-03-19 PROCEDURE — 70490 CT SOFT TISSUE NECK W/O DYE: CPT

## 2025-03-19 PROCEDURE — 6360000002 HC RX W HCPCS: Performed by: NURSE PRACTITIONER

## 2025-03-19 PROCEDURE — 82803 BLOOD GASES ANY COMBINATION: CPT

## 2025-03-19 PROCEDURE — 84145 PROCALCITONIN (PCT): CPT

## 2025-03-19 PROCEDURE — 6360000002 HC RX W HCPCS: Performed by: INTERNAL MEDICINE

## 2025-03-19 PROCEDURE — 71045 X-RAY EXAM CHEST 1 VIEW: CPT

## 2025-03-19 PROCEDURE — 6370000000 HC RX 637 (ALT 250 FOR IP): Performed by: OTOLARYNGOLOGY

## 2025-03-19 PROCEDURE — 85025 COMPLETE CBC W/AUTO DIFF WBC: CPT

## 2025-03-19 PROCEDURE — 94640 AIRWAY INHALATION TREATMENT: CPT

## 2025-03-19 PROCEDURE — 6370000000 HC RX 637 (ALT 250 FOR IP): Performed by: NURSE PRACTITIONER

## 2025-03-19 PROCEDURE — 80053 COMPREHEN METABOLIC PANEL: CPT

## 2025-03-19 PROCEDURE — 6360000002 HC RX W HCPCS: Performed by: OTOLARYNGOLOGY

## 2025-03-19 PROCEDURE — 97535 SELF CARE MNGMENT TRAINING: CPT

## 2025-03-19 PROCEDURE — 92507 TX SP LANG VOICE COMM INDIV: CPT

## 2025-03-19 PROCEDURE — 2700000000 HC OXYGEN THERAPY PER DAY

## 2025-03-19 PROCEDURE — 2500000003 HC RX 250 WO HCPCS: Performed by: NURSE PRACTITIONER

## 2025-03-19 PROCEDURE — 97116 GAIT TRAINING THERAPY: CPT

## 2025-03-19 PROCEDURE — 36415 COLL VENOUS BLD VENIPUNCTURE: CPT

## 2025-03-19 RX ADMIN — PIPERACILLIN AND TAZOBACTAM 3375 MG: 3; .375 INJECTION, POWDER, FOR SOLUTION INTRAVENOUS at 21:01

## 2025-03-19 RX ADMIN — IPRATROPIUM BROMIDE AND ALBUTEROL SULFATE 1 DOSE: .5; 3 SOLUTION RESPIRATORY (INHALATION) at 07:56

## 2025-03-19 RX ADMIN — MUPIROCIN: 20 OINTMENT TOPICAL at 10:59

## 2025-03-19 RX ADMIN — MUPIROCIN: 20 OINTMENT TOPICAL at 15:58

## 2025-03-19 RX ADMIN — IPRATROPIUM BROMIDE AND ALBUTEROL SULFATE 1 DOSE: .5; 3 SOLUTION RESPIRATORY (INHALATION) at 17:30

## 2025-03-19 RX ADMIN — MUPIROCIN: 20 OINTMENT TOPICAL at 21:03

## 2025-03-19 RX ADMIN — ACETAMINOPHEN 1000 MG: 325 TABLET ORAL at 15:57

## 2025-03-19 RX ADMIN — PIPERACILLIN AND TAZOBACTAM 3375 MG: 3; .375 INJECTION, POWDER, FOR SOLUTION INTRAVENOUS at 10:59

## 2025-03-19 RX ADMIN — IPRATROPIUM BROMIDE AND ALBUTEROL SULFATE 1 DOSE: .5; 3 SOLUTION RESPIRATORY (INHALATION) at 11:59

## 2025-03-19 RX ADMIN — PIPERACILLIN AND TAZOBACTAM 3375 MG: 3; .375 INJECTION, POWDER, FOR SOLUTION INTRAVENOUS at 00:25

## 2025-03-19 RX ADMIN — IPRATROPIUM BROMIDE AND ALBUTEROL SULFATE 1 DOSE: .5; 3 SOLUTION RESPIRATORY (INHALATION) at 22:02

## 2025-03-19 RX ADMIN — AMLODIPINE BESYLATE 5 MG: 5 TABLET ORAL at 10:56

## 2025-03-19 RX ADMIN — LOPERAMIDE HYDROCHLORIDE 2 MG: 2 CAPSULE ORAL at 11:14

## 2025-03-19 RX ADMIN — LISINOPRIL 5 MG: 5 TABLET ORAL at 10:56

## 2025-03-19 RX ADMIN — SODIUM CHLORIDE, PRESERVATIVE FREE 10 ML: 5 INJECTION INTRAVENOUS at 11:00

## 2025-03-19 RX ADMIN — PANTOPRAZOLE SODIUM 40 MG: 40 INJECTION, POWDER, FOR SOLUTION INTRAVENOUS at 11:14

## 2025-03-19 RX ADMIN — SODIUM CHLORIDE, PRESERVATIVE FREE 10 ML: 5 INJECTION INTRAVENOUS at 21:02

## 2025-03-19 RX ADMIN — Medication 1250 MG: at 17:28

## 2025-03-19 RX ADMIN — ACETAMINOPHEN 1000 MG: 325 TABLET ORAL at 07:02

## 2025-03-19 RX ADMIN — OXYCODONE 5 MG: 5 TABLET ORAL at 15:56

## 2025-03-19 ASSESSMENT — PAIN DESCRIPTION - LOCATION: LOCATION: NECK

## 2025-03-19 ASSESSMENT — PAIN SCALES - GENERAL
PAINLEVEL_OUTOF10: 5
PAINLEVEL_OUTOF10: 4

## 2025-03-20 ENCOUNTER — APPOINTMENT (OUTPATIENT)
Dept: CT IMAGING | Age: 75
DRG: 011 | End: 2025-03-20
Attending: OTOLARYNGOLOGY
Payer: MEDICARE

## 2025-03-20 LAB
ANION GAP SERPL CALC-SCNC: 10 MEQ/L (ref 8–16)
BUN SERPL-MCNC: 23 MG/DL (ref 8–23)
CALCIUM SERPL-MCNC: 8.9 MG/DL (ref 8.8–10.2)
CHLORIDE SERPL-SCNC: 100 MEQ/L (ref 98–111)
CO2 SERPL-SCNC: 25 MEQ/L (ref 22–29)
CREAT SERPL-MCNC: 0.6 MG/DL (ref 0.7–1.2)
DEPRECATED RDW RBC AUTO: 50.3 FL (ref 35–45)
ERYTHROCYTE [DISTWIDTH] IN BLOOD BY AUTOMATED COUNT: 13.7 % (ref 11.5–14.5)
GFR SERPL CREATININE-BSD FRML MDRD: > 90 ML/MIN/1.73M2
GLUCOSE SERPL-MCNC: 97 MG/DL (ref 74–109)
HCT VFR BLD AUTO: 27.9 % (ref 42–52)
HGB BLD-MCNC: 9.3 GM/DL (ref 14–18)
MCH RBC QN AUTO: 33.7 PG (ref 26–33)
MCHC RBC AUTO-ENTMCNC: 33.3 GM/DL (ref 32.2–35.5)
MCV RBC AUTO: 101.1 FL (ref 80–94)
PLATELET # BLD AUTO: 564 THOU/MM3 (ref 130–400)
PMV BLD AUTO: 10.2 FL (ref 9.4–12.4)
POTASSIUM SERPL-SCNC: 3.9 MEQ/L (ref 3.5–5.2)
RBC # BLD AUTO: 2.76 MILL/MM3 (ref 4.7–6.1)
SODIUM SERPL-SCNC: 135 MEQ/L (ref 135–145)
WBC # BLD AUTO: 14.5 THOU/MM3 (ref 4.8–10.8)

## 2025-03-20 PROCEDURE — 89220 SPUTUM SPECIMEN COLLECTION: CPT

## 2025-03-20 PROCEDURE — 87077 CULTURE AEROBIC IDENTIFY: CPT

## 2025-03-20 PROCEDURE — 6370000000 HC RX 637 (ALT 250 FOR IP)

## 2025-03-20 PROCEDURE — 6360000002 HC RX W HCPCS: Performed by: INTERNAL MEDICINE

## 2025-03-20 PROCEDURE — 97535 SELF CARE MNGMENT TRAINING: CPT

## 2025-03-20 PROCEDURE — 6360000004 HC RX CONTRAST MEDICATION: Performed by: INTERNAL MEDICINE

## 2025-03-20 PROCEDURE — 2580000003 HC RX 258: Performed by: NURSE PRACTITIONER

## 2025-03-20 PROCEDURE — 92507 TX SP LANG VOICE COMM INDIV: CPT | Performed by: SPEECH-LANGUAGE PATHOLOGIST

## 2025-03-20 PROCEDURE — 2060000000 HC ICU INTERMEDIATE R&B

## 2025-03-20 PROCEDURE — 97530 THERAPEUTIC ACTIVITIES: CPT

## 2025-03-20 PROCEDURE — 71260 CT THORAX DX C+: CPT

## 2025-03-20 PROCEDURE — 94761 N-INVAS EAR/PLS OXIMETRY MLT: CPT

## 2025-03-20 PROCEDURE — 87147 CULTURE TYPE IMMUNOLOGIC: CPT

## 2025-03-20 PROCEDURE — 94640 AIRWAY INHALATION TREATMENT: CPT

## 2025-03-20 PROCEDURE — 36415 COLL VENOUS BLD VENIPUNCTURE: CPT

## 2025-03-20 PROCEDURE — 6360000002 HC RX W HCPCS: Performed by: OTOLARYNGOLOGY

## 2025-03-20 PROCEDURE — 87070 CULTURE OTHR SPECIMN AEROBIC: CPT

## 2025-03-20 PROCEDURE — 6370000000 HC RX 637 (ALT 250 FOR IP): Performed by: INTERNAL MEDICINE

## 2025-03-20 PROCEDURE — 87205 SMEAR GRAM STAIN: CPT

## 2025-03-20 PROCEDURE — 2700000000 HC OXYGEN THERAPY PER DAY

## 2025-03-20 PROCEDURE — 6370000000 HC RX 637 (ALT 250 FOR IP): Performed by: NURSE PRACTITIONER

## 2025-03-20 PROCEDURE — 87186 SC STD MICRODIL/AGAR DIL: CPT

## 2025-03-20 PROCEDURE — 2580000003 HC RX 258: Performed by: OTOLARYNGOLOGY

## 2025-03-20 PROCEDURE — 6370000000 HC RX 637 (ALT 250 FOR IP): Performed by: OTOLARYNGOLOGY

## 2025-03-20 PROCEDURE — 6360000002 HC RX W HCPCS: Performed by: NURSE PRACTITIONER

## 2025-03-20 PROCEDURE — 85027 COMPLETE CBC AUTOMATED: CPT

## 2025-03-20 PROCEDURE — 99024 POSTOP FOLLOW-UP VISIT: CPT

## 2025-03-20 PROCEDURE — 80048 BASIC METABOLIC PNL TOTAL CA: CPT

## 2025-03-20 PROCEDURE — 2500000003 HC RX 250 WO HCPCS: Performed by: NURSE PRACTITIONER

## 2025-03-20 RX ORDER — IOPAMIDOL 755 MG/ML
80 INJECTION, SOLUTION INTRAVASCULAR
Status: COMPLETED | OUTPATIENT
Start: 2025-03-20 | End: 2025-03-20

## 2025-03-20 RX ADMIN — PIPERACILLIN AND TAZOBACTAM 3375 MG: 3; .375 INJECTION, POWDER, FOR SOLUTION INTRAVENOUS at 03:00

## 2025-03-20 RX ADMIN — ACETAMINOPHEN 1000 MG: 325 TABLET ORAL at 00:00

## 2025-03-20 RX ADMIN — VANCOMYCIN HYDROCHLORIDE 1000 MG: 1 INJECTION, POWDER, LYOPHILIZED, FOR SOLUTION INTRAVENOUS at 16:19

## 2025-03-20 RX ADMIN — SODIUM CHLORIDE, PRESERVATIVE FREE 40 ML: 5 INJECTION INTRAVENOUS at 08:15

## 2025-03-20 RX ADMIN — IPRATROPIUM BROMIDE AND ALBUTEROL SULFATE 1 DOSE: .5; 3 SOLUTION RESPIRATORY (INHALATION) at 17:14

## 2025-03-20 RX ADMIN — VANCOMYCIN HYDROCHLORIDE 1000 MG: 1 INJECTION, POWDER, LYOPHILIZED, FOR SOLUTION INTRAVENOUS at 05:15

## 2025-03-20 RX ADMIN — IPRATROPIUM BROMIDE AND ALBUTEROL SULFATE 1 DOSE: .5; 3 SOLUTION RESPIRATORY (INHALATION) at 07:19

## 2025-03-20 RX ADMIN — ACETAMINOPHEN 1000 MG: 325 TABLET ORAL at 08:13

## 2025-03-20 RX ADMIN — PANTOPRAZOLE SODIUM 40 MG: 40 INJECTION, POWDER, FOR SOLUTION INTRAVENOUS at 08:11

## 2025-03-20 RX ADMIN — IPRATROPIUM BROMIDE AND ALBUTEROL SULFATE 1 DOSE: .5; 3 SOLUTION RESPIRATORY (INHALATION) at 20:39

## 2025-03-20 RX ADMIN — MUPIROCIN: 20 OINTMENT TOPICAL at 08:13

## 2025-03-20 RX ADMIN — ACETAMINOPHEN 1000 MG: 325 TABLET ORAL at 16:13

## 2025-03-20 RX ADMIN — ACETAMINOPHEN 1000 MG: 325 TABLET ORAL at 23:40

## 2025-03-20 RX ADMIN — MUPIROCIN: 20 OINTMENT TOPICAL at 16:08

## 2025-03-20 RX ADMIN — LISINOPRIL 5 MG: 5 TABLET ORAL at 08:11

## 2025-03-20 RX ADMIN — MUPIROCIN: 20 OINTMENT TOPICAL at 20:20

## 2025-03-20 RX ADMIN — PIPERACILLIN AND TAZOBACTAM 3375 MG: 3; .375 INJECTION, POWDER, FOR SOLUTION INTRAVENOUS at 16:20

## 2025-03-20 RX ADMIN — AMLODIPINE BESYLATE 5 MG: 5 TABLET ORAL at 08:11

## 2025-03-20 RX ADMIN — IOPAMIDOL 80 ML: 755 INJECTION, SOLUTION INTRAVENOUS at 16:49

## 2025-03-20 RX ADMIN — PIPERACILLIN AND TAZOBACTAM 3375 MG: 3; .375 INJECTION, POWDER, FOR SOLUTION INTRAVENOUS at 08:30

## 2025-03-20 RX ADMIN — IPRATROPIUM BROMIDE AND ALBUTEROL SULFATE 1 DOSE: .5; 3 SOLUTION RESPIRATORY (INHALATION) at 11:18

## 2025-03-20 RX ADMIN — SODIUM CHLORIDE, PRESERVATIVE FREE 10 ML: 5 INJECTION INTRAVENOUS at 20:20

## 2025-03-20 NOTE — CONSULTS
CONSULTATION NOTE :ID       Patient - Masood Randhawa,  Age - 74 y.o.    - 1950      Room Number - 4K-23/023-A   MRN -  [id number]   [id number]  Date of Admission -  3/14/2025  6:11 AM  Patient's PCP: Samy Piper DO     Requesting Physician: Virginia Ortiz APRN - CNP    REASON FOR CONSULTATION   Assist with antibiotics.  CHIEF COMPLAINT   Shortness of breath    HISTORY OF PRESENT ILLNESS       This is a very pleasant 74 y.o. male who was admitted to the hospital with a chief complaints of shortness of breath.  Patient has known history of squamous cell carcinoma of the glottis with worsening shortness of breath and failure to thrive.  Patient had a week ago total bilateral laryngectomy with radical neck dissection, left selective neck dissection TEP placement and right hemithyroidectomy.  Patient has tracheostomy PEG tube.  Infectious disease consultation was requested to assist with antibiotic treatment he has extensive postsurgical change has infiltrate on the right upper lung he has long history of smoking and alcohol abuse and had hoarseness of voice for over a year he has COPD hypertension osteoarthritis and PAD.  Currently he is getting feeding tube through PEG tube he has drains.  He has leukocytosis and low-grade fever.  I was asked to assist with antibiotic treatment.  He has been started on broad-spectrum antibiotic including Zosyn and vancomycin.    PAST MEDICAL  HISTORY       Past Medical History:   Diagnosis Date    BPH (benign prostatic hyperplasia)     Chronic back pain     COPD (chronic obstructive pulmonary disease) (HCC)     Erectile dysfunction     Gastroesophageal reflux disease with apnea without esophagitis 01/10/2025    Head and neck cancer (HCC) 2025    Hypertension     Osteoarthritis     Pt denies    Personal history of colonic polyps 2006    Pneumonia     3/2025    PVD (peripheral vascular disease)     PVD (peripheral vascular

## 2025-03-20 NOTE — FLOWSHEET NOTE
03/19/25 2019   Treatment Team Notification   Reason for Communication Change in status  (oxygen dropped to 85% on HHF, increased settings from 21% to 35% and O2 is at 92%. Respiratory notified along with hospitalist.)   Name of Team Member Notified Areli Medina   Treatment Team Role Advanced Practice Nurse   Method of Communication Secure Message   Response See orders  (placed chest xray and ABGs to be drawn)   Notification Time 2121

## 2025-03-20 NOTE — CARE COORDINATION
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 Tracheoesphogeal puncture fistula construction and TEP placement   3/15 Extubated  3/16 #8 LGT placed    Imaging since last note:   3/18 CXR 1. New tracheostomy tube in place.   2. Right upper lobe infiltrate improved since previous study dated 3/4/2025..     3/19 CT Soft tissue neck WO Contrast 1. Extensive postoperative changes.   2. No definite evidence of parotitis.   3. Increased density in the skin and subcutaneous soft tissues which may   represent edema and/or cellulitis.   4. Abnormal density in the right upper lobe of the lungs suggestive of   inflammatory changes.     3/19 CXR 1. Right upper lobe infiltrate does not appear to be significantly   changed. Adjacent pleural thickening or pleural effusion does not appear   to be significantly changed.   2. New rounded opacities within the medial aspect of the right lower lung.   An infectious etiology is suspected.     Barriers to Discharge: ENT following, PT/OT/SLP, ID consult, TF via PEG tube with Dietician following, pain and nausea control, nebs, IV Protonix, IV Zosyn, IV Vancomycin.     PCP: Samy Piper,   Readmission Risk Score: 25.6    Patient Goals/Plan/Treatment Preferences: From home with wife, current [facility]. Will need speech. SW following.

## 2025-03-21 ENCOUNTER — APPOINTMENT (OUTPATIENT)
Dept: GENERAL RADIOLOGY | Age: 75
DRG: 011 | End: 2025-03-21
Attending: OTOLARYNGOLOGY
Payer: MEDICARE

## 2025-03-21 LAB
ANION GAP SERPL CALC-SCNC: 9 MEQ/L (ref 8–16)
BASOPHILS ABSOLUTE: 0.1 THOU/MM3 (ref 0–0.1)
BASOPHILS NFR BLD AUTO: 0.9 %
BUN SERPL-MCNC: 22 MG/DL (ref 8–23)
CALCIUM SERPL-MCNC: 8.7 MG/DL (ref 8.8–10.2)
CHLORIDE SERPL-SCNC: 104 MEQ/L (ref 98–111)
CO2 SERPL-SCNC: 25 MEQ/L (ref 22–29)
CREAT SERPL-MCNC: 0.6 MG/DL (ref 0.7–1.2)
DEPRECATED RDW RBC AUTO: 52 FL (ref 35–45)
EOSINOPHIL NFR BLD AUTO: 2.1 %
EOSINOPHILS ABSOLUTE: 0.3 THOU/MM3 (ref 0–0.4)
ERYTHROCYTE [DISTWIDTH] IN BLOOD BY AUTOMATED COUNT: 13.8 % (ref 11.5–14.5)
GFR SERPL CREATININE-BSD FRML MDRD: > 90 ML/MIN/1.73M2
GLUCOSE SERPL-MCNC: 93 MG/DL (ref 74–109)
HCT VFR BLD AUTO: 26.4 % (ref 42–52)
HGB BLD-MCNC: 9 GM/DL (ref 14–18)
IMM GRANULOCYTES # BLD AUTO: 0.09 THOU/MM3 (ref 0–0.07)
IMM GRANULOCYTES NFR BLD AUTO: 0.7 %
LYMPHOCYTES ABSOLUTE: 1.8 THOU/MM3 (ref 1–4.8)
LYMPHOCYTES NFR BLD AUTO: 14.3 %
MCH RBC QN AUTO: 34.9 PG (ref 26–33)
MCHC RBC AUTO-ENTMCNC: 34.1 GM/DL (ref 32.2–35.5)
MCV RBC AUTO: 102.3 FL (ref 80–94)
MONOCYTES ABSOLUTE: 1 THOU/MM3 (ref 0.4–1.3)
MONOCYTES NFR BLD AUTO: 7.9 %
NEUTROPHILS ABSOLUTE: 9.3 THOU/MM3 (ref 1.8–7.7)
NEUTROPHILS NFR BLD AUTO: 74.1 %
NRBC BLD AUTO-RTO: 0 /100 WBC
PLATELET # BLD AUTO: 559 THOU/MM3 (ref 130–400)
PMV BLD AUTO: 10.3 FL (ref 9.4–12.4)
POTASSIUM SERPL-SCNC: 4 MEQ/L (ref 3.5–5.2)
RBC # BLD AUTO: 2.58 MILL/MM3 (ref 4.7–6.1)
SODIUM SERPL-SCNC: 138 MEQ/L (ref 135–145)
VANCOMYCIN SERPL-MCNC: 15.1 UG/ML (ref 10–39.9)
WBC # BLD AUTO: 12.6 THOU/MM3 (ref 4.8–10.8)

## 2025-03-21 PROCEDURE — 92507 TX SP LANG VOICE COMM INDIV: CPT | Performed by: SPEECH-LANGUAGE PATHOLOGIST

## 2025-03-21 PROCEDURE — 74220 X-RAY XM ESOPHAGUS 1CNTRST: CPT

## 2025-03-21 PROCEDURE — 6370000000 HC RX 637 (ALT 250 FOR IP)

## 2025-03-21 PROCEDURE — 2700000000 HC OXYGEN THERAPY PER DAY

## 2025-03-21 PROCEDURE — 2580000003 HC RX 258: Performed by: OTOLARYNGOLOGY

## 2025-03-21 PROCEDURE — 94640 AIRWAY INHALATION TREATMENT: CPT

## 2025-03-21 PROCEDURE — 85025 COMPLETE CBC W/AUTO DIFF WBC: CPT

## 2025-03-21 PROCEDURE — 94761 N-INVAS EAR/PLS OXIMETRY MLT: CPT

## 2025-03-21 PROCEDURE — 2500000003 HC RX 250 WO HCPCS

## 2025-03-21 PROCEDURE — 6360000002 HC RX W HCPCS: Performed by: OTOLARYNGOLOGY

## 2025-03-21 PROCEDURE — 80048 BASIC METABOLIC PNL TOTAL CA: CPT

## 2025-03-21 PROCEDURE — 6360000002 HC RX W HCPCS: Performed by: INTERNAL MEDICINE

## 2025-03-21 PROCEDURE — 6360000002 HC RX W HCPCS: Performed by: NURSE PRACTITIONER

## 2025-03-21 PROCEDURE — 2580000003 HC RX 258: Performed by: NURSE PRACTITIONER

## 2025-03-21 PROCEDURE — 6370000000 HC RX 637 (ALT 250 FOR IP): Performed by: NURSE PRACTITIONER

## 2025-03-21 PROCEDURE — 2060000000 HC ICU INTERMEDIATE R&B

## 2025-03-21 PROCEDURE — 80202 ASSAY OF VANCOMYCIN: CPT

## 2025-03-21 PROCEDURE — 36415 COLL VENOUS BLD VENIPUNCTURE: CPT

## 2025-03-21 PROCEDURE — 6370000000 HC RX 637 (ALT 250 FOR IP): Performed by: INTERNAL MEDICINE

## 2025-03-21 PROCEDURE — 99024 POSTOP FOLLOW-UP VISIT: CPT

## 2025-03-21 PROCEDURE — 2500000003 HC RX 250 WO HCPCS: Performed by: NURSE PRACTITIONER

## 2025-03-21 PROCEDURE — 6370000000 HC RX 637 (ALT 250 FOR IP): Performed by: OTOLARYNGOLOGY

## 2025-03-21 RX ADMIN — LOPERAMIDE HYDROCHLORIDE 2 MG: 2 CAPSULE ORAL at 10:44

## 2025-03-21 RX ADMIN — AMLODIPINE BESYLATE 5 MG: 5 TABLET ORAL at 08:09

## 2025-03-21 RX ADMIN — BARIUM SULFATE 50 ML: 0.6 SUSPENSION ORAL at 09:38

## 2025-03-21 RX ADMIN — IPRATROPIUM BROMIDE AND ALBUTEROL SULFATE 1 DOSE: .5; 3 SOLUTION RESPIRATORY (INHALATION) at 20:24

## 2025-03-21 RX ADMIN — SODIUM CHLORIDE, PRESERVATIVE FREE 10 ML: 5 INJECTION INTRAVENOUS at 09:00

## 2025-03-21 RX ADMIN — VANCOMYCIN HYDROCHLORIDE 1000 MG: 1 INJECTION, POWDER, LYOPHILIZED, FOR SOLUTION INTRAVENOUS at 17:06

## 2025-03-21 RX ADMIN — PIPERACILLIN AND TAZOBACTAM 3375 MG: 3; .375 INJECTION, POWDER, FOR SOLUTION INTRAVENOUS at 18:58

## 2025-03-21 RX ADMIN — OXYCODONE 10 MG: 5 TABLET ORAL at 13:38

## 2025-03-21 RX ADMIN — MUPIROCIN: 20 OINTMENT TOPICAL at 20:46

## 2025-03-21 RX ADMIN — SODIUM CHLORIDE, PRESERVATIVE FREE 10 ML: 5 INJECTION INTRAVENOUS at 20:45

## 2025-03-21 RX ADMIN — IPRATROPIUM BROMIDE AND ALBUTEROL SULFATE 1 DOSE: .5; 3 SOLUTION RESPIRATORY (INHALATION) at 08:17

## 2025-03-21 RX ADMIN — ACETAMINOPHEN 1000 MG: 325 TABLET ORAL at 08:09

## 2025-03-21 RX ADMIN — LISINOPRIL 5 MG: 5 TABLET ORAL at 08:09

## 2025-03-21 RX ADMIN — PIPERACILLIN AND TAZOBACTAM 3375 MG: 3; .375 INJECTION, POWDER, FOR SOLUTION INTRAVENOUS at 01:23

## 2025-03-21 RX ADMIN — MUPIROCIN: 20 OINTMENT TOPICAL at 15:02

## 2025-03-21 RX ADMIN — IPRATROPIUM BROMIDE AND ALBUTEROL SULFATE 1 DOSE: .5; 3 SOLUTION RESPIRATORY (INHALATION) at 16:07

## 2025-03-21 RX ADMIN — MUPIROCIN: 20 OINTMENT TOPICAL at 08:12

## 2025-03-21 RX ADMIN — PANTOPRAZOLE SODIUM 40 MG: 40 INJECTION, POWDER, FOR SOLUTION INTRAVENOUS at 08:11

## 2025-03-21 RX ADMIN — ACETAMINOPHEN 1000 MG: 325 TABLET ORAL at 15:02

## 2025-03-21 RX ADMIN — VANCOMYCIN HYDROCHLORIDE 1000 MG: 1 INJECTION, POWDER, LYOPHILIZED, FOR SOLUTION INTRAVENOUS at 05:30

## 2025-03-21 RX ADMIN — IPRATROPIUM BROMIDE AND ALBUTEROL SULFATE 1 DOSE: .5; 3 SOLUTION RESPIRATORY (INHALATION) at 12:51

## 2025-03-21 RX ADMIN — PIPERACILLIN AND TAZOBACTAM 3375 MG: 3; .375 INJECTION, POWDER, FOR SOLUTION INTRAVENOUS at 10:57

## 2025-03-21 ASSESSMENT — PAIN DESCRIPTION - ORIENTATION: ORIENTATION: INNER

## 2025-03-21 ASSESSMENT — PAIN SCALES - GENERAL
PAINLEVEL_OUTOF10: 0
PAINLEVEL_OUTOF10: 7
PAINLEVEL_OUTOF10: 0
PAINLEVEL_OUTOF10: 0

## 2025-03-21 ASSESSMENT — PAIN DESCRIPTION - LOCATION: LOCATION: THROAT

## 2025-03-21 ASSESSMENT — PAIN - FUNCTIONAL ASSESSMENT: PAIN_FUNCTIONAL_ASSESSMENT: ACTIVITIES ARE NOT PREVENTED

## 2025-03-21 ASSESSMENT — PAIN DESCRIPTION - DESCRIPTORS: DESCRIPTORS: ACHING;DISCOMFORT

## 2025-03-22 LAB
ANION GAP SERPL CALC-SCNC: 10 MEQ/L (ref 8–16)
BASOPHILS ABSOLUTE: 0.1 THOU/MM3 (ref 0–0.1)
BASOPHILS NFR BLD AUTO: 0.8 %
BUN SERPL-MCNC: 20 MG/DL (ref 8–23)
CALCIUM SERPL-MCNC: 8.2 MG/DL (ref 8.8–10.2)
CHLORIDE SERPL-SCNC: 103 MEQ/L (ref 98–111)
CO2 SERPL-SCNC: 23 MEQ/L (ref 22–29)
CREAT SERPL-MCNC: 0.6 MG/DL (ref 0.7–1.2)
DEPRECATED RDW RBC AUTO: 50.6 FL (ref 35–45)
EOSINOPHIL NFR BLD AUTO: 2 %
EOSINOPHILS ABSOLUTE: 0.3 THOU/MM3 (ref 0–0.4)
ERYTHROCYTE [DISTWIDTH] IN BLOOD BY AUTOMATED COUNT: 13.7 % (ref 11.5–14.5)
GFR SERPL CREATININE-BSD FRML MDRD: > 90 ML/MIN/1.73M2
GLUCOSE SERPL-MCNC: 94 MG/DL (ref 74–109)
HCT VFR BLD AUTO: 26.6 % (ref 42–52)
HGB BLD-MCNC: 9 GM/DL (ref 14–18)
IMM GRANULOCYTES # BLD AUTO: 0.12 THOU/MM3 (ref 0–0.07)
IMM GRANULOCYTES NFR BLD AUTO: 0.8 %
LYMPHOCYTES ABSOLUTE: 1.8 THOU/MM3 (ref 1–4.8)
LYMPHOCYTES NFR BLD AUTO: 11.9 %
MCH RBC QN AUTO: 34.4 PG (ref 26–33)
MCHC RBC AUTO-ENTMCNC: 33.8 GM/DL (ref 32.2–35.5)
MCV RBC AUTO: 101.5 FL (ref 80–94)
MONOCYTES ABSOLUTE: 1.2 THOU/MM3 (ref 0.4–1.3)
MONOCYTES NFR BLD AUTO: 7.8 %
NEUTROPHILS ABSOLUTE: 11.4 THOU/MM3 (ref 1.8–7.7)
NEUTROPHILS NFR BLD AUTO: 76.7 %
NRBC BLD AUTO-RTO: 0 /100 WBC
PHOSPHATE SERPL-MCNC: 3.6 MG/DL (ref 2.5–4.5)
PLATELET # BLD AUTO: 505 THOU/MM3 (ref 130–400)
PMV BLD AUTO: 10.3 FL (ref 9.4–12.4)
POTASSIUM SERPL-SCNC: 4.1 MEQ/L (ref 3.5–5.2)
RBC # BLD AUTO: 2.62 MILL/MM3 (ref 4.7–6.1)
SODIUM SERPL-SCNC: 136 MEQ/L (ref 135–145)
WBC # BLD AUTO: 14.8 THOU/MM3 (ref 4.8–10.8)

## 2025-03-22 PROCEDURE — 85025 COMPLETE CBC W/AUTO DIFF WBC: CPT

## 2025-03-22 PROCEDURE — 2580000003 HC RX 258: Performed by: OTOLARYNGOLOGY

## 2025-03-22 PROCEDURE — 36415 COLL VENOUS BLD VENIPUNCTURE: CPT

## 2025-03-22 PROCEDURE — 6370000000 HC RX 637 (ALT 250 FOR IP)

## 2025-03-22 PROCEDURE — 84100 ASSAY OF PHOSPHORUS: CPT

## 2025-03-22 PROCEDURE — 99024 POSTOP FOLLOW-UP VISIT: CPT

## 2025-03-22 PROCEDURE — 6360000002 HC RX W HCPCS: Performed by: NURSE PRACTITIONER

## 2025-03-22 PROCEDURE — 6360000002 HC RX W HCPCS: Performed by: INTERNAL MEDICINE

## 2025-03-22 PROCEDURE — 6370000000 HC RX 637 (ALT 250 FOR IP): Performed by: INTERNAL MEDICINE

## 2025-03-22 PROCEDURE — 2700000000 HC OXYGEN THERAPY PER DAY

## 2025-03-22 PROCEDURE — 2060000000 HC ICU INTERMEDIATE R&B

## 2025-03-22 PROCEDURE — 94640 AIRWAY INHALATION TREATMENT: CPT

## 2025-03-22 PROCEDURE — 94761 N-INVAS EAR/PLS OXIMETRY MLT: CPT

## 2025-03-22 PROCEDURE — 99233 SBSQ HOSP IP/OBS HIGH 50: CPT | Performed by: INTERNAL MEDICINE

## 2025-03-22 PROCEDURE — 2580000003 HC RX 258: Performed by: NURSE PRACTITIONER

## 2025-03-22 PROCEDURE — 2500000003 HC RX 250 WO HCPCS: Performed by: NURSE PRACTITIONER

## 2025-03-22 PROCEDURE — 12020 TX SUPFC WND DEHSN SMPL CLSR: CPT

## 2025-03-22 PROCEDURE — 80048 BASIC METABOLIC PNL TOTAL CA: CPT

## 2025-03-22 PROCEDURE — 6360000002 HC RX W HCPCS: Performed by: OTOLARYNGOLOGY

## 2025-03-22 RX ORDER — PANTOPRAZOLE SODIUM 40 MG/1
40 TABLET, DELAYED RELEASE ORAL
Status: DISCONTINUED | OUTPATIENT
Start: 2025-03-23 | End: 2025-03-23

## 2025-03-22 RX ORDER — MAGNESIUM SULFATE IN WATER 40 MG/ML
2000 INJECTION, SOLUTION INTRAVENOUS PRN
Status: DISCONTINUED | OUTPATIENT
Start: 2025-03-22 | End: 2025-03-24 | Stop reason: HOSPADM

## 2025-03-22 RX ORDER — POTASSIUM CHLORIDE 1500 MG/1
40 TABLET, EXTENDED RELEASE ORAL PRN
Status: DISCONTINUED | OUTPATIENT
Start: 2025-03-22 | End: 2025-03-24 | Stop reason: HOSPADM

## 2025-03-22 RX ORDER — POTASSIUM CHLORIDE 7.45 MG/ML
10 INJECTION INTRAVENOUS PRN
Status: DISCONTINUED | OUTPATIENT
Start: 2025-03-22 | End: 2025-03-24 | Stop reason: HOSPADM

## 2025-03-22 RX ADMIN — IPRATROPIUM BROMIDE AND ALBUTEROL SULFATE 1 DOSE: .5; 3 SOLUTION RESPIRATORY (INHALATION) at 08:27

## 2025-03-22 RX ADMIN — ACETAMINOPHEN 1000 MG: 325 TABLET ORAL at 20:36

## 2025-03-22 RX ADMIN — LISINOPRIL 5 MG: 5 TABLET ORAL at 08:12

## 2025-03-22 RX ADMIN — MUPIROCIN: 20 OINTMENT TOPICAL at 14:56

## 2025-03-22 RX ADMIN — LOPERAMIDE HYDROCHLORIDE 2 MG: 2 CAPSULE ORAL at 08:11

## 2025-03-22 RX ADMIN — PIPERACILLIN AND TAZOBACTAM 3375 MG: 3; .375 INJECTION, POWDER, FOR SOLUTION INTRAVENOUS at 11:33

## 2025-03-22 RX ADMIN — VANCOMYCIN HYDROCHLORIDE 1000 MG: 1 INJECTION, POWDER, LYOPHILIZED, FOR SOLUTION INTRAVENOUS at 23:46

## 2025-03-22 RX ADMIN — ACETAMINOPHEN 1000 MG: 325 TABLET ORAL at 05:57

## 2025-03-22 RX ADMIN — PANTOPRAZOLE SODIUM 40 MG: 40 INJECTION, POWDER, FOR SOLUTION INTRAVENOUS at 08:11

## 2025-03-22 RX ADMIN — ACETAMINOPHEN 1000 MG: 325 TABLET ORAL at 14:57

## 2025-03-22 RX ADMIN — IPRATROPIUM BROMIDE AND ALBUTEROL SULFATE 1 DOSE: .5; 3 SOLUTION RESPIRATORY (INHALATION) at 15:42

## 2025-03-22 RX ADMIN — LOPERAMIDE HYDROCHLORIDE 2 MG: 2 CAPSULE ORAL at 14:56

## 2025-03-22 RX ADMIN — ACETAMINOPHEN 1000 MG: 325 TABLET ORAL at 00:01

## 2025-03-22 RX ADMIN — MUPIROCIN: 20 OINTMENT TOPICAL at 08:12

## 2025-03-22 RX ADMIN — AMLODIPINE BESYLATE 5 MG: 5 TABLET ORAL at 08:12

## 2025-03-22 RX ADMIN — MUPIROCIN: 20 OINTMENT TOPICAL at 20:36

## 2025-03-22 RX ADMIN — IPRATROPIUM BROMIDE AND ALBUTEROL SULFATE 1 DOSE: .5; 3 SOLUTION RESPIRATORY (INHALATION) at 12:35

## 2025-03-22 RX ADMIN — SODIUM CHLORIDE, PRESERVATIVE FREE 10 ML: 5 INJECTION INTRAVENOUS at 08:11

## 2025-03-22 RX ADMIN — IPRATROPIUM BROMIDE AND ALBUTEROL SULFATE 1 DOSE: .5; 3 SOLUTION RESPIRATORY (INHALATION) at 20:26

## 2025-03-22 RX ADMIN — PIPERACILLIN AND TAZOBACTAM 3375 MG: 3; .375 INJECTION, POWDER, FOR SOLUTION INTRAVENOUS at 03:31

## 2025-03-22 RX ADMIN — VANCOMYCIN HYDROCHLORIDE 1000 MG: 1 INJECTION, POWDER, LYOPHILIZED, FOR SOLUTION INTRAVENOUS at 08:22

## 2025-03-22 RX ADMIN — PIPERACILLIN AND TAZOBACTAM 3375 MG: 3; .375 INJECTION, POWDER, FOR SOLUTION INTRAVENOUS at 19:12

## 2025-03-23 LAB
ANION GAP SERPL CALC-SCNC: 8 MEQ/L (ref 8–16)
BACTERIA SPEC RESP CULT: ABNORMAL
BACTERIA SPEC RESP CULT: ABNORMAL
BASOPHILS ABSOLUTE: 0.1 THOU/MM3 (ref 0–0.1)
BASOPHILS NFR BLD AUTO: 0.6 %
BUN SERPL-MCNC: 19 MG/DL (ref 8–23)
C CAYETANENSIS DNA SPEC QL NAA+PROBE: NOT DETECTED
CALCIUM SERPL-MCNC: 8.7 MG/DL (ref 8.8–10.2)
CAMPY SP DNA.DIARRHEA STL QL NAA+PROBE: NOT DETECTED
CHLORIDE SERPL-SCNC: 105 MEQ/L (ref 98–111)
CO2 SERPL-SCNC: 25 MEQ/L (ref 22–29)
CREAT SERPL-MCNC: 0.6 MG/DL (ref 0.7–1.2)
CRYPTOSP DNA SPEC QL NAA+PROBE: NOT DETECTED
DEPRECATED RDW RBC AUTO: 51.1 FL (ref 35–45)
E COLI O157H7 DNA SPEC QL NAA+PROBE: NORMAL
E HISTOLYT DNA SPEC QL NAA+PROBE: NOT DETECTED
EAEC PAA PLAS AGGR+AATA ST NAA+NON-PRB: NOT DETECTED
EC STX1+STX2 + H7 FLIC SPEC NAA+PROBE: NOT DETECTED
EOSINOPHIL NFR BLD AUTO: 3.3 %
EOSINOPHILS ABSOLUTE: 0.4 THOU/MM3 (ref 0–0.4)
EPEC EAE GENE STL QL NAA+NON-PROBE: NOT DETECTED
ERYTHROCYTE [DISTWIDTH] IN BLOOD BY AUTOMATED COUNT: 13.6 % (ref 11.5–14.5)
ETEC LTA+ST1A+ST1B TOX ST NAA+NON-PROBE: NOT DETECTED
G LAMBLIA DNA SPEC QL NAA+PROBE: NOT DETECTED
GFR SERPL CREATININE-BSD FRML MDRD: > 90 ML/MIN/1.73M2
GLUCOSE SERPL-MCNC: 106 MG/DL (ref 74–109)
GRAM STN SPEC: ABNORMAL
HADV DNA SPEC QL NAA+PROBE: NOT DETECTED
HASTV RNA SPEC QL NAA+PROBE: NOT DETECTED
HCT VFR BLD AUTO: 25.6 % (ref 42–52)
HGB BLD-MCNC: 8.4 GM/DL (ref 14–18)
IMM GRANULOCYTES # BLD AUTO: 0.1 THOU/MM3 (ref 0–0.07)
IMM GRANULOCYTES NFR BLD AUTO: 0.8 %
LYMPHOCYTES ABSOLUTE: 1.7 THOU/MM3 (ref 1–4.8)
LYMPHOCYTES NFR BLD AUTO: 12.7 %
MCH RBC QN AUTO: 34.1 PG (ref 26–33)
MCHC RBC AUTO-ENTMCNC: 32.8 GM/DL (ref 32.2–35.5)
MCV RBC AUTO: 104.1 FL (ref 80–94)
MONOCYTES ABSOLUTE: 1 THOU/MM3 (ref 0.4–1.3)
MONOCYTES NFR BLD AUTO: 7.9 %
NEUTROPHILS ABSOLUTE: 9.9 THOU/MM3 (ref 1.8–7.7)
NEUTROPHILS NFR BLD AUTO: 74.7 %
NOROVIRUS GI + GII RNA STL NAA+PROBE: NOT DETECTED
NRBC BLD AUTO-RTO: 0 /100 WBC
ORGANISM: ABNORMAL
P SHIGELLOIDES DNA STL QL NAA+PROBE: NOT DETECTED
PLATELET # BLD AUTO: 479 THOU/MM3 (ref 130–400)
PMV BLD AUTO: 10.3 FL (ref 9.4–12.4)
POTASSIUM SERPL-SCNC: 4.2 MEQ/L (ref 3.5–5.2)
RBC # BLD AUTO: 2.46 MILL/MM3 (ref 4.7–6.1)
RV RNA SPEC QL NAA+PROBE: NOT DETECTED
SALMONELLA DNA SPEC QL NAA+PROBE: NOT DETECTED
SAPOVIRUS RNA SPEC QL NAA+PROBE: NOT DETECTED
SHIGELLA SP+EIEC IPAH ST NAA+NON-PROBE: NOT DETECTED
SODIUM SERPL-SCNC: 138 MEQ/L (ref 135–145)
V CHOLERAE DNA SPEC QL NAA+PROBE: NOT DETECTED
VIBRIO DNA SPEC NAA+PROBE: NOT DETECTED
WBC # BLD AUTO: 13.2 THOU/MM3 (ref 4.8–10.8)
Y ENTERO RECN STL QL NAA+PROBE: NOT DETECTED

## 2025-03-23 PROCEDURE — 6370000000 HC RX 637 (ALT 250 FOR IP)

## 2025-03-23 PROCEDURE — 87507 IADNA-DNA/RNA PROBE TQ 12-25: CPT

## 2025-03-23 PROCEDURE — 94640 AIRWAY INHALATION TREATMENT: CPT

## 2025-03-23 PROCEDURE — 97110 THERAPEUTIC EXERCISES: CPT

## 2025-03-23 PROCEDURE — 2700000000 HC OXYGEN THERAPY PER DAY

## 2025-03-23 PROCEDURE — 99024 POSTOP FOLLOW-UP VISIT: CPT

## 2025-03-23 PROCEDURE — 80048 BASIC METABOLIC PNL TOTAL CA: CPT

## 2025-03-23 PROCEDURE — 99232 SBSQ HOSP IP/OBS MODERATE 35: CPT | Performed by: INTERNAL MEDICINE

## 2025-03-23 PROCEDURE — 6370000000 HC RX 637 (ALT 250 FOR IP): Performed by: INTERNAL MEDICINE

## 2025-03-23 PROCEDURE — 97530 THERAPEUTIC ACTIVITIES: CPT

## 2025-03-23 PROCEDURE — 6360000002 HC RX W HCPCS: Performed by: OTOLARYNGOLOGY

## 2025-03-23 PROCEDURE — 2580000003 HC RX 258: Performed by: OTOLARYNGOLOGY

## 2025-03-23 PROCEDURE — 6360000002 HC RX W HCPCS

## 2025-03-23 PROCEDURE — 6370000000 HC RX 637 (ALT 250 FOR IP): Performed by: NURSE PRACTITIONER

## 2025-03-23 PROCEDURE — 36415 COLL VENOUS BLD VENIPUNCTURE: CPT

## 2025-03-23 PROCEDURE — 85025 COMPLETE CBC W/AUTO DIFF WBC: CPT

## 2025-03-23 PROCEDURE — 6370000000 HC RX 637 (ALT 250 FOR IP): Performed by: OTOLARYNGOLOGY

## 2025-03-23 PROCEDURE — 6360000002 HC RX W HCPCS: Performed by: NURSE PRACTITIONER

## 2025-03-23 PROCEDURE — 2060000000 HC ICU INTERMEDIATE R&B

## 2025-03-23 PROCEDURE — 2580000003 HC RX 258: Performed by: NURSE PRACTITIONER

## 2025-03-23 PROCEDURE — 2500000003 HC RX 250 WO HCPCS: Performed by: NURSE PRACTITIONER

## 2025-03-23 PROCEDURE — 94761 N-INVAS EAR/PLS OXIMETRY MLT: CPT

## 2025-03-23 RX ORDER — ENOXAPARIN SODIUM 100 MG/ML
40 INJECTION SUBCUTANEOUS NIGHTLY
Status: DISCONTINUED | OUTPATIENT
Start: 2025-03-23 | End: 2025-03-24 | Stop reason: HOSPADM

## 2025-03-23 RX ADMIN — IPRATROPIUM BROMIDE AND ALBUTEROL SULFATE 1 DOSE: .5; 3 SOLUTION RESPIRATORY (INHALATION) at 15:29

## 2025-03-23 RX ADMIN — VANCOMYCIN HYDROCHLORIDE 1000 MG: 1 INJECTION, POWDER, LYOPHILIZED, FOR SOLUTION INTRAVENOUS at 09:51

## 2025-03-23 RX ADMIN — ENOXAPARIN SODIUM 40 MG: 100 INJECTION SUBCUTANEOUS at 21:32

## 2025-03-23 RX ADMIN — ACETAMINOPHEN 1000 MG: 325 TABLET ORAL at 21:32

## 2025-03-23 RX ADMIN — PIPERACILLIN AND TAZOBACTAM 3375 MG: 3; .375 INJECTION, POWDER, FOR SOLUTION INTRAVENOUS at 18:25

## 2025-03-23 RX ADMIN — MUPIROCIN: 20 OINTMENT TOPICAL at 10:21

## 2025-03-23 RX ADMIN — PIPERACILLIN AND TAZOBACTAM 3375 MG: 3; .375 INJECTION, POWDER, FOR SOLUTION INTRAVENOUS at 03:22

## 2025-03-23 RX ADMIN — PANTOPRAZOLE SODIUM 40 MG: 40 TABLET, DELAYED RELEASE ORAL at 05:33

## 2025-03-23 RX ADMIN — VANCOMYCIN HYDROCHLORIDE 1000 MG: 1 INJECTION, POWDER, LYOPHILIZED, FOR SOLUTION INTRAVENOUS at 23:20

## 2025-03-23 RX ADMIN — IPRATROPIUM BROMIDE AND ALBUTEROL SULFATE 1 DOSE: .5; 3 SOLUTION RESPIRATORY (INHALATION) at 23:02

## 2025-03-23 RX ADMIN — ACETAMINOPHEN 1000 MG: 325 TABLET ORAL at 05:33

## 2025-03-23 RX ADMIN — ACETAMINOPHEN 1000 MG: 325 TABLET ORAL at 13:00

## 2025-03-23 RX ADMIN — IPRATROPIUM BROMIDE AND ALBUTEROL SULFATE 1 DOSE: .5; 3 SOLUTION RESPIRATORY (INHALATION) at 08:33

## 2025-03-23 RX ADMIN — MUPIROCIN: 20 OINTMENT TOPICAL at 13:55

## 2025-03-23 RX ADMIN — LOPERAMIDE HYDROCHLORIDE 2 MG: 2 CAPSULE ORAL at 06:06

## 2025-03-23 RX ADMIN — MUPIROCIN: 20 OINTMENT TOPICAL at 21:33

## 2025-03-23 RX ADMIN — LISINOPRIL 5 MG: 5 TABLET ORAL at 09:58

## 2025-03-23 RX ADMIN — IPRATROPIUM BROMIDE AND ALBUTEROL SULFATE 1 DOSE: .5; 3 SOLUTION RESPIRATORY (INHALATION) at 11:55

## 2025-03-23 RX ADMIN — AMLODIPINE BESYLATE 5 MG: 5 TABLET ORAL at 09:58

## 2025-03-23 RX ADMIN — LOPERAMIDE HYDROCHLORIDE 2 MG: 2 CAPSULE ORAL at 21:33

## 2025-03-23 RX ADMIN — PIPERACILLIN AND TAZOBACTAM 3375 MG: 3; .375 INJECTION, POWDER, FOR SOLUTION INTRAVENOUS at 11:08

## 2025-03-23 RX ADMIN — OXYCODONE 5 MG: 5 TABLET ORAL at 12:59

## 2025-03-23 RX ADMIN — CALCIUM POLYCARBOPHIL 625 MG: 625 TABLET, FILM COATED ORAL at 09:58

## 2025-03-23 RX ADMIN — SODIUM CHLORIDE, PRESERVATIVE FREE 10 ML: 5 INJECTION INTRAVENOUS at 23:20

## 2025-03-23 ASSESSMENT — PAIN - FUNCTIONAL ASSESSMENT: PAIN_FUNCTIONAL_ASSESSMENT: ACTIVITIES ARE NOT PREVENTED

## 2025-03-23 ASSESSMENT — PAIN SCALES - GENERAL
PAINLEVEL_OUTOF10: 7
PAINLEVEL_OUTOF10: 6
PAINLEVEL_OUTOF10: 4

## 2025-03-23 ASSESSMENT — PAIN DESCRIPTION - LOCATION
LOCATION: THROAT
LOCATION: THROAT

## 2025-03-23 ASSESSMENT — PAIN DESCRIPTION - FREQUENCY: FREQUENCY: INTERMITTENT

## 2025-03-23 ASSESSMENT — PAIN DESCRIPTION - DESCRIPTORS: DESCRIPTORS: ACHING

## 2025-03-23 ASSESSMENT — PAIN DESCRIPTION - ORIENTATION: ORIENTATION: INNER

## 2025-03-23 ASSESSMENT — PAIN SCALES - WONG BAKER: WONGBAKER_NUMERICALRESPONSE: HURTS A LITTLE BIT

## 2025-03-23 ASSESSMENT — PAIN DESCRIPTION - PAIN TYPE: TYPE: ACUTE PAIN;SURGICAL PAIN

## 2025-03-23 ASSESSMENT — PAIN DESCRIPTION - ONSET: ONSET: ON-GOING

## 2025-03-24 VITALS
HEART RATE: 96 BPM | WEIGHT: 125.44 LBS | HEIGHT: 66 IN | TEMPERATURE: 98.5 F | DIASTOLIC BLOOD PRESSURE: 65 MMHG | BODY MASS INDEX: 20.16 KG/M2 | RESPIRATION RATE: 16 BRPM | SYSTOLIC BLOOD PRESSURE: 125 MMHG | OXYGEN SATURATION: 96 %

## 2025-03-24 PROBLEM — R21 RASH: Status: ACTIVE | Noted: 2025-03-24

## 2025-03-24 PROBLEM — D53.9 MACROCYTIC ANEMIA: Status: ACTIVE | Noted: 2025-03-24

## 2025-03-24 PROBLEM — E46 MALNUTRITION: Status: ACTIVE | Noted: 2025-03-24

## 2025-03-24 PROBLEM — J44.9 CHRONIC OBSTRUCTIVE PULMONARY DISEASE (HCC): Status: ACTIVE | Noted: 2025-03-15

## 2025-03-24 PROBLEM — J18.9 PNEUMONIA OF RIGHT UPPER LOBE DUE TO INFECTIOUS ORGANISM: Status: ACTIVE | Noted: 2025-03-24

## 2025-03-24 PROBLEM — R29.898 MUSCULAR DECONDITIONING: Status: ACTIVE | Noted: 2024-12-09

## 2025-03-24 PROBLEM — Z90.02 STATUS POST LARYNGECTOMY: Status: ACTIVE | Noted: 2025-03-24

## 2025-03-24 LAB
ANION GAP SERPL CALC-SCNC: 11 MEQ/L (ref 8–16)
BASOPHILS ABSOLUTE: 0.1 THOU/MM3 (ref 0–0.1)
BASOPHILS NFR BLD AUTO: 0.7 %
BUN SERPL-MCNC: 13 MG/DL (ref 8–23)
CA-I BLD ISE-SCNC: 1.21 MMOL/L (ref 1.12–1.32)
CALCIUM SERPL-MCNC: 8.7 MG/DL (ref 8.8–10.2)
CHLORIDE SERPL-SCNC: 103 MEQ/L (ref 98–111)
CO2 SERPL-SCNC: 24 MEQ/L (ref 22–29)
CREAT SERPL-MCNC: 0.5 MG/DL (ref 0.7–1.2)
DEPRECATED RDW RBC AUTO: 49.6 FL (ref 35–45)
EOSINOPHIL NFR BLD AUTO: 3.7 %
EOSINOPHILS ABSOLUTE: 0.4 THOU/MM3 (ref 0–0.4)
ERYTHROCYTE [DISTWIDTH] IN BLOOD BY AUTOMATED COUNT: 13.4 % (ref 11.5–14.5)
GFR SERPL CREATININE-BSD FRML MDRD: > 90 ML/MIN/1.73M2
GLUCOSE SERPL-MCNC: 100 MG/DL (ref 74–109)
HCT VFR BLD AUTO: 27.5 % (ref 42–52)
HGB BLD-MCNC: 9.3 GM/DL (ref 14–18)
IMM GRANULOCYTES # BLD AUTO: 0.06 THOU/MM3 (ref 0–0.07)
IMM GRANULOCYTES NFR BLD AUTO: 0.6 %
LYMPHOCYTES ABSOLUTE: 1.5 THOU/MM3 (ref 1–4.8)
LYMPHOCYTES NFR BLD AUTO: 14.1 %
MCH RBC QN AUTO: 34.2 PG (ref 26–33)
MCHC RBC AUTO-ENTMCNC: 33.8 GM/DL (ref 32.2–35.5)
MCV RBC AUTO: 101.1 FL (ref 80–94)
MONOCYTES ABSOLUTE: 0.8 THOU/MM3 (ref 0.4–1.3)
MONOCYTES NFR BLD AUTO: 7.3 %
NEUTROPHILS ABSOLUTE: 7.7 THOU/MM3 (ref 1.8–7.7)
NEUTROPHILS NFR BLD AUTO: 73.6 %
NRBC BLD AUTO-RTO: 0 /100 WBC
PLATELET # BLD AUTO: 487 THOU/MM3 (ref 130–400)
PMV BLD AUTO: 9.8 FL (ref 9.4–12.4)
POTASSIUM SERPL-SCNC: 4.1 MEQ/L (ref 3.5–5.2)
RBC # BLD AUTO: 2.72 MILL/MM3 (ref 4.7–6.1)
SODIUM SERPL-SCNC: 138 MEQ/L (ref 135–145)
WBC # BLD AUTO: 10.4 THOU/MM3 (ref 4.8–10.8)

## 2025-03-24 PROCEDURE — 6370000000 HC RX 637 (ALT 250 FOR IP)

## 2025-03-24 PROCEDURE — 6370000000 HC RX 637 (ALT 250 FOR IP): Performed by: INTERNAL MEDICINE

## 2025-03-24 PROCEDURE — 99239 HOSP IP/OBS DSCHRG MGMT >30: CPT | Performed by: STUDENT IN AN ORGANIZED HEALTH CARE EDUCATION/TRAINING PROGRAM

## 2025-03-24 PROCEDURE — 97535 SELF CARE MNGMENT TRAINING: CPT

## 2025-03-24 PROCEDURE — 80048 BASIC METABOLIC PNL TOTAL CA: CPT

## 2025-03-24 PROCEDURE — 2700000000 HC OXYGEN THERAPY PER DAY

## 2025-03-24 PROCEDURE — 6370000000 HC RX 637 (ALT 250 FOR IP): Performed by: OTOLARYNGOLOGY

## 2025-03-24 PROCEDURE — 2580000003 HC RX 258: Performed by: OTOLARYNGOLOGY

## 2025-03-24 PROCEDURE — 99024 POSTOP FOLLOW-UP VISIT: CPT

## 2025-03-24 PROCEDURE — 94761 N-INVAS EAR/PLS OXIMETRY MLT: CPT

## 2025-03-24 PROCEDURE — 2580000003 HC RX 258: Performed by: NURSE PRACTITIONER

## 2025-03-24 PROCEDURE — 36415 COLL VENOUS BLD VENIPUNCTURE: CPT

## 2025-03-24 PROCEDURE — 97116 GAIT TRAINING THERAPY: CPT

## 2025-03-24 PROCEDURE — 97110 THERAPEUTIC EXERCISES: CPT

## 2025-03-24 PROCEDURE — 85025 COMPLETE CBC W/AUTO DIFF WBC: CPT

## 2025-03-24 PROCEDURE — 6360000002 HC RX W HCPCS: Performed by: OTOLARYNGOLOGY

## 2025-03-24 PROCEDURE — 97530 THERAPEUTIC ACTIVITIES: CPT

## 2025-03-24 PROCEDURE — 94640 AIRWAY INHALATION TREATMENT: CPT

## 2025-03-24 PROCEDURE — 99233 SBSQ HOSP IP/OBS HIGH 50: CPT | Performed by: STUDENT IN AN ORGANIZED HEALTH CARE EDUCATION/TRAINING PROGRAM

## 2025-03-24 PROCEDURE — 92507 TX SP LANG VOICE COMM INDIV: CPT

## 2025-03-24 PROCEDURE — 2500000003 HC RX 250 WO HCPCS: Performed by: NURSE PRACTITIONER

## 2025-03-24 PROCEDURE — 6360000002 HC RX W HCPCS: Performed by: NURSE PRACTITIONER

## 2025-03-24 PROCEDURE — 82330 ASSAY OF CALCIUM: CPT

## 2025-03-24 RX ORDER — MUPIROCIN 20 MG/G
OINTMENT TOPICAL
Qty: 1 G | Refills: 3 | Status: SHIPPED | OUTPATIENT
Start: 2025-03-24

## 2025-03-24 RX ORDER — HYDROCODONE BITARTRATE AND ACETAMINOPHEN 5; 325 MG/1; MG/1
1 TABLET ORAL EVERY 6 HOURS PRN
Qty: 12 TABLET | Refills: 0 | Status: SHIPPED | OUTPATIENT
Start: 2025-03-24 | End: 2025-03-27

## 2025-03-24 RX ADMIN — PIPERACILLIN AND TAZOBACTAM 3375 MG: 3; .375 INJECTION, POWDER, FOR SOLUTION INTRAVENOUS at 12:14

## 2025-03-24 RX ADMIN — LANSOPRAZOLE 15 MG: 15 TABLET, ORALLY DISINTEGRATING, DELAYED RELEASE ORAL at 05:23

## 2025-03-24 RX ADMIN — PIPERACILLIN AND TAZOBACTAM 3375 MG: 3; .375 INJECTION, POWDER, FOR SOLUTION INTRAVENOUS at 03:46

## 2025-03-24 RX ADMIN — IPRATROPIUM BROMIDE AND ALBUTEROL SULFATE 1 DOSE: .5; 3 SOLUTION RESPIRATORY (INHALATION) at 16:45

## 2025-03-24 RX ADMIN — ACETAMINOPHEN 1000 MG: 325 TABLET ORAL at 05:23

## 2025-03-24 RX ADMIN — IPRATROPIUM BROMIDE AND ALBUTEROL SULFATE 1 DOSE: .5; 3 SOLUTION RESPIRATORY (INHALATION) at 12:26

## 2025-03-24 RX ADMIN — LISINOPRIL 5 MG: 5 TABLET ORAL at 09:40

## 2025-03-24 RX ADMIN — IPRATROPIUM BROMIDE AND ALBUTEROL SULFATE 1 DOSE: .5; 3 SOLUTION RESPIRATORY (INHALATION) at 08:13

## 2025-03-24 RX ADMIN — SODIUM CHLORIDE, PRESERVATIVE FREE 10 ML: 5 INJECTION INTRAVENOUS at 12:14

## 2025-03-24 RX ADMIN — LOPERAMIDE HYDROCHLORIDE 2 MG: 2 CAPSULE ORAL at 09:40

## 2025-03-24 RX ADMIN — MUPIROCIN: 20 OINTMENT TOPICAL at 15:21

## 2025-03-24 RX ADMIN — AMLODIPINE BESYLATE 5 MG: 5 TABLET ORAL at 09:40

## 2025-03-24 RX ADMIN — VANCOMYCIN HYDROCHLORIDE 1000 MG: 1 INJECTION, POWDER, LYOPHILIZED, FOR SOLUTION INTRAVENOUS at 09:42

## 2025-03-24 RX ADMIN — SODIUM CHLORIDE, PRESERVATIVE FREE 10 ML: 5 INJECTION INTRAVENOUS at 08:45

## 2025-03-24 RX ADMIN — MUPIROCIN: 20 OINTMENT TOPICAL at 08:46

## 2025-03-24 ASSESSMENT — PAIN DESCRIPTION - PAIN TYPE: TYPE: ACUTE PAIN;SURGICAL PAIN

## 2025-03-24 ASSESSMENT — PAIN DESCRIPTION - ORIENTATION: ORIENTATION: INNER

## 2025-03-24 ASSESSMENT — PAIN DESCRIPTION - DESCRIPTORS: DESCRIPTORS: ACHING

## 2025-03-24 ASSESSMENT — PAIN - FUNCTIONAL ASSESSMENT: PAIN_FUNCTIONAL_ASSESSMENT: ACTIVITIES ARE NOT PREVENTED

## 2025-03-24 ASSESSMENT — PAIN DESCRIPTION - LOCATION: LOCATION: THROAT

## 2025-03-24 ASSESSMENT — PAIN SCALES - GENERAL: PAINLEVEL_OUTOF10: 3

## 2025-03-24 NOTE — DISCHARGE INSTRUCTIONS
He can continue ice chips and sips of water, tolerating well. Continue tube feeds as prior to this admission. Tube feed and medications via G tube.  - Encourage frequent ambulation at home to keep strength up.  - I spoke with inpatient speech, they will be coordinating with outpt speech will for patient to follow up in the next couple of days.  - Speech- Ok to use HME without adhesive and electrolarynx on either side of neck. Can trial TEP for short periods of time but should not try constantly until a couple of weeks post op to allow time for tissues to heal.   - Suction as needed but at least 3 times a day and bactroban to stoma 3 times a day  - You will have home delivered suction and humidification to use as needed  - Use the Ipratropium-albuterol nebulizer 3-4 times a day and normal saline nebulizer 3 times a day

## 2025-03-24 NOTE — DISCHARGE SUMMARY
as needed for Pain for up to 3 days. Intended supply: 3 days. Take lowest dose possible to manage pain Max Daily Amount: 4 tablets     mupirocin 2 % ointment  Commonly known as: BACTROBAN  Apply to stoma with q tip TID.            CONTINUE taking these medications      amLODIPine 5 MG tablet  Commonly known as: NORVASC  Take 1 tablet by mouth daily     aspirin 81 MG EC tablet     benazepril 20 MG tablet  Commonly known as: LOTENSIN  Take 1 tablet by mouth daily     famotidine 40 MG tablet  Commonly known as: PEPCID  Take 1 tablet by mouth every evening     ipratropium 0.5 mg-albuterol 2.5 mg 0.5-2.5 (3) MG/3ML Soln nebulizer solution  Commonly known as: DUONEB  Inhale 3 mLs into the lungs in the morning and 3 mLs in the evening.     MULTI VITAMIN PO     Nebulizer/Tubing/Mouthpiece Kit  1 kit by Does not apply route in the morning, at noon, and at bedtime     pantoprazole 40 MG tablet  Commonly known as: PROTONIX  Take 1 tablet by mouth every morning (before breakfast)     sodium chloride nebulizer 0.9 % solution            STOP taking these medications      albuterol sulfate  (90 Base) MCG/ACT inhaler  Commonly known as: PROVENTIL;VENTOLIN;PROAIR               Where to Get Your Medications        These medications were sent to [facility] [address] [phone] - F [phone]  [address] [address]      Phone: [phone]   HYDROcodone-acetaminophen 5-325 MG per tablet  mupirocin 2 % ointment         Patient Instructions:  Discharge lab work: None  Activity:  activity as tolerated  Diet:  Diet NPO Exceptions are: Ice Chips, Other (Specify); Specify Other Exceptions: Small sips of water  ADULT TUBE FEEDING; PEG; Standard with Fiber; Bolus; 5 Times Daily; 237; Gravity; 50; Before and after each bolus    Code Status:  Full Code    Follow-Up Visits:  [facility] [place]  [address] [facility]

## 2025-03-24 NOTE — PROGRESS NOTES
Progress note: Infectious diseases    Patient - Masood Randhawa,  Age - 74 y.o.    - 1950      Room Number - 4K-23/023-A   MRN -  [id number]   [facility] # - [id number]  Date of Admission -  3/14/2025  6:11 AM    SUBJECTIVE:   No new issues. Wife concerned of swelling over the anterior neck  OBJECTIVE   VITALS    height is 1.676 m (5' 6\") and weight is 56.9 kg (125 lb 7.1 oz). His oral temperature is 98 °F (36.7 °C). His blood pressure is 120/63 and his pulse is 100. His respiration is 16 and oxygen saturation is 93%.       Wt Readings from Last 3 Encounters:   25 56.9 kg (125 lb 7.1 oz)   25 55 kg (121 lb 4.1 oz)   25 56.9 kg (125 lb 6.4 oz)       I/O (24 Hours)    Intake/Output Summary (Last 24 hours) at 3/21/2025 0915  Last data filed at 3/21/2025 0400  Gross per 24 hour   Intake 1348 ml   Output 525 ml   Net 823 ml       General Appearance  Awake, alert, oriented, chronically sick looking  HEENT - normocephalic, atraumatic, pale  conjunctiva,  anicteric sclera  Neck - drains inplace, the wound is intact  Lungs -  Bilateral air entry, +rhonchi,diminished breath sound  Cardiovascular - Heart sounds are normal.     Abdomen - soft, not distended, nontender, peg tube in place  Neurologic -oriented  Skin - No bruising or bleeding  Extremities - decreased muscle mass  MEDICATIONS:      vancomycin  1,000 mg IntraVENous Q12H    vancomycin (VANCOCIN) intermittent dosing (placeholder)   Other RX Placeholder    lidocaine  1 patch TransDERmal Daily    amLODIPine  5 mg Oral Daily    lisinopril  5 mg Oral Daily    ipratropium 0.5 mg-albuterol 2.5 mg  1 Dose Inhalation 4x Daily RT    pantoprazole  40 mg IntraVENous Daily    acetaminophen  1,000 mg Per G Tube 3 times per day    sodium chloride flush  5-40 mL IntraVENous 2 times per day    mupirocin   Topical TID    piperacillin-tazobactam  3,375 mg IntraVENous q8h 
                                                                                          Progress note: Infectious diseases    Patient - Masood Randhawa,  Age - 74 y.o.    - 1950      Room Number - 4K-23/023-A   MRN -  [id number]   [facility] # - [id number]  Date of Admission -  3/14/2025  6:11 AM    SUBJECTIVE:   He has no new complaints  He wants to go home, wife also wants him to come home. He is very weak, at risk of fall      OBJECTIVE   VITALS    height is 1.676 m (5' 6\") and weight is 56.9 kg (125 lb 7.1 oz). His oral temperature is 98.4 °F (36.9 °C). His blood pressure is 130/62 and his pulse is 94. His respiration is 19 and oxygen saturation is 94%.       Wt Readings from Last 3 Encounters:   25 56.9 kg (125 lb 7.1 oz)   25 55 kg (121 lb 4.1 oz)   25 56.9 kg (125 lb 6.4 oz)       I/O (24 Hours)    Intake/Output Summary (Last 24 hours) at 3/24/2025 0912  Last data filed at 3/24/2025 0845  Gross per 24 hour   Intake 2031.37 ml   Output 400 ml   Net 1631.37 ml       General Appearance  Awake, alert, oriented, chronically sick looking on oxygen    Neck - drains inplace, the wound is intact  Lungs -  Bilateral air entry, +rhonchi,diminished breath sound  Cardiovascular - Heart sounds are normal.     Abdomen - soft, not distended, nontender, peg tube in place  Neurologic -oriented  Skin - No bruising or bleeding  Extremities - the rash on his back has almost resolved  MEDICATIONS:      polycarbophil  625 mg Oral Daily    lansoprazole  15 mg Per NG tube QAM AC    enoxaparin  40 mg SubCUTAneous Nightly    vancomycin  1,000 mg IntraVENous Q12H    vancomycin (VANCOCIN) intermittent dosing (placeholder)   Other RX Placeholder    lidocaine  1 patch TransDERmal Daily    amLODIPine  5 mg Oral Daily    lisinopril  5 mg Oral Daily    ipratropium 0.5 mg-albuterol 2.5 mg  1 Dose Inhalation 4x Daily RT    acetaminophen  1,000 mg Per G Tube 3 times per day    sodium chloride flush  5-40 mL IntraVENous 2 
                                                                                          Progress note: Infectious diseases    Patient - Masood Randhawa,  Age - 74 y.o.    - 1950      Room Number - 4K-23/023-A   MRN -  [id number]   [facility] # - [id number]  Date of Admission -  3/14/2025  6:11 AM    SUBJECTIVE:   He has no new complaints  Asked to have a look on a rash on the right scalpular area  Has no pain not itching, doesn't follow a dermatome    OBJECTIVE   VITALS    height is 1.676 m (5' 6\") and weight is 56.9 kg (125 lb 7.1 oz). His axillary temperature is 98.5 °F (36.9 °C). His blood pressure is 116/57 (abnormal) and his pulse is 90. His respiration is 18 and oxygen saturation is 94%.       Wt Readings from Last 3 Encounters:   25 56.9 kg (125 lb 7.1 oz)   25 55 kg (121 lb 4.1 oz)   25 56.9 kg (125 lb 6.4 oz)       I/O (24 Hours)    Intake/Output Summary (Last 24 hours) at 3/23/2025 1437  Last data filed at 3/23/2025 1135  Gross per 24 hour   Intake 3390.17 ml   Output 450 ml   Net 2940.17 ml       General Appearance  Awake, alert, oriented, chronically sick looking on oxygen    Neck - drains inplace, the wound is intact  Lungs -  Bilateral air entry, +rhonchi,diminished breath sound  Cardiovascular - Heart sounds are normal.     Abdomen - soft, not distended, nontender, peg tube in place  Neurologic -oriented  Skin - No bruising or bleeding  Extremities - he has a macular rash on the right scalpular area, no blisters, patient was lying on it  MEDICATIONS:      polycarbophil  625 mg Oral Daily    [START ON 3/24/2025] lansoprazole  15 mg Per NG tube QAM AC    vancomycin  1,000 mg IntraVENous Q12H    vancomycin (VANCOCIN) intermittent dosing (placeholder)   Other RX Placeholder    lidocaine  1 patch TransDERmal Daily    amLODIPine  5 mg Oral Daily    lisinopril  5 mg Oral Daily    ipratropium 0.5 mg-albuterol 2.5 mg  1 Dose Inhalation 4x Daily RT    acetaminophen  1,000 mg Per G Tube 
                                                                                          Progress note: Infectious diseases    Patient - Masood Randhawa,  Age - 74 y.o.    - 1950      Room Number - 4K-23/023-A   MRN -  [id number]   [facility] # - [id number]  Date of Admission -  3/14/2025  6:11 AM    SUBJECTIVE:   He has no new complaints  He wants to go home    OBJECTIVE   VITALS    height is 1.676 m (5' 6\") and weight is 56.9 kg (125 lb 7.1 oz). His oral temperature is 98.3 °F (36.8 °C). His blood pressure is 135/68 and his pulse is 88. His respiration is 20 and oxygen saturation is 94%.       Wt Readings from Last 3 Encounters:   25 56.9 kg (125 lb 7.1 oz)   25 55 kg (121 lb 4.1 oz)   25 56.9 kg (125 lb 6.4 oz)       I/O (24 Hours)    Intake/Output Summary (Last 24 hours) at 3/22/2025 0942  Last data filed at 3/22/2025 0707  Gross per 24 hour   Intake 1170 ml   Output 530 ml   Net 640 ml       General Appearance  Awake, alert, oriented, chronically sick looking on oxygen    Neck - drains inplace, the wound is intact  Lungs -  Bilateral air entry, +rhonchi,diminished breath sound  Cardiovascular - Heart sounds are normal.     Abdomen - soft, not distended, nontender, peg tube in place  Neurologic -oriented  Skin - No bruising or bleeding  Extremities - decreased muscle mass  MEDICATIONS:      vancomycin  1,000 mg IntraVENous Q12H    vancomycin (VANCOCIN) intermittent dosing (placeholder)   Other RX Placeholder    lidocaine  1 patch TransDERmal Daily    amLODIPine  5 mg Oral Daily    lisinopril  5 mg Oral Daily    ipratropium 0.5 mg-albuterol 2.5 mg  1 Dose Inhalation 4x Daily RT    pantoprazole  40 mg IntraVENous Daily    acetaminophen  1,000 mg Per G Tube 3 times per day    sodium chloride flush  5-40 mL IntraVENous 2 times per day    mupirocin   Topical TID    piperacillin-tazobactam  3,375 mg IntraVENous q8h      sodium chloride       loperamide, sodium chloride flush, sodium chloride, 
    Hospitalist Progress Note  Internal Medicine Resident      Patient: Masood Randhawa 74 y.o. 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 TEP placement, L selective neck dissection, and R hemithyroidectomy 3/14. PETER drain insitu. Drain removed 03/22. Continue to keep incision site clean and dry and use Bactroban to stoma TID and around drain site as needed.     -Acute hypoxic Respiratory Failure: secondary to RUL pneumonia. Wean O2 to keep >90%.     -Right upper lobe pneumonia-CT chest showed improved consolidation in right upper lung. Respiratory cultures positive for Staphylococcus. ID is following.   Continue with Zosyn and Vancomycin.   Continue with vest therapy and irrigations with deep suction     -Malnutrition-Per nutritionist note patient is at risk for malnutrition. Tube feeds have been restarted. Dietitians following    -Deconditioning: PT/OT consulted. Encourage activity, try for up to chair and ambulation.     -Macrocytic Anemia: Hb 8.4. .5. B12 and folate normal. Monitor CBC. If Hb <7 consider transfusion.     -Rash: On examination had maculopapular rash on back. Is not bothersome to patient and denies itchiness. Pictures uploaded to media. Will monitor for now.     -Diarrhea: Ongoing loose stool. GI panel negative On PRN imodium. Started on Fibercon.      Resolved Problems    Chronic Conditions (reviewed and stable unless otherwise stated)  Essential hypertension: Continue with amlodipine and lisinopril   Hx of COPD: Not in exacerbation. Continue Stiolto and albuterol as needed  GERD: On Protonix       LDA: []CVC / []PICC / []Midline / []Rousseau / []Drains / []Mediport / []None  Antibiotics: Zosyn and Vancomycin  Steroids: n/a  Labs (still needed?): [x]Yes / []No  IVF 
    Hospitalist Progress Note  Internal Medicine Resident      Patient: Masood Randhawa 74 y.o. 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 TEP placement, L selective neck dissection, and R hemithyroidectomy 3/14. PETER drain insitu. For possible drain removal. Continue to keep incision site clean and dry and use Bactroban to stoma TID and around drain site as needed.     -Acute hypoxic Respiratory Failure: secondary to RUL pneumonia. Wean O2 to keep >90%.     -Right upper lobe pneumonia-CT chest showed improved consolidation in right upper lung. Respiratory cultures positive for Staphylococcus. ID is following.   Continue with Zosyn and Vancomycin.   Continue with vest therapy and irrigations with deep suction     -Malnutrition-Per nutritionist note patient is at risk for malnutrition. Tube feeds restarted.     -Deconditioning: PT/OT consulted. Encourage activity, try for up to chair and ambulation.     -Macrocytic Anemia: Hb 9.0. .5. B12 and folate normal. Monitor CBC. If Hb <7 consider transfusion.     -Rash: On examination had maculopapular rash on back. Denies itchiness. Will monitor.   -Diarrhea: Ongoing loose stool. No recent GI panel. GI panel ordered. On PRN imodium.     Resolved Problems    Chronic Conditions (reviewed and stable unless otherwise stated)  Essential hypertension: Continue with amlodipine and lisinopril   Hx of COPD: Not in exacerbation. Continue Stiolto and albuterol as needed  GERD: On Protonix       LDA: []CVC / []PICC / []Midline / []Rousseau / []Drains / []Mediport / []None  Antibiotics: Zosyn and Vancomycin  Steroids: n/a  Labs (still needed?): [x]Yes / []No  IVF (still needed?): []Yes / [x]No    Level of care: [x]Step Down / []Med-Surg  Bed Status: [x]Inpatient 
  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 Per chart review, patient with complicated medical course. Advanced transglottic squamous cell carcinoma of the larynx with metastases cervical nodes; POD 3 Total bilateral laryngectomy with radical neck dissection, Left selective neck dissection, Right hemithyroidectomy. Tracheoesphogeal puncture fistula construction and TEP placement.      Past Medical History:   Diagnosis Date    BPH (benign prostatic hyperplasia)     Chronic back pain     COPD (chronic obstructive pulmonary disease) (HCC)     Erectile dysfunction     Gastroesophageal reflux disease with apnea without esophagitis 01/10/2025    Head and neck cancer (HCC) 2025    Hypertension     Osteoarthritis     Pt denies    Personal history of colonic polyps 2006    Pneumonia     3/2025    PVD (peripheral vascular disease)     PVD (peripheral vascular disease) 2012     PAIN:  No pain reported.    SUBJECTIVE:  SONY Grant with approval to proceed with evaluation. Upon arrival, patient resting in bed with spontaneous wakefulness. High level of involvement and engagement within immediate context and POC establishment. Spouse present at bedside, much support provided.   *of note, visual signage placed throughout patient's room to indicate anatomical surgical 
  CRITICAL CARE H&P  NOTE       Patient:  Masood Randhawa    Unit/Bed:4D-11/011-A  YOB: 1950  MRN: [id number]   PCP: Samy Piper DO  Date of Admission: 3/14/2025  Chief Complaint:-Shortness of breath    Assessment and Plan:    Laryngeal/glottis squamous cell carcinoma with cervical lymph nodes metastasis, s/p total bilateral laryngectomy with radical neck dissection, selective left side neck dissection.  Tracheoesophageal puncture fistula construction and TEP placement.  Right hemithyroidectomy.:  ENT following.   Patient underwent significant surgery by ENT.  Discussed the case with the ENT. Will continue to follow until cleared by ENT.  Laryngectomy tube 3/16, ENT to see tomorrow for further airway management  Supportive care.  Pain control.  Patient weaned off ventilator.  Telemetry pulse ox.  Daily CBC BMP.  Daily weights TRUPTI's.  Fall/aspiration precautions  PT OT.  Speech.  Dietitian consulted.  Will transfer out of ICU once stable from ENT perspective  Mild macrocytic anemia, 2/2 acute blood loss anemia, iso recent surgical procedure, improving.  Will continue monitoring, daily CBC.  Checking vitamin B12/folic acid levels.  If needed will supplement vitamin B12/folic acid.   Will transfuse if patient becomes hemodynamically unstable.  Trach dependent.  Tracheostomy placed by ENT.  Discussed the case.  Mild thrombocytosis, improving: Likely reactive.  Will continue monitoring with daily CBC.  COPD with a history of bronchial asthma: Continue scheduled DuoNeb.  Transition to Stiolto and as needed albuterol 3/17.  Severe malnutrition, calorie/protein: Noted.  Speech eval for dysphagia, dietitian consult for possible tube feeding.  Deconditioning/debility: Due to multiple comorbidities and advanced age.  GERD.:  On PPI  Primary hypertension: Meds resumed with parameters.  Mild hyponatremia, resolved: Due to poor oral intake.  Sodium 134.  Will continue check.  Daily BMP.    INITIAL H AND 
  CRITICAL CARE PROGRESS NOTE      Patient:  Masood Randhawa    Unit/Bed:4D-11/011-A  YOB: 1950  MRN: [id number]   PCP: Samy Piper DO  Date of Admission: 3/14/2025  Chief Complaint:-Dyspnea    Assessment and Plan:    Squamous cell carcinoma of the glottis and larynx with metastasis to cervical lymph nodes s/p bilateral laryngectomy, radical neck resection, transesophageal official construction, TEP placement, L neck dissection, and R hemithyroidectomy 3/14: Being followed by ENT. Has had surgery before. Larynx tube placed 3/15 by ENT and transitioned to LGT #8 3/16.  Undergoing another tube exchange today. Minimal blood in drain. Currently, he is on 15 L and 21% FiO2 HHFNC. To be transferred out of the ICU today. Speech following. PT/OT following.  Malnutrition s/p G-tube placement 2/15: Due to obstructive airway.  Dietitian following.  Macrocytic anemia secondary to blood loss from recent procedure: Continue monitoring, daily CBC. Hb 8.6 3/14 => 9.8 3/18. Baseline 11-12. Currently, patient is hemodynamically stable. Transfuse patient if he becomes hemodynamically unstable. folate and B12 WNL.  Thrombocytosis: Reactive secondary to procedure. Stable.  History of COPD: Continue Stiolto and as needed albuterol.  History of GERD: Continue PPI  HTN: Continue home antihypertensives.  Mild hyponatremia resolved  Leukocytosis, downtrending. WBC 23.4 3/14 => 14.6 3/18. Reactive secondary to procedure.  Right upper lung infiltrate present on x-ray before current admission: Improved on chest x-ray 3/18.    INITIAL H AND P AND ICU COURSE:  Patient is a 74-year-old male with PMH of squamous cell carcinoma of the glottis and larynx with  metastasis to lymph nodes s/p recent extensive ENT surgery and PEG tube placement who presented to the ED with worsening dyspnea and dysphagia.  Patient underwent outpatient CT scan which showed laryngeal mass and necrotic cervical lymph nodes.  He underwent needle 
  CRITICAL CARE PROGRESS NOTE      Patient:  Masood Randhawa    Unit/Bed:4D-11/011-A  YOB: 1950  MRN: [id number]   PCP: Samy Piper DO  Date of Admission: 3/14/2025  Chief Complaint:-Dyspnea    Assessment and Plan:    Squamous cell carcinoma of the glottis and larynx with metastasis to cervical lymph nodes s/p bilateral laryngectomy, radical neck resection, transesophageal official construction, TEP placement, L neck dissection, and R hemithyroidectomy 3/14: Being followed by ENT. Has had surgery before. Larynx tube placed 3/15 by ENT and transitioned to LGT #8 3/16.  Currently, he is on 10 L and 21% FiO2 HHFNC. To be transferred out of the ICU once drain is removed and patient has been cleared by ENT. Speech consulted. PT/OT following.  Malnutrition s/p G-tube placement 2/15: Due to obstructive airway.  Dietitian following.  Macrocytic anemia secondary to blood loss from recent procedure: Continue monitoring, daily CBC. Hb 8.6 3/14 => 9.4 3/17. Baseline 11-12. Currently, patient is hemodynamically stable. Transfuse patient if she becomes hemodynamically unstable.  Ordered folate and B12.  Thrombocytosis: Reactive secondary to procedure.  Stable.  History of COPD: Continue Stiolto and as needed albuterol.  History of GERD: Continue PPI  HTN: Continue home antihypertensives.  Mild hyponatremia resolved    INITIAL H AND P AND ICU COURSE:  Patient is a 74-year-old male with PMH of squamous cell carcinoma of the glottis and larynx with  metastasis to lymph nodes s/p recent extensive ENT surgery and PEG tube placement who presented to the ED with worsening dyspnea and dysphagia.  Patient underwent outpatient CT scan which showed laryngeal mass and necrotic cervical lymph nodes.  He underwent needle aspiration of a cervical lymph node which was positive for metastatic squamous cell carcinoma. Patient endorsed worsening dyspnea, hoarseness, fatigue and dysphagia so he was advised to go to the ED.  
  Physician Progress Note      PATIENT:               PEMA BOLANOS  [facility] #:                  [id number]  :                       1950  ADMIT DATE:       3/14/2025 6:11 AM  DISCH DATE:  RESPONDING  PROVIDER #:        Virginia Oritz CNP          QUERY TEXT:    Patient admitted 3/14/25 with laryngeal/glottis squamous cell carcinoma with   cervical lymph nodes metastasis and underwent bilateral laryngectomy with   radical neck dissection on 3/14/25.  Noted documentation of severe malnutrition, calorie/protein in Critical Care   H/P.  Per 3/15/25 and 3/19/25 Comprehensive Nutrition Assessment by Dietitian: At   risk for malnutrition    In order to support the diagnosis of severe protein calorie malnutrition   please include additional clinical indicators in your documentation.  Or   please document if the diagnosis of malnutrition has been ruled out after   further study.    The medical record reflects the following:  Risk Factors: per H/P:  laryngeal/glottis squamous cell carcinoma with   cervical lymph nodes metastasis and underwent bilateral laryngectomy with   radical neck dissection on 3/14/25; Body mass index is 18.75 kg/m2  Clinical Indicators: Comprehensive Nutrition Assessment by Dietitian:   Malnutrition Status:  At risk for malnutrition (03/15/25 1018)  Context:  Chronic Illness  Findings of the 6 clinical characteristics of malnutrition:  Energy Intake:   (meeting 100% of nutrition needs via PEG enteral feedings   PTA, PEG placed 25)  Weight Loss:   (significant losses over last 6 months; appears to have gained   3# since 25)  Body Fat Loss:   (noted prior to enteral feeding start; now meeting 100% of   estimated nutritional needs via TF) Orbital, Triceps, Fat Overlying Ribs,   Buccal region  Muscle Mass Loss:   (noted prior to enteral feeding start; now meeting 100% of   estimated nutritional needs via TF) Temples (temporalis), Clavicles   (pectoralis & deltoids), Thigh (quadriceps), Calf 
  Physician Progress Note      PATIENT:               PEMA BOLANOS  CSN #:                  [id number]  :                       1950  ADMIT DATE:       3/14/2025 6:11 AM  DISCH DATE:  RESPONDING  PROVIDER #:        Marques Russell MD          QUERY TEXT:    Patient admitted 3/14/25 with laryngeal/glottis squamous cell carcinoma with   cervical lymph nodes metastasis and underwent bilateral laryngectomy with   radical neck dissection on 3/14/25.  Per 3/20/25 IM progress note: suspected pneumonia;  Per 3/22/25 IM progress   note: Acute hypoxic Respiratory Failure: secondary to RUL pneumonia.  Per 3/14/25 OP note: \"Patient still has a productive cough from the treatment   of his pneumonia a week and a half ago and is regarded as a PATOS infection\"    If possible, please document in progress notes and discharge summary to   clarify the present on admission status of pneumonia:    The medical record reflects the following:  Risk Factors:COPD, Squamous cell carcinoma of the larynx s/p extensive surgery   with radical neck dissection, Per 3/14/25 OP and Anesthesia Report: \"(+)   pneumonia (Pt just discharged x 9 days ago from the hospital.   Pt was treated   as inpatient for bilateral pneumonia.\"  Clinical Indicators   : per 3/14/25 OP note:\" Patient still has a productive   cough from the treatment of his pneumonia a week and a half ago and is   regarded as a PATOS infection\";  3/18/25 CXR: Right upper lobe infiltrate   improved since previous study dated 3/4/2025.;  3/19/25 CXR:  Right upper lobe   infiltrate does not appear to be significantly change; 3/20/25 CT  chest:   Improved consolidation in the right upper lung and improved  centrilobular   infiltrates in the right lower lung with residual airspace disease remaining;    Per 3/20/25 IM progress note: suspected pneumonia;    Treatment: Zosyn, vancomycin, ID consult  Options provided:  -- Yes, peumonia was present at the time of the order to admit to the 
 [place]  INPATIENT PHYSICAL THERAPY  DAILY NOTE  [facility] ICU STEPDOWN TELEMETRY 4K - 4K-23/023-A      Discharge Recommendations: Home with Assist as Needed  Equipment Recommendations: No  cont to monitor for needs            Time In: 1042  Time Out: 1112  Timed Code Treatment Minutes: 30 Minutes  Minutes: 30          Date: 3/24/2025  Patient Name: Masood Randhawa,  Gender:  male        MRN: [id number]  : 1950  (74 y.o.)     Referring Practitioner: LEVI Pat CNP  Diagnosis: Squamous cell carcinoma of glottis  Additional Pertinent Hx: Initial HPI.  Per EMR. Masood Randhawa is a 74 y.o. 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  He underwent an emergency debulking of this disease in order to avoid an emergency tracheostomy.  Subsequent to this procedure he also underwent a gastrostomy tube placement for nutrition given his his limited ability to protect his airway from his transoral vertical Que laryngectomy.  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  Tracheoesophageal puncture fistula construction and TEP placement.  Right hemithyroidectomy on 3-14-25. pt extubated 3-15-25.     Prior Level of Function:  Lives With: Spouse  Type of Home: House  Home Layout: Two level (bedroom on second floor but has hospital bed to stay on first floor. bathroom on first floor.)  Home Access: Stairs to enter with rails (can use bear statue for UE support per pt and spouse)  Entrance Stairs - Number of Steps: 2  Home Equipment: Walker - Rolling   Bathroom Shower/Tub: Walk-in shower  Bathroom Toilet: Standard  Bathroom Equipment: Grab bars in shower    Prior Level of Assist for ADLs: Needs 
 [facility]  INPATIENT PHYSICAL THERAPY  DAILY NOTE  STRZ ICU STEPDOWN TELEMETRY 4K - 4K-23/023-A      Discharge Recommendations: 24 hour assistance or supervision and Home with Home Health PT  Equipment Recommendations: No  cont to monitor for needs            Time In: 949  Time Out: 1028  Timed Code Treatment Minutes: 39 Minutes  Minutes: 39          Date: 3/19/2025  Patient Name: Masood Randhawa,  Gender:  male        MRN: [id number]  : 1950  (74 y.o.)     Referring Practitioner: LEVI Pat CNP  Diagnosis: Squamous cell carcinoma of glottis (HCC)  Additional Pertinent Hx: Initial HPI.  Per EMR. Masood Randhawa is a 74 y.o. 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  He underwent an emergency debulking of this disease in order to avoid an emergency tracheostomy.  Subsequent to this procedure he also underwent a gastrostomy tube placement for nutrition given his his limited ability to protect his airway from his transoral vertical Que laryngectomy.  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  Tracheoesophageal puncture fistula construction and TEP placement.  Right hemithyroidectomy on 3-14-25. pt extubated 3-15-25.     Prior Level of Function:  Lives With: Spouse  Type of Home: House  Home Layout: Two level (bedroom on second floor but has hospital bed to stay on first floor. bathroom on first floor.)  Home Access: Stairs to enter with rails (can use bear statue for UE support per pt and spouse)  Entrance Stairs - Number of Steps: 2  Home Equipment: Walker - Rolling   Bathroom Shower/Tub: Walk-in shower  Bathroom Toilet: Standard  Bathroom Equipment: Grab bars in shower    Prior 
 [facility]  INPATIENT PHYSICAL THERAPY  DAILY NOTE  [facility] ICU 4D - 4D-11/011-A      Discharge Recommendations:  home wth 24 hour assist vs. Inpt therapy stay. Cont to monitor progress.  Equipment Recommendations: No  cont to monitor for needs            Time In: 1040  Time Out: 1109  Timed Code Treatment Minutes: 29 Minutes  Minutes: 29          Date: 3/18/2025  Patient Name: Masood Randhawa,  Gender:  male        MRN: [id number]  : 1950  (74 y.o.)     Referring Practitioner: LEVI Pat CNP  Diagnosis: Squamous cell carcinoma of glottis (HCC)  Additional Pertinent Hx: Initial HPI.  Per EMR. Masood Randhawa is a 74 y.o. 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  He underwent an emergency debulking of this disease in order to avoid an emergency tracheostomy.  Subsequent to this procedure he also underwent a gastrostomy tube placement for nutrition given his his limited ability to protect his airway from his transoral vertical Que laryngectomy.  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  Tracheoesophageal puncture fistula construction and TEP placement.  Right hemithyroidectomy on 3-14-25. pt extubated 3-15-25.     Prior Level of Function:  Lives With: Spouse  Type of Home: House  Home Layout: Two level (bedroom on second floor but has hospital bed to stay on first floor. bathroom on first floor.)  Home Access: Stairs to enter with rails (can use bear statue for UE support per pt and spouse)  Entrance Stairs - Number of Steps: 2  Home Equipment: Walker - Rolling   Bathroom Shower/Tub: Walk-in shower  Bathroom Toilet: Standard  Bathroom Equipment: Grab bars in shower    Prior Level of 
0740 report and bedside rounds completed. Pt voided ,external leaking and removed.kary care given. Pt assisted up to chair,used walker, tolerated well.  0800 pt incontinent of large loose stool.brown. pt stood pericare given. Pt returned to chair.  
0840 Ninfa Pat APNP in to see pt switched trach to jason tube 8.0 lgt.neck incision cleaned and lt neck dressing changed per Ninfa.  
1150 wife here updated. Pt assisted back to bed.  1210 wife states pt is sleeping and she is going home, asked to call if needed.  
[id number] Shiley Cuffed placed by Dr. Daley into patient's laryngectomy stoma. Secured with trach ties by Dr. Daley and Cherelle Pat CNP. Report called to ICU RN.  
[facility]  INPATIENT SPEECH THERAPY  [facility] ICU 4D  DAILY NOTE    Discharge Recommendations: Home Health versus Outpatient Therapy    SLP Individual Minutes  Time In: 1100  Time Out: 1158  Minutes: 58  Timed Code Treatment Minutes: 0 Minutes       Date: 3/19/2025  Patient Name: Masood Randhawa      CSN: [id number]   : 1950  (74 y.o.)  Gender: male   Referring Physician:  Cherelle Pat, SOO - CNP   Diagnosis: Squamous cell carcinoma of glottis (HCC)  Precautions: PATIENT IS NECK BREATHER - S/P TOTAL LARYNGECTOMY  Current Diet: Strict NPO  Respiratory Status:  Humidification, Trach Mask   Swallowing Strategies:  Oral care q2h  Date of Last MBS/FEES: Not Applicable    Pain:  No pain reported.    Subjective:  SONY Turcios with approval to proceed with interventions. Upon arrival, patient resting in recliner chair, alert and highly engaged within immediate context. Spouse present at bedside for ongoing provision of extensive education.     Short-Term Goals:  SHORT TERM GOAL #1:  Goal 1: Patient will successfully engage in multi-modal communication attempts (static and dynamic) in 4/5 opportunities given min assist to ensure effective ability to communicate wants/needs.  INTERVENTIONS: Communication modalities  -Hand gestures/facial expressions: intact  -Mouthin% success, patient with decreased phrasing to result in decreased ability to accurately interpret oral articulators  -Writing: intact (boogie board)  -Cell phone, stylus: min assist required for navigation of internet pages    Instruction provided via alternate source of electrolarynx with highlighting completed re: battery insertion, button activation, and placement on the superior+anterior neck. SLP with direct application for example purposes, not able to achieve success with patient attempts (R sided), ?attribution d/t edema. Did not trial usage with oral adaptor at date, will incorporate in subsequent therapy sessions to increase 
[facility]  INPATIENT PHYSICAL THERAPY  EVALUATION  [facility] ICU 4D - 4D-11/011-A    Discharge Recommendations: Continue to assess pending progress (pt plans home with spouse and cont PT)  Equipment Recommendations: No  cont to monitor for needs            Time In: 1050  Time Out: 1106  Timed Code Treatment Minutes: 8 Minutes  Minutes: 16          Date: 3/17/2025  Patient Name: Masood Randhawa,  Gender:  male        MRN: [id number]  : 1950  (74 y.o.)      Referring Practitioner: LEVI Pat CNP  Diagnosis: Squamous cell carcinoma of glottis (HCC)  Additional Pertinent Hx: Initial HPI.  Per EMR. Masood Randhawa is a 74 y.o. 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  He underwent an emergency debulking of this disease in order to avoid an emergency tracheostomy.  Subsequent to this procedure he also underwent a gastrostomy tube placement for nutrition given his his limited ability to protect his airway from his transoral vertical Que laryngectomy.  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  Tracheoesophageal puncture fistula construction and TEP placement.  Right hemithyroidectomy on 3-14-25. pt extubated 3-15-25.     Restrictions/Precautions:  Restrictions/Precautions: Fall Risk       Other Position/Activity Restrictions: trach s/p laryngectomy           Subjective:  Chart Reviewed: Yes  Patient assessed for rehabilitation services?: Yes  Subjective: pleasant and cooperative, wife present and very supportive.    General:        Hearing: Within functional limits       Pain: no pain per pt    Vitals: Vitals not assessed per clinical judgement, see nursing flowsheet    Social/Functional 
Cathryn GOLDBERG spoke with patient regarding discharge instructions. Teaching done already on stoma care by ENT.   1300 RT will stop by room and give education on how to use nebulizer at home.    aware of discharge and setting up home health care.  1430 ENT follow up tomorrow, patient wife decline follow up with PCP at this time.   1527  Jessica Bailey called floor, she needs RT to document on patient educated on suctioning before equipment can be delivered. Their office closes at 1630  1530 Speech on floor to help with education on stoma care.   15:33 RT was contacted regarding needing documentation regarding patient and wife educated on suctioning.   1608 patient wife left floor to  meds in outpatient pharmacy and to stop by [employer] medical equipment company to  supplies before they close at 16:30.   1700 patient wife return to floor. Ready for discharge.   
[facility]  PHYSICAL THERAPY MISSED TREATMENT NOTE  [facility]Z ICU STEPDOWN TELEMETRY 4K    Date: 3/21/2025  Patient Name: Masood Randhawa        MRN: [id number]   : 1950  (74 y.o.)  Gender: male   Referring Practitioner: LEVI Pat CNP            REASON FOR MISSED TREATMENT:  Patient unable to participate.      RN approving session. X2 attempts DR in room evaluating pt. Will re-attempt as time allows or at next available date.       
Comprehensive Nutrition Assessment    Type and Reason for Visit:  Reassess, Nutrition support    Nutrition Recommendations/Plan:   Continue tube feeds as at home: Bolus 237 ml Jevity 1.5  (8oz container at home) 5 times per day (~ 8AM, 11AM, 2PM, 5PM, 8PM)  NPO strict except mouth swabs.   I reviewed home tube feeding regimen handout with pt/wife (3/19).  Pt to receive [facility] TF [facility].   Pt's wife mentions she was in process of calling Jacqueline Levine OP RD however pt ended up in the hospital. Wife requests that OP RD not call her but she will call & schedule the appointment for tube feeding followup. I alerted Susan . I gave pt/wife my name & number as well as Jacqueline Levine 's.contact info.      Malnutrition Assessment:  Malnutrition Status:  At risk for malnutrition (03/15/25 1018)    Context:  Chronic Illness     Findings of the 6 clinical characteristics of malnutrition:  Energy Intake:   (meeting 100% of nutrition needs via PEG enteral feedings PTA, PEG placed 2/14/25)  Weight Loss:   (significant losses over last 6 months; appears to have gained ~3# since 2/9/25)     Body Fat Loss:   (noted prior to enteral feeding start; now meeting 100% of estimated nutritional needs via TF) Orbital, Triceps, Fat Overlying Ribs, Buccal region   Muscle Mass Loss:   (noted prior to enteral feeding start; now meeting 100% of estimated nutritional needs via TF) Temples (temporalis), Clavicles (pectoralis & deltoids), Thigh (quadriceps), Calf (gastrocnemius), Hand (interosseous), Scapula (trapezius)  Fluid Accumulation:  No fluid accumulation     Strength:  Not Performed    Nutrition Assessment:      Pt. nutritionally improving AEB pt reports he is tolerating tube feeds at goal & denies N/V.   At risk for further nutrition compromise r/t admit with SCC of glottis, s/p 3/14/25:Total Bilateral Laryngectomy with radical neck dissection. Left Selective Neck Dissection. Tracheoesphogeal Puncture Fistula Construction and 
Comprehensive Nutrition Assessment    Type and Reason for Visit:  Reassess, Nutrition support    Nutrition Recommendations/Plan:   When able to restart tube feeds, restart as at home: Bolus 237 ml Jevity 1.5  (8oz container at home) 5 times per day (~ 8AM, 11AM, 2PM, 5PM, 8PM)  Flush with 50ml free water before & after each tube feeding bolus.  I reviewed home tube feeding regimen handout with pt & wife (3/19)  Susan  notified on (3/19) wife requested that OP RD not call and she will call & schedule appointment  for tube feeding followup.   Continue NPO except ice chips small sips of water. Also okay for oral swabs, if pt with frequent cough after sips, hold further liquids.  ( Per provider orders)     Malnutrition Assessment:  Malnutrition Status:  At risk for malnutrition (03/15/25 1018)    Context:  Chronic Illness     Findings of the 6 clinical characteristics of malnutrition:  Energy Intake:   (meeting 100% of nutrition needs via PEG enteral feedings PTA, PEG placed 2/14/25)  Weight Loss:   (significant losses over last 6 months; appears to have gained ~3# since 2/9/25)     Body Fat Loss:   (noted prior to enteral feeding start; now meeting 100% of estimated nutritional needs via TF) Orbital, Triceps, Fat Overlying Ribs, Buccal region   Muscle Mass Loss:   (noted prior to enteral feeding start; now meeting 100% of estimated nutritional needs via TF) Temples (temporalis), Clavicles (pectoralis & deltoids), Thigh (quadriceps), Calf (gastrocnemius), Hand (interosseous), Scapula (trapezius)  Fluid Accumulation:  No fluid accumulation     Strength:  Not Performed    Nutrition Assessment:     Pt. nutritionally improving AEB RN reports he is tolerating tube feeds at goal & pt  denies N/V.   At risk for further nutrition compromise r/t admit with SCC of glottis, s/p 3/14/25:Total Bilateral Laryngectomy with radical neck dissection. Left Selective Neck Dissection.suspected pneumonia, macrocytic anemia, 
[facility]  STRZ ICU STEPDOWN TELEMETRY 4K  Occupational Therapy  Daily Note    Discharge Recommendations: Home with assist as needed HH  Equipment Recommendations: No defer to next level of care       Time In: 1042  Time Out: 1105  Timed Code Treatment Minutes: 23 Minutes  Minutes: 23          Date: 3/20/2025  Patient Name: Masood Randhawa,   Gender: male      [facility]  MRN: [id number]  : 1950  (74 y.o.)  Referring Practitioner: Cherelle Pat, APRN - CNP  Diagnosis: Squamous cell carcinoma of glottis (HCC)  Additional Pertinent Hx: nitial HPI.  Per EMR. Masood Randhawa is a 74 y.o. 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  He underwent an emergency debulking of this disease in order to avoid an emergency tracheostomy.  Subsequent to this procedure he also underwent a gastrostomy tube placement for nutrition given his his limited ability to protect his airway from his transoral vertical Que laryngectomy.  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  Tracheoesophageal puncture fistula construction and TEP placement.  Right hemithyroidectomy on 3-14-25. pt extubated 3-15-25.    Restrictions/Precautions:  Restrictions/Precautions: Fall Risk, NPO  Position Activity Restriction  Other Position/Activity Restrictions: trach s/p laryngectomy; neck breather; PEG tube      Social/Functional History:  Lives With: Spouse  Type of Home: House  Home Layout: Two level (bedroom on second floor but has hospital bed to stay on first floor. bathroom on first floor.)  Home Access: Stairs to enter with rails (can use bear statue for UE support per pt and spouse)  Entrance Stairs - Number 
Department of Otolaryngology  Progress Note    Chief Complaint:   POD 5 Total bilateral laryngectomy with radical neck dissection, Left selective neck dissection, Right hemithyroidectomy. Tracheoesphogeal puncture fistula construction and TEP placement     SUBJECTIVE:  Patient denies any acute overnight events. He does endorse that he still has some back of neck pain that he rates as a 5 out of 10, stable from yesterday He denies shortness of breath, fevers, and chills. Right submandibular swelling now firm and tender to palpation His PETER is maintaining compression but there was some bloody/serous drainage on the Curlex packing with therapy which was changed as below. He is tolerating tube feeds per G-tube well.     A complete multi-organ review of systems was performed using a new patient questionnaire, and reviewed by me.  ENT:  negative except as noted in HPI  CONSTITUTIONAL:  negative except as noted in HPI  EYES:  negative except as noted in HPI  RESPIRATORY:  negative except as noted in HPI  CARDIOVASCULAR:  negative except as noted in HPI  GASTROINTESTINAL:  negative except as noted in HPI  GENITOURINARY:  negative except as noted in HPI  MUSCULOSKELETAL:  negative except as noted in HPI  SKIN:  negative except as noted in HPI  ENDOCRINE/METABOLIC: negative except as noted in HPI  HEMATOLOGIC/LYMPHATIC:  negative except as noted in HPI  ALLERGY/IMMUN: negative except as noted in HPI  NEUROLOGICAL:  negative except as noted in HPI  BEHAVIOR/PSYCH:  negative except as noted in HPI    OBJECTIVE      Physical  VITALS:  /80   Pulse (!) 107   Temp 97.5 °F (36.4 °C) (Axillary)   Resp 20   Ht 1.676 m (5' 6\")   Wt 55.8 kg (123 lb 0.3 oz)   SpO2 93%   BMI 19.86 kg/m²     Patient resting comfortably in bed without acute respiratory distress on arrival and moved to chair with therapy.  On humidified trach mask. Bilateral neck incisions well-approximated with skin glue intact.  Minimal bruising noted along R 
Department of Otolaryngology  Progress Note    Chief Complaint:   POD 9 Total bilateral laryngectomy with radical neck dissection, Left selective neck dissection, Right hemithyroidectomy. Tracheoesphogeal puncture fistula construction and TEP placement     SUBJECTIVE:  Patient denies any acute events and was afebrile overnight.  He has stable positional back of neck pain.  He denies shortness of breath, fevers, chills, hemoptysis, and increase in mucus.  The right submandibular swelling is still present with minimal ttp.  He is tolerating his tube feeds per G-tube well. Pt is trialing ice chips and feels that they are going well.     A complete multi-organ review of systems was performed using a new patient questionnaire, and reviewed by me.  ENT:  negative except as noted in HPI  CONSTITUTIONAL:  negative except as noted in HPI  EYES:  negative except as noted in HPI  RESPIRATORY:  negative except as noted in HPI  CARDIOVASCULAR:  negative except as noted in HPI  GASTROINTESTINAL:  negative except as noted in HPI  GENITOURINARY:  negative except as noted in HPI  MUSCULOSKELETAL:  negative except as noted in HPI  SKIN:  negative except as noted in HPI  ENDOCRINE/METABOLIC: negative except as noted in HPI  HEMATOLOGIC/LYMPHATIC:  negative except as noted in HPI  ALLERGY/IMMUN: negative except as noted in HPI  NEUROLOGICAL:  negative except as noted in HPI  BEHAVIOR/PSYCH:  negative except as noted in HPI    OBJECTIVE      Physical  VITALS:  BP (!) 126/58   Pulse 92   Temp 98.8 °F (37.1 °C) (Oral)   Resp 18   Ht 1.676 m (5' 6\")   Wt 56.9 kg (125 lb 7.1 oz)   SpO2 95%   BMI 20.25 kg/m²     Patient resting comfortably in bed without acute respiratory distress on arrival.  On humidified trach mask. Bilateral neck incisions well-approximated with skin glue intact. R submandibular swelling slightly firm and tender to touch, this decreased with parotid massage. Irrigation port secured to left neck with skin glue 
Department of Otolaryngology  Progress Note    Chief Complaint:  POD 10 Total bilateral laryngectomy with radical neck dissection, Left selective neck dissection, Right hemithyroidectomy. Tracheoesphogeal puncture fistula construction and TEP placement     SUBJECTIVE:   Patient denies any acute events and was afebrile overnight.  He has stable positional back of neck pain.  He denies shortness of breath, fevers, chills, hemoptysis, and increase in mucus. Ice chips and sips of water are going well. Tolerating Tfs per G tube well. R submandibular area swelling improved since yesterday. Pt is eager to go home.    A complete multi-organ review of systems was performed using a new patient questionnaire, and reviewed by me.  ENT:  negative except as noted in HPI  CONSTITUTIONAL:  negative except as noted in HPI  EYES:  negative except as noted in HPI  RESPIRATORY:  negative except as noted in HPI  CARDIOVASCULAR:  negative except as noted in HPI  GASTROINTESTINAL:  negative except as noted in HPI  GENITOURINARY:  negative except as noted in HPI  MUSCULOSKELETAL:  negative except as noted in HPI  SKIN:  negative except as noted in HPI  ENDOCRINE/METABOLIC: negative except as noted in HPI  HEMATOLOGIC/LYMPHATIC:  negative except as noted in HPI  ALLERGY/IMMUN: negative except as noted in HPI  NEUROLOGICAL:  negative except as noted in HPI  BEHAVIOR/PSYCH:  negative except as noted in HPI    OBJECTIVE      Physical  VITALS:  /62   Pulse 96   Temp 99.2 °F (37.3 °C) (Oral)   Resp 18   Ht 1.676 m (5' 6\")   Wt 56.9 kg (125 lb 7.1 oz)   SpO2 94%   BMI 20.25 kg/m²     Patient resting comfortably in bed without acute respiratory distress on arrival.  On humidified trach mask. Bilateral neck incisions well-approximated with skin glue intact. R submandibular swelling improved and no longer tender to touch, this decreased with parotid massage. Irrigation port secured to left neck with skin glue surrounding this, although 
Department of Otolaryngology  Progress Note    Chief Complaint:  POD 6 Total bilateral laryngectomy with radical neck dissection, Left selective neck dissection, Right hemithyroidectomy. Tracheoesphogeal puncture fistula construction and TEP placement     SUBJECTIVE: Patient denies any acute events and was afebrile overnight.  He does still endorse that he back of neck pain that is a 5 out of 10 but he lidocaine patch was placed.  He denies shortness of breath, fevers, chills, hemoptysis, and increase in mucus.  The right submandibular swelling is now more soft and no longer tender to palpation.  PETER drain hooked to suction.  He is tolerating his tube feeds per G-tube well.  At time of evaluation speech therapy was present and assisted in trach care.    A complete multi-organ review of systems was performed using a new patient questionnaire, and reviewed by me.  ENT:  negative except as noted in HPI  CONSTITUTIONAL:  negative except as noted in HPI  EYES:  negative except as noted in HPI  RESPIRATORY:  negative except as noted in HPI  CARDIOVASCULAR:  negative except as noted in HPI  GASTROINTESTINAL:  negative except as noted in HPI  GENITOURINARY:  negative except as noted in HPI  MUSCULOSKELETAL:  negative except as noted in HPI  SKIN:  negative except as noted in HPI  ENDOCRINE/METABOLIC: negative except as noted in HPI  HEMATOLOGIC/LYMPHATIC:  negative except as noted in HPI  ALLERGY/IMMUN: negative except as noted in HPI  NEUROLOGICAL:  negative except as noted in HPI  BEHAVIOR/PSYCH:  negative except as noted in HPI    OBJECTIVE      Physical  VITALS:  /66   Pulse 97   Temp 98.3 °F (36.8 °C) (Oral)   Resp 18   Ht 1.676 m (5' 6\")   Wt 56.2 kg (123 lb 14.4 oz)   SpO2 97%   BMI 20.00 kg/m²     Patient resting comfortably in bed without acute respiratory distress on arrival.  On humidified trach mask. Bilateral neck incisions well-approximated with skin glue intact.  Minimal bruising noted along R 
Department of Otolaryngology  Progress Note    Chief Complaint:  POD 7 Total bilateral laryngectomy with radical neck dissection, Left selective neck dissection, Right hemithyroidectomy. Tracheoesphogeal puncture fistula construction and TEP placement     SUBJECTIVE:  Patient denies any acute events and was afebrile overnight.  He has stable positional back of neck pain.  He denies shortness of breath, fevers, chills, hemoptysis, and increase in mucus.  The right submandibular swelling is still present with minimal ttp. PETER drain hooked to suction.  He is tolerating his tube feeds per G-tube well. AT time of evaluation wife was at bedside and we reviewed some aspects of laryngectomy care.    A complete multi-organ review of systems was performed using a new patient questionnaire, and reviewed by me.  ENT:  negative except as noted in HPI  CONSTITUTIONAL:  negative except as noted in HPI  EYES:  negative except as noted in HPI  RESPIRATORY:  negative except as noted in HPI  CARDIOVASCULAR:  negative except as noted in HPI  GASTROINTESTINAL:  negative except as noted in HPI  GENITOURINARY:  negative except as noted in HPI  MUSCULOSKELETAL:  negative except as noted in HPI  SKIN:  negative except as noted in HPI  ENDOCRINE/METABOLIC: negative except as noted in HPI  HEMATOLOGIC/LYMPHATIC:  negative except as noted in HPI  ALLERGY/IMMUN: negative except as noted in HPI  NEUROLOGICAL:  negative except as noted in HPI  BEHAVIOR/PSYCH:  negative except as noted in HPI    OBJECTIVE      Physical  VITALS:  /63   Pulse 100   Temp 98 °F (36.7 °C) (Oral)   Resp 16   Ht 1.676 m (5' 6\")   Wt 56.9 kg (125 lb 7.1 oz)   SpO2 93%   BMI 20.25 kg/m²     Patient resting comfortably in bed without acute respiratory distress on arrival.  On humidified trach mask. Bilateral neck incisions well-approximated with skin glue intact.  Minimal bruising noted along R anterior neck incision which is soft to palpation.  R submandibular 
Department of Otolaryngology  Progress Note    Chief Complaint:  POD 8 Total bilateral laryngectomy with radical neck dissection, Left selective neck dissection, Right hemithyroidectomy. Tracheoesphogeal puncture fistula construction and TEP placement     SUBJECTIVE:  Patient denies any acute events and was afebrile overnight.  He has stable positional back of neck pain.  He denies shortness of breath, fevers, chills, hemoptysis, and increase in mucus.  The right submandibular swelling is still present with minimal ttp. PETER drain hooked to suction.  He is tolerating his tube feeds per G-tube well, briefly stopped but restarted today. Pt is trialing ice chips and feels that they are going well.    A complete multi-organ review of systems was performed using a new patient questionnaire, and reviewed by me.  ENT:  negative except as noted in HPI  CONSTITUTIONAL:  negative except as noted in HPI  EYES:  negative except as noted in HPI  RESPIRATORY:  negative except as noted in HPI  CARDIOVASCULAR:  negative except as noted in HPI  GASTROINTESTINAL:  negative except as noted in HPI  GENITOURINARY:  negative except as noted in HPI  MUSCULOSKELETAL:  negative except as noted in HPI  SKIN:  negative except as noted in HPI  ENDOCRINE/METABOLIC: negative except as noted in HPI  HEMATOLOGIC/LYMPHATIC:  negative except as noted in HPI  ALLERGY/IMMUN: negative except as noted in HPI  NEUROLOGICAL:  negative except as noted in HPI  BEHAVIOR/PSYCH:  negative except as noted in HPI    OBJECTIVE      Physical  VITALS:  /68   Pulse 88   Temp 98.3 °F (36.8 °C) (Oral)   Resp 20   Ht 1.676 m (5' 6\")   Wt 56.9 kg (125 lb 7.1 oz)   SpO2 94%   BMI 20.25 kg/m²      Patient resting comfortably in bed without acute respiratory distress on arrival.  On humidified trach mask. Bilateral neck incisions well-approximated with skin glue intact. R submandibular swelling slightly firm and tender to touch, this decreased with parotid 
Handoff given to Primary nurse, pt is in bed with call light in reach and bed alarm on.    Page Baird  
[facility]  INPATIENT SPEECH THERAPY  STRZ ICU 4D  DAILY NOTE    Discharge Recommendations: Outpatient Therapy    SLP Individual Minutes  Time In:  1523  Time out:  1554  Minutes:  31  Timed Code Treatment Minutes:  0 minutes     Date: 3/24/2025  Patient Name: Masood Randhawa      CSN: [id number]   : 1950  (74 y.o.)  Gender: male   Referring Physician:  Cherelle Pat, APRN - CNP   Diagnosis: Squamous cell carcinoma of glottis  Precautions: PATIENT IS NECK BREATHER - S/P TOTAL LARYNGECTOMY  Current Diet: Strict NPO  Respiratory Status: Open stoma with laryngectomy tube in place;  No trach collar this date.    Swallowing Strategies:  Oral care q2h  Date of Last MBS/FEES: Not Applicable    Pain:  Patient denied pain this date.      Subjective:  SONY Silver called SLP team indicating that patient is possibly be discharged this date and pt's wife wished to talk to SLP prior to discharge.  Pt sitting in chair and wife present throughout session.     Short-Term Goals:  SHORT TERM GOAL #1:  Goal 1: Patient will successfully engage in multi-modal communication attempts (static and dynamic) in 4/5 opportunities given min assist to ensure effective ability to communicate wants/needs.  INTERVENTIONS: Patient mouthing words and pointing to communicate.  Electrolarynx was not in patient's reach upon entering the room.  Did not specifically demonstrate electrolarynx use.  Educated pt's wife that speech therapy would address communication as an outpatient, including use of TEP speech once cleared by physician.  Pt's wife verbalized understanding.      SHORT TERM GOAL #2:  Goal 2: Patient and family will exhibit an understanding of total laryngeactomy anatomy, ATOS supplies for home management, multi-modal communication, and the role of SLP interventions (e.g., TEP/voice prosthesis) to maximize education given extensive surgical interventions.  INTERVENTIONS: Inquired to pt's wife what education she had 
I notified Comfort MEHTA with Dr. Daley of Masood' recent hospital stay and discharge yesterday 3/10 1610 for severe RUL pneumonia/atelectasis, COPD, asthma exacerbation. Comfort will make Dr. SIMS aware. Thank you.  
[facility]   Pharmacy Pharmacokinetic Monitoring Service - Vancomycin    Indication: surgical site infection- 3/15 total b/l laryngectomy with radical neck dissection  Target Concentration: Goal AUC/JACQUES 400-600 mg*hr/L  Day of Therapy: 3  Additional Antimicrobials: Zosyn    Pertinent Laboratory Values:   Wt Readings from Last 1 Encounters:   03/21/25 56.9 kg (125 lb 7.1 oz)     Temp Readings from Last 1 Encounters:   03/21/25 98 °F (36.7 °C) (Oral)     Estimated Creatinine Clearance: 86 mL/min (A) (based on SCr of 0.6 mg/dL (L)).  Recent Labs     03/20/25  0454 03/21/25  0434   CREATININE 0.6* 0.6*   BUN 23 22   WBC 14.5* 12.6*       Pertinent Cultures:  Date Source Results   3/14/25 Urine culture NG   3/14/25 Culture, MRSA screen nares No MRSA isolated    3/5/25 Sputum culture  MRSA    3/5/25 MRSA by PCR nares Positive      Recent vancomycin administrations                     vancomycin (VANCOCIN) 1,000 mg in sodium chloride 0.9 % 250 mL IVPB ([id number]) (mg) 1,000 mg New Bag 03/21/25 0530     1,000 mg New Bag 03/20/25 1619     1,000 mg New Bag  0515    vancomycin (VANCOCIN) 1250 mg in sodium chloride 0.9% 250 mL IVPB (mg) 1,250 mg New Bag 03/19/25 1728                  Assessment:  Date/Time Current Dose Concentration Timing of Concentration AUC C trough,ss   3/21/2025 0434 1000 mg Q12H 15.1 ~ 12 hr 589 17.2   Note: Serum concentrations collected for AUC dosing may appear elevated if collected in close proximity to the dose administered, this is not necessarily an indication of toxicity    Plan:  Current dosing regimen is therapeutic- monitor sCr and I&O's closely due to high end of goal  Continue current dose of 1000 mg Q24H  Repeat vancomycin concentration in 48 hours   sCr in AM- has BMP order  Pharmacy will monitor renal function daily and adjust therapy as indicated.    Thank you for the consult,  Lou Strauss PharmD 3/21/2025 7:51 AM          
[facility]   Pharmacy Pharmacokinetic Monitoring Service - Vancomycin     Masood Randhawa is a 74 y.o. male starting on vancomycin therapy for surgical site . Pharmacy consulted by Dr. Daley for monitoring and adjustment.    Target Concentration: Goal AUC/JACQUES 400-600 mg*hr/L    Additional Antimicrobials: Zosyn    Pertinent Laboratory Values:   Wt Readings from Last 1 Encounters:   03/19/25 55.8 kg (123 lb 0.3 oz)     Temp Readings from Last 1 Encounters:   03/19/25 98.6 °F (37 °C) (Oral)     Estimated Creatinine Clearance: 84 mL/min (A) (based on SCr of 0.6 mg/dL (L)).  Recent Labs     03/17/25  0344 03/18/25  0359 03/19/25  0434   CREATININE 0.5* 0.6* 0.6*   BUN 21 25* 20   WBC 15.5* 14.6*  --      Pertinent Cultures:  Date Source Results   3/14 urine NGTD   MRSA Nasal Swab: Negative    Plan:  Dosing recommendations based on Bayesian software  Start vancomycin 1250 mg loading dose, followed by 1000 mg IV q12h  Anticipated AUC of 522 and trough concentration of 15.1 at steady state  Renal labs as indicated   Vancomycin concentration not ordered.  Pharmacy will continue to monitor patient and adjust therapy as indicated    Thank you for the consult,  Marques Dalton [facility]     
[facility]  STR ICU 4D  Occupational Therapy  Daily Note    Discharge Recommendations: 24 hour assistance or supervision and Home with Home Health OT pending continued progress towards goals  **If unable to meet goals, would benefit/recommend therapy stay    Equipment Recommendations: No defer to next level of care    Time In: 1415  Time Out: 1448  Timed Code Treatment Minutes: 33 Minutes  Minutes: 33          Date: 3/18/2025  Patient Name: Masood Randhawa,   Gender: male      [facility]  MRN: [id number]  : 1950  (74 y.o.)  Referring Practitioner: Cherelle Pat, APRN - CNP  Diagnosis: Squamous cell carcinoma of glottis (HCC)  Additional Pertinent Hx: nitial HPI.  Per EMR. Masood Randhawa is a 74 y.o. 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  He underwent an emergency debulking of this disease in order to avoid an emergency tracheostomy.  Subsequent to this procedure he also underwent a gastrostomy tube placement for nutrition given his his limited ability to protect his airway from his transoral vertical Que laryngectomy.  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  Tracheoesophageal puncture fistula construction and TEP placement.  Right hemithyroidectomy on 3-14-25. pt extubated 3-15-25.    Restrictions/Precautions:  Restrictions/Precautions: Fall Risk, NPO  Position Activity Restriction  Other Position/Activity Restrictions: trach s/p laryngectomy; neck breather; PEG tube     Social/Functional History:  Lives With: Spouse  Type of Home: House  Home Layout: Two level (bedroom on second floor but has hospital bed to stay on first floor. bathroom on first 
[facility]  STRZ ICU STEPDOWN TELEMETRY 4K  Occupational Therapy  Daily Note    Discharge Recommendations: Home with assist as needed and Patient would benefit from continued OT at discharge  Equipment Recommendations: No defer to next level of care      Time In: 1345  Time Out: 1408  Timed Code Treatment Minutes: 23 Minutes  Minutes: 23          Date: 3/23/2025  Patient Name: Masood Randhawa,   Gender: male      [facility]  MRN: [id number]  : 1950  (74 y.o.)  Referring Practitioner: Cherelle Pat, APRN - CNP  Diagnosis: Squamous cell carcinoma of glottis  Additional Pertinent Hx: nitial HPI.  Per EMR. Masood Randhawa is a 74 y.o. 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  He underwent an emergency debulking of this disease in order to avoid an emergency tracheostomy.  Subsequent to this procedure he also underwent a gastrostomy tube placement for nutrition given his his limited ability to protect his airway from his transoral vertical Que laryngectomy.  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  Tracheoesophageal puncture fistula construction and TEP placement.  Right hemithyroidectomy on 3-14-25. pt extubated 3-15-25.    Restrictions/Precautions:  Restrictions/Precautions: Fall Risk, NPO  Position Activity Restriction  Other Position/Activity Restrictions: trach s/p laryngectomy; neck breather; PEG tube     Social/Functional History:  Lives With: Spouse  Type of Home: House  Home Layout: Two level (bedroom on second floor but has hospital bed to stay on first floor. bathroom on first floor.)  Home Access: Stairs to enter with rails (can use bear statue for UE support 
[facility]  INPATIENT SPEECH THERAPY  STRZ ICU 4D  DAILY NOTE    Discharge Recommendations: Home Health versus Outpatient Therapy    SLP Individual Minutes  Time In: 0842  Time Out: 920  Minutes: 38  Timed Code Treatment Minutes: 0 Minutes       Date: 3/20/2025  Patient Name: Masood Randhawa      CSN: [id number]   : 1950  (74 y.o.)  Gender: male   Referring Physician:  Cherelle Pat, SOO - CNP   Diagnosis: Squamous cell carcinoma of glottis (HCC)  Precautions: PATIENT IS NECK BREATHER - S/P TOTAL LARYNGECTOMY  Current Diet: Strict NPO  Respiratory Status:  Humidification, Trach Mask   Swallowing Strategies:  Oral care q2h  Date of Last MBS/FEES: Not Applicable    Pain:  No pain reported.    Subjective:  SONY Turcios with approval to proceed with treatment.  Also spoke with ASHLYN Bettencourt with ENT prior to seeing the patient to get an update from recent imaging completed.  Upon arrival, patient lying in bed.  Assisted the patient in sitting upright for treatment.  ASHLYN Lockett was also present for a portion of this session to do some dressing changes on the neck.    Short-Term Goals:  SHORT TERM GOAL #1:  Goal 1: Patient will successfully engage in multi-modal communication attempts (static and dynamic) in 4/5 opportunities given min assist to ensure effective ability to communicate wants/needs.  INTERVENTIONS:  Patient with limited initiation to communicate this session due to being tired, but did agree to trial use of the electrolarynx with the oral adapter.  Demonstrated and explained to the patient how everything attaches to the electrolarynx.  Also educated the patient on how to adjust the volume and pitch on the electrolarynx as needed.  Patient then independently used his right hand to start/stop the electrolarynx to communicate the following:  *counting 1-5: x 3 trials with fair success overall, mod cues required to over exaggerate/articulate all sounds  *verbalizing first and 
[facility]  STRZ ICU STEPDOWN TELEMETRY 4K  Occupational Therapy  Daily Note    Discharge Recommendations: 24 hour assistance or supervision and Home with Home Health OT  Equipment Recommendations: No defer to next level of care    Time In: 915  Time Out: 954  Timed Code Treatment Minutes: 39 Minutes  Minutes: 39          Date: 3/24/2025  Patient Name: Masood Randhawa,   Gender: male      Room: [address] [place] [address]  MRN: [id number]  : 1950  (74 y.o.)  Referring Practitioner: Cherelle Pat, APRN - CNP  Diagnosis: Squamous cell carcinoma of glottis  Additional Pertinent Hx: nitial HPI.  Per EMR. Masood Randhawa is a 74 y.o. 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  He underwent an emergency debulking of this disease in order to avoid an emergency tracheostomy.  Subsequent to this procedure he also underwent a gastrostomy tube placement for nutrition given his his limited ability to protect his airway from his transoral vertical Que laryngectomy.  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  Tracheoesophageal puncture fistula construction and TEP placement.  Right hemithyroidectomy on 3-14-25. pt extubated 3-15-25.    Restrictions/Precautions:  Restrictions/Precautions: Fall Risk, NPO  Position Activity Restriction  Other Position/Activity Restrictions: trach s/p laryngectomy; neck breather; PEG tube    Social/Functional History:  Lives With: Spouse  Type of Home: House  Home Layout: Two level (bedroom on second floor but has hospital bed to stay on first floor. bathroom on first floor.)  Home Access: Stairs to enter with rails (can use bear statue for UE support per pt and 
[facility]  INPATIENT SPEECH THERAPY  STRZ ICU 4D  DAILY NOTE    Discharge Recommendations: Home Health versus Outpatient Therapy    SLP Individual Minutes  Time In: 1132  Time Out: 1239  Minutes: 67  Timed Code Treatment Minutes: 0 Minutes       Date: 3/21/2025  Patient Name: Masood Randhawa      CSN: [id number]   : 1950  (74 y.o.)  Gender: male   Referring Physician:  Cherelle Pat, SOO - CNP   Diagnosis: Squamous cell carcinoma of glottis  Precautions: PATIENT IS NECK BREATHER - S/P TOTAL LARYNGECTOMY  Current Diet: Strict NPO  Respiratory Status:  Humidification, Trach Mask   Swallowing Strategies:  Oral care q2h  Date of Last MBS/FEES: Not Applicable    Pain:  5/10 - Pain location: around whole neck    Subjective:  SONY Clement with approval to proceed with treatment.  Saw the patient in collaboration with Susan Albrecht, SLP this date. Patient's wife present and extensive education was completed to assist with laryngectomy care after discharge.      Short-Term Goals:  SHORT TERM GOAL #1:  Goal 1: Patient will successfully engage in multi-modal communication attempts (static and dynamic) in 4/5 opportunities given min assist to ensure effective ability to communicate wants/needs.  INTERVENTIONS: Patient reports he has been trying to use the electrolarynx to communicate at times, however, the electrolarynx was not in the patient's reach upon arrival. Patient mostly mouthing and gesturing to communicate this session, which at times was difficulty to figure out what he was saying.    SHORT TERM GOAL #2:  Goal 2: Patient and family will exhibit an understanding of total laryngeactomy anatomy, ATOS supplies for home management, multi-modal communication, and the role of SLP interventions (e.g., TEP/voice prosthesis) to maximize education given extensive surgical interventions.  INTERVENTIONS: This SLP removed the patient's larytube and a significant amount of thick mucus (that was blood 
PETER bulb attached to LIWS per Dr. Daley's orders.   
Patient and wife reviewed discharge orders and follow up plan. No questions or concerns. IV and telemetry monitor removed. Extra supplies given to wife. All belongings packed up and wife will transport home.   
Patient arrived to unit from OR via bed. Patient transferred to ICU bed and placed on continuous ICU bedside monitor. 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 Vitals obtained. See flowsheets. Patient's IV access includes 20G and 18G Left arm. Current infusions and rates of infusion include NS to gravity. Assessment completed by Nicole RN. Two nurse skin assessment completed by Nicole CARDOZA and Veronica RN. See flowsheets for assessment details. Policies and procedures of ICU unable to be explained to patient at this time. Family member(s)/representative(s) present at time of admission include none. Patient rights explained to family member(s)/representatives and patient, as able. Patient/patient's family member(s)/representative(s) N/A to have physician notified of their admission. All questions posed by patient's family member(s)/representative(s) and patient answered at this time.    
Patient oriented to Same Day department and admitted to Same Day Surgery room 16.   Patient verbalized approval for first name, last initial with physician name on unit whiteboard.     Plan of care reviewed with patient.   Patient room whiteboard filled out and discussed with patient and responsible adult.   Patient and responsible adult offered Same Day Welcome Packet to review.    Call light in reach.   Bed in lowest position, locked, with one bed rail up.   SCDs and warming blanket in place.  Appropriate arm bands on patient.   Bathroom offered.   All questions and concerns of patient addressed.        Meds to Beds:   Patient informed of [facility] Meds to Beds program during admission. Patient is agreeable to program.   Contact information for the pharmacy and the Meds to Beds program:   Name: Soraya   Relationship to patient:spouse/significant other   Phone number: [phone]          
Patient transferred to K Room 23 via bed. All belongings, including extra airway supplies sent with patient. VSS at time of transfer. Report given to SONY Silverio.  
Patients wife educated on sterile suctioning with teach-back method by this RT. Wife able to perform on own and tell me what next step was. Wife feels confident in sterile suctioning patient. Also educated on how to use nebulizer treatments and set up to paddy tube.  
Received report from Primary nurse.    Page Baird  
[facility]  STRZ ICU STEPDOWN TELEMETRY 4K  Occupational Therapy  Daily Note    Discharge Recommendations: Home with Home Health OT  Equipment Recommendations: No defer to next level of care       Time In: 0815  Time Out: 08  Timed Code Treatment Minutes: 39 Minutes  Minutes: 39          Date: 3/19/2025  Patient Name: Masood Randhawa,   Gender: male      Room: [address] [place] [address]  MRN: [id number]  : 1950  (74 y.o.)  Referring Practitioner: Cherelle Pat, APRN - CNP  Diagnosis: Squamous cell carcinoma of glottis (HCC)  Additional Pertinent Hx: nitial HPI.  Per EMR. Masood Randhawa is a 74 y.o. 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  He underwent an emergency debulking of this disease in order to avoid an emergency tracheostomy.  Subsequent to this procedure he also underwent a gastrostomy tube placement for nutrition given his his limited ability to protect his airway from his transoral vertical Que laryngectomy.  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  Tracheoesophageal puncture fistula construction and TEP placement.  Right hemithyroidectomy on 3-14-25. pt extubated 3-15-25.    Restrictions/Precautions:  Restrictions/Precautions: Fall Risk, NPO  Position Activity Restriction  Other Position/Activity Restrictions: trach s/p laryngectomy; neck breather; PEG tube      Social/Functional History:  Lives With: Spouse  Type of Home: House  Home Layout: Two level (bedroom on second floor but has hospital bed to stay on first floor. bathroom on first floor.)  Home Access: Stairs to enter with rails (can use bear statue for UE support per pt and spouse)  Entrance Stairs - Number of 
Spiritual Health History and Assessment/Progress Note  [facility]    (P) Initial Encounter,  ,  ,      Name: Masood Randhawa MRN: [id number]    Age: 74 y.o.     Sex: male   Language: English   Jew: Quaker   Squamous cell carcinoma of glottis (HCC)     Date: 3/19/2025            Total Time Calculated: (P) 16 min              Spiritual Assessment began in [facility] ICU STEPDOWN TELEMETRY 4K        Referral/Consult From: (P) Rounding   Encounter Overview/Reason: (P) Initial Encounter  Service Provided For: (P) Patient    Inge, Belief, Meaning:   Patient identifies as spiritual  Family/Friends No family/friends present      Importance and Influence:  Patient has spiritual/personal beliefs that influence decisions regarding their health  Family/Friends No family/friends present    Community:  Patient is connected with a spiritual community  Family/Friends No family/friends present    Assessment and Plan of Care:   \"Ildefonso\" , just came back from scans.  He is in good spirits.  He is unable to speak at this time but answered questions by shaking his head, yes and no.  He is Voodoo and he welcomed prayer.    Patient Interventions include: Affirmed coping skills/support systems  Family/Friends Interventions include: No family/friends present    Patient Plan of Care: No spiritual needs identified for follow-up  Family/Friends Plan of Care: No family/friends present    Electronically signed by FLETCHER Yeh on 3/19/2025 at 4:01 PM   
[facility]  OCCUPATIONAL THERAPY MISSED TREATMENT NOTE  [facility] ICU STEPDOWN TELEMETRY 4K  [id number]      Date: 3/21/2025  Patient Name: Masood Randhawa        CSN: [id number]   : 1950  (74 y.o.)  Gender: male   Referring Practitioner: Cherelle Pat APRN - CNP            REASON FOR MISSED TREATMENT:  OT attempted multiple times this date. At 930, pt was off floor for testing. At 1040, pt declined due to fatigue. Checked back at 12 as pt requested and pt was working with SLP. Will check back as able                
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 Patient underwent outpatient CT scan which showed laryngeal mass and necrotic cervical lymph nodes. He underwent needle aspiration of a cervical lymph node which was positive for metastatic squamous cell carcinoma. Patient endorsed worsening dyspnea, hoarseness, fatigue and dysphagia so he was advised to go to the ED. He underwent extensive ENT surgery.     Subjective (past 24 hours):     Patient was resting in the chair looks comfortable, his wife is at bedside  Communicating via sign language  He slept okay last night  Denies current shortness of breath or chest pain  Neck pain is better  His wife noting a lot of second secretions coming out of his trach site but no change in amount /color or consistency, no subjective fever or chills  He is tolerating tube feeds but complaining of diarrhea, we did discuss adding Imodium to see if it would help  ENT had not seen him yet today        Medications:    Infusion Medications    sodium chloride      Scheduled Medications    lidocaine  1 patch TransDERmal Daily    amLODIPine  5 mg Oral Daily    lisinopril  5 mg Oral Daily    ipratropium 0.5 mg-albuterol 2.5 mg  1 Dose Inhalation 4x Daily RT    pantoprazole  40 mg IntraVENous Daily    acetaminophen  1,000 mg Per G Tube 3 times per day    sodium chloride flush  5-40 mL IntraVENous 2 times per day    mupirocin   Topical TID    piperacillin-tazobactam  3,375 mg IntraVENous q8h    PRN Meds: loperamide, sodium chloride flush, sodium chloride, ondansetron **OR** ondansetron, oxyCODONE **OR** oxyCODONE, HYDROmorphone **OR** HYDROmorphone    Exam:  /80   
and coronal reformatted images were obtained. All CT scans at this facility use dose modulation, iterative reconstruction, and/or weight-based dosing when appropriate to reduce radiation dose to as low as reasonably achievable.  FINDINGS: The patient has undergone laryngectomy with radical neck dissection and right hemithyroidectomy. The patient is status post TEP placement There are surgical drains present There is increased density and the skin and subcutaneous soft tissues which may represent edema and/or cellulitis. There is no definite evidence of right parotitis. There is abnormal density in the right upper lobe of the lungs suggestive of inflammatory changes.. There is mild cervical spondylosis.     1. Extensive postoperative changes. 2. No definite evidence of parotitis. 3. Increased density in the skin and subcutaneous soft tissues which may represent edema and/or cellulitis. 4. Abnormal density in the right upper lobe of the lungs suggestive of inflammatory changes. **This report has been created using voice recognition software. It may contain minor errors which are inherent in voice recognition technology.** Electronically signed by Dr. Pushpa Schmidt    XR CHEST PORTABLE  Result Date: 3/18/2025  PROCEDURE: XR CHEST PORTABLE CLINICAL INFORMATION: hypoxemia. COMPARISON: Chest x-ray dated 3/4/2025. TECHNIQUE: AP upright view of the chest. FINDINGS: There is a tracheotomy tube in place. The heart size is normal.The mediastinum is not widened.  There  is right upper lobe infiltrate, improved since previous study dated 3/4/2025. The pulmonary vascularity is normal. There is thoracic spondylosis..     1. New tracheostomy tube in place. 2. Right upper lobe infiltrate improved since previous study dated 3/4/2025.. **This report has been created using voice recognition software. It may contain minor errors which are inherent in voice recognition technology.** Electronically signed by Dr. Barrow 
bloody fluid. #8 LGT tube within inner cannula and trach ties snug. Humidification to laryngectomy stoma in place    Data  CBC with Differential:    Lab Results   Component Value Date/Time    WBC 15.5 03/17/2025 03:44 AM    RBC 2.75 03/17/2025 03:44 AM    RBC 4.56 02/22/2022 02:50 PM    RBC 14 02/22/2022 02:50 PM    HGB 9.4 03/17/2025 03:44 AM    HCT 27.6 03/17/2025 03:44 AM     03/17/2025 03:44 AM    .4 03/17/2025 03:44 AM    MCH 34.2 03/17/2025 03:44 AM    MCHC 34.1 03/17/2025 03:44 AM    RDW 14.6 12/19/2024 01:26 PM    NRBC 0 03/14/2025 06:37 AM    LYMPHOPCT 25.8 12/19/2024 01:26 PM    LYMPHOPCT 25.0 02/22/2022 02:50 PM    MONOPCT 6.3 03/14/2025 06:37 AM    EOSPCT 0.7 12/19/2024 01:26 PM    BASOPCT 0.7 12/19/2024 01:26 PM    MONOSABS 1.5 03/14/2025 06:37 AM    LYMPHSABS 2.6 03/14/2025 06:37 AM    EOSABS 0.4 03/14/2025 06:37 AM    BASOSABS 0.1 03/14/2025 06:37 AM     BMP:    Lab Results   Component Value Date/Time     03/17/2025 03:44 AM    K 4.0 03/17/2025 03:44 AM     03/17/2025 03:44 AM    CO2 29 03/17/2025 03:44 AM    BUN 21 03/17/2025 03:44 AM    CREATININE 0.5 03/17/2025 03:44 AM    CALCIUM 8.5 03/17/2025 03:44 AM    LABGLOM > 90 03/17/2025 03:44 AM    LABGLOM >60 03/18/2024 01:54 PM    GLUCOSE 126 03/17/2025 03:44 AM    GLUCOSE 115 12/19/2024 01:26 PM     Inpatient Medications  Current Facility-Administered Medications: amLODIPine (NORVASC) tablet 5 mg, 5 mg, Oral, Daily  lisinopril (PRINIVIL;ZESTRIL) tablet 5 mg, 5 mg, Oral, Daily  ipratropium 0.5 mg-albuterol 2.5 mg (DUONEB) nebulizer solution 1 Dose, 1 Dose, Inhalation, 4x Daily RT  pantoprazole (PROTONIX) injection 40 mg, 40 mg, IntraVENous, Daily  acetaminophen (TYLENOL) tablet 1,000 mg, 1,000 mg, Per G Tube, 3 times per day  sodium chloride flush 0.9 % injection 5-40 mL, 5-40 mL, IntraVENous, 2 times per day  sodium chloride flush 0.9 % injection 5-40 mL, 5-40 mL, IntraVENous, PRN  0.9 % sodium chloride infusion, , 
   sodium chloride flush  5-40 mL IntraVENous 2 times per day    mupirocin   Topical TID    piperacillin-tazobactam  3,375 mg IntraVENous q8h    PRN Meds: magnesium sulfate, sodium phosphate 15 mmol in sodium chloride 0.9 % 250 mL IVPB, potassium chloride **OR** potassium alternative oral replacement **OR** potassium chloride, loperamide, sodium chloride flush, sodium chloride, ondansetron **OR** ondansetron, oxyCODONE **OR** oxyCODONE, HYDROmorphone **OR** HYDROmorphone    Exam:  BP (!) 116/57   Pulse 90   Temp 98.5 °F (36.9 °C) (Axillary)   Resp 18   Ht 1.676 m (5' 6\")   Wt 56.9 kg (125 lb 7.1 oz)   SpO2 94%   BMI 20.25 kg/m²   General: No distress, appears stated age.  Eyes:  PERRL. Conjunctivae/corneas clear.  HENT: Head normal appearing. Nares normal. Oral mucosa moist.  Hearing intact.   Neck: Supple, with full range of motion. Trachea midline.  No gross JVD appreciated.  Respiratory:  Normal effort. Rhonchi noted throughout.   Cardiovascular: Normal rate, regular rhythm with normal S1/S2 without murmurs.    No lower extremity edema.   Abdomen: Soft, non-tender, non-distended with normal bowel sounds.  Musculoskeletal: No joint swelling or tenderness. Normal tone. No abnormal movements. Maculopapular rash on back noted.   Skin: Warm and dry. No rashes or lesions.  Neurologic:  No focal sensory/motor deficits in the upper or lower extremities. Psychiatric: Alert and oriented, normal insight and thought content.   Capillary Refill: Brisk,< 3 seconds.  Peripheral Pulses: +2 palpable, equal bilaterally.       Labs/Radiology: See chart or assessment above.     Electronically signed by Genesis Edwards MD on 3/23/2025 at 2:16 PM  Case was discussed with Attending, Dr. Marques Russell.    
1 Dose, Inhalation, 4x Daily RT  pantoprazole (PROTONIX) injection 40 mg, 40 mg, IntraVENous, Daily  acetaminophen (TYLENOL) tablet 1,000 mg, 1,000 mg, Per G Tube, 3 times per day  sodium chloride flush 0.9 % injection 5-40 mL, 5-40 mL, IntraVENous, 2 times per day  sodium chloride flush 0.9 % injection 5-40 mL, 5-40 mL, IntraVENous, PRN  0.9 % sodium chloride infusion, , IntraVENous, PRN  ondansetron (ZOFRAN-ODT) disintegrating tablet 4 mg, 4 mg, Oral, Q8H PRN **OR** ondansetron (ZOFRAN) injection 4 mg, 4 mg, IntraVENous, Q6H PRN  mupirocin (BACTROBAN) 2 % ointment, , Topical, TID  oxyCODONE (ROXICODONE) immediate release tablet 5 mg, 5 mg, Per G Tube, Q4H PRN **OR** oxyCODONE (ROXICODONE) immediate release tablet 10 mg, 10 mg, Per G Tube, Q4H PRN  HYDROmorphone (DILAUDID) injection 0.25 mg, 0.25 mg, IntraVENous, Q3H PRN **OR** HYDROmorphone (DILAUDID) injection 0.5 mg, 0.5 mg, IntraVENous, Q3H PRN  piperacillin-tazobactam (ZOSYN) 3,375 mg in sodium chloride 0.9 % 50 mL IVPB (addEASE), 3,375 mg, IntraVENous, q8h    ASSESSMENT AND PLAN    POD 2 Total bilateral laryngectomy with radical neck dissection, Left selective neck dissection, Right hemithyroidectomy. Tracheoesphogeal puncture fistula construction and TEP placement  - Patient doing very well at this time  - Hemodynamically stable  - Trach tube removed and transitioned to #8 LGT.  Can change to silastic jason tube once available with Atos kit.  - PETER to bulb suction; difficulty maintaining compression due to leakage of air around drain insertion site - holding better this morning; lathered Bactroban and placed unfurled Kerlix with stockinet.  Recompress bulb as needed if not maintaining compression.  Not currently using irrigation port; will utilize if wound drainage becomes turbid.    - Keep incision sites clean and dry; wound glue at superficial surface.  Bactroban to stoma TID and around drain site as needed  - Strictly NPO.  Okay for mouth swab for oral 
cough  Cardiovascular: No chest pain, no palpitations  Abdomen: No nausea/vomiting  Psychiatric: No confusion    Review of systems otherwise negative    Scheduled Meds:   amLODIPine  5 mg Oral Daily    lisinopril  5 mg Oral Daily    ipratropium 0.5 mg-albuterol 2.5 mg  1 Dose Inhalation 4x Daily RT    pantoprazole  40 mg IntraVENous Daily    chlorhexidine  15 mL Mouth/Throat BID    acetaminophen  1,000 mg Per G Tube 3 times per day    sodium chloride flush  5-40 mL IntraVENous 2 times per day    mupirocin   Topical TID    piperacillin-tazobactam  3,375 mg IntraVENous q8h     Continuous Infusions:   sodium chloride         PHYSICAL EXAMINATION:  T:  98.6.  P:  118. RR:  22. B/P:  155/71.  FiO2:  28. O2 Sat:  99.  I/O:  net + 2.6L  Body mass index is 19.53 kg/m².   GCS:   15  General:   Acutely ill-appearing  HEENT:  normocephalic and atraumatic.  No scleral icterus. PERR  Neck: supple.  No Thyromegaly.  Tracheostomy in place  Lungs: clear to auscultation.  No retractions  Cardiac: RRR.  No JVD.  Abdomen: soft.  Nontender.  Extremities:  No clubbing, cyanosis, or edema x 4.    Vasculature: capillary refill < 3 seconds. Palpable dorsalis pedis pulses.  Skin:  warm and dry.  Psych:  Alert and oriented to self.  Able to fully assess orientation status 2/2 nonverbal   lymph:  No supraclavicular adenopathy.  Neurologic:  No focal deficit. No seizures.    Data: (All radiographs, tracings, PFTs, and imaging are personally viewed and interpreted unless otherwise noted).   Sodium 138, potassium 4.2, chloride 107, BUN 16, creatinine 0.6, glucose 149  WBC 21, hemoglobin 8.8, 26.4, platelet 46  Telemetry shows sinus tachycardia    Meets Continued ICU Level Care Criteria:   [x] Yes   [] No - Transfer Planned to listed location:  [] HOSPITALIST CONTACTED-       Case and plan discussed with Dr. Preciado    Electronically signed by Tre Herr MD    Addendum by Dr. Ilana MD:  Patient seen by me independently including key 
mls after each bolus feeding  Current TF Provides: 237 ml Jevity 1.5 bolus 5 times per day and water flushes~ 1778 kcals, 76 grams protein, 256 grams CHO, 25 grams fiber, 1401 mls water (901 TF, 500 flushes) in 1685 mls total fluid volume (1185 TF, 500 flushes) per 24 hours    Anthropometric Measures:  Height: 167.6 cm (5' 6\")  Ideal Body Weight (IBW): 142 lbs (65 kg)    Admission Body Weight: 54.9 kg (121 lb) (3/14/25 stated (recent admit 3/7/25: 121#4oz))  Current Body Weight: 54.9 kg (121 lb) (3/14/25 stated (recent admit 3/7/25: 121#4oz), current bedscale not accurate),     Current BMI (kg/m2): 19.5  Usual Body Weight:  (~170-180# 5 months ago with loss of 50#; per EMR: 5/12/15 182# 14oz, 1/29/21 163# 10oz, 12/19/23 149# 13oz, 8/13/24 135# 6oz, 12/9/24 129# 14oz, 2/9/24: 118# - standing scale)                          BMI Categories: Underweight (BMI less than 22) age over 65    Estimated Daily Nutrient Needs:  Energy Requirements Based On: Kcal/kg  Weight Used for Energy Requirements:  (55 kg)  Energy (kcal/day): ~ 6655-4060 kcals (30-35 kcals/kg/day)  Weight Used for Protein Requirements:  (55 kg)  Protein (g/day): ~ 72 grams or more (1.3 grams/kg/day)     Fluid (ml/day): ~ 7270-7396 mls (25-30 mls/kg/day) or per Provider    Nutrition Diagnosis:   Inadequate oral intake related to swallowing difficulty as evidenced by swallow study results, nutrition support - enteral nutrition, NPO or clear liquid status due to medical condition    Nutrition Interventions:   Food and/or Nutrient Delivery: Continue NPO, Start Tube Feeding  Nutrition Education/Counseling: Education/Counseling initiated  Coordination of Nutrition Care: Continue to monitor while inpatient, Speech Therapy       Goals:  Goals: Initiate nutrition support, Meet at least 75% of estimated needs, Tolerate nutrition support at goal rate, by next RD assessment  Type of Goal: New goal       Nutrition Monitoring and Evaluation:      Food/Nutrient Intake 
session to help the patient communicate easier.     Provided the patient with a folder of important information from [employer], including a medical bracelet that says that he is a neck breather - this was placed on the patient's right wrist.  Also reviewed that there is a card that he can place in his wallet that explains his anatomy changes and how to resuscitate as needed.  There was also a window cling that he can put on his front door at home so emergency personnel see that he needs supplemental oxygen via his stoma/neck.  Will plan to review this with the patient's family when present.    Will plan to complete the Patient Services Form and Prescription Form for [employer] during the next session to get the patient's account ready so he will be able to order laryngectomy supplies after he is discharged to home.     SHORT TERM GOAL #3:  Goal 3: Collaborate efforts with ENT to determine readiness for an instrumental evaluation via diagnostic purposes of the swallow mechanism.  INTERVENTIONS:  Per ENT, the plan is for the patient to have a Barium Esophagram completed on Friday, 3/21/25 to evaluate his swallowing to determine if he is safe to start PO.      Long-Term Goals:  No LTG's secondary to short estimated length of stay.     Functional Oral Intake Scale: Tube Dependent: 1.  No Oral Intake    EDUCATION:  Learner: Patient  Education:  Reviewed ST goals and Plan of Care, Reviewed recommendations for follow-up, Education Related to Potential Risks and Complications Due to Impairment/Illness/Injury, Education Related to Health Promotion and Wellness, and use of laryngectomy supplies  Evaluation of Education: Needs further instruction, Family not present, and Demonstrates understanding via head nod.    ASSESSMENT/PLAN:  Activity Tolerance:  Patient tolerance of  treatment: good. Highly cooperative.     Assessment/Plan: Patient progressing toward established goals.  Continues to require skilled care of licensed 
tablet 1,000 mg, 1,000 mg, Per G Tube, 3 times per day  sodium chloride flush 0.9 % injection 5-40 mL, 5-40 mL, IntraVENous, 2 times per day  sodium chloride flush 0.9 % injection 5-40 mL, 5-40 mL, IntraVENous, PRN  0.9 % sodium chloride infusion, , IntraVENous, PRN  ondansetron (ZOFRAN-ODT) disintegrating tablet 4 mg, 4 mg, Oral, Q8H PRN **OR** ondansetron (ZOFRAN) injection 4 mg, 4 mg, IntraVENous, Q6H PRN  mupirocin (BACTROBAN) 2 % ointment, , Topical, TID  oxyCODONE (ROXICODONE) immediate release tablet 5 mg, 5 mg, Per G Tube, Q4H PRN **OR** oxyCODONE (ROXICODONE) immediate release tablet 10 mg, 10 mg, Per G Tube, Q4H PRN  HYDROmorphone (DILAUDID) injection 0.25 mg, 0.25 mg, IntraVENous, Q3H PRN **OR** HYDROmorphone (DILAUDID) injection 0.5 mg, 0.5 mg, IntraVENous, Q3H PRN  piperacillin-tazobactam (ZOSYN) 3,375 mg in sodium chloride 0.9 % 50 mL IVPB (addEASE), 3,375 mg, IntraVENous, q8h    ASSESSMENT AND PLAN    POD 1 Total bilateral laryngectomy with radical neck dissection, Left selective neck dissection, Right hemithyroidectomy. Tracheoesphogeal puncture fistula construction and TEP placement  - Patient doing very well at this time  - Hemodynamically stable  - Off positive pressure ventilation this morning; transitioned successfully to cuff down on \"trach\" tube and humidification via trach mask. Will transition to laryngectomy tube tomorrow.   - PETER to bulb suction; difficulty maintaining compression due to leakage of air around drain insertion site; lathered Bactroban and placed unfurled Kerlix with stockinet.  Recompress bulb as needed if not maintaining compression.  Not currently using irrigation port; will utilize if wound drainage becomes turbid.    - Keep incision sites clean and dry; wound glue at superficial surface.  Bactroban to stoma TID and around drain site as needed  - Strictly NPO.  Okay for mouth swab for oral care/moistening.    - Tube feed and medications via G tube  - Remove Rousseau today  - 
0.5 mg, IntraVENous, Q3H PRN  piperacillin-tazobactam (ZOSYN) 3,375 mg in sodium chloride 0.9 % 50 mL IVPB (addEASE), 3,375 mg, IntraVENous, q8h    ASSESSMENT AND PLAN    POD 4 Total bilateral laryngectomy with radical neck dissection, Left selective neck dissection, Right hemithyroidectomy. Tracheoesphogeal puncture fistula construction and TEP placement     - Pt is doing well and can be transferred to stepdown today, case discussed with Stephy Boone.  - Today pt transitioned from #8 LGT to silastic jason tube.  In case of need for intubation, this would be removed and ETT passed through stoma.  - PETER to bulb suction; initial difficulty maintaining compression due to leakage of air around drain insertion site - seems to be improved; lathered Bactroban and placed unfurled Kerlix with stockinet.  Recompress bulb as needed if not maintaining compression.  Not currently using irrigation port; will utilize if wound drainage becomes turbid.    - New R sided submandibular swelling discussed with Dr. Daley, likely lymphatic drainage but pending Dr. Daley evaluation and final opinion.   - Keep incision sites clean and dry; wound glue at superficial surface.  Bactroban to stoma TID and around drain site as needed  - Strictly NPO.  Okay for mouth swab for oral care/moistening.  Will have swallow eval via barium esophogram per Dr. Daley on Friday.   - Tube feed and medications via G tube  - Encourage activity with up to chair and ambulation.   - SCDs for DVT prophylaxis  - Continue Zosyn for surgical prophylaxis  - Consults to speech, occupational and physical therapies  - Discussed case with speech- Ok to use HME without adhesive and electrolarynx on either side of neck. Friday after barium esophogram pending good results can trial TEP for short periods of time but should not try constantly until a couple of weeks post op to allow time for tissues to heal.  - Signage on door and above bed that patient is a 
non-focal.  Psychiatric: Alert and oriented, thought content appropriate, normal insight  Capillary Refill: Brisk,< 3 seconds   Peripheral Pulses: +2 palpable, equal bilaterally       Labs:   Recent Labs     03/19/25  1643 03/20/25 0454 03/21/25  0434   WBC 12.5* 14.5* 12.6*   HGB 9.4* 9.3* 9.0*   HCT 28.0* 27.9* 26.4*   * 564* 559*     Recent Labs     03/19/25  1759 03/20/25  0454 03/21/25  0434    135 138   K 4.0 3.9 4.0    100 104   CO2 28 25 25   BUN 24* 23 22   CREATININE 0.6* 0.6* 0.6*   CALCIUM 9.2 8.9 8.7*     Recent Labs     03/19/25 0434 03/19/25 1759   AST 16 23   ALT 18 19   BILITOT 0.4 0.3   ALKPHOS 75 86     No results for input(s): \"INR\" in the last 72 hours.  No results for input(s): \"CKTOTAL\", \"TROPONINI\" in the last 72 hours.    Urinalysis:      Lab Results   Component Value Date/Time    NITRU Positive 02/22/2022 02:50 PM       Radiology:  CT CHEST W CONTRAST   Final Result   1. Improved consolidation in the right upper lung and improved    centrilobular infiltrates in the right lower lung with residual airspace    disease remaining. Recommend continued follow-up for resolution to exclude    underlying lesion.   2. Stable background of lung emphysema.   3. Reason surgical changes of laryngectomy with tracheostomy tube in    place. Residual posterior soft tissue air underlying surgical drains.      This document has been electronically signed by: Bib Strong MD on    03/21/2025 01:55 AM      All CTs at this facility use dose modulation techniques and iterative    reconstructions, and/or weight-based dosing   when appropriate to reduce radiation to a low as reasonably achievable.      XR CHEST PORTABLE   Final Result   1. Right upper lobe infiltrate does not appear to be significantly    changed. Adjacent pleural thickening or pleural effusion does not appear    to be significantly changed.   2. New rounded opacities within the medial aspect of the right lower lung.    An 
T-Scale Score : 32.03    Following session, patient left in safe position with all fall risk precautions in place.              
study dated 3/4/2025..               **This report has been created using voice recognition software. It may contain   minor errors which are inherent in voice recognition technology.**      Electronically signed by Dr. Pushpa Schmidt          Diet: Diet NPO  ADULT TUBE FEEDING; PEG; Standard with Fiber; Bolus; 5 Times Daily; 237; Gravity; 50; Before and after each bolus    DVT prophylaxis: [] Lovenox                                 [x] SCDs                                 [] SQ Heparin                                 [] Encourage ambulation           [] Already on Anticoagulation     Disposition:    [] Home       [] TCU       [] Rehab       [] Psych       [] SNF       [] Long Term Care Facility       [] Other-    Code Status: Full Code    PT/OT Eval Status:         Electronically signed by SOO Harvey CNP on 3/20/2025 at 9:38 AM

## 2025-03-24 NOTE — PLAN OF CARE
Problem: Discharge Planning  Goal: Discharge to home or other facility with appropriate resources  3/16/2025 2240 by Deidre Sharp RN  Outcome: Progressing     Problem: Pain  Goal: Verbalizes/displays adequate comfort level or baseline comfort level  3/16/2025 2240 by Deidre Sharp RN  Outcome: Progressing     Problem: Safety - Adult  Goal: Free from fall injury  3/16/2025 2240 by Deidre Sharp RN  Outcome: Progressing     Problem: ABCDS Injury Assessment  Goal: Absence of physical injury  3/16/2025 2240 by Deidre Sharp RN  Outcome: Progressing     Problem: Skin/Tissue Integrity  Goal: Skin integrity remains intact  Description: 1.  Monitor for areas of redness and/or skin breakdown  2.  Assess vascular access sites hourly  3.  Every 4-6 hours minimum:  Change oxygen saturation probe site  4.  Every 4-6 hours:  If on nasal continuous positive airway pressure, respiratory therapy assess nares and determine need for appliance change or resting period  3/16/2025 2240 by Deidre Sharp RN  Outcome: Progressing     Problem: Respiratory - Adult  Goal: Achieves optimal ventilation and oxygenation  3/16/2025 2240 by Deidre Sharp RN  Outcome: Progressing     Problem: Respiratory - Adult  Goal: Clear lung sounds  3/16/2025 2240 by Deidre Sharp RN  Outcome: Progressing     Problem: Nutrition Deficit:  Goal: Optimize nutritional status  3/16/2025 2240 by Deidre Sharp RN  Outcome: Progressing     
  Problem: Discharge Planning  Goal: Discharge to home or other facility with appropriate resources  3/17/2025 0910 by Juanita Coker RN  Outcome: Progressing  3/16/2025 2240 by Deidre Sharp RN  Outcome: Progressing  3/16/2025 2240 by Deidre Sharp RN  Outcome: Progressing     Problem: Pain  Goal: Verbalizes/displays adequate comfort level or baseline comfort level  3/17/2025 0910 by Juanita Coker RN  Outcome: Progressing  3/16/2025 2240 by Deidre Sharp RN  Outcome: Progressing  3/16/2025 2240 by Deidre Sharp RN  Outcome: Progressing     Problem: ABCDS Injury Assessment  Goal: Absence of physical injury  3/17/2025 0910 by Juanita Coker RN  Outcome: Progressing  3/16/2025 2240 by Deidre Sharp RN  Outcome: Progressing  3/16/2025 2240 by Deidre Sharp RN  Outcome: Progressing     Problem: Skin/Tissue Integrity  Goal: Skin integrity remains intact  Description: 1.  Monitor for areas of redness and/or skin breakdown  2.  Assess vascular access sites hourly  3.  Every 4-6 hours minimum:  Change oxygen saturation probe site  4.  Every 4-6 hours:  If on nasal continuous positive airway pressure, respiratory therapy assess nares and determine need for appliance change or resting period  3/17/2025 0910 by Juanita Coker RN  Outcome: Progressing  3/16/2025 2240 by Deidre Sharp RN  Outcome: Progressing  3/16/2025 2240 by Deidre Sharp RN  Outcome: Progressing     Problem: Respiratory - Adult  Goal: Achieves optimal ventilation and oxygenation  3/17/2025 0910 by Juanita Coker RN  Outcome: Progressing  3/16/2025 2240 by Deidre Sharp RN  Outcome: Progressing  3/16/2025 2240 by Deidre Sharp RN  Outcome: Progressing  Goal: Clear lung sounds  3/17/2025 0910 by Juanita Coker RN  Outcome: Progressing  3/16/2025 2240 by Deidre Sharp RN  Outcome: Progressing  3/16/2025 2240 by Deidre Sharp RN  Outcome: Progressing     Problem: 
  Problem: Discharge Planning  Goal: Discharge to home or other facility with appropriate resources  3/18/2025 1305 by Irma Holland RN  Outcome: Progressing  3/18/2025 0817 by Irma Holland RN  Outcome: Progressing  Flowsheets (Taken 3/18/2025 0800)  Discharge to home or other facility with appropriate resources:   Identify barriers to discharge with patient and caregiver   Refer to discharge planning if patient needs post-hospital services based on physician order or complex needs related to functional status, cognitive ability or social support system     Problem: Pain  Goal: Verbalizes/displays adequate comfort level or baseline comfort level  3/18/2025 1305 by Irma Holland RN  Outcome: Progressing  3/18/2025 0817 by Irma Holland RN  Outcome: Progressing  Flowsheets (Taken 3/18/2025 0800)  Verbalizes/displays adequate comfort level or baseline comfort level:   Encourage patient to monitor pain and request assistance   Assess pain using appropriate pain scale   Administer analgesics based on type and severity of pain and evaluate response     Problem: Safety - Adult  Goal: Free from fall injury  3/18/2025 1305 by Irma Holland RN  Outcome: Progressing  3/18/2025 0817 by Irma Holland RN  Outcome: Progressing  Flowsheets (Taken 3/18/2025 0800)  Free From Fall Injury:   Instruct family/caregiver on patient safety   Based on caregiver fall risk screen, instruct family/caregiver to ask for assistance with transferring infant if caregiver noted to have fall risk factors     Problem: ABCDS Injury Assessment  Goal: Absence of physical injury  3/18/2025 1305 by Irma Holland RN  Outcome: Progressing  3/18/2025 0817 by Irma Holland RN  Outcome: Progressing  Flowsheets (Taken 3/18/2025 0800)  Absence of Physical Injury: Implement safety measures based on patient assessment     Problem: Skin/Tissue Integrity  Goal: Skin integrity remains intact  Description: 1.  Monitor for areas of redness and/or 
  Problem: Discharge Planning  Goal: Discharge to home or other facility with appropriate resources  3/19/2025 0242 by Faith Chamorro RN  Outcome: Progressing  Flowsheets (Taken 3/19/2025 0242)  Discharge to home or other facility with appropriate resources: Identify barriers to discharge with patient and caregiver  3/18/2025 1305 by Irma Holland RN  Outcome: Progressing     Problem: Pain  Goal: Verbalizes/displays adequate comfort level or baseline comfort level  3/19/2025 0242 by Faith Chamorro RN  Outcome: Progressing  Flowsheets (Taken 3/19/2025 0242)  Verbalizes/displays adequate comfort level or baseline comfort level:   Encourage patient to monitor pain and request assistance   Assess pain using appropriate pain scale  3/18/2025 1305 by Irma Holland RN  Outcome: Progressing     Problem: Safety - Adult  Goal: Free from fall injury  3/19/2025 0242 by Faith Chamorro RN  Outcome: Progressing  Flowsheets (Taken 3/19/2025 0242)  Free From Fall Injury: Instruct family/caregiver on patient safety  3/18/2025 1305 by Irma Holland RN  Outcome: Progressing     Problem: ABCDS Injury Assessment  Goal: Absence of physical injury  3/19/2025 0242 by Faith Chamorro RN  Outcome: Progressing  Flowsheets (Taken 3/19/2025 0242)  Absence of Physical Injury: Implement safety measures based on patient assessment  3/18/2025 1305 by Irma Holland RN  Outcome: Progressing     Problem: Skin/Tissue Integrity  Goal: Skin integrity remains intact  Description: 1.  Monitor for areas of redness and/or skin breakdown  2.  Assess vascular access sites hourly  3.  Every 4-6 hours minimum:  Change oxygen saturation probe site  4.  Every 4-6 hours:  If on nasal continuous positive airway pressure, respiratory therapy assess nares and determine need for appliance change or resting period  3/19/2025 0242 by Faith Chamorro RN  Outcome: Progressing  Flowsheets (Taken 3/19/2025 0242)  Skin Integrity Remains 
  Problem: Discharge Planning  Goal: Discharge to home or other facility with appropriate resources  Outcome: Progressing     Problem: Pain  Goal: Verbalizes/displays adequate comfort level or baseline comfort level  Outcome: Progressing     Problem: Safety - Adult  Goal: Free from fall injury  Outcome: Progressing     Problem: ABCDS Injury Assessment  Goal: Absence of physical injury  Outcome: Progressing     Problem: Skin/Tissue Integrity  Goal: Skin integrity remains intact  Description: 1.  Monitor for areas of redness and/or skin breakdown  2.  Assess vascular access sites hourly  3.  Every 4-6 hours minimum:  Change oxygen saturation probe site  4.  Every 4-6 hours:  If on nasal continuous positive airway pressure, respiratory therapy assess nares and determine need for appliance change or resting period  Outcome: Progressing     Problem: Respiratory - Adult  Goal: Achieves optimal ventilation and oxygenation  Outcome: Progressing  Goal: Clear lung sounds  Outcome: Progressing     Problem: Nutrition Deficit:  Goal: Optimize nutritional status  Outcome: Progressing     
  Problem: Discharge Planning  Goal: Discharge to home or other facility with appropriate resources  Outcome: Progressing  Flowsheets (Taken 3/20/2025 0008)  Discharge to home or other facility with appropriate resources: Identify barriers to discharge with patient and caregiver     Problem: Pain  Goal: Verbalizes/displays adequate comfort level or baseline comfort level  Outcome: Progressing  Flowsheets (Taken 3/20/2025 0008)  Verbalizes/displays adequate comfort level or baseline comfort level:   Encourage patient to monitor pain and request assistance   Administer analgesics based on type and severity of pain and evaluate response   Assess pain using appropriate pain scale   Implement non-pharmacological measures as appropriate and evaluate response     Problem: Safety - Adult  Goal: Free from fall injury  Outcome: Progressing  Flowsheets (Taken 3/20/2025 0008)  Free From Fall Injury: Instruct family/caregiver on patient safety     Problem: ABCDS Injury Assessment  Goal: Absence of physical injury  Outcome: Progressing  Flowsheets (Taken 3/20/2025 0008)  Absence of Physical Injury: Implement safety measures based on patient assessment     Problem: Skin/Tissue Integrity  Goal: Skin integrity remains intact  Description: 1.  Monitor for areas of redness and/or skin breakdown  2.  Assess vascular access sites hourly  3.  Every 4-6 hours minimum:  Change oxygen saturation probe site  4.  Every 4-6 hours:  If on nasal continuous positive airway pressure, respiratory therapy assess nares and determine need for appliance change or resting period  Outcome: Progressing  Flowsheets (Taken 3/20/2025 0008)  Skin Integrity Remains Intact: Monitor for areas of redness and/or skin breakdown     Problem: Respiratory - Adult  Goal: Achieves optimal ventilation and oxygenation  3/20/2025 0008 by Faith Chamorro, RN  Outcome: Progressing  Flowsheets (Taken 3/20/2025 0008)  Achieves optimal ventilation and oxygenation:   Assess 
  Problem: Discharge Planning  Goal: Discharge to home or other facility with appropriate resources  Outcome: Progressing  Flowsheets (Taken 3/21/2025 0114)  Discharge to home or other facility with appropriate resources: Identify barriers to discharge with patient and caregiver     Problem: Pain  Goal: Verbalizes/displays adequate comfort level or baseline comfort level  Outcome: Progressing  Flowsheets (Taken 3/21/2025 0114)  Verbalizes/displays adequate comfort level or baseline comfort level:   Encourage patient to monitor pain and request assistance   Assess pain using appropriate pain scale   Administer analgesics based on type and severity of pain and evaluate response     Problem: Safety - Adult  Goal: Free from fall injury  Outcome: Progressing  Flowsheets (Taken 3/21/2025 0114)  Free From Fall Injury: Instruct family/caregiver on patient safety     Problem: ABCDS Injury Assessment  Goal: Absence of physical injury  Outcome: Progressing  Flowsheets (Taken 3/21/2025 0114)  Absence of Physical Injury: Implement safety measures based on patient assessment     Problem: Skin/Tissue Integrity  Goal: Skin integrity remains intact  Description: 1.  Monitor for areas of redness and/or skin breakdown  2.  Assess vascular access sites hourly  3.  Every 4-6 hours minimum:  Change oxygen saturation probe site  4.  Every 4-6 hours:  If on nasal continuous positive airway pressure, respiratory therapy assess nares and determine need for appliance change or resting period  Outcome: Progressing  Flowsheets (Taken 3/21/2025 0114)  Skin Integrity Remains Intact:   Monitor for areas of redness and/or skin breakdown   Assess vascular access sites hourly     Problem: Respiratory - Adult  Goal: Achieves optimal ventilation and oxygenation  Outcome: Progressing  Flowsheets (Taken 3/21/2025 0114)  Achieves optimal ventilation and oxygenation:   Assess for changes in respiratory status   Assess for changes in mentation and 
  Problem: Discharge Planning  Goal: Discharge to home or other facility with appropriate resources  Outcome: Progressing  Flowsheets (Taken 3/22/2025 0800 by Deidre Lindo, RN)  Discharge to home or other facility with appropriate resources:   Identify barriers to discharge with patient and caregiver   Identify discharge learning needs (meds, wound care, etc)     Problem: Pain  Goal: Verbalizes/displays adequate comfort level or baseline comfort level  Outcome: Progressing  Flowsheets (Taken 3/21/2025 1132 by Urbano Arciniega, RN)  Verbalizes/displays adequate comfort level or baseline comfort level:   Encourage patient to monitor pain and request assistance   Administer analgesics based on type and severity of pain and evaluate response   Consider cultural and social influences on pain and pain management   Assess pain using appropriate pain scale   Implement non-pharmacological measures as appropriate and evaluate response   Notify Licensed Independent Practitioner if interventions unsuccessful or patient reports new pain     Problem: Safety - Adult  Goal: Free from fall injury  Outcome: Progressing  Flowsheets (Taken 3/21/2025 1132 by Urbano Arciniega, RN)  Free From Fall Injury: Instruct family/caregiver on patient safety     Problem: ABCDS Injury Assessment  Goal: Absence of physical injury  Outcome: Progressing  Flowsheets (Taken 3/21/2025 1132 by Urbano Arciniega, RN)  Absence of Physical Injury: Implement safety measures based on patient assessment     Problem: Skin/Tissue Integrity  Goal: Skin integrity remains intact  Description: 1.  Monitor for areas of redness and/or skin breakdown  2.  Assess vascular access sites hourly  3.  Every 4-6 hours minimum:  Change oxygen saturation probe site  4.  Every 4-6 hours:  If on nasal continuous positive airway pressure, respiratory therapy assess nares and determine need for appliance change or resting period  Outcome: Progressing  Flowsheets (Taken 
  Problem: Respiratory - Adult  Goal: Achieves optimal ventilation and oxygenation  3/19/2025 0536 by Lyssa Zeng, RCP  Outcome: Progressing   Remains on High Flow to Tach mask for heat and humidity tolerating well.     Patient mutually agreed on goals.    
  Problem: Respiratory - Adult  Goal: Achieves optimal ventilation and oxygenation  3/19/2025 0758 by Stephy Glasgow RCP  Outcome: Progressing  3/19/2025 0536 by Lyssa Zeng RCP  Outcome: Progressing  3/19/2025 0242 by Faith Chamorro RN  Outcome: Progressing  Flowsheets  Taken 3/19/2025 0242 by Faith Chamorro RN  Achieves optimal ventilation and oxygenation:   Assess for changes in respiratory status   Assess for changes in mentation and behavior  Taken 3/18/2025 1309 by Cordell Paz RCP  Achieves optimal ventilation and oxygenation: Assess for changes in respiratory status  Goal: Clear lung sounds  3/19/2025 0758 by Stephy Glasgow RCP  Outcome: Progressing  3/19/2025 0242 by Faith Chamorro RN  Outcome: Progressing   Patient lung sounds are considered normal for their current lung condition. No signs of distress noted. Current treatment regimen appropriate   
  Problem: Respiratory - Adult  Goal: Achieves optimal ventilation and oxygenation  3/21/2025 2027 by Suze Reynolds RCP  Outcome: Progressing     Problem: Respiratory - Adult  Goal: Clear lung sounds  3/21/2025 2027 by Suze Reynolds RCP  Outcome: Progressing   Patient mutually agrees upon goals.   
  Problem: Respiratory - Adult  Goal: Achieves optimal ventilation and oxygenation  3/23/2025 0839 by Urbano Dominguez RCP  Outcome: Progressing     Problem: Respiratory - Adult  Goal: Clear lung sounds  3/23/2025 0839 by Urbano Dominguez, SEMAJ  Outcome: Progressing   Patient mutually agreed on goals.    
  Problem: Respiratory - Adult  Goal: Achieves optimal ventilation and oxygenation  Outcome: Progressing     Problem: Respiratory - Adult  Goal: Clear lung sounds  Outcome: Progressing     
  Problem: Respiratory - Adult  Goal: Achieves optimal ventilation and oxygenation  Outcome: Progressing     Problem: Respiratory - Adult  Goal: Clear lung sounds  Outcome: Progressing     Patient mutually agreed on goals.  
  Problem: Respiratory - Adult  Goal: Achieves optimal ventilation and oxygenation  Outcome: Progressing     Problem: Respiratory - Adult  Goal: Normal spontaneous ventilation  Outcome: Progressing                                                Patient Weaning Progress    The patient's vent settings was able to be weaned this shift.      Ventilator settings that were weaned              [x] Mode   [x] Pressure support weaned   [x] Fio2 weaned   [] Peep weaned      Spontaneous weaning trial  was attempted.     The patient was able to tolerate SBT.   RSBI  was 33.1 with EtCO2 of 35 and SpO2 of 100 on 30% FiO2.  Spontanteous VT was 423 and RR 14 breaths/min.    Evac tube was not  hooked up with continuous low suction(20-30mmHg)      Cuff was not  deflated to determine cuff leak. A leak  was not detected.   Patient mutually agreed on goals.    
  Problem: Respiratory - Adult  Goal: Clear lung sounds  3/16/2025 1349 by Aspen Byrnes, RCP  Outcome: Progressing  Note: Txs to help improve lung aeration. Patient mutually agreed on goals.       
  Problem: Respiratory - Adult  Goal: Clear lung sounds  3/17/2025 2108 by Suze Reynolds RCP  Outcome: Progressing     Problem: Respiratory - Adult  Goal: Achieves optimal ventilation and oxygenation  3/17/2025 2108 by Suze Reynolds RCP  Outcome: Progressing     Problem: Respiratory - Adult  Goal: Normal spontaneous ventilation  Outcome: Completed   Patient mutually agrees upon goals.   
  Problem: Respiratory - Adult  Goal: Clear lung sounds  3/20/2025 0723 by Madhuri Addison, RCP  Outcome: Progressing     
  Problem: Respiratory - Adult  Goal: Clear lung sounds  3/21/2025 0906 by David Carrasquillo RCP  Outcome: Progressing     
  Problem: Respiratory - Adult  Goal: Clear lung sounds  3/24/2025 0817 by Courtney Plascencia, P  Note: Patient receiving breathing treatments to maintain and manage respiratory status. Will be continued as ordered to improve aeration.      
  Problem: Respiratory - Adult  Goal: Clear lung sounds  Outcome: Progressing     Problem: Respiratory - Adult  Goal: Achieves optimal ventilation and oxygenation  Outcome: Progressing     Patient mutually agrees upon goals.   
  Problem: Respiratory - Adult  Goal: Clear lung sounds  Outcome: Progressing  Note: Txs to help improve lung aeration. Patient mutually agreed on goals.         
Hospitalist will assume care  POD 4 Total bilateral laryngectomy with radical neck dissection, Left selective neck dissection, Right hemithyroidectomy. Tracheoesphogeal puncture fistula construction and TEP placement, continue Zosyn.   NPO, speech eval.  
Internal medicine resident plan of care:    After progress note submitted, DME arrived, thus patient able to be discharged on 3/24/2025.  Please see discharge summary on this date.      Electronically signed by DO ROOPA May PGY-2 on 3/24/2025.    
breakdown  2.  Assess vascular access sites hourly  3.  Every 4-6 hours minimum:  Change oxygen saturation probe site  4.  Every 4-6 hours:  If on nasal continuous positive airway pressure, respiratory therapy assess nares and determine need for appliance change or resting period  3/21/2025 1132 by Urbano Arciniega, RN  Outcome: Progressing  Flowsheets (Taken 3/21/2025 1132)  Skin Integrity Remains Intact: Monitor for areas of redness and/or skin breakdown     Problem: Respiratory - Adult  Goal: Achieves optimal ventilation and oxygenation  3/21/2025 1132 by Urbano Arciniega, RN  Outcome: Progressing  Flowsheets (Taken 3/21/2025 1132)  Achieves optimal ventilation and oxygenation:   Assess for changes in respiratory status   Oxygen supplementation based on oxygen saturation or arterial blood gases   Assess the need for suctioning and aspirate as needed   Assess for changes in mentation and behavior   Assess and instruct to report shortness of breath or any respiratory difficulty   Position to facilitate oxygenation and minimize respiratory effort   Encourage broncho-pulmonary hygiene including cough, deep breathe, incentive spirometry   Respiratory therapy support as indicated     Problem: Nutrition Deficit:  Goal: Optimize nutritional status  3/21/2025 1132 by Urbano Arciniega, RN  Outcome: Progressing  Flowsheets (Taken 3/21/2025 1132)  Nutrient intake appropriate for improving, restoring, or maintaining nutritional needs:   Assess nutritional status and recommend course of action   Monitor oral intake, labs, and treatment plans   Recommend, monitor, and adjust tube feedings and TPN/PPN based on assessed needs   Provide specific nutrition education to patient or family as appropriate   Care plan reviewed with patient.  Patient verbalized understanding of the plan of care and contribute to goal setting.     
minimum:  Change oxygen saturation probe site  4.  Every 4-6 hours:  If on nasal continuous positive airway pressure, respiratory therapy assess nares and determine need for appliance change or resting period  Outcome: Progressing  Flowsheets (Taken 3/21/2025 1132 by Urbano Arciniega, RN)  Skin Integrity Remains Intact: Monitor for areas of redness and/or skin breakdown     Problem: Respiratory - Adult  Goal: Achieves optimal ventilation and oxygenation  3/22/2025 0615 by Pita Sididqi RN  Outcome: Progressing  Flowsheets (Taken 3/21/2025 1132 by Urbano Arciniega, RN)  Achieves optimal ventilation and oxygenation:   Assess for changes in respiratory status   Oxygen supplementation based on oxygen saturation or arterial blood gases   Assess the need for suctioning and aspirate as needed   Assess for changes in mentation and behavior   Assess and instruct to report shortness of breath or any respiratory difficulty   Position to facilitate oxygenation and minimize respiratory effort   Encourage broncho-pulmonary hygiene including cough, deep breathe, incentive spirometry   Respiratory therapy support as indicated  3/21/2025 2027 by Suze Reynolds RCP  Outcome: Progressing  Goal: Clear lung sounds  3/22/2025 0615 by Pita Siddiqi RN  Outcome: Progressing  3/21/2025 2027 by Suze Reynolds RCP  Outcome: Progressing     Problem: Nutrition Deficit:  Goal: Optimize nutritional status  Outcome: Progressing  Flowsheets (Taken 3/21/2025 1132 by Urbano Arciniega, RN)  Nutrient intake appropriate for improving, restoring, or maintaining nutritional needs:   Assess nutritional status and recommend course of action   Monitor oral intake, labs, and treatment plans   Recommend, monitor, and adjust tube feedings and TPN/PPN based on assessed needs   Provide specific nutrition education to patient or family as appropriate

## 2025-03-25 ENCOUNTER — OFFICE VISIT (OUTPATIENT)
Dept: ENT CLINIC | Age: 75
End: 2025-03-25

## 2025-03-25 ENCOUNTER — CARE COORDINATION (OUTPATIENT)
Dept: CARE COORDINATION | Age: 75
End: 2025-03-25

## 2025-03-25 VITALS
BODY MASS INDEX: 19.22 KG/M2 | HEART RATE: 97 BPM | DIASTOLIC BLOOD PRESSURE: 58 MMHG | OXYGEN SATURATION: 100 % | TEMPERATURE: 98.8 F | HEIGHT: 66 IN | RESPIRATION RATE: 20 BRPM | SYSTOLIC BLOOD PRESSURE: 108 MMHG | WEIGHT: 119.6 LBS

## 2025-03-25 DIAGNOSIS — C32.0 SQUAMOUS CELL CARCINOMA OF GLOTTIS: ICD-10-CM

## 2025-03-25 DIAGNOSIS — Z98.890 STATUS POST RADICAL DISSECTION OF NECK: ICD-10-CM

## 2025-03-25 DIAGNOSIS — Z90.02 H/O RADICAL LARYNGECTOMY: Primary | ICD-10-CM

## 2025-03-25 DIAGNOSIS — R49.1 APHONIA: Primary | ICD-10-CM

## 2025-03-25 DIAGNOSIS — Z90.02 STATUS POST LARYNGECTOMY: Primary | ICD-10-CM

## 2025-03-25 DIAGNOSIS — Z90.02 STATUS POST LARYNGECTOMY: ICD-10-CM

## 2025-03-25 DIAGNOSIS — Z87.891 HISTORY OF SMOKING GREATER THAN 50 PACK YEARS: ICD-10-CM

## 2025-03-25 DIAGNOSIS — R49.1 APHONIA: ICD-10-CM

## 2025-03-25 PROCEDURE — 99024 POSTOP FOLLOW-UP VISIT: CPT | Performed by: OTOLARYNGOLOGY

## 2025-03-25 PROCEDURE — 1111F DSCHRG MED/CURRENT MED MERGE: CPT | Performed by: FAMILY MEDICINE

## 2025-03-25 NOTE — CARE COORDINATION
Care Transitions Note    Initial Call - Call within 2 business days of discharge: Yes    Patient Current Location:  Home: [address] [place] [address]    Care Transition Nurse contacted the spouse/partner  by telephone to perform post hospital discharge assessment, verified name and  as identifiers.  Provided introduction to self, and explanation of the Care Transition Nurse role.    Patient: Masood Randhawa    Patient : 1950   MRN: [id number]    Reason for Admission: s/p bilateral laryngectomy   Discharge Date: 3/24/25  RURS: Readmission Risk Score: 25.5      Last Discharge Facility       Date Complaint Diagnosis Description Type Department Provider    3/14/25  Status post laryngectomy ... Admission (Discharged) LISANDRA ChouK Behl, Ashita, MD            Was this an external facility discharge? No    Additional needs identified to be addressed with provider   No needs identified             Method of communication with provider: none.    Patients top risk factors for readmission: lack of knowledge about disease, medical condition-s/p laryngectomy, COPD, HTN, and polypharmacy    Interventions to address risk factors:   Education: laryngectomy, trach care  Review of patient management of conditions/medications:    Home Health: [facility]   DME: [facility]SC, walker  Communication with providers: HFU scheduled    Care Summary Note:  Spoke with wife, Soraya, said Ildefonso was really tired yesterday but is up today, sitting in his chair.  Has been able to walk to and from the bedroom and bathroom.  Denies pain, fever, chills, dizziness, dyspnea, n/v.  Still has diarrhea d/t tube feedings 5 times a day.  He is allowed ice chips and sips of water.  She is providing trach care, suctioning without any problems.  Reviewed medications/changes.  Will see ENT this afternoon and oncology on Friday.  No other issues to report.  Denies any other needs.  No other questions or concerns at this time.  Will continue to follow,

## 2025-03-26 ENCOUNTER — TELEPHONE (OUTPATIENT)
Dept: ENT CLINIC | Age: 75
End: 2025-03-26

## 2025-03-26 DIAGNOSIS — C32.0 SQUAMOUS CELL CARCINOMA OF GLOTTIS: ICD-10-CM

## 2025-03-26 DIAGNOSIS — R63.4 UNINTENTIONAL WEIGHT LOSS: Primary | ICD-10-CM

## 2025-03-26 DIAGNOSIS — G89.3 CANCER ASSOCIATED PAIN: ICD-10-CM

## 2025-03-27 ENCOUNTER — HOSPITAL ENCOUNTER (OUTPATIENT)
Dept: SPEECH THERAPY | Age: 75
Setting detail: THERAPIES SERIES
Discharge: HOME | End: 2025-03-27
Payer: MEDICARE

## 2025-03-27 PROCEDURE — 92523 SPEECH SOUND LANG COMPREHEN: CPT

## 2025-03-27 NOTE — TELEPHONE ENCOUNTER
Called and informed patient wife that orders were already placed. Patient wife verbalized understanding and thanked me.

## 2025-03-27 NOTE — PROGRESS NOTES
with Dr. Ricketts while pt was admitted who said it was ok to use HME without adhesive and electrolarynx on either side of neck. Can also trial TEP for short periods of time but should not try constantly until a couple of weeks post op to allow time for tissues to heal. Further recommendations would be determined from post op visits with Dr. Daley, note from Wednesday is not complete but I will route to him for further guidance.\"    Social/Functional History:    Masood Randhawa lives with spouse.    Limited background social information obtained this therapy session as patient is unable to verbalize. Patient's spouse Soraya providing all information. Patient also declining to write at this time, relying heavily on gestures to communicate. Spouse reporting, \"he can write and he does point and gesture and pound a lot to get my attention.\"     Respiratory Status: Independent, TOTAL NECK BREATHER            CRANIAL NERVE ASSESSMENT   CN V (Trigeminal) Closes and Opens Mandible WFL    Rotary Jaw Movement WFL       CN VII (Facial) Cheeks Hold Food out of Sulci WFL    Opens, Closes/Seals, Protrudes, Retracts Lips WFL    General Appearance Decreased tone       CN X (Vagus - Pharyngeal) Raises Back of Tongue WFL       CN XI (Accessory) Lifts Soft Palate WFL       CN XII (Hypoglossal) Elevates Tongue Up and Back WFL    Protrusion    WFL    Lateralizes Tongue WFL    Sensation WFL       Other Observations Dentition Edentulous; ownership of full dentures; upper dentures in place at time of OP evaluation     Vocal Quality Aphonic    Cough WFL- productive            STOMA/TEP:  Up arrival to  patient with Size 6 [employer] life, non-fenestrated Elena Tube  in place with trach collar implemented to support positioning of tube, very loose fit as spouse reported \"I didn't want to hurt the incision, it was rubbing before.\"  Outside stoma (beyond presence of collar) yielding presence of thick, yellow secretions.  ST provided

## 2025-03-27 NOTE — TELEPHONE ENCOUNTER
Speech therapy called back stating that they are not seeing the orders for outpatient OT and PT. Also they want to know what th plan is for him in speech. I also informed her on what Cherelle said. Please advise.

## 2025-03-27 NOTE — TELEPHONE ENCOUNTER
New order for PT/OT placed. Regarding speech plan I had discussed with Dr. Ricketts while pt was admitted who said it was ok to use HME without adhesive and electrolarynx on either side of neck. Can also trial TEP for short periods of time but should not try constantly until a couple of weeks post op to allow time for tissues to heal. Further recommendations would be determined from post op visits with Dr. Daley, note from Wednesday is not complete but I will route to him for further guidance.

## 2025-03-27 NOTE — TELEPHONE ENCOUNTER
I placed ST orders a few days ago.  He has PT order already (within the order for referral to home care needs) - if he is receiving PT at home through home health, he should not need a separate order.

## 2025-03-28 ENCOUNTER — APPOINTMENT (OUTPATIENT)
Dept: GENERAL RADIOLOGY | Age: 75
DRG: 177 | End: 2025-03-28
Payer: MEDICARE

## 2025-03-28 ENCOUNTER — TELEPHONE (OUTPATIENT)
Dept: ONCOLOGY | Age: 75
End: 2025-03-28

## 2025-03-28 ENCOUNTER — HOSPITAL ENCOUNTER (EMERGENCY)
Age: 75
Discharge: HOME | DRG: 177 | End: 2025-03-28
Attending: STUDENT IN AN ORGANIZED HEALTH CARE EDUCATION/TRAINING PROGRAM
Payer: MEDICARE

## 2025-03-28 ASSESSMENT — PAIN SCALES - GENERAL: PAINLEVEL_OUTOF10: 0

## 2025-03-28 NOTE — TELEPHONE ENCOUNTER
Patient cancelled due to being in the ED   Referred by: Summer Gutiérrez MD; Medical Diagnosis (from order):    Diagnosis Information      Diagnosis    724.3 (ICD-9-CM) - M54.41 (ICD-10-CM) - Low back pain with right-sided sciatica    719.46 (ICD-9-CM) - M25.561, M25.562 (ICD-10-CM) - Bilateral knee pain                Physical Therapy -  Daily Treatment Note    Visit:  6     SUBJECTIVE                                                                                                             Improving walking distance, pt stated \" My legs are stronger now , I started walk longer distance\"         OBJECTIVE                                                                                                                       Palpation:     Comments / Details: Less crepitus (B) knees       TREATMENT                                                                                                                  Therapeutic Exercise:  Mat exs for cor , (B) LE with x 3 lbs weight  Min,mad squats   Endurance exs - stationary bike 15 min      ASSESSMENT                                                                                                             progressing steadilly  To date the patient has made gains as expected as reported. Patient continues to have impairments and functional deficits as noted.  Patient will continue to benefit from skilled care as outlined.    Pain/symptoms after session: 1  Patient Education:   Results of above outlined education: Verbalizes understanding and Demonstrates understanding     PLAN                                                                                                                         Updates to plan of care: continue current plan of care    patient involved in and agreed to plan of care and goals.    GOALS                                                                                                                       Long Term Goals: To be met by end of plan of care:      Home Exercise Program:  Independent with progressed and modified home exercise program (HEP)      Status:  Progressing/ongoing  Energy Conservation: Independent with energy conservation techniques      Status:  Progressing/ongoing  Task Modification: Independent with instructed task modifications      Status:  Progressing/ongoing    Pain: Decrease pain/symptoms to 0  Strength: Improve involved strength to   /Pinch: Improve strength to     Status:  Progressing/ongoing      Procedures and total treatment time documented Time Entry flowsheet.

## 2025-03-28 NOTE — ED NOTES
Pt requesting breathing tx. Will update provider. Pt remains restful on cart, no acute distress noted. RR easy and non labored.

## 2025-03-28 NOTE — CONSULTS
Department of Otolaryngology  Consult Note    Patient brought in by wife for some bleeding found on Elena tube during stoma care today.  She said that this was a small clot that was found during trach care with mucus and LaryTube.  Per wife patient has been slightly more lethargic the last couple of days than prior.  They denied previous bleeding, fevers, chills, nausea, vomiting, and shortness of breath.    On exam patient with LaryTube already removed and scant mucus which was suctioned.  PET with minimal bloody ooze beneath without active trickle.  Stoma notable for mucus which was suctioned and gently wiped with wet gauze without evidence of bleeding or purulent drainage.  Stoma is notable for healing crust which are beginning to slough off as expected.  Stable right submandibular swelling which decreases with parotid massage.  Left prior incision site of PETER drain and irrigation port with stable appearance.    IMPRESSION/RECOMMENDATIONS:      - Scant blood likely is from healing mucosa without evidence of active bleeding.  - Patient should continue with saline nebulizers 3 times daily and breathing treatments 2-3 times a day as well.  - Continue workup per ED to rule out worsening pneumonia.  - Per Dr. Daley patient should start using saline bullets 3 times a day with stomal suctioning.  Patient and wife should be aware whether that using the saline bullet will induce coughing which is normal.  This will help moisturize the healing crust to prevent skin irritation and more bleeding.  - Office will call patient to follow-up with Dr. Daley in the next 1 to 2 weeks for wound check.      Management of patient's care was collaborated with Dr Daley today.     Electronically signed by Cathryn Esparza PA-C on 3/28/2025 at 3:34 PM

## 2025-03-28 NOTE — ED PROVIDER NOTES
[facility] EMERGENCY DEPARTMENT - VISIT NOTE    Patient Name: Masood Randhawa  MRN: [id number]  YOB: 1950  Date of Evaluation: 3/28/2025  Treating Resident Physician: Oscar Acevedo MD  Supervising Physician: Terrell Alvarado MD    CHIEF COMPLAINT       Chief Complaint   Patient presents with    blood from trach after cleaning today       HISTORY OF PRESENT ILLNESS    HPI    History obtained from the patient.    Masood Nielsen" is a 74 y.o. old male who presents to the emergency department by Walk In for evaluation of blood from his laryngectomy site.  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  Wife describes that today, when doing dressing changes she noted some blood at the tip of the plastic device in the laryngectomy.  Patient does have mucus producing cough, wife states patient has been fatigued past few days.  Otherwise, review of systems is generally negative including headache, neck pain, chest pain, shortness of breath, abdominal pain, fever, chills    Chart reviewed, relevant history summarized in HPI above.      REVIEW OF SYSTEMS   Review of Systems  Negative unless documented in HPI    PAST MEDICAL AND SURGICAL HISTORY   Masood Nielsen"  has a past medical history of BPH (benign prostatic hyperplasia), Chronic back pain, Chronic obstructive pulmonary disease (HCC) (3/15/2025), COPD (chronic obstructive pulmonary disease) ([facility]), Erectile dysfunction, Gastroesophageal reflux disease with apnea without esophagitis (01/10/2025), Head and neck cancer (HCC) (02/08/2025), Hypertension, Osteoarthritis, Personal history of colonic polyps (2006), Pneumonia, PVD  recognition software and electronically transcribed. The transcription may contain errors not detected in proofreading. Please refer to my supervising physician's documentation if my documentation differs.    Electronically Signed: Oscar Acevedo MD, 03/28/25, 7:59 PM

## 2025-03-28 NOTE — DISCHARGE INSTRUCTIONS
Take the antibiotics as prescribed.    Follow-up with ENT in office within the next week.    Return for new or worsening symptoms.

## 2025-03-28 NOTE — ED NOTES
Respiratory called to look at trach/stoma. Pt appears in  no acute distress, VSS. Reports generalized fatigue over the last few days but denies CP/SOB, no recent ABD pain or n/v/d. Denies fever/chills, no cough/congestion.

## 2025-03-28 NOTE — TELEPHONE ENCOUNTER
If it is just pink/blood tinged she should just ensure she to use the bactroban ointment 3 times a day. This could just be the mucosa healing.  If he has a persistent trickle or flow of bleeding he should be evaluated by UC or ER

## 2025-03-28 NOTE — TELEPHONE ENCOUNTER
Patient wife left a voicemail stating that she had pulled out patient Trache an he is having some bleeding and would like to know if that is normal because she has never seen any blood before when removing it. Please advise.   code: stroke

## 2025-03-28 NOTE — TELEPHONE ENCOUNTER
Patient was seen in the ED 3/28 for laryngectomy wound concerns.  Please have patient follow-up with Dr. Daley in the next 1 to 2 weeks instead of 3 weeks for a wound check.

## 2025-03-29 NOTE — PROGRESS NOTES
embolus. Once again there is extensive consolidation in the right upper lobe suggesting pneumonia in the appropriate clinical setting. Numerous patchy reticular nodular airspace opacities are noted in the right lower lobe, likely infectious/inflammatory. Close clinical follow-up and a 3-month follow-up CT thorax is advised to assess for resolution. 2. Chronic findings are discussed. **This report has been created using voice recognition software.  It may contain minor errors which are inherent in voice recognition technology.** Electronically signed by Dr. Aby Bueno    XR CHEST PORTABLE  Result Date: 3/4/2025  1. Lungs hyperinflated. Canesten COPD. Normal heart size. 2. Severe atelectasis/pneumonia involving the entire right upper lobe. Question mild additional infiltrate right lung base. Question tiny effusion right side. **This report has been created using voice recognition software.  It may contain minor errors which are inherent in voice recognition technology.** Electronically signed by Dr. Fredo Strickland     Lab Results   Component Value Date/Time     03/28/2025 02:11 PM     03/24/2025 08:52 AM     03/23/2025 03:53 AM    K 5.2 03/28/2025 02:11 PM    K 4.1 03/24/2025 08:52 AM    K 4.2 03/23/2025 03:53 AM    K 4.1 03/22/2025 05:07 AM    K 4.0 03/21/2025 04:34 AM    CL 99 03/28/2025 02:11 PM     03/24/2025 08:52 AM     03/23/2025 03:53 AM    CO2 28 03/28/2025 02:11 PM    CO2 24 03/24/2025 08:52 AM    CO2 25 03/23/2025 03:53 AM    BUN 23 03/28/2025 02:11 PM    BUN 13 03/24/2025 08:52 AM    BUN 19 03/23/2025 03:53 AM    CREATININE 0.6 03/28/2025 02:11 PM    CREATININE 0.5 03/24/2025 08:52 AM    CREATININE 0.6 03/23/2025 03:53 AM    CALCIUM 8.9 03/28/2025 02:11 PM    CALCIUM 8.7 03/24/2025 08:52 AM    CALCIUM 8.7 03/23/2025 03:53 AM    BILITOT 0.3 03/19/2025 05:59 PM    BILITOT 0.4 03/19/2025 04:34 AM    BILITOT <0.2 03/18/2025 03:59 AM    BILITOT Negative 02/22/2022 02:50 PM

## 2025-03-31 ENCOUNTER — HOSPITAL ENCOUNTER (OUTPATIENT)
Dept: SPEECH THERAPY | Age: 75
Setting detail: THERAPIES SERIES
Discharge: HOME | End: 2025-03-31
Payer: MEDICARE

## 2025-03-31 ENCOUNTER — TELEPHONE (OUTPATIENT)
Dept: ENT CLINIC | Age: 75
End: 2025-03-31

## 2025-03-31 ENCOUNTER — CARE COORDINATION (OUTPATIENT)
Dept: CARE COORDINATION | Age: 75
End: 2025-03-31

## 2025-03-31 PROCEDURE — 92507 TX SP LANG VOICE COMM INDIV: CPT

## 2025-03-31 NOTE — CARE COORDINATION
Care Transitions Note    Follow Up Call     Patient Current Location:  Home: [address] [facility] [address]    Care Transition Nurse contacted the patient by telephone. Verified name and  as identifiers.    Additional needs identified to be addressed with provider   Standard priority: button on PEG tube came off                 Method of communication with provider: phone.    Care Summary Note: CTN call to Soraya (spouse) today and she says Ildefonso is feeling better. Went to ED 3/28/25->. Pneumonia, UTI blood from trach. Started on Cefdinir, Z quinton.  Denies fever, chills, sob, chest pain/tightness, cough, dizziness, n/v/d, swelling, malaise, UTI sxs.  Not necessary to f/u w/ PCP since seeing oncology this week.  PEG tube intact. She says the button near the skin fell off. She will contact ENT immediately. Says the feedings are going well, no leaking.  He is eating thinned soups, puddings, shakes. He ate a [place] bar and tolerated ok.  Speech therapy continues.  Denies bleeding from trach. Denies pain.  ENT f/u 25  Onc 4/3/25  RD 25 wt.=119  Able to ambulate w/o any problems.   Discussed seeking ER medical attention for any new or worsening sxs.  Denies problems w/ urination/bowels. Says BMs have been solid.  No other concerns voiced at this time. Will continue to follow.    Plan of care updates since last contact:  Review of patient management of conditions/medications: s/p laryngectomy/neck dissection ->thin liquids, PEG tube feedings, ENT f/u 25 Onc 4/3/25 Pneumonia/UTI-> complete antibiotics       Advance Care Planning:   Does patient have an Advance Directive: reviewed during previous call, see note. .    Medication Review:  Medications changed since last call, reviewed today.     Remote Patient Monitoring:  Offered patient enrollment in the Remote Patient Monitoring (RPM) program for in-home monitoring: Yes, but did not enroll at this time: controlled chronic disease

## 2025-03-31 NOTE — PROGRESS NOTES
[facility]  SPEECH THERAPY  [] SPEECH LANGUAGE COGNITIVE EVALUATION  TOTAL LARYNGECTOMY EVALUATION   [x] DAILY NOTE   [] PROGRESS NOTE [] DISCHARGE NOTE    [x] OUTPATIENT REHABILITATION CENTER [facility]   [] [facility]    [] [facility]   [] [facility]    Date: 3/31/2025  Patient Name:  Masood Randhawa  : 1950  MRN: [id number]  CSN: [id number]    Referring Practitioner Cherelle Pat, APRN - CNP [id number]      Diagnosis  Diagnoses       Z90.02 (ICD-10-CM) - H/O radical laryngectomy    R49.1 (ICD-10-CM) - Aphonia           Treatment Diagnosis R49.1 Aphonia      Date of Evaluation 3/27/25   Additional Pertinent History Masood Randhawa has a past medical history of BPH (benign prostatic hyperplasia), Chronic back pain, Chronic obstructive pulmonary disease (HCC), COPD (chronic obstructive pulmonary disease) (HCC), Erectile dysfunction, Gastroesophageal reflux disease with apnea without esophagitis, Head and neck cancer (HCC), Hypertension, Osteoarthritis, Personal history of colonic polyps, Pneumonia, PVD (peripheral vascular disease), and PVD (peripheral vascular disease).  he has a past surgical history that includes Colonoscopy (2006); lumbar fusion; Rotator cuff repair (2021); Carpal tunnel release (Bilateral); US ASP/INJ THYROID CYST (2025); laryngoscopy (N/A, 2025); IR FLUORO GUIDED GASTROSTOMY TUBE INSERTION PERC W CONTRAST (2025); and Laryngectomy (Bilateral, 3/14/2025).     Allergies Allergies   Allergen Reactions    Pcn [Penicillins] Other (See Comments)     Was told he was allergic since he was a child      Medications   Current Outpatient Medications:     cefdinir (OMNICEF) 300 MG capsule, Take 1 capsule by mouth 2 times daily for 7 days, Disp: 14 capsule, Rfl: 0    azithromycin (ZITHROMAX Z-BRIGETTE) 250 MG tablet, Take 2 tablets (500 mg) on Day 1, and then take 1 tablet (250 mg) on days 2 through 5., Disp: 1

## 2025-03-31 NOTE — TELEPHONE ENCOUNTER
I am unsure of what button she is referring to but pt is seeing speech today who should be able to identify problem and reach out with any questions or concerns

## 2025-03-31 NOTE — TELEPHONE ENCOUNTER
Patient's wife called in stating that one of alessio buttons fell off the side of his tube attachment, and would like to know if this is okay or if something needs to be done about it. Please advise.

## 2025-04-01 ENCOUNTER — HOSPITAL ENCOUNTER (INPATIENT)
Age: 75
LOS: 10 days | Discharge: HOME | DRG: 177 | End: 2025-04-11
Attending: FAMILY MEDICINE | Admitting: INTERNAL MEDICINE
Payer: MEDICARE

## 2025-04-01 ENCOUNTER — APPOINTMENT (OUTPATIENT)
Dept: CT IMAGING | Age: 75
DRG: 177 | End: 2025-04-01
Payer: MEDICARE

## 2025-04-01 PROBLEM — T81.9XXA POSTOPERATIVE OR SURGICAL COMPLICATION, INITIAL ENCOUNTER: Status: ACTIVE | Noted: 2025-04-01

## 2025-04-01 ASSESSMENT — PAIN SCALES - GENERAL
PAINLEVEL_OUTOF10: 5
PAINLEVEL_OUTOF10: 2
PAINLEVEL_OUTOF10: 5
PAINLEVEL_OUTOF10: 5

## 2025-04-01 ASSESSMENT — PAIN - FUNCTIONAL ASSESSMENT
PAIN_FUNCTIONAL_ASSESSMENT: ACTIVITIES ARE NOT PREVENTED
PAIN_FUNCTIONAL_ASSESSMENT: 0-10

## 2025-04-01 ASSESSMENT — PAIN DESCRIPTION - LOCATION: LOCATION: THROAT;NECK

## 2025-04-01 ASSESSMENT — PAIN DESCRIPTION - ORIENTATION: ORIENTATION: INNER;RIGHT

## 2025-04-01 ASSESSMENT — PAIN DESCRIPTION - PAIN TYPE: TYPE: ACUTE PAIN;SURGICAL PAIN

## 2025-04-01 ASSESSMENT — PAIN DESCRIPTION - FREQUENCY: FREQUENCY: INTERMITTENT

## 2025-04-01 ASSESSMENT — PAIN DESCRIPTION - DIRECTION: RADIATING_TOWARDS: R. NECK

## 2025-04-01 ASSESSMENT — PAIN DESCRIPTION - DESCRIPTORS: DESCRIPTORS: ACHING

## 2025-04-01 ASSESSMENT — PAIN DESCRIPTION - ONSET: ONSET: ON-GOING

## 2025-04-01 NOTE — PROGRESS NOTES
hours.  APTT:No results for input(s): \"APTT\" in the last 72 hours.  LIVER PROFILE:  Recent Labs     04/01/25  1341   AST 20   ALT 13   BILITOT <0.2*   ALKPHOS 104       Imaging Last 24 Hours:  CT SOFT TISSUE NECK W CONTRAST  Result Date: 4/1/2025  PROCEDURE: CT SOFT TISSUE NECK W CONTRAST CLINICAL INFORMATION: post op swelling, redness. Coughing up blood. Status post laryngectomy. COMPARISON: CT soft tissue neck 3/19/2025 and 2/8/2025. TECHNIQUE: 3 mm axial images were obtained through the neck soft tissues after the administration of intravenous contrast. Sagittal and coronal reconstructions were obtained. All CT scans at this facility use dose modulation, iterative reconstruction, and/or weight-based dosing when appropriate to reduce radiation dose to as low as reasonably achievable. FINDINGS: There are residual pockets of gas in the neck soft tissues. These have decreased in size. On the left, in the submandibular location, there is a pocket of gas with associated fluid. This has a faint enhancing rim. This has an irregular shape and is tracking inferiorly to the floor the mouth. This is difficult to measure. On the coronal images, this has a transverse measurement of 5.2 cm, a superior-inferior dimension of 1.1 cm and there is a AP dimension of approximately 1.8 cm. There are surgical clips medial and deep to this. In the submandibular location on the right, there is only one small bubble of gas. Based on the coronal images, this appears to be connecting to the fluid which is in the left submandibular location. In the right posterior lateral neck there is a collection with an enhancing rim and an air-fluid level measuring 3.1 x 1.0 x 5.3 cm. This is posterior and deep to the sternocleidomastoid muscle. This is consistent with an abscess. The submental soft tissues are diffusely edematous. The skin is diffusely thickened. There is diffuse fat stranding. There are enlarged submandibular and submental lymph nodes.  These have increased in size. The soft tissues of the oropharynx are difficult to assess. The oropharynx is collapsed. The patient has had a laryngectomy. Oral tongue appears grossly normal. There is a tracheostomy tube in place. The airway below the tracheostomy tube appears normal. The airway above the tube is collapsed. There is no right thyroid lobe. There is a small normal-appearing left thyroid lobe. Submandibular glands are not identified. The parotid glands appear grossly normal. There are no enlarged supraclavicular lobes. There are no enlarged lymph nodes along the posterior chain on either side. There is no upper mediastinal adenopathy. There is persistent moderate severity opacification in the right upper lung. This appears similar to the patient's postoperative CT. There are lucent areas within this. This is unchanged. No suspicious osseous lesions are present. There are degenerative changes in the cervical spine. There is some mild mucosal thickening along the floor of the right maxillary sinus. The other paranasal sinuses are normally aerated. The mastoid air cells are normally aerated. There are no gross abnormalities in the brain parenchyma.     1. Fluid collection with an air-fluid level and enhancing margin in the posterior lateral right neck strongly suspicious for an abscess. 2. More heterogeneous fluid and gas in the submandibular location on the left. This has a less well-defined wall. This is also suspicious for infection/abscess. This is contiguous with a small amount of fluid in the right submandibular location. 3. Mildly enlarged submandibular and submental lymph nodes. These may be reactive. Malignancy is not excluded. 3. Persistent dense opacification with cystic areas in the right upper lobe. **This report has been created using voice recognition software. It may contain minor errors which are inherent in voice recognition technology.** Electronically signed by Dr. Aspen Christie    CT

## 2025-04-01 NOTE — PROGRESS NOTES
[facility]   Pharmacy Pharmacokinetic Monitoring Service - Vancomycin     Masood Randhawa is a 74 y.o. male starting on vancomycin therapy for skin and soft tissue. Pharmacy consulted by Dr Sellers for monitoring and adjustment.    Target Concentration: Goal AUC/JACQUES 400-600 mg*hr/L    Additional Antimicrobials: zosyn    Pertinent Laboratory Values:   Wt Readings from Last 1 Encounters:   04/01/25 54.4 kg (120 lb)     Temp Readings from Last 1 Encounters:   04/01/25 98 °F (36.7 °C) (Oral)     Estimated Creatinine Clearance: 82 mL/min (A) (based on SCr of 0.6 mg/dL (L)).  Recent Labs     04/01/25  1341   CREATININE 0.6*   BUN 23   WBC 8.8       Plan:  Dosing recommendations based on Bayesian software  Patient received vancomycin 1250 mg on 4/1/25 at 1727. Begin vancomycin 1000 mg IV q12h for anticipated AUC of 534 and trough concentration of 15.5 at steady state  Renal labs as indicated   Vancomycin concentration not ordered at this time  Pharmacy will monitor renal function and adjust therapy as indicated    Thank you for the consult,    Marisol Nazario, PharmD, BCPS  4/1/2025  6:54 PM

## 2025-04-01 NOTE — ED NOTES
PT resting in bed. PT notes decreased pain. VS assessed. Call light in reach. PT denies current needs

## 2025-04-01 NOTE — ED NOTES
Spoke with HERSON Wallace to approve pt transport to [facility]. Pt in stable condition at this time.

## 2025-04-01 NOTE — ED TRIAGE NOTES
PT to the ED with wife for having blood in his trach. Wife states she first noticed the blood this morning. Wife states the button by his feeding tube also came off yesterday. Respirations are easy and unlabored.

## 2025-04-01 NOTE — TELEPHONE ENCOUNTER
Patient's wife called back stating that she did mention this at his speech appointment and they did not know what this part of it was, she is struggling to to feed him and states that there is a little piece broken into the piece where the syringe goes.

## 2025-04-01 NOTE — PROGRESS NOTES
Patient currently in the ED.  Informed Dr. Ortega that patient had tested positive for Pseudomonas aeruginosa in the urine.  He will address with the patient this visit and determine if treatment is necessary.

## 2025-04-01 NOTE — ED PROVIDER NOTES
[facility] EMERGENCY DEPARTMENT  EMERGENCY DEPARTMENT ENCOUNTER          Pt Name: Masood Randhawa  MRN: [id number]  Birthdate 1950  Date of evaluation: 4/1/2025  Resident Physician: Toney Ortega MD EM Resident PGY-3  Attending Physician: Feng Garcia MD      CHIEF COMPLAINT     No chief complaint on file.        HISTORY OF PRESENT ILLNESS    HPI  Masood Randhawa is a 74 y.o. male who presents to the emergency department from home, by private vehicle for evaluation of blood from trach, feeding tube issue.    Patient had total bilateral laryngectomy with radical neck dissection, left selective neck dissection, tracheoesophageal puncture fistula construction and TEP placement, right hemithyroidectomy performed by Dr. Daley on 3/14/2025.  Patient companied by wife who states that a small part of his G-tube fractured at home as well as a medical device was dislodged from patient's abdomen today.  She states that he has been having copious secretions as well as some grossly bloody secretions from his mouth and trach over the past week.      The patient has no other acute complaints at this time.    ROS negative except as stated above.    PAST MEDICAL AND SURGICAL HISTORY     Past Medical History:   Diagnosis Date    BPH (benign prostatic hyperplasia)     Chronic back pain     Chronic obstructive pulmonary disease (HCC) 3/15/2025    COPD (chronic obstructive pulmonary disease) (HCC)     Erectile dysfunction     Gastroesophageal reflux disease with apnea without esophagitis 01/10/2025    Head and neck cancer (HCC) 02/08/2025    Hypertension     Osteoarthritis     Pt denies    Personal history of colonic polyps 2006    Pneumonia     3/2025    PVD (peripheral vascular disease)     PVD (peripheral vascular disease) 02/14/2012     Past Surgical History:   Procedure Laterality Date    CARPAL TUNNEL RELEASE Bilateral     2010    COLONOSCOPY  07/19/2006    IR FLUORO GUIDED GASTROSTOMY TUBE INSERTION  Pulmonary effort is normal. No respiratory distress.      Breath sounds: Normal breath sounds. No wheezing.   Abdominal:      General: Abdomen is flat. There is no distension.      Palpations: Abdomen is soft.      Tenderness: There is no abdominal tenderness.      Comments: G-tube in place no area of erythema, crepitus or discharge.  Cap to the G-tube is broken   Musculoskeletal:         General: Normal range of motion.      Cervical back: Normal range of motion and neck supple. No rigidity.      Right lower leg: No edema.      Left lower leg: No edema.   Lymphadenopathy:      Cervical: No cervical adenopathy.   Skin:     General: Skin is warm and dry.      Capillary Refill: Capillary refill takes less than 2 seconds.      Coloration: Skin is not jaundiced.   Neurological:      General: No focal deficit present.      Mental Status: He is alert and oriented to person, place, and time.   Psychiatric:         Mood and Affect: Mood normal.                         ED RESULTS   Laboratory results (none if blank):  Labs Reviewed   COMPREHENSIVE METABOLIC PANEL - Abnormal; Notable for the following components:       Result Value    Glucose 114 (*)     Creatinine 0.6 (*)     Total Bilirubin <0.2 (*)     All other components within normal limits   CBC WITH AUTO DIFFERENTIAL - Abnormal; Notable for the following components:    RBC 2.82 (*)     Hemoglobin 9.4 (*)     Hematocrit 28.5 (*)     .1 (*)     MCH 33.3 (*)     RDW-SD 49.5 (*)     Platelets 574 (*)     All other components within normal limits   SEDIMENTATION RATE - Abnormal; Notable for the following components:    Sed Rate, Automated 90 (*)     All other components within normal limits   CULTURE, BLOOD 1   CULTURE, BLOOD 1   LACTATE, SEPSIS   MAGNESIUM   ANION GAP   GLOMERULAR FILTRATION RATE, ESTIMATED   OSMOLALITY   LACTATE, SEPSIS     All laboratory results are individually reviewed and interpreted by me in the clinical context of this patient.  See ED

## 2025-04-01 NOTE — TELEPHONE ENCOUNTER
Called patient's wife and advised her as to what Cathryn said. Patient's wife verbalized understanding and thanked me. Patient's wife also stated that she is able to feed him through the feeding tube and stated that she went to clean his trach tube and she found some blood on it not a lot but she did find blood and wanted to know if that was concerning she did take a picture of it, she said they are not seeing home health and will go to ER for G tube

## 2025-04-01 NOTE — TELEPHONE ENCOUNTER
Case discussed with Dr. Daley. They can send a picture via [employer] if they would like, I believe they are referring to the TEP? The TEP was seated per Thuy at last appt. If this was broken speech would have noted this in their note or notified our office.

## 2025-04-01 NOTE — TELEPHONE ENCOUNTER
Ok so they are referring to his G tube then, not his airway. Are they still able to give feeds through tube? Is home health seeing them? If so they could call their line for assistance. If not they should go to ER for G tube malfunction and evaluation.

## 2025-04-01 NOTE — ED NOTES
ED to inpatient nurses report      Chief Complaint:  No chief complaint on file.    Present to ED from: home    MOA:     LOC: alert and orientated to name, place, date  Mobility: Requires assistance * 2  Oxygen Baseline: RA - Trach PT    Current needs required: RA     Code Status:   Prior    What abnormal results were found and what did you give/do to treat them? See results  Any procedures or intervention occur?     Mental Status:  Level of Consciousness: Alert (0)    Psych Assessment:        Vitals:  Patient Vitals for the past 24 hrs:   BP Temp Temp src Pulse Resp SpO2 Weight   04/01/25 1725 (!) 113/59 -- -- 86 18 97 % --   04/01/25 1724 (!) 117/53 -- -- 82 18 97 % --   04/01/25 1653 135/66 -- -- 83 18 94 % --   04/01/25 1629 -- -- -- -- -- -- 54.4 kg (120 lb)   04/01/25 1522 117/62 -- -- 88 22 94 % --   04/01/25 1417 117/63 -- -- 89 19 96 % --   04/01/25 1345 118/61 -- -- 90 22 98 % --   04/01/25 1307 113/66 98.3 °F (36.8 °C) Oral 96 18 97 % --        LDAs:   Peripheral IV 04/01/25 Right Antecubital (Active)   Site Assessment Clean, dry & intact 04/01/25 1344   Line Status Normal saline locked;Flushed 04/01/25 1344   Phlebitis Assessment No symptoms 04/01/25 1344   Infiltration Assessment 0 04/01/25 1344       Ambulatory Status:  No data recorded    Diagnosis:  DISPOSITION Admitted 04/01/2025 05:42:00 PM   Final diagnoses:   Post-operative state   Abscess of neck        Consults:  IP CONSULT TO OTOLARYNGOLOGY  PHARMACY TO DOSE VANCOMYCIN     Pain Score:  Pain Assessment  Pain Assessment: 0-10  Pain Level: 5    C-SSRS:   Risk of Suicide: No Risk    Sepsis Screening:       Brandon Fall Risk:       Swallow Screening        Preferred Language:   English      ALLERGIES     Pcn [penicillins]    SURGICAL HISTORY       Past Surgical History:   Procedure Laterality Date    CARPAL TUNNEL RELEASE Bilateral     2010    COLONOSCOPY  07/19/2006    IR FLUORO GUIDED GASTROSTOMY TUBE INSERTION PERC W CONTRAST  02/14/2025    IR  FLUORO GUIDED GASTROSTOMY TUBE INSERTION PERC W CONTRAST 2/14/2025 [facility] SPECIAL PROCEDURES    LARYNGECTOMY Bilateral 3/14/2025    Total Bilateral Laryngectomy with radical neck dissection. Left Selective Neck Dissection. Tracheoesphogeal Puncture Fistula Construction and TEP Placement. Right Hemithyroidectomy. performed by Rickie Daley MD at [facility] OR    LARYNGOSCOPY N/A 02/12/2025    Laryngoscopy with Right Transoral anterolateral laryngectomy, Advancement flap laryongoplasty performed by Rickie Daley MD at [facility] OR    LUMBAR FUSION      Dr. Malone at [facility]     ROTATOR CUFF REPAIR  02/04/2021    US ASP/INJ THYROID CYST  02/04/2025    US ASP/INJ THYROID CYST 2/4/2025 [facility] ULTRASOUND       PAST MEDICAL HISTORY       Past Medical History:   Diagnosis Date    BPH (benign prostatic hyperplasia)     Chronic back pain     Chronic obstructive pulmonary disease (HCC) 3/15/2025    COPD (chronic obstructive pulmonary disease) (HCC)     Erectile dysfunction     Gastroesophageal reflux disease with apnea without esophagitis 01/10/2025    Head and neck cancer (HCC) 02/08/2025    Hypertension     Osteoarthritis     Pt denies    Personal history of colonic polyps 2006    Pneumonia     3/2025    PVD (peripheral vascular disease)     PVD (peripheral vascular disease) 02/14/2012           Electronically signed by Christian Quick RN on 4/1/2025 at 6:05 PM

## 2025-04-01 NOTE — H&P
History & Physical  Internal Medicine Resident         Patient: Masood Randhawa 74 y.o. male      : 1950  Date of Admission: 2025  Date of Service: Pt seen/examined on 25 and Admitted to Inpatient with expected LOS greater than two midnights due to medical therapy.       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 Bauer's on 3/14/2025.  Patient reports copious secretions from his mouth and tracheostomy over the past week, with notes of grossly bloody secretions.  Right lateral posterior neck is edematous and erythematous upon evaluation.  CT soft tissue neck with contrast shows a fluid collection with air fluid level and enhancing margin in the posterior lateral right neck suspicious for abscess.  Afebrile.  No leukocytosis.  Keep head of bed elevated at 45 degrees  IR consult for pigtail insertion into right lateral posterior neck  Aspirin and Lovenox held in preparation for procedure  N.p.o. at midnight  Continue to monitor for signs and symptoms of infection  Continue vancomycin and Zosyn, will de-escalate based on clinical condition and cultures    Superimposed abnormal density in the right upper lobe: Seen on CT chest on 2025 revealing emphysematous changes throughout both lung fields, superimposed 8.2 x 6.2 cm abnormal density in right upper lobe consistent with inflammatory changes, worse than previous study.  Afebrile.  No leukocytosis.  Patient reports recent coughing and chills.  Patient denies any recent fevers or shakes.  No reports of wheezing.  SpO2 95% on room air.  Incentive spirometry  Closely monitor respiratory status, initiate oxygen supplementation via nasal cannula to maintain SpO2 between 88-94% if patient experiences desaturation  Continue DuoNebs 3 times daily  Continue 3% sodium  performed by Rickie Daley MD at [facility] OR    LUMBAR FUSION      Dr. Malone at [facility]     ROTATOR CUFF REPAIR  2021    US ASP/INJ THYROID CYST  2025    US ASP/INJ THYROID CYST 2025 [facility] ULTRASOUND         Problem Relation Age of Onset    Cancer Mother         Skin Cancer    High Blood Pressure Mother     High Blood Pressure Father      Social History     Socioeconomic History    Marital status:      Spouse name: Soraya    Number of children: 3    Years of education: 11    Highest education level: GED or equivalent   Tobacco Use    Smoking status: Former     Current packs/day: 0.00     Average packs/day: 1 pack/day for 40.0 years (40.0 ttl pk-yrs)     Types: Cigarettes, Pipe     Quit date: 2025     Years since quittin.2     Passive exposure: Past    Smokeless tobacco: Never   Vaping Use    Vaping status: Never Used   Substance and Sexual Activity    Alcohol use: Yes     Alcohol/week: 56.0 standard drinks of alcohol     Types: 56 Cans of beer per week     Comment: daily beer, none since surgery in 2025    Drug use: No    Sexual activity: Not Currently     Partners: Female     Social Drivers of Health     Financial Resource Strain: Low Risk  (2024)    Overall Financial Resource Strain (CARDIA)     Difficulty of Paying Living Expenses: Not hard at all   Food Insecurity: No Food Insecurity (2025)    Hunger Vital Sign     Worried About Running Out of Food in the Last Year: Never true     Ran Out of Food in the Last Year: Never true   Transportation Needs: No Transportation Needs (2025)    PRAPARE - Transportation     Lack of Transportation (Medical): No     Lack of Transportation (Non-Medical): No   Physical Activity: Inactive (2023)    Exercise Vital Sign     Days of Exercise per Week: 0 days     Minutes of Exercise per Session: 0 min   Housing Stability: Low Risk  (2025)    Housing Stability Vital Sign     Unable to Pay for Housing in the Last Year: No

## 2025-04-01 NOTE — TELEPHONE ENCOUNTER
Called patient to advise her as to what Cathryn said, patient's wife states that a piece fell of this morning from the tube where she puts the food in so there is a small button that fell off and a piece from the feeding tube, that they did not see when they were at the speech appointment. Patient's wife does not know how to explain in any more detail and does not have my chart. Please advise.

## 2025-04-02 ENCOUNTER — APPOINTMENT (OUTPATIENT)
Dept: CT IMAGING | Age: 75
DRG: 177 | End: 2025-04-02
Payer: MEDICARE

## 2025-04-02 PROBLEM — R93.89 ABNORMAL CT OF THE CHEST: Status: ACTIVE | Noted: 2025-04-02

## 2025-04-02 ASSESSMENT — PAIN SCALES - GENERAL
PAINLEVEL_OUTOF10: 5
PAINLEVEL_OUTOF10: 4
PAINLEVEL_OUTOF10: 5
PAINLEVEL_OUTOF10: 5
PAINLEVEL_OUTOF10: 0
PAINLEVEL_OUTOF10: 5

## 2025-04-02 ASSESSMENT — PAIN DESCRIPTION - ORIENTATION
ORIENTATION: RIGHT
ORIENTATION: RIGHT
ORIENTATION: RIGHT;INNER
ORIENTATION: INNER;RIGHT

## 2025-04-02 ASSESSMENT — PAIN DESCRIPTION - LOCATION
LOCATION: NECK
LOCATION: NECK
LOCATION: NECK;THROAT
LOCATION: NECK
LOCATION: NECK
LOCATION: THROAT;NECK

## 2025-04-02 ASSESSMENT — PAIN DESCRIPTION - DESCRIPTORS
DESCRIPTORS: ACHING
DESCRIPTORS: DISCOMFORT
DESCRIPTORS: DISCOMFORT
DESCRIPTORS: ACHING

## 2025-04-02 ASSESSMENT — PAIN DESCRIPTION - PAIN TYPE
TYPE: ACUTE PAIN;SURGICAL PAIN
TYPE: SURGICAL PAIN;ACUTE PAIN

## 2025-04-02 ASSESSMENT — PAIN DESCRIPTION - DIRECTION
RADIATING_TOWARDS: RIGHT NECK
RADIATING_TOWARDS: R. NECK

## 2025-04-02 ASSESSMENT — PAIN DESCRIPTION - ONSET
ONSET: ON-GOING
ONSET: ON-GOING

## 2025-04-02 ASSESSMENT — PAIN DESCRIPTION - FREQUENCY
FREQUENCY: INTERMITTENT
FREQUENCY: INTERMITTENT

## 2025-04-02 NOTE — PROGRESS NOTES
[facility]  SPEECH THERAPY  STRZ ICU STEPDOWN TELEMETRY 4K  Total Laryngectomy Evaluation    Discharge Recommendations: Outpatient Speech Therapy    SLP Individual Minutes  Time In: 1152  Time Out: 1239  Minutes: 47  Timed Code Treatment Minutes: 0 Minutes       Date: 2025  Patient Name: Masood Randhawa      CSN: [id number]   : 1950  (74 y.o.)  Gender: male   Referring Physician:  SOO Barger CNP   Diagnosis: Postoperative or surgical complication, initial encounter  Precautions: Total laryngectomy, NECK BREATHER  History of Present Illness/Injury: The patient is a 74 y.o. male who was admitted to [facility] ER with concerns for increased facial swelling by wife.  Patient was evaluated in ER with CT neck that shows fluid and gas accumulation bilateral submandibular area and larger fluid accumulation right posterolateral neck.  Patient unsure if he feels any better or worse.  Has some pain and tenderness under right jaw, but otherwise denies any discomfort.  He feels that he is breathing well.  He had reportedly brought some degree of bloody secretions up from his stoma, none from the mouth, prior to ER visit yesterday.  He denies any bloody secretions overnight.  Afebrile.  WBC wnl.  No wound drainage.  He was started on Zosyn and Vancomycin.     Patient is unsure if he was using any humidification at home other than HME on laryngectomy tube.  He does not have HME on at this time.  He reportedly declined use of humidification via trach mask overnight here.       Past Medical History:   Diagnosis Date    BPH (benign prostatic hyperplasia)     Chronic back pain     Chronic obstructive pulmonary disease (HCC) 3/15/2025    COPD (chronic obstructive pulmonary disease) ([facility])     Erectile dysfunction     Gastroesophageal reflux disease with apnea without esophagitis 01/10/2025    Head and neck cancer ([facility]) 2025    Hypertension     Osteoarthritis   assess during treatment.    SHORT TERM GOALS:  Short Term Goals  Time Frame for Short Term Goals: 2 weeks  Goal 1: Patient and family will exhibit an understanding of total laryngeactomy anatomy, ATOS supplies for home management, multi-modal communication, and the role of SLP interventions (e.g., TEP/voice prosthesis) to maximize education given extensive surgical interventions.  Goal 2: Patient and family will demonstrate comprehsion re: rationale for cleansing TEP, cleansing LaryTube, secretion management, needs/rationale for HME to promote overall total laryngectomy care and aide in pulmonary return.  Goal 3: Patient will successfully engage in multi-modal communication attempts (static and dynamic) in 4/5 opportunities given min assist to ensure effective ability to communicate wants/needs.  Goal 4: In coordination with ENT, patient will complete CSE to determine least restrictive diet level and aide in satiation/QOL measures.    LONG TERM GOALS:  No LTGs established d/t karen Albrecht MA, CCC-SLP [id number]

## 2025-04-02 NOTE — RT PROTOCOL NOTE
RT Inhaler-Nebulizer Bronchodilator Protocol Note    There is a bronchodilator order in the chart from a provider indicating to follow the RT Bronchodilator Protocol and there is an “Initiate RT Inhaler-Nebulizer Bronchodilator Protocol” order as well (see protocol at bottom of note).    CXR Findings:  No results found.    The findings from the last RT Protocol Assessment were as follows:   History Pulmonary Disease: Chronic pulmonary disease  Respiratory Pattern: Regular pattern and RR 12-20 bpm  Breath Sounds: Slightly diminished and/or crackles  Cough: Strong, spontaneous, non-productive  Indication for Bronchodilator Therapy: Decreased or absent breath sounds, On home bronchodilators (Patient takes treatments 3 times a day at home so left TID, per patient)  Bronchodilator Assessment Score: 4    Aerosolized bronchodilator medication orders have been revised according to the RT Inhaler-Nebulizer Bronchodilator Protocol below.    Respiratory Therapist to perform RT Therapy Protocol Assessment initially then follow the protocol.  Repeat RT Therapy Protocol Assessment PRN for score 0-3 or on second treatment, BID, and PRN for scores above 3.    No Indications - adjust the frequency to every 6 hours PRN wheezing or bronchospasm, if no treatments needed after 48 hours then discontinue using Per Protocol order mode.     If indication present, adjust the RT bronchodilator orders based on the Bronchodilator Assessment Score as indicated below.  Use Inhaler orders unless patient has one or more of the following: on home nebulizer, not able to hold breath for 10 seconds, is not alert and oriented, cannot activate and use MDI correctly, or respiratory rate 25 breaths per minute or more, then use the equivalent nebulizer order(s) with same Frequency and PRN reasons based on the score.  If a patient is on this medication at home then do not decrease Frequency below that used at home.    0-3 - enter or revise RT bronchodilator

## 2025-04-02 NOTE — CONSULTS
Department of Otolaryngology  Consult Note    Reason for Consult:  Recent total laryngectomy  Requesting Physician:  ER Provider    CHIEF COMPLAINT:  Hemoptysis    History Obtained From:  patient, electronic medical record    HISTORY OF PRESENT ILLNESS:                The patient is a 74 y.o. male who was admitted to [facility] ER with concerns for increased facial swelling by wife.  Patient was evaluated in ER with CT neck that shows fluid and gas accumulation bilateral submandibular area and larger fluid accumulation right posterolateral neck.  Patient unsure if he feels any better or worse.  Has some pain and tenderness under right jaw, but otherwise denies any discomfort.  He feels that he is breathing well.  He had reportedly brought some degree of bloody secretions up from his stoma, none from the mouth, prior to ER visit yesterday.  He denies any bloody secretions overnight.  Afebrile.  WBC wnl.  No wound drainage.  He was started on Zosyn and Vancomycin.    Patient is unsure if he was using any humidification at home other than HME on laryngectomy tube.  He does not have HME on at this time.  He reportedly declined use of humidification via trach mask overnight here.        Past Medical History:        Diagnosis Date    BPH (benign prostatic hyperplasia)     Chronic back pain     Chronic obstructive pulmonary disease (HCC) 3/15/2025    COPD (chronic obstructive pulmonary disease) (HCC)     Erectile dysfunction     Gastroesophageal reflux disease with apnea without esophagitis 01/10/2025    Head and neck cancer (HCC) 02/08/2025    Hypertension     Osteoarthritis     Pt denies    Personal history of colonic polyps 2006    Pneumonia     3/2025    PVD (peripheral vascular disease)     PVD (peripheral vascular disease) 02/14/2012       Past Surgical History:        Procedure Laterality Date    CARPAL TUNNEL RELEASE Bilateral     2010    COLONOSCOPY  07/19/2006    IR FLUORO GUIDED

## 2025-04-02 NOTE — PROGRESS NOTES
1027 Pt arrived to CT for CT guided drain placement under conscious sedation. Soraya (wife) and student nurse María at bedside.   1028 Procedure explained using teach back method. Pt states understanding.  1037 Dr Kinney into assess patient and explain procedure. This procedure has been fully reviewed with the patient and written informed consent has been obtained.   1043 Wife shown to waiting area.  1052 Patient assisted to table in left side lying position. Monitor attached to patient. Comfort ensured.  1053 Pre-procedure images obtained.  1104 Procedure begins.  1113 Right neck drain placed per Dr Kinney. Patient assisted to supine position.  1116 Pre-procedure images obtained.  1129 Procedure complete. 2 Samples obtained and sent to lab for analysis.   1130 Post-procedure images obtained.  1133 Monitor removed. Patient assisted to bed in semi fowlers position. Comfort ensured.  1138 Patient transported to  in stable condition. Wife and student nurse at side.

## 2025-04-02 NOTE — PROGRESS NOTES
Pharmacy Note  ED Culture Follow-up    Masood Randhawa is a 74 y.o. male.     Allergies: Pcn [penicillins]     Labs:  Lab Results   Component Value Date    BUN 18 04/02/2025    CREATININE 0.7 04/02/2025    WBC 10.8 04/02/2025     Estimated Creatinine Clearance: 72 mL/min (based on SCr of 0.7 mg/dL).    Current antimicrobials:   Zosyn, patient currently admitted at [facility]    ASSESSMENT:  Micro results:   Urine culture: positive for Pseudomonas aeruginosa     PLAN:  Need for intervention: Defer to inpatient team  Discussed with: Feng Garcia MD  Chosen treatment:    Patient will be treated by inpatient providers during hospital admission on 04/01/25 at [facility]. Inpatient physician, Dr. Sellers notified of urine culture result.     Patient response:   No need to contact patient    Called/sent in prescription to: Not applicable    Please call with any questions. Ext. [phone]    Saskia Willis RPH, PharmD 7:28 PM 4/2/2025

## 2025-04-02 NOTE — PROGRESS NOTES
[facility]  PHYSICAL THERAPY MISSED TREATMENT NOTE  STRZ ICU STEPDOWN TELEMETRY 4K    Date: 2025  Patient Name: Masood Randhawa        MRN: [id number]   : 1950  (74 y.o.)  Gender: male                REASON FOR MISSED TREATMENT:  Patient at testing and/or off unit.  Pt having procedure for drain placement. Will check back tomorrow.    Carmen Blancas, MPT [id number]

## 2025-04-02 NOTE — CONSULTS
[place] for Pulmonary, Sleep and Critical Care Medicine      Patient - Masood Randhawa   MRN -  [id number]   [id number]   - 1950      Date of Admission -  2025  1:04 PM  Date of evaluation -  2025  Room - -10/[facility]-A   Hospital Day - 1  Consulting - Thong Street MD Primary Care Physician - Samy Piper DO     Problem List      Active Hospital Problems    Diagnosis Date Noted    Postoperative or surgical complication, initial encounter [T81.9XXA] 2025     Reason for Consult    Abnormal density in the right upper lobe  HPI   History Obtained From: Patient wife and electronic medical record.    Masood Randhawa is a 74 y.o. male with PMHX of  COPD, HTN, BPH, recent b/l  laryngectomy with radical neck dissection on 3/14/2025 who presents to [facility] with reports of copious secretions with notes of intermittent bloody secretions.  Per wife pt produced thick, dark yellow-greenish mucus in the morning that progressively get whitish in color through out the day. Denies fever.     Apparently pt had Laryngoscopy with Right Transoral anterolateral laryngectomy, Advancement flap laryongoplasty with Rickie Daley MD on 2025 and Had Total Bilateral Laryngectomy with radical neck dissection. Left Selective Neck Dissection. Tracheoesphogeal Puncture Fistula Construction and TEP Placement. Right Hemithyroidectomy. - Bilateral with Rickie Daley MD on 3/14/2025.    CT chest on 25 noted no consolidation. PET scan on  did not report consolidation or pneumonia. However, CTA on 3/4/25 noted Extensive consolidation is noted in the right upper lobe and patchy reticular nodular opacities are seen in the right lower lobe and pt was started on antibiotics. Repeat CT chest showed persistent consolidation of the RUL.  Serial XR from 3/4/25 to 3/28 noted persistent and worsening right upper and left lower lung atelectasis/infiltrate.     Currently pt does  patient's CT scans of chest since 4 March 2025 to present (Left)        Plan:  - Schedule patient for Bronchoscopy for air way examination, BAL and bronchwashings without fluoroscopy in endoscopy suite at [facility] with out anesthesia to further evaluate the etiology of persistent right upper lobe infiltrate.   -Masood Randhawa and his wife were educated and informed about bronchoscopy procedure,method, complicantions of procedure including but not limited to development of pneumothorax with requirement of chest tube placement. They agreed for the procedure and verbalizes understanding.  -Follow pending cultures  -Hold Lovenox due to history of hemoptysis.  Patient denies any more hemoptysis.   -SCDs to both lower extremities.  -I spoke with the Dr. Rickie Daley MD from ENT service regarding the need for tracheostomy change by calling him on his mobile.  He request me to place #8 [place]iley tracheostomy if needed prior to the planned bronchoscopy procedure.  Masood Randhawa educated about my impression and plan. He verbalizes understanding.    Electronically signed by   Angela Reynoso MD on 4/2/2025 at 6:51 PM

## 2025-04-02 NOTE — PROGRESS NOTES
[facility]  OCCUPATIONAL THERAPY MISSED TREATMENT NOTE  STRZ ICU STEPDOWN TELEMETRY 4K  [id number]-[id number]-A      Date: 2025  Patient Name: Masood Randhawa        CSN: [id number]   : 1950  (74 y.o.)  Gender: male                REASON FOR MISSED TREATMENT: Patient Refused.  Pt having neck drain placed today and requesting to hold off on OT until after procedure. Will check back as time allows.

## 2025-04-02 NOTE — CARE COORDINATION
04/02/25 0826   Readmission Assessment   Number of Days since last admission? 8-30 days   Previous Disposition Home with Home Health   Who is being Interviewed Patient   What was the patient's/caregiver's perception as to why they think they needed to return back to the hospital? Other (Comment)  (neck abscess)   Did you visit your Primary Care Physician after you left the hospital, before you returned this time? No   Why weren't you able to visit your PCP? Other (Comment)  (readmitted too soon)   Did you see a specialist, such as Cardiac, Pulmonary, Orthopedic Physician, etc. after you left the hospital? No   Who advised the patient to return to the hospital? Self-referral   Does the patient report anything that got in the way of taking their medications? No   In our efforts to provide the best possible care to you and others like you, can you think of anything that we could have done to help you after you left the hospital the first time, so that you might not have needed to return so soon? Other (Comment)  (no)

## 2025-04-02 NOTE — CARE COORDINATION
Case Management Assessment Initial Evaluation    Date/Time of Evaluation: 4/2/2025 11:06 AM  Assessment Completed by: Fredo Guthrie RN    If patient is discharged prior to next notation, then this note serves as note for discharge by case management.    Patient Name: Masood Randhawa                   YOB: 1950  Diagnosis: Abscess of neck [L02.11]  Post-operative state [Z98.890]  Postoperative or surgical complication, initial encounter [T81.9XXA]                   Date / Time: 4/1/2025  1:04 PM  Location: [address] [place] [address]     Patient Admission Status: Inpatient   Readmission Risk Low 0-14, Mod 15-19), High > 20: Readmission Risk Score: 26.5    Current PCP: Samy Piper,   Health Care Decision Makers:   Primary Decision Maker: Soraya Randhawa - Spouse - [phone]    Secondary Decision Maker: Ildefonso Randhawa - Child - [phone]    Supplemental (Other) Decision Maker: Manoj Malone - Brother/Sister - [phone]    Additional Case Management Notes:   PNA/UTI/Blood out Trach/L>R Neck Abscess  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, 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; monitor  IV PPI  IV Zosyn  IV Vancomycin  Await Cultures    Procedures:   4/2 IR B/L Neck Abscess Drain  4/3 EBUS planned    Patient Goals/Plan/Treatment Preferences: lives w spouse/caregiver Gabe (has memory loss issues, she is aware of this); OP ST has concerns re: broken feeding tube, possible safety issues w needing complex care and confused caregiver; await therapy recommendations; monitor possible SNF v IPR; has  OP PT/OT/ST, SR HIS for GTF, SR HME for laryngectomy supplies, nebulizer, cane, walker, WC, bed, current  Cancer Center    Update  Family refused HH/SNF           04/02/25 1057   Service Assessment   Patient Orientation Alert and Oriented   Cognition Alert   History Provided By Spouse   Primary Caregiver Self   Accompanied By/Relationship none   Support Systems Spouse/Significant Other   Patient's Healthcare Decision Maker

## 2025-04-02 NOTE — PROGRESS NOTES
Comprehensive Nutrition Assessment    Type and Reason for Visit:  Initial, Positive nutrition screen, Consult (home tubfeeding, ordering and management)    Nutrition Recommendations/Plan:   When able after IR neck abscess drain placement today, plan to resume home TF regimen: bolus 237 mL (which is 8 oz container at home)  Jevity 1.5, 5x/day (suggested times 8AM, 11AM, 2PM, 5PM, & 8PM); Flush 50 mL free H2O before and 50 mL free H2O after each bolus feeding or per Provider.  NPO at this time. SLP consulted, patient was told by ENT he could have thin liquids.  Per wife consumed broth x 2, ice cream, jello last night.  Patient with SLP outpatient, last seen 3/31/25.     Recommend probiotic with multiple antibiotic use and reports of loose stools.   [facility]    Discussed tubefeeding plan with RN.    Home needs/concerns discussed with Care Manager.  On 2/14/25 patient's wife had told writer she has memory issues and shaky hands.       Malnutrition Assessment:  Malnutrition Status:  At risk for malnutrition (04/02/25 1138)    Context:  Chronic Illness     Findings of the 6 clinical characteristics of malnutrition:  Energy Intake:   (meeting 100% of nutrition needs via PEG enteral feedings PTA, PEG placed 2/14/25)  Weight Loss:   (significant losses over last 6-7 months; appears to have gained weight since 2/9/25  (PEG Placed 2/14/25))     Body Fat Loss:  Severe body fat loss (noted prior to enteral feeding start; now meeting 100% of estimated nutritional needs via TF) Orbital, Triceps, Fat Overlying Ribs, Buccal region   Muscle Mass Loss:  Severe muscle mass loss (noted prior to enteral feeding start; now meeting 100% of estimated nutritional needs via TF) Temples (temporalis), Clavicles (pectoralis & deltoids), Thigh (quadriceps), Calf (gastrocnemius), Hand (interosseous), Scapula (trapezius)  Fluid Accumulation:  Mild Extremities   Strength:  Not Performed    Nutrition  Continue to monitor while inpatient, Speech Therapy, Coordination of Care       Goals:  Goals: Initiate nutrition support, Meet at least 75% of estimated needs, by next RD assessment  Type of Goal: New goal       Nutrition Monitoring and Evaluation:      Food/Nutrient Intake Outcomes: Diet Advancement/Tolerance, Food and Nutrient Intake, Enteral Nutrition Intake/Tolerance  Physical Signs/Symptoms Outcomes: Biochemical Data, Chewing or Swallowing, GI Status, Fluid Status or Edema, Nutrition Focused Physical Findings, Skin, Weight    Discharge Planning:    Too soon to determine     Allison C Schwab, RD, LD  Contact: [phone]

## 2025-04-03 ENCOUNTER — TELEPHONE (OUTPATIENT)
Dept: ONCOLOGY | Age: 75
End: 2025-04-03

## 2025-04-03 ENCOUNTER — ANESTHESIA (OUTPATIENT)
Dept: ENDOSCOPY | Age: 75
End: 2025-04-03
Payer: MEDICARE

## 2025-04-03 ENCOUNTER — ANESTHESIA EVENT (OUTPATIENT)
Dept: ENDOSCOPY | Age: 75
End: 2025-04-03
Payer: MEDICARE

## 2025-04-03 PROCEDURE — 6360000002 HC RX W HCPCS: Performed by: REGISTERED NURSE

## 2025-04-03 PROCEDURE — 2500000003 HC RX 250 WO HCPCS: Performed by: REGISTERED NURSE

## 2025-04-03 RX ORDER — MIDAZOLAM HYDROCHLORIDE 1 MG/ML
INJECTION, SOLUTION INTRAMUSCULAR; INTRAVENOUS
Status: DISCONTINUED | OUTPATIENT
Start: 2025-04-03 | End: 2025-04-03 | Stop reason: SDUPTHER

## 2025-04-03 RX ORDER — PROPOFOL 10 MG/ML
INJECTION, EMULSION INTRAVENOUS
Status: DISCONTINUED | OUTPATIENT
Start: 2025-04-03 | End: 2025-04-03 | Stop reason: SDUPTHER

## 2025-04-03 RX ADMIN — PROPOFOL 50 MG: 10 INJECTION, EMULSION INTRAVENOUS at 15:38

## 2025-04-03 RX ADMIN — PROPOFOL 50 MG: 10 INJECTION, EMULSION INTRAVENOUS at 15:31

## 2025-04-03 RX ADMIN — Medication 25 MG: at 15:34

## 2025-04-03 RX ADMIN — Medication 25 MG: at 15:25

## 2025-04-03 RX ADMIN — MIDAZOLAM 2 MG: 1 INJECTION INTRAMUSCULAR; INTRAVENOUS at 15:23

## 2025-04-03 RX ADMIN — PROPOFOL 50 MG: 10 INJECTION, EMULSION INTRAVENOUS at 15:25

## 2025-04-03 ASSESSMENT — PAIN - FUNCTIONAL ASSESSMENT
PAIN_FUNCTIONAL_ASSESSMENT: ACTIVITIES ARE NOT PREVENTED
PAIN_FUNCTIONAL_ASSESSMENT: NONE - DENIES PAIN
PAIN_FUNCTIONAL_ASSESSMENT: ACTIVITIES ARE NOT PREVENTED
PAIN_FUNCTIONAL_ASSESSMENT: NONE - DENIES PAIN

## 2025-04-03 ASSESSMENT — PAIN DESCRIPTION - LOCATION
LOCATION: THROAT
LOCATION: NECK
LOCATION: THROAT
LOCATION: NECK

## 2025-04-03 ASSESSMENT — PAIN SCALES - GENERAL
PAINLEVEL_OUTOF10: 0
PAINLEVEL_OUTOF10: 7
PAINLEVEL_OUTOF10: 4
PAINLEVEL_OUTOF10: 5
PAINLEVEL_OUTOF10: 5
PAINLEVEL_OUTOF10: 0
PAINLEVEL_OUTOF10: 5
PAINLEVEL_OUTOF10: 5

## 2025-04-03 ASSESSMENT — PAIN DESCRIPTION - FREQUENCY: FREQUENCY: CONTINUOUS

## 2025-04-03 ASSESSMENT — PAIN DESCRIPTION - PAIN TYPE
TYPE: SURGICAL PAIN
TYPE: SURGICAL PAIN

## 2025-04-03 ASSESSMENT — PAIN DESCRIPTION - DESCRIPTORS
DESCRIPTORS: DISCOMFORT
DESCRIPTORS: DISCOMFORT

## 2025-04-03 ASSESSMENT — PAIN DESCRIPTION - ONSET: ONSET: ON-GOING

## 2025-04-03 ASSESSMENT — PAIN DESCRIPTION - ORIENTATION
ORIENTATION: RIGHT
ORIENTATION: RIGHT

## 2025-04-03 NOTE — PROGRESS NOTES
[place] for Pulmonary, Sleep and Critical Care Medicine      Patient - Masood Randhawa   MRN -  [id number]   [id number]   - 1950      Date of Admission -  2025  1:04 PM  Date of evaluation -  4/3/2025  Room - -10/010-A   Hospital Day - 2  Consulting - Thong Street MD Primary Care Physician - Samy Piper DO     Problem List      Active Hospital Problems    Diagnosis Date Noted    Abnormal CT of the chest [R93.89] 2025    Postoperative or surgical complication, initial encounter [T81.9XXA] 2025    Tobacco smoke exposure [Z77.22] 2013     Reason for Consult    Abnormal density in the right upper lobe    HPI   History Obtained From: Patient wife and electronic medical record.    Masood Randhawa is a 74 y.o. male with PMHX of  COPD, HTN, BPH, recent b/l  laryngectomy with radical neck dissection on 3/14/2025 who presents to [facility] with reports of copious secretions with notes of intermittent bloody secretions.  Per wife pt produced thick, dark yellow-greenish mucus in the morning that progressively get whitish in color through out the day. Denies fever.     Apparently pt had Laryngoscopy with Right Transoral anterolateral laryngectomy, Advancement flap laryongoplasty with Rickie Daley MD on 2025 and Had Total Bilateral Laryngectomy with radical neck dissection. Left Selective Neck Dissection. Tracheoesphogeal Puncture Fistula Construction and TEP Placement. Right Hemithyroidectomy. - Bilateral with Rickie Daley MD on 3/14/2025.    CT chest on 25 noted no consolidation. PET scan on  did not report consolidation or pneumonia. However, CTA on 3/4/25 noted Extensive consolidation is noted in the right upper lobe and patchy reticular nodular opacities are seen in the right lower lobe and pt was started on antibiotics. Repeat CT chest showed persistent consolidation of the RUL.  Serial XR from 3/4/25 to 3/28 noted  LARYNGOSCOPY N/A 2025    Laryngoscopy with Right Transoral anterolateral laryngectomy, Advancement flap laryongoplasty performed by Rickie Daley MD at [facility] OR    LUMBAR FUSION      Dr. Malone at [facility]     ROTATOR CUFF REPAIR  2021    US ASP/INJ THYROID CYST  2025    US ASP/INJ THYROID CYST 2025 [facility] ULTRASOUND     Meds    Current Medications    vancomycin  750 mg IntraVENous Q12H    pantoprazole  40 mg IntraVENous Daily    miconazole   Topical BID    ipratropium 0.5 mg-albuterol 2.5 mg  1 Dose Inhalation Q4H WA RT    [Held by provider] amLODIPine  5 mg Oral Daily    [Held by provider] aspirin  81 mg Oral Daily    lisinopril  20 mg Oral Daily    piperacillin-tazobactam  3,375 mg IntraVENous Q8H    sodium chloride flush  5-40 mL IntraVENous 2 times per day    [Held by provider] enoxaparin  40 mg SubCUTAneous Daily    vancomycin (VANCOCIN) intermittent dosing (placeholder)   Other RX Placeholder     loperamide, sodium chloride flush, sodium chloride, potassium chloride **OR** potassium alternative oral replacement **OR** potassium chloride, magnesium sulfate, ondansetron **OR** ondansetron, polyethylene glycol, acetaminophen **OR** acetaminophen, morphine **OR** morphine, sodium chloride (Inhalant)  IV Drips/Infusions   sodium chloride       Home Medications  Medications Prior to Admission: cefdinir (OMNICEF) 300 MG capsule, Take 1 capsule by mouth 2 times daily for 7 days  [] azithromycin (ZITHROMAX Z-BRIGETTE) 250 MG tablet, Take 2 tablets (500 mg) on Day 1, and then take 1 tablet (250 mg) on days 2 through 5.  mupirocin (BACTROBAN) 2 % ointment, Apply to stoma with q tip TID.  sodium chloride nebulizer 0.9 % solution, Take 3 mLs by nebulization as needed (throat/ airway secretions)  ipratropium 0.5 mg-albuterol 2.5 mg (DUONEB) 0.5-2.5 (3) MG/3ML SOLN nebulizer solution, Inhale 3 mLs into the lungs in the morning and 3 mLs in the evening.  Respiratory Therapy Supplies

## 2025-04-03 NOTE — PROGRESS NOTES
[facility]  INPATIENT PHYSICAL THERAPY  EVALUATION  STR ICU STEPDOWN TELEMETRY 4K - 4K-10/010-A    Discharge Recommendations: Continue to assess pending progress  Equipment Recommendations: No               Time In: 918  Time Out: 932  Timed Code Treatment Minutes: 8 Minutes  Minutes: 14          Date: 4/3/2025  Patient Name: Masood Randhawa,  Gender:  male        MRN: [id number]  : 1950  (74 y.o.)      Referring Practitioner: Dr. WILY Sellers  Diagnosis: Postoperative or surgical complication, initial encounter  Additional Pertinent Hx: Masood Randhawa is a 74 y.o. male with PMHx of COPD, hypertension, BPH and recent total bilateral laryngectomy with radical neck dissection on 3/14/2025 who presents to [facility] with reports of copious secretions with notes of intermittent bloody secretions.  His hospitalization was complicated with neck and parotid swelling and required delay gentle IV and biotics for hospital-acquired pneumonia.  Patient states that he has also been experiencing some chills, and has had to cover himself with several blankets to stay warm.  He is accompanied by his wife and states that he has not been experiencing any fevers.  Dr. Bauer was consulted in the ED and evaluated patient.  Recommending IR consult for pigtail into right lateral posterior neck.     Restrictions/Precautions:  Restrictions/Precautions: Fall Risk       Other Position/Activity Restrictions: neck breather, 2 drains    Required Braces or Orthoses?: No      Subjective:  Chart Reviewed: Yes  Patient assessed for rehabilitation services?: Yes  Subjective: pleasant and cooperative, pt declined amb in hallway due to multiple lines.    General:        Hearing: Within functional limits       Pain: 5/10: neck per pt    Vitals: O2 sat 99%    Social/Functional History:    Lives With: Spouse  Type of Home: House  Home Layout: Two level, Able to Live on Main level with bedroom/bathroom  Home  GM)    Goals:  Patient Goals : go home  Short Term Goals  Time Frame for Short Term Goals: by discharge  Short Term Goal 1: bed mobility with MOD I to get in/out of bed, may raise HOB  Short Term Goal 2: Transfer with MOD I to get in/out of chairs  Short Term Goal 3: amb >100'x1 with AD and S to walk safely in home  Short Term Goal 4: negotiate 2 steps with HR and S to enter home safely  Long Term Goals  Time Frame for Long Term Goals : no LTGs set secondary to short ELOS    Following session, patient left in safe position with all fall risk precautions in place.

## 2025-04-03 NOTE — PROGRESS NOTES
Spiritual Health History and Assessment/Progress Note  [facility]    Attempted Encounter,  ,  ,      Name: Masood Randhawa MRN: [id number]    Age: 74 y.o.     Sex: male   Language: English   Baptism: Tenriism   Postoperative or surgical complication, initial encounter     Date: 4/3/2025            Total Time Calculated: 1 min              Spiritual Assessment began in [facility] ENDOSCOPY        Referral/Consult From: Rounding   Encounter Overview/Reason: Attempted Encounter  Service Provided For: Patient not available    Inge, Belief, Meaning:   Patient unable to assess at this time  Family/Friends No family/friends present      Importance and Influence:  Patient unable to assess at this time  Family/Friends No family/friends present    Community:  Patient Other: PNA  Family/Friends No family/friends present    Assessment and Plan of Care: Pt in endoscopy at time of visit.    Patient Interventions include: Other: PNA  Family/Friends Interventions include: No family/friends present    Patient Plan of Care: Spiritual Care available upon further referral  Family/Friends Plan of Care: No family/friends present    Electronically signed by Chase Baker  Intern on 4/3/2025 at 4:07 PM

## 2025-04-03 NOTE — CARE COORDINATION
DISCHARGE PLANNING EVALUATION  4/3/25, 9:24 AM EDT    Reason for Referral: Discharge planning  Decision Maker: Self   Current Services: Outpatient PT, OT, Speech  New Services Requested: None  Family/ Social/ Home environment: Patient is from home with his spouse. Patient was able to ambulate and manage at home. Patient goes to outpatient therapy, PT, OT, ST, at [facility]. Patient has supportive family and friends.   Payment Source: Medicare  Transportation at Discharge: family  Post-acute (PAC) provider list was provided to patient. Patient was informed of their freedom to choose PAC provider. Discussed and offered to show the patient the relevant PAC Providers quality and resource use measures on Medicare Compare web site via computer based on patient's goals of care and treatment preferences. Questions regarding selection process were answered.      Teach Back Method used with patient and spouse regarding care plan and needs  Patient and spouse verbalized understanding of the plan of care and contribute to goal setting.       Patient preferences and discharge plan: Patient plans to return home with spouse and outpatient therapies. SW offered ECF and HH but patient and spouse declined. Denied further questions or concerns.     Electronically signed by IAIN Javier on 4/3/2025 at 9:24 AM

## 2025-04-03 NOTE — PRE SEDATION
[facility] Endoscopy  Sedation Pre-Procedure Note    Patient Name: Masood Randhawa    YOB: 1950   Room/Bed: [facility] ENDO [facility]/NONE  Medical Record Number: [id number]  Date: 4/3/2025  Time: 4:12 PM     Indication:  Pain control for Flexible Fibrooptic Bronchoscopy.    Consent: I have discussed with the patient and/or the patient representative the indication, alternatives, and the possible risks and/or complications of the planned procedure and the anesthesia methods. The patient and/or patient representative appear to understand and agree to proceed.    Vital Signs: BP (!) 95/52   Pulse 85   Temp 98.2 °F (36.8 °C) (Oral)   Resp 16   Ht 1.676 m (5' 6\")   Wt 55.5 kg (122 lb 5.7 oz)   SpO2 98%   BMI 19.75 kg/m²       Past Medical History:  Past Medical History:   Diagnosis Date    BPH (benign prostatic hyperplasia)     Chronic back pain     Chronic obstructive pulmonary disease (HCC) 3/15/2025    COPD (chronic obstructive pulmonary disease) (HCC)     Erectile dysfunction     Gastroesophageal reflux disease with apnea without esophagitis 01/10/2025    Head and neck cancer (HCC) 02/08/2025    Hypertension     Osteoarthritis     Pt denies    Personal history of colonic polyps 2006    Pneumonia     3/2025    PVD (peripheral vascular disease)     PVD (peripheral vascular disease) 02/14/2012         Allergy:  Allergies   Allergen Reactions    Pcn [Penicillins] Other (See Comments)     Was told he was allergic since he was a child         Past Surgical History:  Past Surgical History:   Procedure Laterality Date    CARPAL TUNNEL RELEASE Bilateral     2010    COLONOSCOPY  07/19/2006    IR FLUORO GUIDED GASTROSTOMY TUBE INSERTION PERC W CONTRAST  02/14/2025    IR FLUORO GUIDED GASTROSTOMY TUBE INSERTION PERC W CONTRAST 2/14/2025 [facility] SPECIAL PROCEDURES    LARYNGECTOMY Bilateral 3/14/2025    Total Bilateral Laryngectomy with radical neck dissection. Left  Complications:   Please see CRNA note for details.    ASA Classification:  Please see CRNA note for details.    Sedation/ Anesthesia Plan:   Patient was given anesthesia by CRNA  from anesthesiology dept. Please see detailed note by CRNA for details.    Electronically signed by Angela Reynoso MD on 4/3/2025 at 4:12 PM

## 2025-04-03 NOTE — PROGRESS NOTES
Department of Otolaryngology  Progress Note    Chief Complaint:  Hemoptysis    SUBJECTIVE:  No acute issues overnight.  Bilateral pigtail catheters placed to drain bilateral upper neck wounds by IR yesterday.  Draining serous fluid.  Pulmonary evaluated yesterday with plan for bronchoscopy today.  WBC remains wnl.  Afebrile.  Patient has had humidification on around the clock via \"trach\" mask.    A complete multi-organ review of systems was performed using a new patient questionnaire, and reviewed by me.  ENT:  negative except as noted in HPI  CONSTITUTIONAL:  negative except as noted in HPI  EYES:  negative except as noted in HPI  RESPIRATORY:  negative except as noted in HPI  CARDIOVASCULAR:  negative except as noted in HPI  GASTROINTESTINAL:  negative except as noted in HPI  GENITOURINARY:  negative except as noted in HPI  MUSCULOSKELETAL:  negative except as noted in HPI  SKIN:  negative except as noted in HPI  ENDOCRINE/METABOLIC: negative except as noted in HPI  HEMATOLOGIC/LYMPHATIC:  negative except as noted in HPI  ALLERGY/IMMUN: negative except as noted in HPI  NEUROLOGICAL:  negative except as noted in HPI  BEHAVIOR/PSYCH:  negative except as noted in HPI    OBJECTIVE      Physical  VITALS:  BP (!) 82/53   Pulse 81   Temp 98.2 °F (36.8 °C) (Oral)   Resp 16   Ht 1.676 m (5' 6\")   Wt 55.5 kg (122 lb 5.7 oz)   SpO2 100%   BMI 19.75 kg/m²     Patient resting in bedside chair with nursing and wife in room. He is awake, alert and oriented. Cooperative. Aphonic. Bilateral submandibular swelling, right>left. Right posterior neck flap mildly swollen and minimally tender. All swelling less firm today, more \"fluidy\" and soft. Incisions healing well with small area of crusting along left side. Laryngectomy tube in place secured with \"trach\" ties. Stomal edges intact. ? Dehiscence at trifurcation of right side at 9 o'clock.  Patient with laminar air flow. No increased work of breathing. \"Neck breather\" alert

## 2025-04-03 NOTE — OP NOTE
Bronchoscopy Procedure Note    Patient: Masood Randhawa  : 1950        Surgeon: Angela Reynoso MD    Operation: Flexible diagnostic fiberoptic bronchoscopy without fluoroscopy.     During procedure following procedures were performed:  Bronchioalveolar lavage: obtained from right upper lobe apical and posterior segment.  bronch washings obtained from right tracheobronchial tree including right upper lobe, right middle lobe and right lower lobe.    Date of Operation: 4/3/2025    Indication for procedure: Abnormal CT scan of chest dated 2025 with 8.2 x 6.2 cm abnormal density in the right upper lobe- ?  Etiology.  High suspicion for inflammatory process due to history of pneumonia in the right upper lobe last month.  Diagnostic flexible fiberoptic bronchoscopy was planned for airway examination and to obtain lower respiratory specimen to aid in patient management.    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 male . The risks, benefits, complications, treatment options and expected outcomes were discussed with the patient. Consent obtained from the patient after explaining the risks and complications of the procedure. The possibilities of reaction to medication, pulmonary aspiration, perforation of a viscus, development of pneumothorax with requirement of chest tube placement,air way bleeding, failure to diagnose a condition and creating a complication leading to respiratory failure requiring mechanical  lavage was performed from right upper lobe apical and posterior segments. A good amount of Bronchioalveolar lavage I.e 40 ml was obtained after instilling 120ml of sterile 0.9NS. Bronchioalveolar lavage was sent to the laboratory for further analysis.    Bronchial washings: Bronchial washings were performed form right tracheobronchial tree including right upper lobe, middle lobe and right lower lobe. More than 25 ml of Bronchial washings were obtained and sent to the laboratory for further analysis.      Endobronchial findings:   Trachea: Normal mucosa.  Annika: Normal mucosa  Right main bronchus: Normal mucosa  Right upper lobe bronchus: Normal mucosa  Right Middle lobe bronchus: Normal mucosa  Right Lower lobe bronchus:Normal mucosa  Left main bronchus: Normal mucosa  Left upper lobe bronchus: Normal mucosa  Left lower lobe bronchus: Normal mucosa    The Patient was taken to the endoscopy recovery area in satisfactory condition.    Estimated Blood Loss: None    Complications: None.    Recommendation/Plan:  1. F/U on culturs results  2. F/U on cytology results.    Follow up: Patient will be followed as inpatient.    Attestation: I performed the procedure.    Angela Reynoso MD  Electronically signed by Angela Reynoso MD on 4/3/2025 at 3:59 PM

## 2025-04-03 NOTE — PROGRESS NOTES
[facility]  INPATIENT OCCUPATIONAL THERAPY  STRZ ICU STEPDOWN TELEMETRY 4K  EVALUATION      Discharge Recommendations:  (SNF versus Home with HH dependent on medical management)  Equipment Recommendations: No defer to next level of care      Time In: 823  Time Out: 08  Timed Code Treatment Minutes: 23 Minutes  Minutes: 31          Date: 4/3/2025  Patient Name: Masood Randhawa,   Gender: male      MRN: [id number]  : 1950  (74 y.o.)  Referring Practitioner: Freddy Sellers MD  Diagnosis: Postoperative or surgical complication, initial encounter  Additional Pertinent Hx: Masood Randhawa is a 74 y.o. male with PMHx of COPD, hypertension, BPH and recent total bilateral laryngectomy with radical neck dissection on 3/14/2025 who presents to [facility] with reports of copious secretions with notes of intermittent bloody secretions.  His hospitalization was complicated with neck and parotid swelling and required delay gentle IV and biotics for hospital-acquired pneumonia.  Patient states that he has also been experiencing some chills, and has had to cover himself with several blankets to stay warm.  He is accompanied by his wife and states that he has not been experiencing any fevers.  Dr. Bauer was consulted in the ED and evaluated patient.  Recommending IR consult for pigtail into right lateral posterior neck.    Restrictions/Precautions:  Restrictions/Precautions: Fall Risk  Position Activity Restriction  Other Position/Activity Restrictions: neck breather    Subjective  Chart Reviewed: Yes, Orders, Progress Notes  Family / Caregiver Present: Yes (wife)    Subjective: RN approved session and in room to give meds. patient very agreeable to OT session, wife present to assist.    Pain: reports neck pain     Vitals: Nurse checked vitals prior to session    Social/Functional History:  Lives With: Spouse  Type of Home: House  Home Layout: Two level, Able to Live on Main level  Demonstration;Verbal  Barriers to Learning: None  Education Outcome: Verbalized understanding;Demonstrated understanding    Plan:  Times Per Week: 5x  Current Treatment Recommendations: Strengthening, Balance training, Functional mobility training, Endurance training, Safety education & training, Equipment evaluation, education, & procurement, Patient/Caregiver education & training, Self-Care / ADL.  See long-term goal time frame for expected duration of plan of care.  If no long-term goals established, a short length of stay is anticipated.    Goals:  Patient goals : return home with wife  Short Term Goals  Time Frame for Short Term Goals: until discharge  Short Term Goal 1: patient will complete full body dressing with CGA and 0-2 vcs for energy conservation techniques  Short Term Goal 2: patient will tolerate 5-7 minutes with unilateral UE release, CGA to complete sink side grooming task  Short Term Goal 3: Patient and caregiver will verbalize at least 3 energy conservation techniques and improve ease in ADL engagement and decrease caregiver burden.  Long Term Goals  Time Frame for Long Term Goals : None due to ELOS    AM-[facility] Inpatient Daily Activity Raw Score: 19  AM-[facility] Inpatient ADL T-Scale Score : 40.22    Following session, patient left in safe position with all fall risk precautions in place.

## 2025-04-03 NOTE — PROGRESS NOTES
Hospitalist Progress Note  Internal Medicine Resident      Patient: Masood Randhawa 74 y.o. 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 Bauer's on 3/14/2025.  Patient reports copious secretions from his mouth and tracheostomy over the past week, with notes of grossly bloody secretions.  Right lateral posterior neck is edematous and erythematous upon evaluation.  CT soft tissue neck with contrast shows a fluid collection with air fluid level and enhancing margin in the posterior lateral right neck suspicious for abscess.  Afebrile.  No leukocytosis.  Keep head of bed elevated at 45 degrees  IR consulted for pigtail insertion into right lateral posterior neck, s/p 4/2  Clear liquid diet, may advance to full liquid diet if tolerating  Continue to monitor for signs and symptoms of infection  Continue vancomycin and Zosyn, will de-escalate based on clinical condition and cultures     RUL Pneumonia: Likely secondary to aspiration pneumonia. Seen on CT chest on 4/1/2025 revealing emphysematous changes throughout both lung fields, superimposed 8.2 x 6.2 cm abnormal density in right upper lobe consistent with inflammatory changes, worse than previous study.  Afebrile.  No leukocytosis.  Patient reports recent coughing and chills.  Patient denies any recent fevers or shakes.  No reports of wheezing.  SpO2 95% on room air.  Pulmonology consulted  Bronchoscopy planned to evaluate etiology of RUL infiltrate   Trach collar in place  Follow fluid analysis and cultures  Incentive spirometry  Closely monitor respiratory status, initiate oxygen supplementation via nasal cannula to maintain SpO2 between 88-94% if patient experiences desaturation  Continue DuoNebs 3 times  daily  Continue 3% sodium chloride nebulizer as needed for throat/airway secretions     Tinea Cruris: Patient complained of burning, pruritic sensation in groin and scrotum. On physical examination, scrotum, groin and glans appeared erythematous. No scrotal edema noted.  Continue miconazole 2% powder twice daily  Will continue to monitor for improvement    Chronic diarrhea: Patient complained of non-bloody diarrhea, that is present at baseline. States he takes Imodium at home which relieves his symptoms.   Continue Imodium    Deconditioning: Underwent procedure on 3/14.  PT/OT consulted.     Malnutrition: Related to clinical course.  Will consult dietitian.     Chronic Conditions (reviewed and stable unless otherwise stated)   COPD: Not in exacerbation.  Continue DuoNebs.  Hypertension: Continue home antihypertensives.  BPH: Noted per chart review.  Not on medication.  Bladder scans daily.  GERD: Continue Protonix    LDA: []CVC / []PICC / []Midline / []Rousseau / []Drains / []Mediport / []None  Antibiotics: Vancomycin, Zosyn  Steroids: None  Labs (still needed?): []Yes / [x]No  IVF (still needed?): []Yes / [x]No    Level of care: O Step Down / []Med-Surg  Bed Status: [x]Inpatient / []Observation  Telemetry: []Yes / [x]No  PT/OT: [x]Yes / []No    DVT Prophylaxis: [] Lovenox / [] Heparin / [x] SCDs / [] Already on Systemic Anticoagulation / [] None     Expected discharge date:  Pending  Disposition: Pending     Code status: Full Code     ===================================================================    Chief Complaint: Postoperative swelling  Subjective (past 24 hours): Patient was seen evaluating sitting upright in chair.  He does not report any worsening pain in his right lateral posterior neck region.  Erythema still noted.  Patient denies any shortness of breath, chest pain, nausea or abdominal pain.  He states that burning pain in his groin has been improving with antifungal powder.  He also states his diarrhea  sodium chloride (Inhalant)    Exam:  /62   Pulse 86   Temp 98.2 °F (36.8 °C) (Oral)   Resp 16   Ht 1.676 m (5' 6\")   Wt 55.5 kg (122 lb 5.7 oz)   SpO2 98%   BMI 19.75 kg/m²   General: Trach collar in place, no bleeding from trach site  Eyes:  PERRL. Conjunctivae/corneas clear.  HENT: Head normal appearing. Nares normal. Oral mucosa moist.  Hearing intact.   Neck: erythema and swelling noted on Right lateral posterior neck, tender to palpation  Respiratory:  Normal effort. Clear to auscultation, without rales or wheezes or rhonchi.  Cardiovascular: Normal rate, regular rhythm with normal S1/S2 without murmurs.    No lower extremity edema.   Abdomen: Soft, non-tender, non-distended with normal bowel sounds.  Musculoskeletal: No joint swelling or tenderness. Normal tone. No abnormal movements.   Skin: Warm and dry. No rashes or lesions.  Neurologic:  No focal sensory/motor deficits in the upper or lower extremities. Cranial nerves:  grossly non-focal 2-12.     Psychiatric: Alert and oriented, normal insight and thought content.   Capillary Refill: Brisk,< 3 seconds.  Peripheral Pulses: +2 palpable, equal bilaterally.       Labs/Radiology: See chart or assessment above.     Electronically signed by Freddy Sellers MD on 4/3/2025 at 1:34 PM  Case was discussed with Attending, Dr. Street.

## 2025-04-03 NOTE — PROGRESS NOTES
[facility]   Pharmacy Pharmacokinetic Monitoring Service - Vancomycin    Indication: SSTI  Target Concentration: Goal AUC/JACQUES 400-600 mg*hr/L  Day of Therapy: 3  Additional Antimicrobials: Zosyn    Pertinent Laboratory Values:   Wt Readings from Last 1 Encounters:   04/01/25 55.5 kg (122 lb 5.7 oz)     Temp Readings from Last 1 Encounters:   04/03/25 98.2 °F (36.8 °C) (Oral)     Estimated Creatinine Clearance: 72 mL/min (based on SCr of 0.7 mg/dL).  Recent Labs     04/02/25  0725 04/03/25  0358   CREATININE 0.7 0.7   BUN 18 15   WBC 10.8 10.7     Pertinent Cultures:  Date Source Results   4/2/25 Fluid from surgical site (neck) No bacteria present   4/1/25 Blood x2 NGTD   MRSA Nasal Swab: N/A. Non-respiratory infection.    Recent vancomycin administrations                     vancomycin (VANCOCIN) 1,000 mg in sodium chloride 0.9 % 250 mL IVPB (Chdi1Cny) (mg) 1,000 mg New Bag 04/02/25 1939     1,000 mg New Bag  0631    vancomycin (VANCOCIN) 1250 mg in sodium chloride 0.9% 250 mL IVPB (mg) 1,250 mg New Bag 04/01/25 1727                  Assessment:  Date/Time Current Dose Concentration Timing of Concentration AUC C trough,   4/3/25 0358 1000 mg Q12H 20.3 ~ 8 hr 681 20.6   Note: Serum concentrations collected for AUC dosing may appear elevated if collected in close proximity to the dose administered, this is not necessarily an indication of toxicity    Plan:  Current dosing regimen is supra-therapeutic  Decrease dose to 750 mg Q12H for estimated AUC of 513 & trough of 15.5  Repeat vancomycin concentration ordered for tomorrow with AM labs  Pharmacy will monitor renal function and adjust therapy as indicated.    Thank you for the consult,  Suze Palafox, PharmD  4/3/2025 at 7:50 AM

## 2025-04-03 NOTE — PROGRESS NOTES
Hospitalist Progress Note  Internal Medicine Resident      Patient: Masood Randhawa 74 y.o. 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 Bauer's on 3/14/2025.  Patient reports copious secretions from his mouth and tracheostomy over the past week, with notes of grossly bloody secretions.  Right lateral posterior neck is edematous and erythematous upon evaluation.  CT soft tissue neck with contrast shows a fluid collection with air fluid level and enhancing margin in the posterior lateral right neck suspicious for abscess.  Afebrile.  No leukocytosis.  Keep head of bed elevated at 45 degrees  IR consulted for pigtail insertion into right lateral posterior neck, s/p 4/2  Clear liquid diet, may advance to full liquid diet if tolerating  Continue to monitor for signs and symptoms of infection  Continue vancomycin and Zosyn, will de-escalate based on clinical condition and cultures     RUL Pneumonia: Likely secondary to aspiration pneumonia. Seen on CT chest on 4/1/2025 revealing emphysematous changes throughout both lung fields, superimposed 8.2 x 6.2 cm abnormal density in right upper lobe consistent with inflammatory changes, worse than previous study.  Afebrile.  No leukocytosis.  Patient reports recent coughing and chills.  Patient denies any recent fevers or shakes.  No reports of wheezing.  SpO2 95% on room air.  Pulmonology consulted  Bronchoscopy planned to evaluate etiology of RUL infiltrate   Trach collar in place  Follow fluid analysis and cultures  Incentive spirometry  Closely monitor respiratory status, initiate oxygen supplementation via nasal cannula to maintain SpO2 between 88-94% if patient experiences desaturation  Continue DuoNebs 3 times

## 2025-04-03 NOTE — ANESTHESIA PRE PROCEDURE
COVID19 Not Detected 03/05/2025 11:50 AM           Anesthesia Evaluation  Patient summary reviewed and Nursing notes reviewed  Airway: Mallampati: Unable to assess / NA       Comment: Laryngeal stoma present: total laryngectomy      Dental:    (+) edentulous      Pulmonary:   (+) pneumonia:  COPD:     rhonchi:right                               ROS comment: Totally laryngectomy 3/14/25: patent stoma: pt oxygenation stable: pt has had copious secretions: will suction through stoma once asleep: will remove Shiley: since the pt had a complete laryngectomy there is no need for the Shiley for a MAC procedure: If complications arise will place a ETT through stoma   Cardiovascular:    (+) hypertension: no interval change      ECG reviewed  Rhythm: regular  Rate: normal  Echocardiogram reviewed                  Neuro/Psych:   (+) neuromuscular disease:, psychiatric history:            GI/Hepatic/Renal:   (+) GERD:          Endo/Other: Negative Endo/Other ROS                    Abdominal: normal exam            Vascular: negative vascular ROS.         Other Findings:         Anesthesia Plan      MAC     ASA 4       Induction: intravenous.      Anesthetic plan and risks discussed with patient and spouse.    Use of blood products discussed with patient whom.      Attending anesthesiologist reviewed and agrees with Preprocedure content              SOO Oquendo - CRNA   4/3/2025

## 2025-04-03 NOTE — PROGRESS NOTES
[facility]  INPATIENT SPEECH THERAPY  STRZ ICU STEPDOWN TELEMETRY 4K  DAILY NOTE    Discharge Recommendations: Outpatient Speech Therapy    SLP Individual Minutes  Time In: 1013  Time Out: 1051  Minutes: 38  Timed Code Treatment Minutes: 0 Minutes       Date: 4/3/2025  Patient Name: Masood Randhawa      CSN: [id number]   : 1950  (74 y.o.)  Gender: male   Referring Physician:  SOO Sanchez-CNP  Diagnosis: Abscess of neck (L02.11)  Precautions: PATIENT IS A NECK BREATHER - S/P TOTAL LARYNGECTOMY  Current Diet: NPO  Respiratory Status:  trach mask/collar ; humidified air  Swallowing Strategies: Not Applicable  Date of Last MBS/FEES: Not Applicable    Pain:  5/10 - Pain location: neck - was given morphine    Subjective:  SONY Martin gave approval for this session. Patient sitting in his recliner throughout. Patient's spouse arrived in the middle of the session and assisted with treatment.    Short-Term Goals:  SHORT TERM GOAL #1:  Goal 1: Patient and family will exhibit an understanding of total laryngeactomy anatomy, ATOS supplies for home management, multi-modal communication, and the role of SLP interventions (e.g., TEP/voice prosthesis) to maximize education given extensive surgical interventions.  INTERVENTIONS: Awaiting the signed prescription order from Dr. Daley to send in to be able to order the patient more laryngectomy supplies as needed.  Will re-fax the prescription to Dr. Daley.     Discussed the possibility of using the TEP to communicate, however, the skin around the outside of the stoma and around the incisions was quite red and appeared swollen this date, therefore, decided not to trial due to needing to push against these areas to occlude the stoma.  Will trial voicing with use of the TEP as appropriate.    SHORT TERM GOAL #2:  Goal 2: Patient and family will demonstrate comprehsion re: rationale for cleansing TEP, cleansing LaryTube, secretion management,

## 2025-04-03 NOTE — ANESTHESIA POSTPROCEDURE EVALUATION
Department of Anesthesiology  Postprocedure Note    Patient: Masood Randhawa  MRN: [id number]  YOB: 1950  Date of evaluation: 4/3/2025    Procedure Summary       Date: 04/03/25 Room / Location: [place] [address] / [facility]    Anesthesia Start: 1518 Anesthesia Stop: 1602    Procedure: BRONCHOSCOPY, BAL WITH WASHING FLUOROSCOPY Diagnosis:       Other postoperative shock, initial encounter      (Other postoperative shock, initial encounter [T81.19XA])    Surgeons: Angela Reynoso MD Responsible Provider: Latrell Juarez DO    Anesthesia Type: MAC ASA Status: 4            Anesthesia Type: No value filed.    Edenilson Phase I: Edenilson Score: 10    Edenilson Phase II: Edenilson Score: 10    Anesthesia Post Evaluation    Patient location during evaluation: PACU  Patient participation: complete - patient participated  Level of consciousness: awake  Pain score: 0  Airway patency: patent  Nausea & Vomiting: no vomiting and no nausea  Cardiovascular status: hemodynamically stable  Respiratory status: spontaneous ventilation and room air (through stoma)  Hydration status: stable        No notable events documented.

## 2025-04-04 ENCOUNTER — APPOINTMENT (OUTPATIENT)
Dept: GENERAL RADIOLOGY | Age: 75
DRG: 177 | End: 2025-04-04
Payer: MEDICARE

## 2025-04-04 ASSESSMENT — PAIN SCALES - GENERAL
PAINLEVEL_OUTOF10: 3
PAINLEVEL_OUTOF10: 5
PAINLEVEL_OUTOF10: 5
PAINLEVEL_OUTOF10: 4
PAINLEVEL_OUTOF10: 0
PAINLEVEL_OUTOF10: 5
PAINLEVEL_OUTOF10: 4
PAINLEVEL_OUTOF10: 5
PAINLEVEL_OUTOF10: 0

## 2025-04-04 ASSESSMENT — PAIN DESCRIPTION - LOCATION
LOCATION: NECK

## 2025-04-04 ASSESSMENT — PAIN DESCRIPTION - PAIN TYPE
TYPE: SURGICAL PAIN
TYPE: SURGICAL PAIN

## 2025-04-04 ASSESSMENT — PAIN DESCRIPTION - ONSET
ONSET: ON-GOING
ONSET: ON-GOING

## 2025-04-04 ASSESSMENT — PAIN DESCRIPTION - FREQUENCY
FREQUENCY: CONTINUOUS
FREQUENCY: CONTINUOUS

## 2025-04-04 ASSESSMENT — PAIN SCALES - WONG BAKER: WONGBAKER_NUMERICALRESPONSE: NO HURT

## 2025-04-04 ASSESSMENT — PAIN DESCRIPTION - DESCRIPTORS
DESCRIPTORS: ACHING
DESCRIPTORS: ACHING
DESCRIPTORS: ACHING;DISCOMFORT
DESCRIPTORS: ACHING

## 2025-04-04 ASSESSMENT — PAIN DESCRIPTION - ORIENTATION
ORIENTATION: RIGHT;LEFT
ORIENTATION: RIGHT;LEFT;MID

## 2025-04-04 NOTE — PROGRESS NOTES
[facility]  INPATIENT SPEECH THERAPY  STRZ ICU STEPDOWN TELEMETRY 4K  DAILY NOTE    Discharge Recommendations: Outpatient Speech Therapy    SLP Individual Minutes  Time In: 1110  Time Out: 1142  Minutes: 32  Timed Code Treatment Minutes: 0 Minutes       Date: 2025  Patient Name: Masood Randhawa      CSN: [id number]   : 1950  (74 y.o.)  Gender: male   Referring Physician:  RUBEN Sanchez  Diagnosis: Abscess of neck (L02.11)  Precautions: PATIENT IS A NECK BREATHER - S/P TOTAL LARYNGECTOMY  Current Diet: Puree with thin liquids  Respiratory Status:  trach mask/collar ; humidified air  Swallowing Strategies: Standard Universal Swallow Precautions  Date of Last MBS/FEES: Not Applicable    Pain:  No pain reported.    Subjective:  RN approved this session. Patient lying in bed throughout. Patient's spouse present. RN present doing another IV for the patient during this session.    Short-Term Goals:  SHORT TERM GOAL #1:  Goal 1: Patient and family will exhibit an understanding of total laryngeactomy anatomy, [facility] supplies for home management, multi-modal communication, and the role of SLP interventions (e.g., TEP/voice prosthesis) to maximize education given extensive surgical interventions.  INTERVENTIONS: Refaxed the prescription for Atos to Dr. Daley this morning, however, patient's spouse reports they received a couple boxes yesterday and she thought one of them was from [facility].  Patient's spouse then showed this SLP an email and text that she received from [facility].  In the email, it said that [facility] received the signed prescription form from Dr. Daley and they shipped the patient a Coming Home Kit of samples.  Since [facility] has already received the prescription form, this SLP will fax the patient services form to [facility].  Patient's spouse was made aware of all of this.    Discussed the plan to let the skin around the stoma continue to heal before putting any pressure in  length of stay.    Functional Oral Intake Scale: Total Oral Intake: 5.  Total oral intake of multiple consistencies requiring special preparation    EDUCATION:  Learner: Patient and Significant Other  Education:  Reviewed ST goals and Plan of Care, Reviewed recommendations for follow-up, Education Related to Potential Risks and Complications Due to Impairment/Illness/Injury, and Education Related to Prevention of Recurrence of Impairment/Illness/Injury  Evaluation of Education: Verbalizes understanding and Needs further instruction    ASSESSMENT/PLAN:  Activity Tolerance:  Patient tolerance of  treatment: good.      Assessment/Plan: Patient progressing toward established goals.  Continues to require skilled care of licensed speech pathologist to progress toward achievement of established goals and plan of care..     Plan for Next Session: laryngectomy care       Brook Sims M.S. CCC-SLP [id number]

## 2025-04-04 NOTE — PLAN OF CARE
Problem: Respiratory - Adult  Goal: Patient's breath sounds will be clear and equal  4/3/2025 2008 by Lacey Victoria RCP  Outcome: Progressing

## 2025-04-04 NOTE — PROGRESS NOTES
Comprehensive Nutrition Assessment    Type and Reason for Visit:  Reassess (diet and tubefeeding)    Nutrition Recommendations/Plan:   Pureed diet started 4/4/25 per ENT.  Patient consumed 75% of breakfast.  Tubefeeding bolus regimen adjusted due to oral intake initiation: Bolus 237 mls Jevity 1.5 after each meal if patient consumes 50% or less of meal (breakfast, lunch, dinner).  Give 237 ml Jevity 1.5 bolus daily at 8pm.  Flush 50 mls free water flush before and 50 mls after each bolus.   Will provide ONS: Ensure Plus/Enlive with meals. Wife reports she has already bought ONS at home.    Will monitor oral intake and adjust tubefeedings accordingly.  Patient has outpatient dietitian appointment scheduled with Jacqueline Levine 4/9/25.  Home tubefeeding supplies through [facility].    Recommend continue probiotic (started today) with current antibiotics.  Imodium prn for loose stools.     Malnutrition Assessment:  Malnutrition Status:  At risk for malnutrition (04/02/25 1138)    Context:  Chronic Illness     Findings of the 6 clinical characteristics of malnutrition:  Energy Intake:   (meeting 100% of nutrition needs via PEG enteral feedings PTA, PEG placed 2/14/25)  Weight Loss:   (significant losses over last 6-7 months; appears to have gained weight since 2/9/25  (PEG Placed 2/14/25))     Body Fat Loss:  Severe body fat loss (noted prior to enteral feeding start; now meeting 100% of estimated nutritional needs via TF) Orbital, Triceps, Fat Overlying Ribs, Buccal region   Muscle Mass Loss:  Severe muscle mass loss (noted prior to enteral feeding start; now meeting 100% of estimated nutritional needs via TF) Temples (temporalis), Clavicles (pectoralis & deltoids), Thigh (quadriceps), Calf (gastrocnemius), Hand (interosseous), Scapula (trapezius)  Fluid Accumulation:  Mild Extremities   Strength:  Not Performed    Nutrition Assessment:     Pt. nutritionally compromised AEB previous NPO status, dysphagia, and need for  Altered GI structure, swallowing difficulty as evidenced by nutrition support - enteral nutrition    Nutrition Interventions:   Food and/or Nutrient Delivery: Continue Current Diet, Modify Tube Feeding, Start Oral Nutrition Supplement  Nutrition Education/Counseling: Education/Counseling initiated  Coordination of Nutrition Care: Continue to monitor while inpatient, Speech Therapy, Coordination of Care       Goals:  Goals: Meet at least 75% of estimated needs, by next RD assessment  Type of Goal: New goal       Nutrition Monitoring and Evaluation:      Food/Nutrient Intake Outcomes: Diet Advancement/Tolerance, Food and Nutrient Intake, Supplement Intake  Physical Signs/Symptoms Outcomes: Biochemical Data, Chewing or Swallowing, GI Status, Fluid Status or Edema, Nutrition Focused Physical Findings, Skin, Weight    Discharge Planning:    Too soon to determine     Allison C Schwab, RD, LD  Contact: [phone]

## 2025-04-04 NOTE — CARE COORDINATION
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, Glottic Cancer  Elevated WBC; monitor  Pneumonia Panel: Staph Aureus/Adenovirus  Lactobacillus  IV Diflucan  IV PPI     Procedures:   4/2 IR B/L Neck Abscess Drain  4/3 EBUS w RUL Density, RUL PNA  4/4 CXR; RUL PNA slightly worse w apex possible developing cavitation    Patient Goals/Plan/Treatment Preferences: lives w spouse/caregiver Gabe (has memory loss issues, she is aware of this); OP ST has concerns re: broken feeding tube, possible safety issues w needing complex care and confused caregiver; family refused HH/SNF/IPR; has  OP PT/OT/ST, SR HIS for GTF, SR HME for laryngectomy supplies, nebulizer, cane, walker, WC, bed, current  [facility]cer Center; therapy following     PCP: Samy Piper,   Readmission Risk Score: 27.2

## 2025-04-04 NOTE — PROGRESS NOTES
Department of Otolaryngology  Progress Note    Chief Complaint:  Hemoptysis    SUBJECTIVE: Patient denies any acute events overnight.  He says that he has not had any hemoptysis overnight. He rates his pain as a 5/10. He says that swallowing clear liquids is going well and is eager to advance to a pureed trial. He denies SOB, thick mucus, fevers, and chills.    A complete multi-organ review of systems was performed using a new patient questionnaire, and reviewed by me.  ENT:  negative except as noted in HPI  CONSTITUTIONAL:  negative except as noted in HPI  EYES:  negative except as noted in HPI  RESPIRATORY:  negative except as noted in HPI  CARDIOVASCULAR:  negative except as noted in HPI  GASTROINTESTINAL:  negative except as noted in HPI  GENITOURINARY:  negative except as noted in HPI  MUSCULOSKELETAL:  negative except as noted in HPI  SKIN:  negative except as noted in HPI  ENDOCRINE/METABOLIC: negative except as noted in HPI  HEMATOLOGIC/LYMPHATIC:  negative except as noted in HPI  ALLERGY/IMMUN: negative except as noted in HPI  NEUROLOGICAL:  negative except as noted in HPI  BEHAVIOR/PSYCH:  negative except as noted in HPI    OBJECTIVE      Physical  VITALS:  /77   Pulse 82   Temp 98.2 °F (36.8 °C) (Oral)   Resp 19   Ht 1.676 m (5' 6\")   Wt 55.5 kg (122 lb 5.7 oz)   SpO2 94%   BMI 19.75 kg/m²     Patient resting comfortably in hospital bed without acute respiratory distress.  Patient is A+O x 3, cooperative, and aphonic. Stable bilateral submandibular swelling right greater than left.  Right posterior neck flap with minimal swelling and improved tenderness per patient.  Incisions well-healing without crusting noted.  Elena tube secured in place with neck ties with some dried mucus.  Elena tube was removed and cleaned under hot water and replaced.  Sternal edges intact.  No PETER drain output out of either drain.    Data  CBC with Differential:    Lab Results   Component Value Date/Time    WBC 12.6  PCR N/A   Resistant gene kpc by PCR N/A   Resistant gene meca/c & mrej by PCR Detected Abnormal    Resistant gene ndm by PCR N/A   Resistant gene oxa-48-like by pcr N/A   Resistant gene vim by PCR N/A   Chlamydia pneumoniae By PCR Not Detected   Legionella pneumophilia by PCR Not Detected   Mycoplasma pneumoniae by PCR Not Detected   Adenovirus by PCR Detected Abnormal    Non-SARS Coronavirus Not Detected   Metapneumovirus by PCR Not Detected   Rhinovirus Enterovirus PCR Not Detected   Influenza A by PCR Not Detected   Influenza B by PCR Not Detected   Parainfluenza virus by PCR Not Detected   Respiratory Syncytial Virus by PCR Not Detected       Radiology Review:      Xray Result (most recent):  XR CHEST PORTABLE 04/04/2025    Narrative  PROCEDURE: XR CHEST PORTABLE    CLINICAL INFORMATION: upper rigt lobe opacity    COMPARISON: 3/28/2025    TECHNIQUE: A single mobile view of the chest was obtained.    Impression  1. Normal heart size. No effusion.  2. Moderate focal atelectasis/pneumonia right upper lobe and apex with a  questionable small developing cavitation.  3. Appearance slightly worse than prior.        **This report has been created using voice recognition software.  It may contain  minor errors which are inherent in voice recognition technology.**    Electronically signed by Dr. Fredo Strickland       Inpatient Medications  Current Facility-Administered Medications: lactobacillus (CULTURELLE) capsule 1 capsule, 1 capsule, Oral, Daily with breakfast  loperamide (IMODIUM) 1 MG/5ML solution 2 mg, 2 mg, Oral, 4x Daily PRN  vancomycin (VANCOCIN) 750 mg in sodium chloride 0.9 % 250 mL IVPB, 750 mg, IntraVENous, Q12H  pantoprazole (PROTONIX) injection 40 mg, 40 mg, IntraVENous, Daily  miconazole (MICOTIN) 2 % powder, , Topical, BID  ipratropium 0.5 mg-albuterol 2.5 mg (DUONEB) nebulizer solution 1 Dose, 1 Dose, Inhalation, Q4H WA RT  [Held by provider] amLODIPine (NORVASC) tablet 5 mg, 5 mg, Oral, Daily  [Held by

## 2025-04-04 NOTE — PLAN OF CARE
Problem: Discharge Planning  Goal: Discharge to home or other facility with appropriate resources  Outcome: Progressing     Problem: Skin/Tissue Integrity  Goal: Skin integrity remains intact  Description: 1.  Monitor for areas of redness and/or skin breakdown  2.  Assess vascular access sites hourly  3.  Every 4-6 hours minimum:  Change oxygen saturation probe site  4.  Every 4-6 hours:  If on nasal continuous positive airway pressure, respiratory therapy assess nares and determine need for appliance change or resting period  Outcome: Progressing     Problem: ABCDS Injury Assessment  Goal: Absence of physical injury  Outcome: Progressing     Problem: Safety - Adult  Goal: Free from fall injury  Outcome: Progressing     Problem: Pain  Goal: Verbalizes/displays adequate comfort level or baseline comfort level  Outcome: Progressing     Problem: Nutrition Deficit:  Goal: Optimize nutritional status  Outcome: Progressing  Flowsheets (Taken 4/4/2025 1140 by Schwab, Allison C, RD, LD)  Nutrient intake appropriate for improving, restoring, or maintaining nutritional needs:   Monitor oral intake, labs, and treatment plans   Assess nutritional status and recommend course of action   Recommend appropriate diets, oral nutritional supplements, and vitamin/mineral supplements   Recommend, monitor, and adjust tube feedings and TPN/PPN based on assessed needs

## 2025-04-04 NOTE — PROGRESS NOTES
Hospitalist Progress Note  Internal Medicine Resident      Patient: Masood Randhawa 74 y.o. 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 Bauer's on 3/14/2025.  Patient reports copious secretions from his mouth and tracheostomy over the past week, with notes of grossly bloody secretions.  Right lateral posterior neck is edematous and erythematous upon evaluation.  CT soft tissue neck with contrast shows a fluid collection with air fluid level and enhancing margin in the posterior lateral right neck suspicious for abscess.  Afebrile.  No leukocytosis.  Per ENT, important to note patient is a total laryngectomy patients, not tracheostomy.  He cannot be intubated orally.  Please contact ENT on-call if there are any questions concerning patient's airway.  Keep head of bed elevated no lower than 45 degrees  IR consulted for pigtail insertion into right lateral posterior neck, s/p 4/2  Clear liquid diet, may advance to full liquid diet if tolerating  Continue to monitor for signs and symptoms of infection  Continue vancomycin and Zosyn, will de-escalate based on clinical condition and cultures     RUL Pneumonia: Likely secondary to aspiration pneumonia. Seen on CT chest on 4/1/2025 revealing emphysematous changes throughout both lung fields, superimposed 8.2 x 6.2 cm abnormal density in right upper lobe consistent with inflammatory changes, worse than previous study.  Afebrile.  No leukocytosis.  Patient reports recent coughing and chills.  Patient denies any recent fevers or shakes.  No reports of wheezing.  SpO2 95% on room air.  Pulmonology consulted  ID consulted  S/p bronchoscopy 4/3, BAL revealed positive MRSA and adenovirus, culture pending  Continue  sitting upright in chair.  He does not report any worsening pain in his right lateral posterior neck region.  Erythema still noted.  Patient denied any shortness of breath chest pain, nausea or abdominal pain.  Will continue Imodium suspension, patient reports diarrhea is improving.    HPI / Hospital Course:  Masood Randhawa is a 74 y.o. male with PMHx of COPD, hypertension, BPH and recent total bilateral laryngectomy with radical neck dissection on 3/14/2025 who presents to [facility] with reports of copious secretions with notes of intermittent bloody secretions.  His hospitalization was complicated with neck and parotid swelling and required delay gentle IV and biotics for hospital-acquired pneumonia.  Patient states that he has also been experiencing some chills, and has had to cover himself with several blankets to stay warm.  He is accompanied by his wife and states that he has not been experiencing any fevers.  Dr. Bauer was consulted in the ED and evaluated patient.  Recommending IR consult for pigtail into right lateral posterior neck.     ED course: Started vancomycin and Zosyn in ED.  Received morphine 4 mg IV, Zofran 4 mg IV.    Medications:    Infusion Medications    sodium chloride      Scheduled Medications    lactobacillus  1 capsule Oral Daily with breakfast    vancomycin  750 mg IntraVENous Q12H    pantoprazole  40 mg IntraVENous Daily    miconazole   Topical BID    ipratropium 0.5 mg-albuterol 2.5 mg  1 Dose Inhalation Q4H WA RT    [Held by provider] amLODIPine  5 mg Oral Daily    [Held by provider] aspirin  81 mg Oral Daily    lisinopril  20 mg Oral Daily    piperacillin-tazobactam  3,375 mg IntraVENous Q8H    sodium chloride flush  5-40 mL IntraVENous 2 times per day    [Held by provider] enoxaparin  40 mg SubCUTAneous Daily    vancomycin (VANCOCIN) intermittent dosing (placeholder)   Other RX Placeholder    PRN Meds: Loperamide HCl, sodium chloride flush, sodium chloride,

## 2025-04-04 NOTE — CONSULTS
oriented        LABS:     CBC:   Recent Labs     04/02/25  0725 04/03/25  0358 04/04/25 0425   WBC 10.8 10.7 12.6*   HGB 8.6* 8.7* 9.2*   * 495* 503*     BMP:    Recent Labs     04/02/25  0725 04/03/25  0358 04/04/25 0425    136 136   K 4.9 5.3* 4.9    101 101   CO2 26 26 26   BUN 18 15 9   CREATININE 0.7 0.7 0.7   GLUCOSE 108 102 91     Calcium:  Recent Labs     04/04/25 0425   CALCIUM 9.0     Ionized Calcium:Invalid input(s): \"IONCA\"  Magnesium:  Recent Labs     04/03/25 0358   MG 2.3     Phosphorus:No results for input(s): \"PHOS\" in the last 72 hours.  BNP:No results for input(s): \"BNP\" in the last 72 hours.  Glucose:No results for input(s): \"POCGLU\" in the last 72 hours.  HgbA1C: No results for input(s): \"LABA1C\" in the last 72 hours.  INR: No results for input(s): \"INR\" in the last 72 hours.  Hepatic:   Recent Labs     04/01/25  1341   ALKPHOS 104   ALT 13   AST 20   BILITOT <0.2*        UA: No results for input(s): \"SPECGRAV\", \"PHUR\", \"COLORU\", \"CLARITYU\", \"MUCUS\", \"PROTEINU\", \"BLOODU\", \"RBCUA\", \"WBCUA\", \"BACTERIA\", \"NITRU\", \"GLUCOSEU\", \"BILIRUBINUR\", \"UROBILINOGEN\", \"KETUA\", \"LABCAST\", \"LABCASTTY\", \"AMORPHOS\" in the last 72 hours.    Invalid input(s): \"CRYSTALS\"      IMAGING:    Micro:   Lab Results   Component Value Date/Time    BC No growth 24 hours. No growth 48 hours. 04/01/2025 02:00 PM       Problem list of patient      Patient Active Problem List   Diagnosis Code    Primary hypertension I10    Hypercholesteremia E78.00    PVD (peripheral vascular disease) I73.9    Low back pain M54.50    ED (erectile dysfunction) N52.9    OA (osteoarthritis), multiple joints M19.90    BPH (benign prostatic hyperplasia) N40.0    Medication monitoring encounter Z51.81    Tobacco smoke exposure Z77.22    IFG (impaired fasting glucose) R73.01    Compression fracture of L4 lumbar vertebra, age indeterminate. S32.040A    Foraminal stenosis of lumbosacral region. L3-4 to L5-S1 due to herniated disc.  care.    Thee Isabel MD, 4/4/2025 10:36 AM

## 2025-04-04 NOTE — PROGRESS NOTES
right upper and left lower lung atelectasis/infiltrate.     Currently pt does not report fever, SOB.   Lab only showing mild elevated CRP.       He is having shortness of breath: No      He is having cough: Yes  Duration of cough: for weeks.   His cough is associated with sputum production: Yes   The sputum color: dark/green/yellow in the morning. Whitish in the afternoon  Hemoptysis:Yes  Diurnal variation: None.     Worse in the morning, in the middle of the day, in the afternoon, and in the evening  Relieving factors: No  Aggravating factors: No      He is having chest pain:No    Past 24 hours subjective   -Pt was seen and evaluated at bedside. No acute event overnight  - has no complains  - On Trach mask, FiO2 28 with O2 flow rate of 5, SpO2 95.   - Had bronchoscopy on 4/3    All other system reviewed    PMHx   Past Medical History      Diagnosis Date    BPH (benign prostatic hyperplasia)     Chronic back pain     Chronic obstructive pulmonary disease (HCC) 3/15/2025    COPD (chronic obstructive pulmonary disease) (HCC)     Erectile dysfunction     Gastroesophageal reflux disease with apnea without esophagitis 01/10/2025    Head and neck cancer (HCC) 02/08/2025    Hypertension     Osteoarthritis     Pt denies    Personal history of colonic polyps 2006    Pneumonia     3/2025    PVD (peripheral vascular disease)     PVD (peripheral vascular disease) 02/14/2012      Past Surgical History        Procedure Laterality Date    BRONCHOSCOPY N/A 4/3/2025    BRONCHOSCOPY, BAL WITH WASHING FLUOROSCOPY performed by Angela Reynoso MD at [facility] ENDOSCOPY    CARPAL TUNNEL RELEASE Bilateral     2010    COLONOSCOPY  07/19/2006    IR FLUORO GUIDED GASTROSTOMY TUBE INSERTION PERC W CONTRAST  02/14/2025    IR FLUORO GUIDED GASTROSTOMY TUBE INSERTION PERC W CONTRAST 2/14/2025 [facility] SPECIAL PROCEDURES    LARYNGECTOMY Bilateral 3/14/2025    Total Bilateral Laryngectomy with radical neck dissection. Left Selective Neck  encounter time involved with patient care by the Pulmonary & Critical care service team spent by me.    Please see my modifications mentioned below:  He feels much better  No more hemoptysis  Infectious disease consultation by Dr. Thee Isabel MD appreciated  Follow-up pending [facility] cultures    Electronically signed by   Angela Reynoso MD on 4/4/2025 at 3:15 PM

## 2025-04-04 NOTE — PROGRESS NOTES
[facility]   Pharmacy Pharmacokinetic Monitoring Service - Vancomycin    Indication: SSTI  Target Concentration: Goal AUC/JACQUES 400-600 mg*hr/L  Day of Therapy: 4  Additional Antimicrobials: Zosyn    Pertinent Laboratory Values:   Wt Readings from Last 1 Encounters:   04/01/25 55.5 kg (122 lb 5.7 oz)     Temp Readings from Last 1 Encounters:   04/04/25 98.2 °F (36.8 °C) (Oral)     Estimated Creatinine Clearance: 72 mL/min (based on SCr of 0.7 mg/dL).  Recent Labs     04/03/25  0358 04/04/25  0425   CREATININE 0.7 0.7   BUN 15 9   WBC 10.7 12.6*     Pertinent Cultures:  Date Source Results   4/2/25 Neck fluid No growth, few segs, no bacteria on gram stain    4/3/25 Pneumonia panel MRSA, adenovirus   4/3/25 BAL resp culture  Sent    MRSA Nasal Swab: culture MRSA screening negative on 3/14/25, MRSA PCR screen positive on 3/5/25     Recent vancomycin administrations                     vancomycin (VANCOCIN) 750 mg in sodium chloride 0.9 % 250 mL IVPB (mg) 750 mg New Bag 04/03/25 2144     750 mg New Bag  0916    vancomycin (VANCOCIN) 1,000 mg in sodium chloride 0.9 % 250 mL IVPB ([id number]) (mg) 1,000 mg New Bag 04/02/25 1939     1,000 mg New Bag  0631    vancomycin (VANCOCIN) 1250 mg in sodium chloride 0.9% 250 mL IVPB (mg) 1,250 mg New Bag 04/01/25 1727                  Assessment:  Date/Time Current Dose Concentration Timing of Concentration AUC C trough,ss   4/4/25 0425 750 mg Q12H 21.9 ~ 6.5 hr 543 16.8   Note: Serum concentrations collected for AUC dosing may appear elevated if collected in close proximity to the dose administered, this is not necessarily an indication of toxicity    4/2 right neck and left submandibular drain placed    Plan:  Current dosing regimen is therapeutic  Continue current dose of 750 mg Q12H  Repeat vancomycin concentration not ordered at this time  Pharmacy will monitor renal function and adjust therapy as indicated.    Thank you for the consult,  Lou Strauss PharmD  4/4/2025 7:14 AM

## 2025-04-05 ASSESSMENT — PAIN DESCRIPTION - LOCATION
LOCATION: NECK

## 2025-04-05 ASSESSMENT — PAIN SCALES - GENERAL
PAINLEVEL_OUTOF10: 7
PAINLEVEL_OUTOF10: 5
PAINLEVEL_OUTOF10: 3
PAINLEVEL_OUTOF10: 0
PAINLEVEL_OUTOF10: 5
PAINLEVEL_OUTOF10: 6
PAINLEVEL_OUTOF10: 0

## 2025-04-05 ASSESSMENT — PAIN DESCRIPTION - FREQUENCY: FREQUENCY: CONTINUOUS

## 2025-04-05 ASSESSMENT — PAIN DESCRIPTION - DESCRIPTORS
DESCRIPTORS: ACHING
DESCRIPTORS: ACHING
DESCRIPTORS: ACHING;DISCOMFORT

## 2025-04-05 ASSESSMENT — PAIN SCALES - WONG BAKER
WONGBAKER_NUMERICALRESPONSE: NO HURT
WONGBAKER_NUMERICALRESPONSE: NO HURT

## 2025-04-05 ASSESSMENT — PAIN DESCRIPTION - PAIN TYPE: TYPE: SURGICAL PAIN

## 2025-04-05 ASSESSMENT — PAIN DESCRIPTION - ORIENTATION
ORIENTATION: RIGHT;LEFT
ORIENTATION: MID
ORIENTATION: RIGHT;LEFT

## 2025-04-05 ASSESSMENT — PAIN DESCRIPTION - ONSET: ONSET: ON-GOING

## 2025-04-05 NOTE — PLAN OF CARE
Problem: Respiratory - Adult  Goal: Patient's breath sounds will be clear and equal  Outcome: Progressing    Patient lung sounds are considered normal for their current lung condition. No signs of distress noted. Current treatment regimen appropriate      Patient mutually agreed on goals.

## 2025-04-05 NOTE — PROGRESS NOTES
Progress note: Infectious diseases    Patient - Masood Randhawa,  Age - 74 y.o.    - 1950      Room Number - 4K-10/010-A   MRN -  [id number]   [id number]  Date of Admission -  2025  1:04 PM    SUBJECTIVE:   No new issues.  OBJECTIVE   VITALS    height is 1.676 m (5' 6\") and weight is 55.5 kg (122 lb 5.7 oz). His oral temperature is 98.7 °F (37.1 °C). His blood pressure is 130/69 and his pulse is 92. His respiration is 20 and oxygen saturation is 100%.       Wt Readings from Last 3 Encounters:   25 55.5 kg (122 lb 5.7 oz)   25 54.3 kg (119 lb 9.6 oz)   25 56.9 kg (125 lb 7.1 oz)       I/O (24 Hours)    Intake/Output Summary (Last 24 hours) at 2025 1350  Last data filed at 2025 1146  Gross per 24 hour   Intake 2087.52 ml   Output 1700 ml   Net 387.52 ml       General Appearance  Awake, alert, oriented, chronically sick looking  HEENT - normocephalic, atraumatic, pale  conjunctiva,  anicteric sclera  Neck -  drains on the neck  Lungs -  Bilateral  air entry, + rhonchi,  Cardiovascular - Heart sounds are normal.    Abdomen - soft, not distended, nontender, peg tube   Neurologic -awake and oriented  Skin - No bruising or bleeding  Extremities - No edema, no cyanosis, clubbing     MEDICATIONS:      fluconazole  200 mg IntraVENous Once    [START ON 2025] fluconazole  400 mg IntraVENous Q24H    lactobacillus  1 capsule Oral Daily with breakfast    vancomycin  750 mg IntraVENous Q12H    pantoprazole  40 mg IntraVENous Daily    miconazole   Topical BID    ipratropium 0.5 mg-albuterol 2.5 mg  1 Dose Inhalation Q4H WA RT    [Held by provider] amLODIPine  5 mg Oral Daily    aspirin  81 mg Oral Daily    lisinopril  20 mg Oral Daily    piperacillin-tazobactam  3,375 mg IntraVENous Q8H    sodium chloride flush  5-40 mL IntraVENous 2 times per day    enoxaparin  40 mg SubCUTAneous Daily     M48.07    Left leg weakness R29.898    Uncomplicated alcohol dependence ([facility]), 8-10 beers daily. F10.20    OAB (overactive bladder) N32.81    Muscular deconditioning R29.898    Tendinopathy of left biceps tendon M67.922    Injury of left shoulder S49.92XA    Full thickness rotator cuff tear M75.120    Hoarseness of voice R49.0    History of smoking greater than 50 pack years Z87.891    Unintentional weight loss R63.4    Night sweats R61    Abnormal findings on laryngoscopy R09.89    Gastroesophageal reflux disease K21.9    Supraglottic airway obstruction J38.6    Lymphadenopathy of head and neck region R59.0    Head and neck cancer ([facility]) C76.0    Failure to thrive in adult R62.7    Tachycardia R00.0    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    Severe malnutrition E43    Acute on chronic respiratory failure with hypoxia J96.21    Cancer of glottis ([facility]) C32.0    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    Acute respiratory failure with hypoxia J96.01    SIRS (systemic inflammatory response syndrome) ([facility]) R65.10    Diarrhea R19.7    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    Status post tracheostomy ([facility]) Z93.0    Physical deconditioning R53.81    Hyponatremia E87.1    Chronic obstructive pulmonary disease ([facility]) J44.9    Status post laryngectomy Z90.02    Pneumonia of right upper lobe due to infectious organism J18.9    Malnutrition E46    Macrocytic anemia D53.9    Rash R21    Postoperative or surgical complication, initial encounter T81.9XXA    Abnormal CT of the chest R93.89         ASSESSMENT/PLAN   Pneumonia involving the right upper lobe  Hx of head and neck cancer : has drains, culture +ve for candida likely colonized drains. Need to consider removing the drains  Squamous cell cancer s/p surgery  Malnutrition  Continue current treatment      Thee Isabel MD, 4/5/2025 1:50 PM

## 2025-04-05 NOTE — PROGRESS NOTES
Department of Otolaryngology  Progress Note    Chief Complaint:  Hemoptysis     SUBJECTIVE:  Patient denies any acute events overnight.  He says that he has not had any hemoptysis overnight. He rates his pain as a 5/10. He says that his dysphagia diet of pureeds is going well. He denies SOB, thick mucus, fevers, and chills.     A complete multi-organ review of systems was performed using a new patient questionnaire, and reviewed by me.  ENT:  negative except as noted in HPI  CONSTITUTIONAL:  negative except as noted in HPI  EYES:  negative except as noted in HPI  RESPIRATORY:  negative except as noted in HPI  CARDIOVASCULAR:  negative except as noted in HPI  GASTROINTESTINAL:  negative except as noted in HPI  GENITOURINARY:  negative except as noted in HPI  MUSCULOSKELETAL:  negative except as noted in HPI  SKIN:  negative except as noted in HPI  ENDOCRINE/METABOLIC: negative except as noted in HPI  HEMATOLOGIC/LYMPHATIC:  negative except as noted in HPI  ALLERGY/IMMUN: negative except as noted in HPI  NEUROLOGICAL:  negative except as noted in HPI  BEHAVIOR/PSYCH:  negative except as noted in HPI    OBJECTIVE      Physical  VITALS:  /69   Pulse 92   Temp 98.7 °F (37.1 °C) (Oral)   Resp 18   Ht 1.676 m (5' 6\")   Wt 55.5 kg (122 lb 5.7 oz)   SpO2 100%   BMI 19.75 kg/m²     Patient resting comfortably in hospital bed without acute respiratory distress.  Patient is A+O x 3, cooperative, and aphonic. Stable bilateral submandibular swelling right greater than left.  Right posterior neck flap with minimal swelling and improved tenderness per patient.  Incisions well-healing without crusting noted.  Elena tube secured in place with neck ties with some wet mucus which was suctioned  Elena tube was removed, cleaned under hot water, and replaced.  Sternal edges intact.  No PETER drain output out of either drain noted.    Data  CBC with Differential:    Lab Results   Component Value Date/Time    WBC 11.4 04/05/2025  \"trach\" mask; previously discussed rationale again with patient why he needs humidification as a total laryngectomy patient.  Patient reported understanding and agreed to wear.  Okay for RT to use saline down laryngectomy and suction for thick secretions.  - No specific wound care needed, just keep clean and dry.  May use saline moistened cotton tip applicators to clean crusting around stoma.  May remove laryngectomy tube as needed to clean under running water and use brush to ensure lumen is patent.    - Patient needs to ambulate in room and in rose.  This will help his strength/endurance as well as assist with moving fluid to reduce swelling. PT/OT consulted.  - ST consulted.  Okay to work with TEP and electrolarynx for communication.  He is likely to need a different size TEP as post op swelling resolves - this will be determined at future time.  - Head of bed no lower than 45 degrees at all times.  - S/p bronchoscopy 4/3 with Dr Reynoso. BAL + MRSA and adenovirus, culture pending  - ID c/s, appreciate assistance with antibiotic therapy  - Signage above door with reminder that patient CANNOT be intubated orally if he were to need positive pressure ventilation.  He is a NECK BREATHER only.     **VERY IMPORTANT**   Patient is a total laryngectomy patient, NOT tracheostomy.  The following diagram shows the difference in airway anatomy.  PLEASE contact ENT on call with any questions or concerns regarding the patient's airway anatomy.             Electronically signed by Cathryn Esparza PA-C on 4/5/2025 at 3:28 PM

## 2025-04-05 NOTE — PROGRESS NOTES
Hospitalist Progress Note  Internal Medicine Resident      Patient: Masood Randhawa 74 y.o. 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 Bauer's on 3/14/2025.  Patient reports copious secretions from his mouth and tracheostomy over the past week, with notes of grossly bloody secretions.  Right lateral posterior neck is edematous and erythematous upon evaluation.  CT soft tissue neck with contrast shows a fluid collection with air fluid level and enhancing margin in the posterior lateral right neck suspicious for abscess.  Afebrile.  No leukocytosis.  Per ENT, Important to note patient is a total laryngectomy patient, not tracheostomy.  He cannot be intubated orally.  Please contact ENT on-call if there are any questions concerning patient's airway.  ENT consulted  Keep head of bed elevated no lower than 45 degrees  IR consulted 4/2 for pigtail insertion into right lateral posterior neck, plan to pull drains 4/7  Continue puréed diet   Continue tube feeds if not able to finish at least half of food on plate  continue to monitor for signs and symptoms of infection  Antibiotics discontinued 4/6, vancomycin 4/1-4/5, Zosyn 4/1-4/6     RUL Pneumonia: Likely secondary to aspiration pneumonia. Seen on CT chest on 4/1/2025 revealing emphysematous changes throughout both lung fields, superimposed 8.2 x 6.2 cm abnormal density in right upper lobe consistent with inflammatory changes, worse than previous study.  Afebrile.  No leukocytosis.  Patient reports recent coughing and chills.  Patient denies any recent fevers or shakes.  No reports of wheezing.  SpO2 95% on room air.  Pulmonology consulted, ID consulted  S/p bronchoscopy 4/3, BAL revealed positive MRSA and  adenovirus  Culture shows light Candida albicans growth  Antibiotics discontinued 4/6  Continue fluconazole  Follow fluid analysis and cultures  Incentive spirometry  Closely monitor respiratory status, initiate oxygen supplementation via nasal cannula to maintain SpO2 between 88-94% if patient experiences desaturation  Continue DuoNebs 3 times daily, Continue 3% sodium chloride nebulizer as needed for throat/airway secretions  Continue chest physiotherapy  CT chest with contrast in 3 months     Tinea Cruris: Patient complained of burning, pruritic sensation in groin and scrotum. On physical examination, scrotum, groin and glans appeared erythematous. No scrotal edema noted.  Continue miconazole 2% powder twice daily  Will continue to monitor for improvement    Chronic diarrhea: Patient complained of non-bloody diarrhea, that is present at baseline. States he takes Imodium at home which relieves his symptoms.   Continue Imodium twice daily    Deconditioning: Underwent procedure on 3/14.  PT/OT consulted.     Malnutrition: Related to clinical course.  Dietitian consulted, appreciate recommendations.     Chronic Conditions (reviewed and stable unless otherwise stated)   COPD: Not in exacerbation.  Continue DuoNebs.  Hypertension: Continue home antihypertensives.  BPH: Noted per chart review.  Not on medication.  Bladder scans daily.  GERD: Continue Protonix    LDA: []CVC / []PICC / []Midline / []Rousseau / []Drains / []Mediport / []None  Antibiotics: Vancomycin, Zosyn  Steroids: None  Labs (still needed?): []Yes / [x]No  IVF (still needed?): []Yes / [x]No    Level of care: O Step Down / []Med-Surg  Bed Status: [x]Inpatient / []Observation  Telemetry: []Yes / [x]No  PT/OT: [x]Yes / []No    DVT Prophylaxis: [] Lovenox / [] Heparin / [x] SCDs / [] Already on Systemic Anticoagulation / [] None     Expected discharge date:  Pending  Disposition: Pending     Code status: Full Code

## 2025-04-05 NOTE — PLAN OF CARE
Problem: Respiratory - Adult  Goal: Patient's breath sounds will be clear and equal  4/5/2025 0836 by Elizabeth Fallon, RCGONZALEZ  Outcome: Progressing    Continue tx's and O2 to improve breath sounds, increase aeration and SpO2, and decrease WOB.

## 2025-04-06 ASSESSMENT — PAIN DESCRIPTION - LOCATION
LOCATION: NECK

## 2025-04-06 ASSESSMENT — PAIN DESCRIPTION - PAIN TYPE: TYPE: SURGICAL PAIN

## 2025-04-06 ASSESSMENT — PAIN DESCRIPTION - ORIENTATION
ORIENTATION: RIGHT;LEFT
ORIENTATION: RIGHT;LEFT
ORIENTATION: MID
ORIENTATION: RIGHT;LEFT

## 2025-04-06 ASSESSMENT — PAIN SCALES - GENERAL
PAINLEVEL_OUTOF10: 7
PAINLEVEL_OUTOF10: 3
PAINLEVEL_OUTOF10: 3
PAINLEVEL_OUTOF10: 6
PAINLEVEL_OUTOF10: 0
PAINLEVEL_OUTOF10: 3
PAINLEVEL_OUTOF10: 6
PAINLEVEL_OUTOF10: 3
PAINLEVEL_OUTOF10: 8
PAINLEVEL_OUTOF10: 6

## 2025-04-06 ASSESSMENT — PAIN - FUNCTIONAL ASSESSMENT
PAIN_FUNCTIONAL_ASSESSMENT: ACTIVITIES ARE NOT PREVENTED

## 2025-04-06 ASSESSMENT — PAIN DESCRIPTION - DESCRIPTORS
DESCRIPTORS: ACHING;DISCOMFORT;SORE
DESCRIPTORS: ACHING
DESCRIPTORS: ACHING
DESCRIPTORS: ACHING;DISCOMFORT

## 2025-04-06 ASSESSMENT — PAIN DESCRIPTION - ONSET: ONSET: ON-GOING

## 2025-04-06 ASSESSMENT — PAIN DESCRIPTION - FREQUENCY: FREQUENCY: CONTINUOUS

## 2025-04-06 NOTE — PLAN OF CARE
Problem: Respiratory - Adult  Goal: Patient's breath sounds will be clear and equal  Outcome: Progressing   Continue tx's and O2 to improve breath sounds, increase aeration and SpO2, and decrease WOB. Pt mutually agrees with goals.

## 2025-04-06 NOTE — PROGRESS NOTES
Progress note: Infectious diseases    Patient - Masood Randhawa,  Age - 74 y.o.    - 1950      Room Number - 4K-10/010-A   MRN -  [id number]   [facility] # - [id number]  Date of Admission -  2025  1:04 PM    SUBJECTIVE:   No new complaints  BAL negative for bacteria  OBJECTIVE   VITALS    height is 1.676 m (5' 6\") and weight is 60.7 kg (133 lb 13.1 oz). His oral temperature is 98.6 °F (37 °C). His blood pressure is 133/63 and his pulse is 98. His respiration is 20 and oxygen saturation is 97%.       Wt Readings from Last 3 Encounters:   25 60.7 kg (133 lb 13.1 oz)   25 54.3 kg (119 lb 9.6 oz)   25 56.9 kg (125 lb 7.1 oz)       I/O (24 Hours)    Intake/Output Summary (Last 24 hours) at 2025 0849  Last data filed at 2025 0309  Gross per 24 hour   Intake 550 ml   Output 1700 ml   Net -1150 ml       General Appearance  Awake, alert, oriented, chronically sick looking  HEENT - normocephalic, atraumatic, pale  conjunctiva,  anicteric sclera  Neck -  drains on the neck has TM  Lungs -  Bilateral  air entry, + rhonchi,  Cardiovascular - Heart sounds are normal.    Abdomen - soft, not distended, nontender, peg tube   Neurologic -awake and oriented  Skin - No bruising or bleeding  Extremities - No edema, no cyanosis, clubbing     MEDICATIONS:      fluconazole  400 mg IntraVENous Q24H    lactobacillus  1 capsule Oral Daily with breakfast    vancomycin  750 mg IntraVENous Q12H    pantoprazole  40 mg IntraVENous Daily    miconazole   Topical BID    ipratropium 0.5 mg-albuterol 2.5 mg  1 Dose Inhalation Q4H WA RT    [Held by provider] amLODIPine  5 mg Oral Daily    aspirin  81 mg Oral Daily    lisinopril  20 mg Oral Daily    piperacillin-tazobactam  3,375 mg IntraVENous Q8H    sodium chloride flush  5-40 mL IntraVENous 2 times per day    enoxaparin  40 mg SubCUTAneous Daily    vancomycin (VANCOCIN)  I have personally performed a face to face diagnostic evaluation on this patient. I have reviewed the ACP note and agree with the history, exam and plan of care, except as noted.

## 2025-04-06 NOTE — PROGRESS NOTES
Department of Otolaryngology  Progress Note    Chief Complaint:  Hemoptysis    SUBJECTIVE:  Patient denies any acute events overnight.  He says that he has not had any hemoptysis or increased mucus. He says that his dysphagia diet of pureeds is going well. He denies SOB, thick mucus, fevers, and chills. He does feel more fatigued today and was refusing tube feeds. We discussed that pt needs to either eat half or more of his tray with ensure or utilize tube feeds. We also discussed need for pt to ambulate to ensure he is safe to go back home.    A complete multi-organ review of systems was performed using a new patient questionnaire, and reviewed by me.  ENT:  negative except as noted in HPI  CONSTITUTIONAL:  negative except as noted in HPI  EYES:  negative except as noted in HPI  RESPIRATORY:  negative except as noted in HPI  CARDIOVASCULAR:  negative except as noted in HPI  GASTROINTESTINAL:  negative except as noted in HPI  GENITOURINARY:  negative except as noted in HPI  MUSCULOSKELETAL:  negative except as noted in HPI  SKIN:  negative except as noted in HPI  ENDOCRINE/METABOLIC: negative except as noted in HPI  HEMATOLOGIC/LYMPHATIC:  negative except as noted in HPI  ALLERGY/IMMUN: negative except as noted in HPI  NEUROLOGICAL:  negative except as noted in HPI  BEHAVIOR/PSYCH:  negative except as noted in HPI    OBJECTIVE      Physical  VITALS:  /69   Pulse (!) 103   Temp (!) 100.5 °F (38.1 °C) (Oral)   Resp 23   Ht 1.676 m (5' 6\")   Wt 60.7 kg (133 lb 13.1 oz)   SpO2 96%   BMI 21.60 kg/m²     Patient resting comfortably in hospital bed without acute respiratory distress.  Patient is A+O x 3, cooperative, and aphonic. Submandibular swelling resolved.  Right posterior neck flap with minimal swelling and improved tenderness per patient.  Incisions well-healing, stomal crusting noted which was removed with saline soaked cotton swab.  Elena tube secured in place with neck ties with some wet mucus which  He is likely to need a different size TEP as post op swelling resolves - this will be determined at future time.  - Head of bed no lower than 45 degrees at all times.  - S/p bronchoscopy 4/3 with Dr Reynoso. BAL + MRSA and adenovirus, candidia albicans +  - ID c/s, appreciate assistance: D/c IV antibiotics and continue oral Diflucan.  - Signage above door with reminder that patient CANNOT be intubated orally if he were to need positive pressure ventilation.  He is a NECK BREATHER only.     **VERY IMPORTANT**   Patient is a total laryngectomy patient, NOT tracheostomy.  The following diagram shows the difference in airway anatomy.  PLEASE contact ENT on call with any questions or concerns regarding the patient's airway anatomy.               Management of patient's care was collaborated with Dr Daley today.         Electronically signed by Cathryn Esparza PA-C on 4/6/2025 at 4:30 PM

## 2025-04-06 NOTE — PLAN OF CARE
Problem: Discharge Planning  Goal: Discharge to home or other facility with appropriate resources  Outcome: Progressing  Flowsheets (Taken 4/5/2025 2356)  Discharge to home or other facility with appropriate resources: Identify barriers to discharge with patient and caregiver     Problem: Skin/Tissue Integrity  Goal: Skin integrity remains intact  Description: 1.  Monitor for areas of redness and/or skin breakdown  2.  Assess vascular access sites hourly  3.  Every 4-6 hours minimum:  Change oxygen saturation probe site  4.  Every 4-6 hours:  If on nasal continuous positive airway pressure, respiratory therapy assess nares and determine need for appliance change or resting period  Outcome: Progressing  Flowsheets  Taken 4/5/2025 2356 by Taylor Perdue RN  Skin Integrity Remains Intact: Monitor for areas of redness and/or skin breakdown    Problem: ABCDS Injury Assessment  Goal: Absence of physical injury  Outcome: Progressing  Flowsheets (Taken 4/5/2025 2356)  Absence of Physical Injury: Implement safety measures based on patient assessment     Problem: Safety - Adult  Goal: Free from fall injury  Outcome: Progressing  Flowsheets (Taken 4/5/2025 2356)  Free From Fall Injury: Instruct family/caregiver on patient safety     Problem: Pain  Goal: Verbalizes/displays adequate comfort level or baseline comfort level  Outcome: Progressing  Flowsheets (Taken 4/5/2025 2356)  Verbalizes/displays adequate comfort level or baseline comfort level:   Encourage patient to monitor pain and request assistance   Assess pain using appropriate pain scale   Administer analgesics based on type and severity of pain and evaluate response   Implement non-pharmacological measures as appropriate and evaluate response     Problem: Nutrition Deficit:  Goal: Optimize nutritional status  Outcome: Progressing  Flowsheets (Taken 4/5/2025 2356)  Nutrient intake appropriate for improving, restoring, or maintaining nutritional needs:   Assess  nutritional status and recommend course of action   Monitor oral intake, labs, and treatment plans   Recommend appropriate diets, oral nutritional supplements, and vitamin/mineral supplements   Order, calculate, and assess calorie counts as needed   Recommend, monitor, and adjust tube feedings and TPN/PPN based on assessed needs     Problem: Skin/Tissue Integrity - Adult  Goal: Skin integrity remains intact  Description: 1.  Monitor for areas of redness and/or skin breakdown  2.  Assess vascular access sites hourly  3.  Every 4-6 hours minimum:  Change oxygen saturation probe site  4.  Every 4-6 hours:  If on nasal continuous positive airway pressure, respiratory therapy assess nares and determine need for appliance change or resting period  Outcome: Progressing  Flowsheets  Taken 4/5/2025 2356 by Taylor Perdue RN  Skin Integrity Remains Intact: Monitor for areas of redness and/or skin breakdown     Problem: Gastrointestinal - Adult  Goal: Maintains adequate nutritional intake  Outcome: Progressing  Flowsheets (Taken 4/5/2025 2356)  Maintains adequate nutritional intake:   Monitor percentage of each meal consumed   Assist with meals as needed   Identify factors contributing to decreased intake, treat as appropriate   Monitor intake and output, weight and lab values     Problem: Infection - Adult  Goal: Absence of infection at discharge  Outcome: Progressing  Flowsheets (Taken 4/5/2025 2356)  Absence of infection at discharge:   Assess and monitor for signs and symptoms of infection   Monitor lab/diagnostic results   Monitor all insertion sites i.e., indwelling lines, tubes and drains   Monitor endotracheal (as able) and nasal secretions for changes in amount and color   Administer medications as ordered   Care plan reviewed with patient.  Patient verbalized understanding of the plan of care and contributed to goal setting.

## 2025-04-06 NOTE — PROGRESS NOTES
Hospitalist Progress Note  Internal Medicine Resident      Patient: Masood Randhawa 74 y.o. 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 Bauer's on 3/14/2025.  Patient reports copious secretions from his mouth and tracheostomy over the past week, with notes of grossly bloody secretions.  Right lateral posterior neck is edematous and erythematous upon evaluation.  CT soft tissue neck with contrast shows a fluid collection with air fluid level and enhancing margin in the posterior lateral right neck suspicious for abscess.  Afebrile.  No leukocytosis.  Per ENT, Important to note patient is a total laryngectomy patient, not tracheostomy.  He cannot be intubated orally.  Please contact ENT on-call if there are any questions concerning patient's airway.  ENT consulted  Keep head of bed elevated no lower than 45 degrees  IR consulted 4/2 for pigtail insertion into right lateral posterior neck, plan to pull drains 4/7  Continue puréed diet   Continue tube feeds if not able to finish at least half of food on plate  continue to monitor for signs and symptoms of infection  Antibiotics discontinued 4/6, vancomycin 4/1-4/5, Zosyn 4/1-4/6     RUL Pneumonia: Likely secondary to aspiration pneumonia. Seen on CT chest on 4/1/2025 revealing emphysematous changes throughout both lung fields, superimposed 8.2 x 6.2 cm abnormal density in right upper lobe consistent with inflammatory changes, worse than previous study.  Afebrile.  No leukocytosis.  Patient reports recent coughing and chills.  Patient denies any recent fevers or shakes.  No reports of wheezing.  SpO2 95% on room air.  Pulmonology consulted, ID consulted  S/p bronchoscopy 4/3, BAL revealed positive MRSA and     lisinopril  20 mg Oral Daily    sodium chloride flush  5-40 mL IntraVENous 2 times per day    enoxaparin  40 mg SubCUTAneous Daily    PRN Meds: sodium chloride flush, sodium chloride, potassium chloride **OR** potassium alternative oral replacement **OR** potassium chloride, magnesium sulfate, ondansetron **OR** ondansetron, polyethylene glycol, acetaminophen **OR** acetaminophen, morphine **OR** morphine, sodium chloride (Inhalant)    Exam:  /69   Pulse (!) 103   Temp (!) 100.5 °F (38.1 °C) (Oral)   Resp 23   Ht 1.676 m (5' 6\")   Wt 60.7 kg (133 lb 13.1 oz)   SpO2 96%   BMI 21.60 kg/m²   General: Trach collar in place, no bleeding noted  Eyes:  PERRL. Conjunctivae/corneas clear.  HENT: Head normal appearing. Nares normal. Oral mucosa moist.  Hearing intact.   Neck: Drain pulled 4/6, pain and erythema is improving.  Respiratory:  Normal effort. Clear to auscultation, without rales or wheezes or rhonchi.  Cardiovascular: Normal rate, regular rhythm with normal S1/S2 without murmurs.    No lower extremity edema.   Abdomen: Soft, non-tender, non-distended with normal bowel sounds.  Musculoskeletal: No joint swelling or tenderness. Normal tone. No abnormal movements.   Skin: Warm and dry. No rashes or lesions.  Neurologic:  No focal sensory/motor deficits in the upper or lower extremities. Cranial nerves:  grossly non-focal 2-12.     Psychiatric: Alert and oriented, normal insight and thought content.   Capillary Refill: Brisk,< 3 seconds.  Peripheral Pulses: +2 palpable, equal bilaterally.       Labs/Radiology: See chart or assessment above.     Electronically signed by Freddy Sellers MD on 4/6/2025 at 5:08 PM  Case was discussed with Attending, Dr. Estrada.

## 2025-04-07 ASSESSMENT — PAIN DESCRIPTION - LOCATION: LOCATION: NECK

## 2025-04-07 ASSESSMENT — PAIN DESCRIPTION - ORIENTATION: ORIENTATION: LEFT;RIGHT

## 2025-04-07 ASSESSMENT — PAIN SCALES - GENERAL: PAINLEVEL_OUTOF10: 6

## 2025-04-07 ASSESSMENT — PAIN - FUNCTIONAL ASSESSMENT: PAIN_FUNCTIONAL_ASSESSMENT: ACTIVITIES ARE NOT PREVENTED

## 2025-04-07 ASSESSMENT — PAIN DESCRIPTION - DESCRIPTORS: DESCRIPTORS: ACHING;SORE;DISCOMFORT

## 2025-04-07 NOTE — PROGRESS NOTES
Progress note: Infectious diseases    Patient - Masood Randhawa,  Age - 74 y.o.    - 1950      Room Number - 4K-10/010-A   MRN -  [id number]   [facility] # - [id number]  Date of Admission -  2025  1:04 PM    SUBJECTIVE:   No new issues. Had fever last night  OBJECTIVE   VITALS    height is 1.676 m (5' 6\") and weight is 60.7 kg (133 lb 13.1 oz). His oral temperature is 100.2 °F (37.9 °C). His blood pressure is 131/66 and his pulse is 98. His respiration is 20 and oxygen saturation is 94%.       Wt Readings from Last 3 Encounters:   25 60.7 kg (133 lb 13.1 oz)   25 54.3 kg (119 lb 9.6 oz)   25 56.9 kg (125 lb 7.1 oz)       I/O (24 Hours)    Intake/Output Summary (Last 24 hours) at 2025 1205  Last data filed at 2025 0354  Gross per 24 hour   Intake 590 ml   Output 1700 ml   Net -1110 ml       General Appearance  Awake, alert, oriented, chronically sick looking  HEENT - normocephalic, atraumatic, pale  conjunctiva,  anicteric sclera  Neck -  drains on the neck has TM  Lungs -  Bilateral  air entry, + rhonchi,  Cardiovascular - Heart sounds are normal.    Abdomen - soft, not distended, nontender, peg tube   Neurologic -awake and oriented  Skin - No bruising or bleeding  Extremities - No edema, no cyanosis, clubbing     MEDICATIONS:      loperamide  2 mg Oral BID    fluconazole  400 mg IntraVENous Q24H    lactobacillus  1 capsule Oral Daily with breakfast    pantoprazole  40 mg IntraVENous Daily    miconazole   Topical BID    ipratropium 0.5 mg-albuterol 2.5 mg  1 Dose Inhalation Q4H WA RT    [Held by provider] amLODIPine  5 mg Oral Daily    aspirin  81 mg Oral Daily    lisinopril  20 mg Oral Daily    sodium chloride flush  5-40 mL IntraVENous 2 times per day    enoxaparin  40 mg SubCUTAneous Daily      sodium chloride       sodium chloride flush, sodium chloride, potassium chloride **OR** potassium

## 2025-04-07 NOTE — PROGRESS NOTES
[facility]      Patient - Masood Randhawa   MRN -  [id number]   [facility] # - [id number]   - 1950      Date of Admission -  2025  1:04 PM  Date of evaluation -  2025  Room - -10/010-A   Hospital Day - 6  Consulting - Carolina Estrada MD Primary Care Physician - Samy Piper,      Problem List      Active Hospital Problems    Diagnosis Date Noted    Abnormal CT of the chest [R93.89] 2025    Postoperative or surgical complication, initial encounter [T81.9XXA] 2025    Tobacco smoke exposure [Z77.22] 2013     Reason for Consult    Abnormal density in RUL   HPI   History Obtained From: Patient wife and electronic medical record.    Masood Randhawa is a 74 y.o. male with PMHX of  COPD, HTN, BPH, recent b/l  laryngectomy with radical neck dissection on 3/14/2025 who presents to [facility] with reports of copious secretions with notes of intermittent bloody secretions.  Per wife pt produced thick, dark yellow-greenish mucus in the morning that progressively get whitish in color through out the day. Denies fever.     Apparently pt had Laryngoscopy with Right Transoral anterolateral laryngectomy, Advancement flap laryongoplasty with Rickie Daley MD on 2025 and Had Total Bilateral Laryngectomy with radical neck dissection. Left Selective Neck Dissection. Tracheoesphogeal Puncture Fistula Construction and TEP Placement. Right Hemithyroidectomy. - Bilateral with Rickie Daley MD on 3/14/2025.    CT chest on 25 noted no consolidation. PET scan on  did not report consolidation or pneumonia. However, CTA on 3/4/25 noted Extensive consolidation is noted in the right upper lobe and patchy reticular nodular opacities are seen in the right lower lobe and pt was started on antibiotics. Repeat CT chest showed persistent consolidation of the RUL.  Serial XR from 3/4/25 to 3/28 noted persistent and worsening  Bilateral Laryngectomy with radical neck dissection. Left Selective Neck Dissection. Tracheoesphogeal Puncture Fistula Construction and TEP Placement. Right Hemithyroidectomy. performed by Rickie Daley MD at [facility] [place]    LARYNGOSCOPY N/A 2025    Laryngoscopy with Right Transoral anterolateral laryngectomy, Advancement flap laryongoplasty performed by Rickie Daley MD at [facility] OR    LUMBAR FUSION      Dr. Malone at O     ROTATOR CUFF REPAIR  2021    US ASP/INJ THYROID CYST  2025    US ASP/INJ THYROID CYST 2025 [facility] ULTRASOUND     Meds    Current Medications    loperamide  2 mg Oral BID    fluconazole  400 mg IntraVENous Q24H    lactobacillus  1 capsule Oral Daily with breakfast    pantoprazole  40 mg IntraVENous Daily    miconazole   Topical BID    ipratropium 0.5 mg-albuterol 2.5 mg  1 Dose Inhalation Q4H WA RT    [Held by provider] amLODIPine  5 mg Oral Daily    aspirin  81 mg Oral Daily    lisinopril  20 mg Oral Daily    sodium chloride flush  5-40 mL IntraVENous 2 times per day    enoxaparin  40 mg SubCUTAneous Daily     sodium chloride flush, sodium chloride, potassium chloride **OR** potassium alternative oral replacement **OR** potassium chloride, magnesium sulfate, ondansetron **OR** ondansetron, polyethylene glycol, acetaminophen **OR** acetaminophen, morphine **OR** morphine, sodium chloride (Inhalant)  IV Drips/Infusions   sodium chloride       Home Medications  Medications Prior to Admission: [] cefdinir (OMNICEF) 300 MG capsule, Take 1 capsule by mouth 2 times daily for 7 days  [] azithromycin (ZITHROMAX Z-BRIGETTE) 250 MG tablet, Take 2 tablets (500 mg) on Day 1, and then take 1 tablet (250 mg) on days 2 through 5.  mupirocin (BACTROBAN) 2 % ointment, Apply to stoma with q tip TID.  sodium chloride nebulizer 0.9 % solution, Take 3 mLs by nebulization as needed (throat/ airway secretions)  ipratropium 0.5 mg-albuterol 2.5 mg (DUONEB) 0.5-2.5 (3) MG/3ML SOLN

## 2025-04-07 NOTE — PROGRESS NOTES
Department of Otolaryngology  Progress Note    Chief Complaint:   Hemoptysis (resolved),     SUBJECTIVE:  Patient noted with fever of 101 overnight.  He states that he does feel fatigue similar to yesterday.  He states that he is able to swallow puréed diet well but he just does not like the food.  We discussed that if he does not eat at least 3% or more of his breakfast tray that he will need to use the tube feeds.  Patient expressed understanding.  We also discussed that patient needs to continue to move MRI to ensure he will be safe to go home.  PT OT ordered.    A complete multi-organ review of systems was performed using a new patient questionnaire, and reviewed by me.  ENT:  negative except as noted in HPI  CONSTITUTIONAL:  negative except as noted in HPI  EYES:  negative except as noted in HPI  RESPIRATORY:  negative except as noted in HPI  CARDIOVASCULAR:  negative except as noted in HPI  GASTROINTESTINAL:  negative except as noted in HPI  GENITOURINARY:  negative except as noted in HPI  MUSCULOSKELETAL:  negative except as noted in HPI  SKIN:  negative except as noted in HPI  ENDOCRINE/METABOLIC: negative except as noted in HPI  HEMATOLOGIC/LYMPHATIC:  negative except as noted in HPI  ALLERGY/IMMUN: negative except as noted in HPI  NEUROLOGICAL:  negative except as noted in HPI  BEHAVIOR/PSYCH:  negative except as noted in HPI    OBJECTIVE      Physical  VITALS:  /66   Pulse 98   Temp 100.2 °F (37.9 °C) (Oral)   Resp 20   Ht 1.676 m (5' 6\")   Wt 60.7 kg (133 lb 13.1 oz)   SpO2 94%   BMI 21.60 kg/m²     Patient resting comfortably in hospital bed without acute respiratory distress.  Patient is A+O x 3, cooperative, and aphonic. Submandibular swelling resolved.  Right posterior neck flap with minimal swelling and improved tenderness per patient.  Incisions well-healing, stomal crusting noted which was removed as able with saline soaked cotton swab.  Elena tube secured in place with neck ties.   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 Will start Bacrtim while drains are in place.  - Tolerating trial puree diet. We discussed pt needs to eat half or more of his tray or needs TF.  - Patient to be on humidification per \"trach\" mask; previously discussed rationale again with patient why he needs humidification as a total laryngectomy patient.  Patient reported understanding and agreed to wear.  Okay for RT to use saline down laryngectomy and suction for thick secretions.  - No specific wound care needed, just keep clean and dry.  May use saline moistened cotton tip applicators to clean crusting around stoma.  May remove laryngectomy tube as needed to clean under running water and use brush to ensure lumen is patent.    - Patient needs to ambulate in room and in rose.  This will help his strength/endurance as well as assist with moving fluid to reduce swelling. PT/OT consulted.  - ST consulted.  Raghu to work with TEP and electrolarynx for communication.  He is likely to need a different size TEP as post op swelling resolves - this will be determined at future time.  - Head of bed no lower than 45 degrees at all times.  - S/p bronchoscopy 4/3 with Dr Reynoso. BAL + MRSA and adenovirus, candidia albicans +  - ID c/s, appreciate assistance: D/c IV antibiotics and continue oral  Diflucan.  - Signage above door with reminder that patient CANNOT be intubated orally if he were to need positive pressure ventilation.  He is a NECK BREATHER only.     **VERY IMPORTANT**   Patient is a total laryngectomy patient, NOT tracheostomy.  The following diagram shows the difference in airway anatomy.  PLEASE contact ENT on call with any questions or concerns regarding the patient's airway anatomy.           Management of patient's care was collaborated with Dr Daley today.       Electronically signed by Cathryn Esparza PA-C on 4/7/2025 at 10:20 AM

## 2025-04-07 NOTE — PROGRESS NOTES
(Jevity 1.5)  Schedule: Bolus  Feeding Regimen: 4/4/25: Bolus 237 mls Jevity 1.5 if patient consumes less than 50% of meals (3 meals).  Bolus 237 mls Jevity 1.5 at 8pm daily.  Additives/Modulars: None  Water Flushes: Recommend 50 mls free water flush before and 50 mls after each bolus feeding.  Current TF Provides: If all 4 boluses given daily~ 1422 kcals, 60 grams protein, 204 grams CHO, 20 grams fiber, 720 mls water in 948 mls volume (1348 mls with water flushes) per 24 hrs    Anthropometric Measures:  Height: 167.6 cm (5' 6\")  Ideal Body Weight (IBW): 142 lbs (65 kg)    Admission Body Weight: 55.5 kg (122 lb 5.7 oz) (4/1/25 trace LE edema)  Current Body Weight: 60.7 kg (133 lb 13.1 oz) ((4/6) trace RLE, LLE, facial edema),   IBW.    Current BMI (kg/m2): 21.6  Usual Body Weight:  (~170-180# 5 months ago with loss of 50#; per EMR: 5/12/15 182# 14oz, 1/29/21 163# 10oz, 12/19/23 149# 13oz, 8/13/24 135# 6oz, 12/9/24 129# 14oz, 2/9/24: 118# - standing scale, 3/21/24: 125#7.1oz)                          BMI Categories: Underweight (BMI less than 22) age over 65    Estimated Daily Nutrient Needs:  Energy Requirements Based On: Kcal/kg  Weight Used for Energy Requirements:  (55.5 kg)  Energy (kcal/day): ~ 4635-0827  (25-30 kcals/kg/day)  Weight Used for Protein Requirements:  (55.5)  Protein (g/day): ~ 67 grams or more (1.2 grams/kg/day)     Fluid (ml/day): ~ 7500-8948 kcals (25-30 mls/kg/day)    Nutrition Diagnosis:   Inadequate oral intake related to Altered GI structure, swallowing difficulty as evidenced by nutrition support - enteral nutrition    Nutrition Interventions:   Food and/or Nutrient Delivery: Continue Current Diet, Continue Current Tube Feeding, Continue Oral Nutrition Supplement  Nutrition Education/Counseling: Education/Counseling initiated  Coordination of Nutrition Care: Continue to monitor while inpatient, Speech Therapy, Coordination of Care       Goals:  Goals: Meet at least 75% of estimated needs,  by next RD assessment  Type of Goal: Continue current goal  Previous Goal Met: No Progress toward Goal(s)    Nutrition Monitoring and Evaluation:      Food/Nutrient Intake Outcomes: Diet Advancement/Tolerance, Food and Nutrient Intake, Enteral Nutrition Intake/Tolerance, Supplement Intake  Physical Signs/Symptoms Outcomes: Biochemical Data, Chewing or Swallowing, GI Status, Fluid Status or Edema, Nutrition Focused Physical Findings, Skin, Weight    Discharge Planning:    Too soon to determine     Vivian Díaz RD, LD  Contact: [phone]

## 2025-04-07 NOTE — PLAN OF CARE
Problem: Discharge Planning  Goal: Discharge to home or other facility with appropriate resources  Outcome: Progressing  Flowsheets (Taken 4/7/2025 0104)  Discharge to home or other facility with appropriate resources:   Identify barriers to discharge with patient and caregiver   Identify discharge learning needs (meds, wound care, etc)   Refer to discharge planning if patient needs post-hospital services based on physician order or complex needs related to functional status, cognitive ability or social support system   Arrange for needed discharge resources and transportation as appropriate     Problem: Skin/Tissue Integrity  Goal: Skin integrity remains intact  Description: 1.  Monitor for areas of redness and/or skin breakdown  2.  Assess vascular access sites hourly  3.  Every 4-6 hours minimum:  Change oxygen saturation probe site  4.  Every 4-6 hours:  If on nasal continuous positive airway pressure, respiratory therapy assess nares and determine need for appliance change or resting period  Outcome: Progressing  Flowsheets  Taken 4/7/2025 0104 by Joan Vela RN  Skin Integrity Remains Intact:   Monitor for areas of redness and/or skin breakdown   Every 4-6 hours minimum:  Change oxygen saturation probe site   Turn and reposition as indicated   Pressure redistribution bed/mattress (bed type)  Taken 4/6/2025 1533 by Brent Becker, RN  Skin Integrity Remains Intact: Monitor for areas of redness and/or skin breakdown     Problem: ABCDS Injury Assessment  Goal: Absence of physical injury  Outcome: Progressing  Flowsheets (Taken 4/7/2025 0104)  Absence of Physical Injury: Implement safety measures based on patient assessment     Problem: Safety - Adult  Goal: Free from fall injury  Outcome: Progressing  Flowsheets (Taken 4/7/2025 0104)  Free From Fall Injury: Instruct family/caregiver on patient safety     Problem: Pain  Goal: Verbalizes/displays adequate comfort level or baseline comfort level  Outcome:  lab values   Identify factors contributing to decreased intake, treat as appropriate   Obtain nutritional consult as needed     Problem: Infection - Adult  Goal: Absence of infection at discharge  Outcome: Progressing  Flowsheets (Taken 4/7/2025 0104)  Absence of infection at discharge:   Assess and monitor for signs and symptoms of infection   Monitor lab/diagnostic results   Monitor all insertion sites i.e., indwelling lines, tubes and drains   Administer medications as ordered   Instruct and encourage patient and family to use good hand hygiene technique   Identify and instruct in appropriate isolation precautions for identified infection/condition

## 2025-04-07 NOTE — PROGRESS NOTES
Hospitalist Progress Note  Internal Medicine Resident      Patient: Masood Randhawa 74 y.o. 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 Bauer's on 3/14/2025.  Patient reports copious secretions from his mouth and tracheostomy over the past week, with notes of grossly bloody secretions.  Right lateral posterior neck is edematous and erythematous upon evaluation.  CT soft tissue neck with contrast shows a fluid collection with air fluid level and enhancing margin in the posterior lateral right neck suspicious for abscess.  Afebrile.  No leukocytosis.  Per ENT, Important to note patient is a total laryngectomy patient, not tracheostomy.  He cannot be intubated orally.  Please contact ENT on-call if there are any questions concerning patient's airway.  ENT consulted  Keep head of bed elevated no lower than 45 degrees  IR consulted 4/2 for pigtail insertion into right lateral posterior neck, plan to pull drains 4/7  Continue puréed diet, Continue tube feeds if not able to finish at least <50% of food on plate.   continue to monitor for signs and symptoms of infection  Antibiotics discontinued 4/6, vancomycin 4/1-4/5, Zosyn 4/1-4/6  Continue to follow ID and ENT recs     RUL Pneumonia: Likely secondary to aspiration pneumonia. Seen on CT chest on 4/1/2025 revealing emphysematous changes throughout both lung fields, superimposed 8.2 x 6.2 cm abnormal density in right upper lobe consistent with inflammatory changes, worse than previous study.  Afebrile.  No leukocytosis.  Patient reports recent coughing and chills.  Patient denies any recent fevers or shakes.  No reports of wheezing.  SpO2 95% on room air.  Pulmonology consulted, ID consulted, Appreciate their

## 2025-04-08 ENCOUNTER — TELEPHONE (OUTPATIENT)
Dept: PULMONOLOGY | Age: 75
End: 2025-04-08

## 2025-04-08 ENCOUNTER — APPOINTMENT (OUTPATIENT)
Dept: GENERAL RADIOLOGY | Age: 75
DRG: 177 | End: 2025-04-08
Payer: MEDICARE

## 2025-04-08 ASSESSMENT — PAIN DESCRIPTION - LOCATION
LOCATION: NECK

## 2025-04-08 ASSESSMENT — PAIN SCALES - GENERAL
PAINLEVEL_OUTOF10: 0
PAINLEVEL_OUTOF10: 6
PAINLEVEL_OUTOF10: 4
PAINLEVEL_OUTOF10: 0
PAINLEVEL_OUTOF10: 0
PAINLEVEL_OUTOF10: 6
PAINLEVEL_OUTOF10: 5

## 2025-04-08 ASSESSMENT — PAIN DESCRIPTION - PAIN TYPE
TYPE: SURGICAL PAIN
TYPE: SURGICAL PAIN

## 2025-04-08 ASSESSMENT — PAIN SCALES - WONG BAKER
WONGBAKER_NUMERICALRESPONSE: HURTS A LITTLE BIT
WONGBAKER_NUMERICALRESPONSE: NO HURT
WONGBAKER_NUMERICALRESPONSE: HURTS A LITTLE BIT

## 2025-04-08 ASSESSMENT — PAIN DESCRIPTION - ORIENTATION
ORIENTATION: RIGHT;LEFT;MID
ORIENTATION: INNER
ORIENTATION: RIGHT;LEFT;MID

## 2025-04-08 ASSESSMENT — PAIN DESCRIPTION - FREQUENCY
FREQUENCY: INTERMITTENT
FREQUENCY: INTERMITTENT

## 2025-04-08 ASSESSMENT — PAIN DESCRIPTION - DESCRIPTORS
DESCRIPTORS: ACHING;SORE

## 2025-04-08 ASSESSMENT — PAIN DESCRIPTION - ONSET
ONSET: ON-GOING
ONSET: SUDDEN

## 2025-04-08 NOTE — PROGRESS NOTES
Hospitalist Progress Note  Internal Medicine Resident      Patient: Masood Randhawa 74 y.o. 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 Bauer's on 3/14/2025.  Patient reports copious secretions from his mouth and tracheostomy over the past week, with notes of grossly bloody secretions.  Right lateral posterior neck is edematous and erythematous upon evaluation.  CT soft tissue neck with contrast shows a fluid collection with air fluid level and enhancing margin in the posterior lateral right neck suspicious for abscess.  Afebrile.  No leukocytosis.  Per ENT, Important to note patient is a total laryngectomy patient, not tracheostomy.  He cannot be intubated orally.  Please contact ENT on-call if there are any questions concerning patient's airway.  ENT consulted  Keep head of bed elevated no lower than 45 degrees  IR consulted 4/2 for pigtail insertion into right lateral posterior neck, plan to pull drains 4/7  Continue puréed diet, Continue tube feeds if not able to finish at least <50% of food on plate.   continue to monitor for signs and symptoms of infection  Antibiotics discontinued 4/6, vancomycin 4/1-4/5, Zosyn 4/1-4/6  Continue to follow ID and ENT recs     RUL Pneumonia: Likely secondary to aspiration pneumonia. Seen on CT chest on 4/1/2025 revealing emphysematous changes throughout both lung fields, superimposed 8.2 x 6.2 cm abnormal density in right upper lobe consistent with inflammatory changes, worse than previous study.  Afebrile.  No leukocytosis.  Patient reports recent coughing and chills.  Patient denies any recent fevers or shakes.  No reports of wheezing.  SpO2 95% on room air.  Pulmonology consulted, ID consulted, Appreciate their  / [] None     Expected discharge date: Pending  Disposition: Pending     Code status: Full Code     ===================================================================    Chief Complaint: Postoperative swelling  Subjective (past 24 hours): Patient was seen and evaluated at bedside.  Neck drain was removed on left side today.  Drain still in place on right side of neck.  Patient denied any lightheadedness, dizziness, nausea or vomiting.  He expressed that his diarrhea has been improving with an average of 2-3 loose stools per day, will continue Imodium and increase frequency if still not controlled.  Patient reports burning pain in groin has significantly improved.  Plan to set up home health and ensure patient is stable for discharge considering he has been deconditioned in setting of recent procedures and malnutrition.    HPI / Hospital Course:  \"Masood Randhawa is a 74 y.o. male with PMHx of COPD, hypertension, BPH and recent total bilateral laryngectomy with radical neck dissection on 3/14/2025 who presents to [facility] with reports of copious secretions with notes of intermittent bloody secretions.  His hospitalization was complicated with neck and parotid swelling and required delay gentle IV and biotics for hospital-acquired pneumonia.  Patient states that he has also been experiencing some chills, and has had to cover himself with several blankets to stay warm.  He is accompanied by his wife and states that he has not been experiencing any fevers.  Dr. Bauer was consulted in the ED and evaluated patient.  Recommending IR consult for pigtail into right lateral posterior neck.     ED course: Started vancomycin and Zosyn in ED.  Received morphine 4 mg IV, Zofran 4 mg IV.\"    Medications:    Infusion Medications    sodium chloride      Scheduled Medications    guaiFENesin  400 mg Per G Tube Q6H    cefTRIAXone (ROCEPHIN) IV  1,000 mg IntraVENous Q24H    loperamide  2 mg Oral BID

## 2025-04-08 NOTE — CARE COORDINATION
4/8/25, 2:20 PM EDT    Patient goals/plan/ treatment preferences discussed by  and .  Patient goals/plan/ treatment preferences reviewed with patient/ family.  Patient/ family verbalize understanding of discharge plan and are in agreement with goal/plan/treatment preferences.  Understanding was demonstrated using the teach back method.  AVS provided by RN at time of discharge, which includes all necessary medical information pertaining to the patients current course of illness, treatment, post-discharge goals of care, and treatment preferences.     Services At/After Discharge: DME  Plans home w spouse/caregiver Gabe when medically cleared likely in am if afebrile; family refused HH/SNF/IPR; has  OP PT/OT/ST, SR [facility] for GTF, SR E for laryngectomy supplies, new DRE Compressor (spouse will  at [facility]), nebulizer, cane, walker, WC, bed, current  [facility]cer [facility]; therapy following; collaborated w patient/family, Missy,  E RCP, ENT     Update  DRE Compressor Teaching 4/9/25 1300; monitor  Electronically signed by Fredo Guthrie RN on 4/9/2025 at 9:15 AM

## 2025-04-08 NOTE — PLAN OF CARE
Problem: Discharge Planning  Goal: Discharge to home or other facility with appropriate resources  Outcome: Progressing     Problem: ABCDS Injury Assessment  Goal: Absence of physical injury  Outcome: Progressing     Problem: Safety - Adult  Goal: Free from fall injury  Outcome: Progressing     Problem: Respiratory - Adult  Goal: Patient's breath sounds will be clear and equal  4/7/2025 2104 by Itzel Barron RCP  Outcome: Progressing     Problem: Gastrointestinal - Adult  Goal: Maintains adequate nutritional intake  Outcome: Progressing  Flowsheets (Taken 4/8/2025 0808)  Maintains adequate nutritional intake:   Monitor percentage of each meal consumed   Identify factors contributing to decreased intake, treat as appropriate     Problem: Infection - Adult  Goal: Absence of infection at discharge  Outcome: Progressing    Care plan reviewed with patient at bedside

## 2025-04-08 NOTE — PLAN OF CARE
Problem: Respiratory - Adult  Goal: Patient's breath sounds will be clear and equal  Outcome: Progressing     Patient mutually agreed on goals.

## 2025-04-08 NOTE — PROGRESS NOTES
Progress note: Infectious diseases    Patient - Masood Randhawa,  Age - 74 y.o.    - 1950      Room Number - 4K-10/010-A   MRN -  [id number]   [facility] # - [id number]  Date of Admission -  2025  1:04 PM    SUBJECTIVE:   He is feeling better  OBJECTIVE   VITALS    height is 1.676 m (5' 6\") and weight is 60.7 kg (133 lb 13.1 oz). His oral temperature is 97.9 °F (36.6 °C). His blood pressure is 122/70 and his pulse is 99. His respiration is 17 and oxygen saturation is 92%.       Wt Readings from Last 3 Encounters:   25 60.7 kg (133 lb 13.1 oz)   25 54.3 kg (119 lb 9.6 oz)   25 56.9 kg (125 lb 7.1 oz)       I/O (24 Hours)    Intake/Output Summary (Last 24 hours) at 2025 1452  Last data filed at 2025 1202  Gross per 24 hour   Intake 500 ml   Output 2300 ml   Net -1800 ml       General Appearance  Awake, alert, oriented, chronically sick looking  HEENT - normocephalic, atraumatic, pale  conjunctiva,  anicteric sclera  Neck -  drain on the neck  on  TM  Lungs -  Bilateral  air entry, + rhonchi,  Cardiovascular - Heart sounds are normal.    Abdomen - soft, not distended, nontender, peg tube   Neurologic -awake and oriented  Skin - No bruising or bleeding  Extremities - No edema, no cyanosis, clubbing     MEDICATIONS:      guaiFENesin  400 mg Per G Tube Q6H    cefTRIAXone (ROCEPHIN) IV  1,000 mg IntraVENous Q24H    loperamide  2 mg Oral BID    fluconazole  400 mg IntraVENous Q24H    lactobacillus  1 capsule Oral Daily with breakfast    pantoprazole  40 mg IntraVENous Daily    miconazole   Topical BID    ipratropium 0.5 mg-albuterol 2.5 mg  1 Dose Inhalation Q4H WA RT    [Held by provider] amLODIPine  5 mg Oral Daily    aspirin  81 mg Oral Daily    lisinopril  20 mg Oral Daily    sodium chloride flush  5-40 mL IntraVENous 2 times per day    enoxaparin  40 mg SubCUTAneous Daily      sodium chloride

## 2025-04-08 NOTE — PROGRESS NOTES
[facility]  INPATIENT SPEECH THERAPY  STRZ ICU STEPDOWN TELEMETRY 4K  DAILY NOTE    Discharge Recommendations: Outpatient Speech Therapy    SLP Individual Minutes  Time In: 907  Time Out: 954  Minutes: 47  Timed Code Treatment Minutes: 0 Minutes       Date: 2025  Patient Name: Masood Randhawa      CSN: [id number]   : 1950  (74 y.o.)  Gender: male   Referring Physician:  SOO Sanchez-CNP  Diagnosis: Abscess of neck (L02.11)  Precautions: PATIENT IS A NECK BREATHER - S/P TOTAL LARYNGECTOMY  Current Diet: Puree with thin liquids  Respiratory Status:  trach mask/collar ; humidified air  Swallowing Strategies: Standard Universal Swallow Precautions  Date of Last MBS/FEES: Not Applicable    Pain:  No pain reported.    Subjective:  SONY Linares approved this session. Spoke with RN about how to clean the larytube and the importance of cleaning it at least 3 times per day to avoid it from becoming clogged with mucous.     Short-Term Goals:  SHORT TERM GOAL #1:  Goal 1: Patient and family will exhibit an understanding of total laryngeactomy anatomy, [facility] supplies for home management, multi-modal communication, and the role of SLP interventions (e.g., TEP/voice prosthesis) to maximize education given extensive surgical interventions.  INTERVENTIONS: Faxed the patient's Patient Services Form to [facility] on 25. Patient's spouse brought in the Going Home Kit that [facility] sent to the patient's house.  Updated the patient and spouse that the Patient Services Form was faxed to [facility] last Friday so the patient should be set up to order supplies in the very near future. If the patient needs more HME's prior to discharge, this SLP will provide the patient with samples from OP.    Patient did very little communicating majority of the session as this SLP was cleaning the patient's stoma and TEP.  Patient did use gestures, such as nodding/head shake to answer questions at times.  Patient  again and attached the neck strap to both sides so it was securely in place.      Patient continues to have humidification via the trach mask, however, completed further education that he will need to use a HME 24/7 to help heat and moisturize the trachea to thin out the mucous after he is no longer using the humidification via trach mask.      Briefly discussed further with the patient and patient's spouse what an adhesive base is and that the HME can attach to the base, however, would like to wait to trial the adhesive base until the patient's incisions are healed better and the sutures are gone.       SHORT TERM GOAL #3:  Goal 3: Patient will successfully engage in multi-modal communication attempts (static and dynamic) in 4/5 opportunities given min assist to ensure effective ability to communicate wants/needs.  INTERVENTIONS:Patient was initially gesturing minimally to communicate. At the end of the session, the patient was agreeable to using  the electrolarynx to communicate for a short time.  Patient communicated the following with use of the electrolarynx:  *counting 1-5 with use of the oral adapter: good success, no cues  *days of the week with use of the oral adapter: fair to good success, min cues for placement of the oral adapter  *months of the year with use of the oral adapter: fair to good success with min cues  *date of birth with use of the oral adapter: fair success, min cues  *counting 1-5 with neck placement: poor to fair intelligibility  *counting 1-5 with cheek placement: fair to good success  **Patient reports he prefers to use the oral adapter versus neck or cheek placement.     Did not complete voicing via the TEP this session as ASHLYN Lockett with ENT just removed a drain from the left side of the patient's neck just prior to this session.  Did not want to increase any seeping/draining from that area when forcing to try to talk.  Will plan to trial next session if everything is nice and  healed.     SHORT TERM GOAL #4:  Goal 4: In coordination with ENT, patient will complete CSE to determine least restrictive diet level and aide in satiation/QOL measures.  INTERVENTIONS: Patient remains on a pureed diet with thin liquids. RN reports the patient does not like the pureed foods.  Completed further education with the patient and spouse that he needs to eat at least 50% of his meals to avoid using the PEG tube for nutrition/hydration for that meal.  Asked ASHLYN Lockett with ENT if the patient's diet could be advanced beyond pureed textures and she reports she would like to speak with Dr. Daley to make sure he is okay with it first, therefore, will await his approval.    Long-Term Goals:   No LTG's secondary to short estimated length of stay.    Functional Oral Intake Scale: Total Oral Intake: 5.  Total oral intake of multiple consistencies requiring special preparation    EDUCATION:  Learner: Patient and Significant Other  Education:  Reviewed ST goals and Plan of Care, Reviewed recommendations for follow-up, Education Related to Potential Risks and Complications Due to Impairment/Illness/Injury, and Education Related to Prevention of Recurrence of Impairment/Illness/Injury  Evaluation of Education: Verbalizes understanding and Needs further instruction    ASSESSMENT/PLAN:  Activity Tolerance:  Patient tolerance of  treatment: good.      Assessment/Plan: Patient progressing toward established goals.  Continues to require skilled care of licensed speech pathologist to progress toward achievement of established goals and plan of care..     Plan for Next Session: laryngectomy care       Brook Sims M.S. CCC-SLP [id number]

## 2025-04-08 NOTE — TELEPHONE ENCOUNTER
ct chest - 7.7.25 arriving @ 9:10 for 9:40 appt  [facility] britt Calvoa - 7.15.25 @ 2pm    Lvm for spouse , only contact number, advising of appt / testing .   Perfect serve sent to maciej.   Appt / testing date , time, preps mailed out .

## 2025-04-08 NOTE — PROGRESS NOTES
Department of Otolaryngology  Progress Note    Chief Complaint:  Hemoptysis (resolved),     SUBJECTIVE: Patient denies any acute events overnight.  He states that he is not feeling more tired today than yesterday.  He denies chills, increased mucus, hemoptysis, shortness of breath, fevers, and chills.  Speech evaluating patient at bedside. Pt  F overnight. WBC nml.    A complete multi-organ review of systems was performed using a new patient questionnaire, and reviewed by me.  ENT:  negative except as noted in HPI  CONSTITUTIONAL:  negative except as noted in HPI  EYES:  negative except as noted in HPI  RESPIRATORY:  negative except as noted in HPI  CARDIOVASCULAR:  negative except as noted in HPI  GASTROINTESTINAL:  negative except as noted in HPI  GENITOURINARY:  negative except as noted in HPI  MUSCULOSKELETAL:  negative except as noted in HPI  SKIN:  negative except as noted in HPI  ENDOCRINE/METABOLIC: negative except as noted in HPI  HEMATOLOGIC/LYMPHATIC:  negative except as noted in HPI  ALLERGY/IMMUN: negative except as noted in HPI  NEUROLOGICAL:  negative except as noted in HPI  BEHAVIOR/PSYCH:  negative except as noted in HPI    OBJECTIVE      Physical  VITALS:  /72   Pulse 99   Temp 100.4 °F (38 °C) (Oral)   Resp 20   Ht 1.676 m (5' 6\")   Wt 60.7 kg (133 lb 13.1 oz)   SpO2 98%   BMI 21.60 kg/m²       Patient resting comfortably in hospital bed without acute respiratory distress.  Patient is A+O x 3, cooperative, and aphonic. Submandibular swelling resolved.  Right posterior neck flap without swelling and improved tenderness per patient.  Incisions well-healing, stomal crusting noted which was removed as able with saline soaked cotton swab by speech at bedside today.  Elena tube secured in place with neck ties.  Elena tube was removed, cleaned under hot water, and replaced by speech.  Sternal edges intact.  No drain output out of either drain noted. L pigtail drain was removed without  +  - ID c/s, appreciate assistance: D/c IV antibiotics and continue oral Diflucan.  - Signage above door with reminder that patient CANNOT be intubated orally if he were to need positive pressure ventilation.  He is a NECK BREATHER only.     **VERY IMPORTANT**   Patient is a total laryngectomy patient, NOT tracheostomy.  The following diagram shows the difference in airway anatomy.  PLEASE contact ENT on call with any questions or concerns regarding the patient's airway anatomy.           Electronically signed by Cathryn Esparza PA-C on 4/7/2025 at 9:26 PM

## 2025-04-08 NOTE — PROGRESS NOTES
Physician Progress Note      PATIENT:               PEMA BOLANOS  CSN #:                  [id number]  :                       1950  ADMIT DATE:       2025 1:04 PM  DISCH DATE:  RESPONDING  PROVIDER #:        Freddy Sellers MD          QUERY TEXT:    Based on your medical judgment, please clarify these findings and document if   any of the following are being evaluated and/or treated:    The medical record reflects the following:  Risk Factors: 73yo, recent surgery  Clinical Indicators: Sodium 131, 132  Treatment: labs    Thank you,  Amanda Carbone (Klaus), RN BSN  Clinical     The clinical indicators include:  Options provided:  -- Hyponatremia  -- Low sodium not clinically significant  -- Other - I will add my own diagnosis  -- Disagree - Not applicable / Not valid  -- Disagree - Clinically unable to determine / Unknown  -- Refer to Clinical Documentation Reviewer    PROVIDER RESPONSE TEXT:    This patient has hyponatremia.    Query created by: Amanda Moss on 2025 11:08 AM      QUERY TEXT:    RUL Pneumonia most likely secondary to aspiration PNA is documented in the   medical record  Pulmonology.  Please clarify any associated diagnoses:    The medical record reflects the following:  Risk Factors: 73yo, pneumonia, recent surgery, cancer  Clinical Indicators: Vitals temp 101.2, , RR 23, BP 89/50, 98% fiO2 28%   trach mask. Labs lactic wnl; procal wnl; crp 1.04; wbc 12.6.  Treatment: labs, imaging, tele, IV rocephin, IV diflucan, IV zosyn, IV   vancomycin      Thank you,  Amanda Carbone (Klaus), RN BSN  Clinical     The clinical indicators include:  Options provided:  -- Sepsis, present on admission  -- Sepsis, developed following admission  -- Localized infection without sepsis  -- Other - I will add my own diagnosis  -- Disagree - Not applicable / Not valid  -- Disagree - Clinically unable to determine / Unknown  -- Refer to Clinical

## 2025-04-08 NOTE — PROGRESS NOTES
[facility]  STRZ ICU STEPDOWN TELEMETRY 4K  Occupational Therapy  Daily Note    Discharge Recommendations: pt would greatly benefit from rehab stay before returning home, although pt/wife continue to refuse  **If pt discharges straight home, pt will require hands on assist for ADLs, transfers/functional mobility; Pt/wife preferring OP therapies rather than HH    Equipment Recommendations: No defer to next level of care    Time In: 1023  Time Out: 1116  Timed Code Treatment Minutes: 53 Minutes  Minutes: 53        Date: 2025  Patient Name: Masood Randhawa,   Gender: male      Room: [facility]10010-A  MRN: [id number]  : 1950  (74 y.o.)  Referring Practitioner: Freddy Sellers MD  Diagnosis: Postoperative or surgical complication, initial encounter  Additional Pertinent Hx: Masood Randhawa is a 74 y.o. male with PMHx of COPD, hypertension, BPH and recent total bilateral laryngectomy with radical neck dissection on 3/14/2025 who presents to [facility] with reports of copious secretions with notes of intermittent bloody secretions.  His hospitalization was complicated with neck and parotid swelling and required delay gentle IV and biotics for hospital-acquired pneumonia.  Patient states that he has also been experiencing some chills, and has had to cover himself with several blankets to stay warm.  He is accompanied by his wife and states that he has not been experiencing any fevers.  Dr. Bauer was consulted in the ED and evaluated patient.  Recommending IR consult for pigtail into right lateral posterior neck.    Restrictions/Precautions:  Restrictions/Precautions: Fall Risk  Position Activity Restriction  Other Position/Activity Restrictions: neck breather, 2 drains     Social/Functional History:  Lives With: Spouse  Type of Home: House  Home Layout: Two level, Able to Live on Main level with bedroom/bathroom  Home Access: Stairs to enter without rails  Entrance Stairs -

## 2025-04-08 NOTE — PROGRESS NOTES
Comprehensive Nutrition Assessment    Type and Reason for Visit:  Reassess, Nutrition support    Nutrition Recommendations/Plan:   Bolus 237ml Jevity 1.5 if pt consumes  50% or less of meals (3meals)  Bolus 237mls Jevity 1.5 at 8pm daily. Advised pt's wife to give pt this daily.   Flush with 50mls water before & after each tube feeding bolus.   Continue Ensure Plus TID (po)  Home tube feeding supplies via [facility].  Wife mentions she cancelled appointment with Jacqueline Levien for tomorrow since pt is still here & mentions she thinks the new appointment is (4/30).   Dysphagia Pureed diet & Tube feeding regimen & handouts reviewed with pt's wife (4/8).  Emphasized pt needs ~ 46-56 ounces of  fluid /day & encouraged wife to keep a record of all fluid given. Encouraged wife to provide a 3 day food diary & tube feeding diary for next appointment with OP RD.         Malnutrition Assessment:  Malnutrition Status:  Severe malnutrition (04/08/25 1330)    Context:  Chronic Illness     Findings of the 6 clinical characteristics of malnutrition:  Energy Intake:   (meeting 100% of nutrition needs via PEG enteral feedings PTA, PEG placed 2/14/25)  Weight Loss:   (significant losses over last 6-7 months; appears to have gained weight since 2/9/25  (PEG Placed 2/14/25))     Body Fat Loss:  Severe body fat loss (noted prior to enteral feeding start; now meeting 100% of estimated nutritional needs via TF) Orbital, Triceps, Fat Overlying Ribs, Buccal region   Muscle Mass Loss:  Severe muscle mass loss (noted prior to enteral feeding start; now meeting 100% of estimated nutritional needs via TF) Temples (temporalis), Clavicles (pectoralis & deltoids), Thigh (quadriceps), Calf (gastrocnemius), Hand (interosseous), Scapula (trapezius)  Fluid Accumulation:  Mild Extremities   Strength:  Not Performed    Nutrition Assessment:     Pt. nutritionally not improving AEB po intake was less than 50% at breakfast today per tray observation, however  Education/Counseling: Education/Counseling completed ((4/8) reviewed Dysphagia Pureed diet & tube feeding written education materials with pt's wife)  Coordination of Nutrition Care: Continue to monitor while inpatient, Speech Therapy, Coordination of Care       Goals:  Goals: Meet at least 75% of estimated needs, by next RD assessment  Type of Goal: Continue current goal  Previous Goal Met: Progressing toward Goal(s)    Nutrition Monitoring and Evaluation:      Food/Nutrient Intake Outcomes: Diet Advancement/Tolerance, Food and Nutrient Intake, Enteral Nutrition Intake/Tolerance, Supplement Intake  Physical Signs/Symptoms Outcomes: Biochemical Data, Chewing or Swallowing, GI Status, Fluid Status or Edema, Nutrition Focused Physical Findings, Skin, Weight    Discharge Planning:    Too soon to determine     Vivian Díaz RD, LD  Contact: [phone]

## 2025-04-08 NOTE — TELEPHONE ENCOUNTER
----- Message from Marques Crockett, SOO - CNP sent at 4/7/2025 12:32 PM EDT -----     3 months with CT chest follow pneumonia s/p laryngectomy    Thank you,   Marques Crockett

## 2025-04-09 ENCOUNTER — HOSPITAL ENCOUNTER (OUTPATIENT)
Dept: SPEECH THERAPY | Age: 75
Setting detail: THERAPIES SERIES
End: 2025-04-09
Payer: MEDICARE

## 2025-04-09 ENCOUNTER — APPOINTMENT (OUTPATIENT)
Dept: CT IMAGING | Age: 75
DRG: 177 | End: 2025-04-09
Payer: MEDICARE

## 2025-04-09 ASSESSMENT — PAIN SCALES - GENERAL
PAINLEVEL_OUTOF10: 3
PAINLEVEL_OUTOF10: 5
PAINLEVEL_OUTOF10: 5

## 2025-04-09 ASSESSMENT — PAIN DESCRIPTION - LOCATION
LOCATION: NECK

## 2025-04-09 ASSESSMENT — PAIN DESCRIPTION - ORIENTATION
ORIENTATION: RIGHT
ORIENTATION: RIGHT

## 2025-04-09 ASSESSMENT — PAIN DESCRIPTION - ONSET
ONSET: ON-GOING
ONSET: ON-GOING

## 2025-04-09 ASSESSMENT — PAIN DESCRIPTION - FREQUENCY
FREQUENCY: CONTINUOUS
FREQUENCY: CONTINUOUS

## 2025-04-09 ASSESSMENT — PAIN DESCRIPTION - PAIN TYPE
TYPE: SURGICAL PAIN
TYPE: SURGICAL PAIN

## 2025-04-09 ASSESSMENT — PAIN SCALES - WONG BAKER: WONGBAKER_NUMERICALRESPONSE: HURTS A LITTLE BIT

## 2025-04-09 ASSESSMENT — PAIN DESCRIPTION - DESCRIPTORS
DESCRIPTORS: ACHING
DESCRIPTORS: ACHING
DESCRIPTORS: ACHING;SORE

## 2025-04-09 NOTE — PLAN OF CARE
Problem: Discharge Planning  Goal: Discharge to home or other facility with appropriate resources  Outcome: Progressing     Problem: Skin/Tissue Integrity  Goal: Skin integrity remains intact  Description: 1.  Monitor for areas of redness and/or skin breakdown  2.  Assess vascular access sites hourly  3.  Every 4-6 hours minimum:  Change oxygen saturation probe site  4.  Every 4-6 hours:  If on nasal continuous positive airway pressure, respiratory therapy assess nares and determine need for appliance change or resting period  Outcome: Progressing     Problem: ABCDS Injury Assessment  Goal: Absence of physical injury  Outcome: Progressing     Problem: Safety - Adult  Goal: Free from fall injury  Outcome: Progressing     Problem: Pain  Goal: Verbalizes/displays adequate comfort level or baseline comfort level  Outcome: Progressing     Problem: Nutrition Deficit:  Goal: Optimize nutritional status  Outcome: Progressing     Problem: Skin/Tissue Integrity - Adult  Goal: Skin integrity remains intact  Description: 1.  Monitor for areas of redness and/or skin breakdown  2.  Assess vascular access sites hourly  3.  Every 4-6 hours minimum:  Change oxygen saturation probe site  4.  Every 4-6 hours:  If on nasal continuous positive airway pressure, respiratory therapy assess nares and determine need for appliance change or resting period  Outcome: Progressing     Problem: Gastrointestinal - Adult  Goal: Maintains adequate nutritional intake  Outcome: Progressing     Problem: Infection - Adult  Goal: Absence of infection at discharge  Outcome: Progressing     Care plan reviewed with patient.  Patient verbalize understanding of the plan of care and contribute to goal setting.

## 2025-04-09 NOTE — PROGRESS NOTES
[facility]  INPATIENT PHYSICAL THERAPY  DAILY NOTE  STRZ ICU STEPDOWN TELEMETRY 4K - 4K-10/010-A      Discharge Recommendations: 24 hour assistance or supervision and Home with Outpatient PT  Equipment Recommendations: No               Time In: 0945  Time Out: 1015  Timed Code Treatment Minutes: 30 Minutes  Minutes: 30          Date: 2025  Patient Name: Masood Randhawa,  Gender:  male        MRN: [id number]  : 1950  (74 y.o.)     Referring Practitioner: Dr. WILY Sellers  Diagnosis: Postoperative or surgical complication, initial encounter  Additional Pertinent Hx: Masood Randhawa is a 74 y.o. male with PMHx of COPD, hypertension, BPH and recent total bilateral laryngectomy with radical neck dissection on 3/14/2025 who presents to [facility] with reports of copious secretions with notes of intermittent bloody secretions.  His hospitalization was complicated with neck and parotid swelling and required delay gentle IV and biotics for hospital-acquired pneumonia.  Patient states that he has also been experiencing some chills, and has had to cover himself with several blankets to stay warm.  He is accompanied by his wife and states that he has not been experiencing any fevers.  Dr. Bauer was consulted in the ED and evaluated patient.  Recommending IR consult for pigtail into right lateral posterior neck.     Prior Level of Function:  Lives With: Spouse  Type of Home: House  Home Layout: Two level, Able to Live on Main level with bedroom/bathroom  Home Access: Stairs to enter without rails  Entrance Stairs - Number of Steps: 2, had bear statue he uses for UE support  Home Equipment: Walker - Rolling, Hospital bed, Walker - 4-Wheeled, Wheelchair - Manual   Bathroom Shower/Tub: Tub/Shower unit, Shower chair with back  Bathroom Toilet: Standard  Bathroom Equipment: Grab bars in shower  Bathroom Accessibility: Accessible    Prior Level of Assist for ADLs: Needs assistance  Prior  Long Term Goals : no LTGs set secondary to short ELOS    Following session, patient left in safe position with all fall risk precautions in place.

## 2025-04-09 NOTE — PROGRESS NOTES
Hospitalist Progress Note  Internal Medicine Resident      Patient: Masood Randhawa 74 y.o. 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 Bauer's on 3/14/2025.  Patient reports copious secretions from his mouth and tracheostomy over the past week, with notes of grossly bloody secretions.  Right lateral posterior neck is edematous and erythematous upon evaluation.  CT soft tissue neck with contrast shows a fluid collection with air fluid level and enhancing margin in the posterior lateral right neck suspicious for abscess.  Afebrile.  No leukocytosis.  Per ENT, Important to note patient is a total laryngectomy patient, not tracheostomy.  He cannot be intubated orally.  Please contact ENT on-call if there are any questions concerning patient's airway.  ENT consulted  CT soft tissue neck 4/9 shows continued improvement along floor of mouth and right neck at site of previous abscess with no residual collection present.  Change catheter in place with 2 small bubbles of glass along floor of mouth, persistent moderate severity opacification of the right upper lung  Keep head of bed elevated no lower than 45 degrees  IR consulted 4/2 for pigtail insertion into right lateral posterior neck, plan to pull drains 4/7  Continue puréed diet, Continue tube feeds if not able to finish at least <50% of food on plate.   continue to monitor for signs and symptoms of infection  Antibiotics discontinued 4/6, vancomycin 4/1-4/5, Zosyn 4/1-4/6  Continue to follow ID and ENT recs     RUL Pneumonia: Likely secondary to aspiration pneumonia. Seen on CT chest on 4/1/2025 revealing emphysematous changes throughout both lung fields, superimposed 8.2 x 6.2 cm abnormal density in right  Oral Daily    loperamide  2 mg Oral TID    guaiFENesin  400 mg Per G Tube Q6H    cefTRIAXone (ROCEPHIN) IV  1,000 mg IntraVENous Q24H    fluconazole  400 mg IntraVENous Q24H    lactobacillus  1 capsule Oral Daily with breakfast    pantoprazole  40 mg IntraVENous Daily    miconazole   Topical BID    ipratropium 0.5 mg-albuterol 2.5 mg  1 Dose Inhalation Q4H WA RT    [Held by provider] amLODIPine  5 mg Oral Daily    lisinopril  20 mg Oral Daily    sodium chloride flush  5-40 mL IntraVENous 2 times per day    enoxaparin  40 mg SubCUTAneous Daily    PRN Meds: sodium chloride flush, sodium chloride, potassium chloride **OR** potassium alternative oral replacement **OR** potassium chloride, magnesium sulfate, ondansetron **OR** ondansetron, polyethylene glycol, acetaminophen **OR** acetaminophen, morphine **OR** morphine, sodium chloride (Inhalant)    Exam:  /63   Pulse 94   Temp 98.6 °F (37 °C) (Oral)   Resp 18   Ht 1.676 m (5' 6\")   Wt 60.7 kg (133 lb 13.1 oz)   SpO2 96%   BMI 21.60 kg/m²   General: Trach collar in place, pigtail drain in place on right side of neck, no purulence noted, no purulence noted  Eyes:  PERRL. Conjunctivae/corneas clear.  HENT: Head normal appearing. Nares normal. Oral mucosa moist.  Hearing intact.   Neck: Supple, with full range of motion. Trachea midline.  No gross JVD appreciated.  Respiratory:  Normal effort. Clear to auscultation, without rales or wheezes or rhonchi.  Cardiovascular: Normal rate, regular rhythm with normal S1/S2 without murmurs.    No lower extremity edema.   Abdomen: Soft, non-tender, non-distended with normal bowel sounds.  Musculoskeletal: No joint swelling or tenderness. Normal tone. No abnormal movements.   Skin: Warm and dry. No rashes or lesions.  Neurologic:  No focal sensory/motor deficits in the upper or lower extremities. Cranial nerves:  grossly non-focal 2-12.     Psychiatric: Alert and oriented, normal insight and thought content.   Capillary  Refill: Brisk,< 3 seconds.  Peripheral Pulses: +2 palpable, equal bilaterally.       Labs/Radiology: See chart or assessment above.     Electronically signed by Freddy Sellers MD on 4/9/2025 at 5:04 PM  Case was discussed with Attending, Dr. Estrada.

## 2025-04-09 NOTE — PROGRESS NOTES
Department of Otolaryngology  Progress Note    Chief Complaint:  Neck swelling, hemoptysis    SUBJECTIVE:  Patient denies any new issues, although I did get a message from nursing that overnight the right side of his neck seemed to be more swollen and he has required supplemental oxygen.  Patient feels right neck is minimally tender.  Denies any issues, aside from being frustrated wanting to go home.  He is taking PO puree meals, but not very appetizing to him.      A complete multi-organ review of systems was performed using a new patient questionnaire, and reviewed by me.  ENT:  negative except as noted in HPI  CONSTITUTIONAL:  negative except as noted in HPI  EYES:  negative except as noted in HPI  RESPIRATORY:  negative except as noted in HPI  CARDIOVASCULAR:  negative except as noted in HPI  GASTROINTESTINAL:  negative except as noted in HPI  GENITOURINARY:  negative except as noted in HPI  MUSCULOSKELETAL:  negative except as noted in HPI  SKIN:  negative except as noted in HPI  ENDOCRINE/METABOLIC: negative except as noted in HPI  HEMATOLOGIC/LYMPHATIC:  negative except as noted in HPI  ALLERGY/IMMUN: negative except as noted in HPI  NEUROLOGICAL:  negative except as noted in HPI  BEHAVIOR/PSYCH:  negative except as noted in HPI    OBJECTIVE      Physical  VITALS:  BP (!) 106/59   Pulse 97   Temp 98.4 °F (36.9 °C) (Oral)   Resp 20   Ht 1.676 m (5' 6\")   Wt 60.7 kg (133 lb 13.1 oz)   SpO2 97%   BMI 21.60 kg/m²     Patient sitting upright in bed eating puree breakfast without any difficulty.  Wife in room.  Patient communicates by pointing and mouthing words.  He is not in any distress.  Humidification via trach mask in place and torqued to right side, as well as his laryngectomy tube - repositioned to midline.  Mild crusting on tube, not occluded.  Crusting within the stoma, particularly the superior rim.  The trifurcation at 9 o'clock does not appear to be healing confluently with the stomal edge

## 2025-04-09 NOTE — PROGRESS NOTES
Physician Progress Note      PATIENT:               PEMA BOLANOS  [facility] #:                  [id number]  :                       1950  ADMIT DATE:       2025 1:04 PM  DISCH DATE:  RESPONDING  PROVIDER #:        Freddy Sellers MD          QUERY TEXT:    Malnutrition is documented in the medical record  H&P.  Please specify the   degree/type:    The clinical indicators include:  Risk Factors: 73yo, COPD, GERD, HTN, OA, PVD, s/p laryngoscopy - 25, PEG   tube placement 25, former smoker, ETOH abuse    Clinical Indicators: Per dietitian, patient meets ASPEN criteria for severe   malnutrition.  Energy Intake:   (meeting 100% of nutrition needs via PEG enteral feedings   PTA, PEG placed 25)  Weight Loss:   (significant losses over last 6-7 months; appears to have   gained weight since 25  (PEG Placed 25))  Body Fat Loss:  Severe body fat loss (noted prior to enteral feeding start;   now meeting 100% of estimated nutritional needs via TF) Orbital, Triceps, Fat   Overlying Ribs, Buccal region  Muscle Mass Loss:  Severe muscle mass loss (noted prior to enteral feeding   start; now meeting 100% of estimated nutritional needs via TF) Temples   (temporalis), Clavicles (pectoralis & deltoids), Thigh (quadriceps), Calf   (gastrocnemius), Hand (interosseous), Scapula (trapezius)  Fluid Accumulation:  Mild Extremities  stage II sacrum, stage II coccyx    Treatment: labs, imaging, ADULT DIET; Dysphagia - Pureed. ADULT ORAL NUTRITION   SUPPLEMENT, ADULT TUBE FEEDING; PEG, I&O, daily weights  Options provided:  -- Severe Malnutrition  -- Other - I will add my own diagnosis  -- Disagree - Not applicable / Not valid  -- Disagree - Clinically unable to determine / Unknown  -- Refer to Clinical Documentation Reviewer    PROVIDER RESPONSE TEXT:    This patient has severe malnutrition.    Query created by: Amanda Moss on 2025 11:17 AM      Electronically signed by:  Freddy Sellers MD 2025  11:51 AM

## 2025-04-09 NOTE — PROGRESS NOTES
[facility]  STRZ ICU STEPDOWN TELEMETRY 4K  Occupational Therapy  Daily Note    Discharge Recommendations: Home with assist as needed and Home with Home Health OT  Equipment Recommendations: No defer to next level of care      Time In: 1030  Time Out: 1046  Timed Code Treatment Minutes: 16 Minutes  Minutes: 16          Date: 2025  Patient Name: Masood Randhawa,   Gender: male      Room: [address] [place] [address]  MRN: [id number]  : 1950  (74 y.o.)  Referring Practitioner: Freddy Sellers MD  Diagnosis: Postoperative or surgical complication, initial encounter  Additional Pertinent Hx: Masood Randhawa is a 74 y.o. 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  His hospitalization was complicated with neck and parotid swelling and required delay gentle IV and biotics for hospital-acquired pneumonia.  Patient states that he has also been experiencing some chills, and has had to cover himself with several blankets to stay warm.  He is accompanied by his wife and states that he has not been experiencing any fevers.  Dr. Bauer was consulted in the ED and evaluated patient.  Recommending IR consult for pigtail into right lateral posterior neck.    Restrictions/Precautions:  Restrictions/Precautions: Fall Risk, General Precautions, Contact Precautions  Position Activity Restriction  Other Position/Activity Restrictions: neck breather, 2 drains     Social/Functional History:  Lives With: Spouse  Type of Home: House  Home Layout: Two level, Able to Live on Main level with bedroom/bathroom  Home Access: Stairs to enter without rails  Entrance Stairs - Number of Steps: 2, had bear statue he uses for UE support  Home Equipment: Walker - Rolling, Hospital bed, Walker - 4-Wheeled, Wheelchair - Manual   Bathroom

## 2025-04-09 NOTE — PROGRESS NOTES
Progress note: Infectious diseases    Patient - Masood Randhawa,  Age - 74 y.o.    - 1950      Room Number - 4K-10/010-A   MRN -  [id number]   [facility] # - [id number]  Date of Admission -  2025  1:04 PM    SUBJECTIVE:   No new complaints.  OBJECTIVE   VITALS    height is 1.676 m (5' 6\") and weight is 60.7 kg (133 lb 13.1 oz). His oral temperature is 98.4 °F (36.9 °C). His blood pressure is 106/59 (abnormal) and his pulse is 97. His respiration is 20 and oxygen saturation is 97%.       Wt Readings from Last 3 Encounters:   25 60.7 kg (133 lb 13.1 oz)   25 54.3 kg (119 lb 9.6 oz)   25 56.9 kg (125 lb 7.1 oz)       I/O (24 Hours)    Intake/Output Summary (Last 24 hours) at 2025 1215  Last data filed at 2025 1119  Gross per 24 hour   Intake 460 ml   Output 425 ml   Net 35 ml       General Appearance  Awake, alert, oriented, chronically sick looking  HEENT - normocephalic, atraumatic, pale  conjunctiva,  anicteric sclera  Neck -  1 drain on the right side of the neck  on  TM.  Lungs -  Bilateral  air entry, + rhonchi,  Cardiovascular - Heart sounds are normal.    Abdomen - soft, not distended, nontender, peg tube   Neurologic -awake and oriented  Skin - No bruising or bleeding  Extremities - No edema, no cyanosis, clubbing     MEDICATIONS:      aspirin  81 mg Oral Daily    guaiFENesin  400 mg Per G Tube Q6H    cefTRIAXone (ROCEPHIN) IV  1,000 mg IntraVENous Q24H    loperamide  2 mg Oral BID    fluconazole  400 mg IntraVENous Q24H    lactobacillus  1 capsule Oral Daily with breakfast    pantoprazole  40 mg IntraVENous Daily    miconazole   Topical BID    ipratropium 0.5 mg-albuterol 2.5 mg  1 Dose Inhalation Q4H WA RT    [Held by provider] amLODIPine  5 mg Oral Daily    lisinopril  20 mg Oral Daily    sodium chloride flush  5-40 mL IntraVENous 2 times per day    enoxaparin  40 mg SubCUTAneous  Daily      sodium chloride       sodium chloride flush, sodium chloride, potassium chloride **OR** potassium alternative oral replacement **OR** potassium chloride, magnesium sulfate, ondansetron **OR** ondansetron, polyethylene glycol, acetaminophen **OR** acetaminophen, morphine **OR** morphine, sodium chloride (Inhalant)      LABS:     CBC:   Recent Labs     04/07/25  0359 04/08/25  0403 04/09/25  0414   WBC 7.9 7.4 7.0   HGB 9.2* 9.1* 8.9*    383 370     BMP:    Recent Labs     04/07/25  0359 04/08/25  0403 04/09/25  0414   * 133* 133*   K 4.5 4.3 4.4   CL 96* 97* 97*   CO2 26 24 24   BUN 21 26* 28*   CREATININE 0.7 0.7 0.6*   GLUCOSE 105 99 92     Calcium:  Recent Labs     04/09/25  0414   CALCIUM 9.0     Ionized Calcium:Invalid input(s): \"IONCA\"  Magnesium:  No results for input(s): \"MG\" in the last 72 hours.       CULTURES:   UA: No results for input(s): \"SPECGRAV\", \"PHUR\", \"COLORU\", \"CLARITYU\", \"MUCUS\", \"PROTEINU\", \"BLOODU\", \"RBCUA\", \"WBCUA\", \"BACTERIA\", \"NITRU\", \"GLUCOSEU\", \"BILIRUBINUR\", \"UROBILINOGEN\", \"KETUA\", \"LABCAST\", \"LABCASTTY\", \"AMORPHOS\" in the last 72 hours.    Invalid input(s): \"CRYSTALS\"  Micro:   Lab Results   Component Value Date/Time    BC  04/01/2025 02:00 PM     No growth 24 hours. No growth 48 hours. No growth at 5 days          Problem list of patient:     Patient Active Problem List   Diagnosis Code    Primary hypertension I10    Hypercholesteremia E78.00    PVD (peripheral vascular disease) I73.9    Low back pain M54.50    ED (erectile dysfunction) N52.9    OA (osteoarthritis), multiple joints M19.90    BPH (benign prostatic hyperplasia) N40.0    Medication monitoring encounter Z51.81    Tobacco smoke exposure Z77.22    IFG (impaired fasting glucose) R73.01    Compression fracture of L4 lumbar vertebra, age indeterminate. S32.040A    Foraminal stenosis of lumbosacral region. L3-4 to L5-S1 due to herniated disc. M48.07    Left leg weakness R29.898    Uncomplicated alcohol  dependence (Formerly [facility]), 8-10 beers daily. F10.20    OAB (overactive bladder) N32.81    Muscular deconditioning R29.898    Tendinopathy of left biceps tendon M67.922    Injury of left shoulder S49.92XA    Full thickness rotator cuff tear M75.120    Hoarseness of voice R49.0    History of smoking greater than 50 pack years Z87.891    Unintentional weight loss R63.4    Night sweats R61    Abnormal findings on laryngoscopy R09.89    Gastroesophageal reflux disease K21.9    Supraglottic airway obstruction J38.6    Lymphadenopathy of head and neck region R59.0    Head and neck cancer (Formerly [facility]) C76.0    Failure to thrive in adult R62.7    Tachycardia R00.0    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    Severe malnutrition E43    Acute on chronic respiratory failure with hypoxia J96.21    Cancer of glottis (Formerly [facility]) C32.0    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    Acute respiratory failure with hypoxia J96.01    SIRS (systemic inflammatory response syndrome) (Formerly [facility]) R65.10    Diarrhea R19.7    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    Status post tracheostomy (Formerly [facility]) Z93.0    Physical deconditioning R53.81    Hyponatremia E87.1    Chronic obstructive pulmonary disease (Former[facility]) J44.9    Status post laryngectomy Z90.02    Pneumonia of right upper lobe due to infectious organism J18.9    Malnutrition E46    Macrocytic anemia D53.9    Rash R21    Postoperative or surgical complication, initial encounter T81.9XXA    Abnormal CT of the chest R93.89         ASSESSMENT/PLAN   Pneumonia involving the right upper lobe: repeat BAL was negative for bacteria  Hx of head and neck cancer :  infection with candida albicans on diflucan  Squamous cell cancer s/p surgery  Malnutrition  Continue current treatment      Thee Isabel MD, 4/9/2025 12:15 PM

## 2025-04-09 NOTE — PROGRESS NOTES
[facility]  INPATIENT SPEECH THERAPY  STRZ ICU STEPDOWN TELEMETRY 4K  DAILY NOTE    Discharge Recommendations: Outpatient Speech Therapy    SLP Individual Minutes  Time In: 1138  Time Out: 1205  Minutes: 27  Timed Code Treatment Minutes: 0 Minutes       Date: 2025  Patient Name: Masood Randhawa      CSN: [id number]   : 1950  (74 y.o.)  Gender: male   Referring Physician:  RUBEN Sanchez  Diagnosis: Abscess of neck (L02.11)  Precautions: PATIENT IS A NECK BREATHER - S/P TOTAL LARYNGECTOMY  Current Diet: Puree with thin liquids  Respiratory Status:  trach mask/collar ; humidified air  Swallowing Strategies: Standard Universal Swallow Precautions  Date of Last MBS/FEES: Not Applicable    Pain:  8/10 - Pain location: buttocks    Subjective:  SONY Clement approved this session. Patient slouched down in his recliner leaning all the way to this right side upon arrival. Spouse present.  Patient and spouse feel they will be able to manage the patient's care at home after discharge. Patient's spouse reports there will be some time that she needs to leave for a short period of time, but she feels okay with leaving the patient home alone. This SLP shared a concern about the patient being left home alone....how is he going to get help in an emergency since he is currently does not have a great way to communicate.  Patient's spouse reports she will let their neighbor know that if the patient calls him (unable to speak), he needs to go down and check on him right away.    Short-Term Goals:  SHORT TERM GOAL #1:  Goal 1: Patient and family will exhibit an understanding of total laryngeactomy anatomy, ATOS supplies for home management, multi-modal communication, and the role of SLP interventions (e.g., TEP/voice prosthesis) to maximize education given extensive surgical interventions.  INTERVENTIONS: Patient's spouse reports they still have a good amount of HME's at home as they have some from the  Atos kit that he left the hospital with and the Going Home Kit that they received this past week.  No additional HME's given to the patient this date.  Patient's spouse reports the patient will be going home with humidification via trach mask.  Reviewed with the patient and spouse that HME's are not to be worn with external humidification being used. Spouse verbalized understanding.    SHORT TERM GOAL #2:  Goal 2: Patient and family will demonstrate comprehsion re: rationale for cleansing TEP, cleansing LaryTube, secretion management, needs/rationale for HME to promote overall total laryngectomy care and aide in pulmonary return.  INTERVENTIONS:  Patient's larytube was noted to be partially out of the stoma and leaning more towards the right side of the patient's stoma, which may be due to the shape of the patient's stoma.  There is somewhat of a sideways \"v-like\" shape at the 9 o'clock position of the stoma where the incision is causing the larytube to migrate in that direction, especially when the patient is moving around quite a bit.  This is where a significant amount of air seems to leak with attempts to occlude the stoma to attempt voicing with the TEP.  This also makes it difficult to determine the correct size of fenestrated larytube to order for the patient.  Spoke with Cherelle Gonzales CNP with ENT regarding these concerns.     Removed the patient's larytube this session and cleaned a very minimal amount of mucous from the inner portion of the tube.  Patient continues to have dried up mucous around the bottom edge of the stoma, along the left side of the stoma (3 o'clock position) and just superior to the tracheal flange of the TEP.  Attempted to moisten some of the dried up mucous with a cotton tipped applicator dipped in sterile water, however, this was difficult to do.  Was able to remove a very minimal amount of the dried up mucous this session using a hemastat.  Patient noted to have some pain when

## 2025-04-09 NOTE — PROGRESS NOTES
Comprehensive Nutrition Assessment    Type and Reason for Visit:  Reassess, Nutrition support , Education    Nutrition Recommendations/Plan:    Bolus 237 mls Jevity 1.5 if patient consumes less than 50% of meals (3 meals).  Bolus 237 mls Jevity 1.5 at 8pm daily. Bolus the 237mls Jevity at 8pm daily even if pt eats good.  Reminded night staff via sticky note.   Flush with 50ml water before & after each tube feeding bolus.  Continue Ensure Plus High Protein TID (po).  Wife mentions has appointment with Jacqueline Levine (OP RD) ~ 4/30.  Dysphagia Pureed diet , Tube feeding regimen & handouts reviewed with pt's wife  (4/8) & (4/9).  Emphasized pt needs ~46-56 ounces of fluid /day & encouraged wife to keep a record of all fluid given, Encouraged wife to provide a 3 day food diary & tube feeding diary for next appointment with OP RD.    Pt to receive home tube feeding supplies from [facility].       Malnutrition Assessment:  Malnutrition Status:  Severe malnutrition (04/08/25 1330)    Context:  Chronic Illness     Findings of the 6 clinical characteristics of malnutrition:  Energy Intake:   (meeting 100% of nutrition needs via PEG enteral feedings PTA, PEG placed 2/14/25)  Weight Loss:   (significant losses over last 6-7 months; appears to have gained weight since 2/9/25  (PEG Placed 2/14/25))     Body Fat Loss:  Severe body fat loss (noted prior to enteral feeding start; now meeting 100% of estimated nutritional needs via TF) Orbital, Triceps, Fat Overlying Ribs, Buccal region   Muscle Mass Loss:  Severe muscle mass loss (noted prior to enteral feeding start; now meeting 100% of estimated nutritional needs via TF) Temples (temporalis), Clavicles (pectoralis & deltoids), Thigh (quadriceps), Calf (gastrocnemius), Hand (interosseous), Scapula (trapezius)  Fluid Accumulation:  Mild Extremities   Strength:  Not Performed    Nutrition Assessment:      Pt. nutritionally  improving AEB po intake was  51-75%at breakfast today & pt  Protein Requirements:  (55.5)  Protein (g/day): ~ 67 grams or more (1.2 grams/kg/day)     Fluid (ml/day): ~ 2299-0254 kcals (25-30 mls/kg/day)    Nutrition Diagnosis:   Inadequate oral intake related to Altered GI structure, swallowing difficulty as evidenced by nutrition support - enteral nutrition    Nutrition Interventions:   Food and/or Nutrient Delivery: Continue Current Diet, Continue Oral Nutrition Supplement, Continue Current Tube Feeding  Nutrition Education/Counseling: Education/Counseling completed ((4/8) reviewed Dysphagia Pureed diet & tube feeding written education materials with pt's wife)  Coordination of Nutrition Care: Continue to monitor while inpatient, Speech Therapy, Coordination of Care       Goals:  Goals: Meet at least 75% of estimated needs, by next RD assessment  Type of Goal: Continue current goal  Previous Goal Met: Progressing toward Goal(s)    Nutrition Monitoring and Evaluation:      Food/Nutrient Intake Outcomes: Diet Advancement/Tolerance, Food and Nutrient Intake, Enteral Nutrition Intake/Tolerance, Supplement Intake  Physical Signs/Symptoms Outcomes: Biochemical Data, Chewing or Swallowing, GI Status, Fluid Status or Edema, Nutrition Focused Physical Findings, Skin, Weight    Discharge Planning:    Too soon to determine     Vivian Díaz, RD, LD  Contact: [phone]

## 2025-04-10 ASSESSMENT — PAIN DESCRIPTION - LOCATION
LOCATION: NECK
LOCATION: NECK

## 2025-04-10 ASSESSMENT — PAIN SCALES - GENERAL
PAINLEVEL_OUTOF10: 5
PAINLEVEL_OUTOF10: 0
PAINLEVEL_OUTOF10: 3
PAINLEVEL_OUTOF10: 0
PAINLEVEL_OUTOF10: 6
PAINLEVEL_OUTOF10: 0

## 2025-04-10 ASSESSMENT — PAIN DESCRIPTION - DESCRIPTORS
DESCRIPTORS: ACHING
DESCRIPTORS: ACHING

## 2025-04-10 ASSESSMENT — PAIN DESCRIPTION - ORIENTATION
ORIENTATION: RIGHT
ORIENTATION: RIGHT

## 2025-04-10 ASSESSMENT — PAIN - FUNCTIONAL ASSESSMENT: PAIN_FUNCTIONAL_ASSESSMENT: ACTIVITIES ARE NOT PREVENTED

## 2025-04-10 ASSESSMENT — PAIN DESCRIPTION - PAIN TYPE: TYPE: ACUTE PAIN;SURGICAL PAIN

## 2025-04-10 ASSESSMENT — PAIN DESCRIPTION - ONSET: ONSET: GRADUAL

## 2025-04-10 ASSESSMENT — PAIN DESCRIPTION - FREQUENCY: FREQUENCY: INTERMITTENT

## 2025-04-10 NOTE — PROGRESS NOTES
Spiritual Health History and Assessment/Progress Note  [facility]    (P) Initial Encounter,  ,  ,      Name: Masood Randhawa MRN: [id number]    Age: 74 y.o.     Sex: male   Language: English   Synagogue: Islam   Postoperative or surgical complication, initial encounter     Date: 4/10/2025            Total Time Calculated: (P) 7 min              Spiritual Assessment began in [facility] ICU STEPDOWN TELEMETRY 4K        Referral/Consult From: (P) Rounding   Encounter Overview/Reason: (P) Initial Encounter  Service Provided For: (P) Patient, Significant other    Inge, Belief, Meaning:   Patient identifies as spiritual  Family/Friends Other: Patient's spouse declined wanting prayer or needing anything.       Importance and Influence:  Patient has spiritual/personal beliefs that influence decisions regarding their health  Family/Friends have no beliefs influential to healthcare decision-making identified during this visit    Community:  Patient Other: Patient unable to talk, neck breathing, limited communication to be able to assess.  Family/Friends Other: Patient's spouse in room, declining needing any spiritual support.     Assessment and Plan of Care:   Patient sitting in chair and his wife was sitting in window bench, welcomed . Patient is unable to talk at this time, as he recently had surgery and is neck breathing. Patient's wife stated that he can \"mouth words\" and will write on paper.  (intern) asked the patient if he would like to write on paper to communicate and he declined.  (intern) asked patient if I could do anything for him and he signed for prayer. Patient's spouse declined wanting prayer for herself or needing any support at this time. Offered prayer with patient. Patient expressed gratitude. Made patient and patient's spouse aware of  availability and support.     Patient Interventions include: Provided sacramental/Adventism  ritual  Family/Friends Interventions include: Patient's spouse declined needing any spiritual support, made her aware of  support and availability if she needs it.     Patient Plan of Care: Spiritual Care available upon further referral  Family/Friends Plan of Care: Spiritual Care available upon further referral    Electronically signed by Darlyn Mackenzie,  Intern on 4/10/2025 at 2:32 PM

## 2025-04-10 NOTE — PROGRESS NOTES
[facility]  INPATIENT SPEECH THERAPY  [facility] ICU STEPDOWN TELEMETRY 4K  DAILY NOTE    Discharge Recommendations: Outpatient Speech Therapy    SLP Individual Minutes  Time In: 920  Time Out: 955  Minutes: 35  Timed Code Treatment Minutes: 0 Minutes       Date: 4/10/2025  Patient Name: Masood Randhawa      CSN: [id number]   : 1950  (74 y.o.)  Gender: male   Referring Physician:  SOO Sanchez-CNP  Diagnosis: Abscess of neck (L02.11)  Precautions: PATIENT IS A NECK BREATHER - S/P TOTAL LARYNGECTOMY  Current Diet: Puree with thin liquids  Respiratory Status:  trach mask/collar ; humidified air  Swallowing Strategies: Standard Universal Swallow Precautions  Date of Last MBS/FEES: Not Applicable    Pain:  in neck, but did not rate    Subjective:  SONY Harper approved this session. Patient sitting upright in his recliner starting to eat his breakfast upon arrival. Patient's spouse states, \"he's grouchy.\"  Patient mouthed and gestured that he wants the drain out of his neck and he wants to go home.  Patient was agreeable to this SLP completing laryngectomy care in between bites of his breakfast.    Short-Term Goals:  SHORT TERM GOAL #1:  Goal 1: Patient and family will exhibit an understanding of total laryngeactomy anatomy, ATOS supplies for home management, multi-modal communication, and the role of SLP interventions (e.g., TEP/voice prosthesis) to maximize education given extensive surgical interventions.  INTERVENTIONS: Patient's spouse reports she was educated on how to hook up and apply the humidification via trach mask when the patient goes home. She inquired about if the patient needed to have the humidification on when he gets up to sit at the table to eat.  This SLP told the spouse that the patient should have the humidification 24/7 to keep the air in his trachea heated and moisturized. Educated the patient and spouse that the patient would need to attach a [employer] Go HME to the  larytube for humidification when the patient leaves the house to go to an appointment (doctor, therapy, etc) so he has the constant humidification to avoid his mucous from thickening again. Patient's spouse verbalized understanding.     Educated the patient's spouse how to monitor for leaking through the center or around the outside of the voice prosthesis.  If leaking is noted through the center, then the patient's spouse is to clean through the center of the prosthesis with the cleaning brush.  If leaking continues after cleanig, then contact the OP SLP department.  If the leaking is noted over the weekend and the department is closed, then discontinue PO intake and use the PEG tube until it can be further assessed.  Reviewed that ongoing leaking from the prosthesis can cause pneumonia over time.      SHORT TERM GOAL #2:  Goal 2: Patient and family will demonstrate comprehsion re: rationale for cleansing TEP, cleansing LaryTube, secretion management, needs/rationale for HME to promote overall total laryngectomy care and aide in pulmonary return.  INTERVENTIONS:  Reviewed the patient's spouse that she will need to make sure the stoma is clean and clean the larytube and TEP 2-3 times a day and more if indicated.  Spouse reports she is comfortable with doing this.    This SLP removed the patient's larytube and cleaned a minimal amount of dried brown mucous from the inner part of the larytube.  Also cleaned the TEP using the [employer] cleaning brush several times.  Moistened the dried mucous around the bottom edge and the left lateral edge of the stoma with cotton tipped applicators dipped in sterile water and then removed a minimal amount of mucous using a hemastat. No leaking was noted through the center or around the outside of TEP while eating or drinking during his breakfast.     SHORT TERM GOAL #3:  Goal 3: Patient will successfully engage in multi-modal communication attempts (static and dynamic) in 4/5  opportunities given min assist to ensure effective ability to communicate wants/needs.  INTERVENTIONS:  Due to the patient eating breakfast, the patient declined attempting communication via the TEP and electrolarynx.  Patient mouthing and gesturing to communicate some of his needs.     SHORT TERM GOAL #4:  Goal 4: In coordination with ENT, patient will complete CSE to determine least restrictive diet level and aide in satiation/QOL measures.  INTERVENTIONS: Patient continues on a puree with thin liquid diet.  Will defer diet advancement to ENT at this time, unless they request our assistance with this.    Long-Term Goals:   No LTG's secondary to short estimated length of stay.    Functional Oral Intake Scale: Total Oral Intake: 5.  Total oral intake of multiple consistencies requiring special preparation    EDUCATION:  Learner: Patient and Significant Other  Education:  Reviewed ST goals and Plan of Care, Reviewed recommendations for follow-up, Education Related to Potential Risks and Complications Due to Impairment/Illness/Injury, Education Related to Prevention of Recurrence of Impairment/Illness/Injury, Education Related to Health Promotion and Wellness, and Home Safety Education  Evaluation of Education: Verbalizes understanding and Needs further instruction    ASSESSMENT/PLAN:  Activity Tolerance:  Patient tolerance of  treatment: fair.  Patient wants to go home.  Assessment/Plan: Patient progressing toward established goals.  Continues to require skilled care of licensed speech pathologist to progress toward achievement of established goals and plan of care.  Plan for Next Session: laryngectomy care       Brook Sims M.S. CCC-SLP [id number]

## 2025-04-10 NOTE — CARE COORDINATION
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 Significant improvement along the floor the mouth and in the right neck at the site of previous abscesses. No residual discrete collection is present.   There is a drainage catheter in place. There are 2 small bubbles of gas along the floor the mouth.   2. Persistent moderate severity opacification of the right upper lung.   4/10 Right Neck Drain Removed  Patient Goals/Plan/Treatment Preferences: lives w spouse/caregiver Gabe; has  OP PT/OT/ST, SR HIS for GTF, SR HME for laryngectomy supplies, nebulizer, cane, walker, WC, bed, current  Cancer Center, new DRE Compressor for humidification to laryngectomy site; collaborated w Marcela,  HME Liaison  PCP: Samy Piper,   Readmission Risk Score: 28

## 2025-04-10 NOTE — PLAN OF CARE
Problem: Discharge Planning  Goal: Discharge to home or other facility with appropriate resources  4/10/2025 0002 by Faith Chamorro RN  Outcome: Progressing  Flowsheets (Taken 4/10/2025 0002)  Discharge to home or other facility with appropriate resources: Identify barriers to discharge with patient and caregiver  4/9/2025 1552 by Urbano Palafox RN  Outcome: Progressing     Problem: Skin/Tissue Integrity  Goal: Skin integrity remains intact  Description: 1.  Monitor for areas of redness and/or skin breakdown  2.  Assess vascular access sites hourly  3.  Every 4-6 hours minimum:  Change oxygen saturation probe site  4.  Every 4-6 hours:  If on nasal continuous positive airway pressure, respiratory therapy assess nares and determine need for appliance change or resting period  4/10/2025 0002 by Faith Chamorro RN  Outcome: Progressing  Flowsheets (Taken 4/10/2025 0002)  Skin Integrity Remains Intact:   Monitor for areas of redness and/or skin breakdown   Assess vascular access sites hourly  4/9/2025 1552 by Urbano Palfaox RN  Outcome: Progressing     Problem: ABCDS Injury Assessment  Goal: Absence of physical injury  4/10/2025 0002 by Faith Chamorro RN  Outcome: Progressing  Flowsheets (Taken 4/10/2025 0002)  Absence of Physical Injury: Implement safety measures based on patient assessment  4/9/2025 1552 by Urbano Palafox RN  Outcome: Progressing     Problem: Safety - Adult  Goal: Free from fall injury  4/10/2025 0002 by Faith Chamorro RN  Outcome: Progressing  Flowsheets (Taken 4/10/2025 0002)  Free From Fall Injury: Instruct family/caregiver on patient safety  4/9/2025 1552 by Urbano Palafox RN  Outcome: Progressing     Problem: Pain  Goal: Verbalizes/displays adequate comfort level or baseline comfort level  4/10/2025 0002 by Faith Chamorro RN  Outcome: Progressing  Flowsheets (Taken 4/10/2025 0002)  Verbalizes/displays adequate comfort level or baseline comfort level:    discharge  4/10/2025 0002 by Faith Chamorro, RN  Outcome: Progressing  Flowsheets (Taken 4/10/2025 0002)  Absence of infection at discharge:   Assess and monitor for signs and symptoms of infection   Monitor all insertion sites i.e., indwelling lines, tubes and drains  4/9/2025 1552 by Urbano Palafox, RN  Outcome: Progressing

## 2025-04-10 NOTE — PROGRESS NOTES
Progress note: Infectious diseases    Patient - Masood Randhawa,  Age - 74 y.o.    - 1950      Room Number - 4K-10/010-A   MRN -  [id number]   [facility] # - [id number]  Date of Admission -  2025  1:04 PM    SUBJECTIVE:   No new issues.  Follow up CT of the neck noted. Much improvement of the collection  OBJECTIVE   VITALS    height is 1.676 m (5' 6\") and weight is 60.7 kg (133 lb 13.1 oz). His oral temperature is 97.9 °F (36.6 °C). His blood pressure is 122/72 and his pulse is 100. His respiration is 20 and oxygen saturation is 93%.       Wt Readings from Last 3 Encounters:   25 60.7 kg (133 lb 13.1 oz)   25 54.3 kg (119 lb 9.6 oz)   25 56.9 kg (125 lb 7.1 oz)       I/O (24 Hours)    Intake/Output Summary (Last 24 hours) at 4/10/2025 1045  Last data filed at 4/10/2025 0318  Gross per 24 hour   Intake 900 ml   Output 550 ml   Net 350 ml       General Appearance  Awake, alert, oriented, chronically sick looking  HEENT - normocephalic, atraumatic, pale  conjunctiva,  anicteric sclera  Neck -  1 drain on the right side of the neck  on  TM.  Lungs -  Bilateral  air entry, + rhonchi,  Cardiovascular - Heart sounds are normal.    Abdomen - soft, not distended, nontender, peg tube   Neurologic -awake and oriented  Skin - No bruising or bleeding  Extremities - No edema, no cyanosis, clubbing     MEDICATIONS:      aspirin  81 mg Oral Daily    loperamide  2 mg Oral TID    guaiFENesin  400 mg Per G Tube Q6H    cefTRIAXone (ROCEPHIN) IV  1,000 mg IntraVENous Q24H    fluconazole  400 mg IntraVENous Q24H    lactobacillus  1 capsule Oral Daily with breakfast    pantoprazole  40 mg IntraVENous Daily    miconazole   Topical BID    ipratropium 0.5 mg-albuterol 2.5 mg  1 Dose Inhalation Q4H WA RT    [Held by provider] amLODIPine  5 mg Oral Daily    lisinopril  20 mg Oral Daily    sodium chloride flush  5-40 mL  dependence ([facility]), 8-10 beers daily. F10.20    OAB (overactive bladder) N32.81    Muscular deconditioning R29.898    Tendinopathy of left biceps tendon M67.922    Injury of left shoulder S49.92XA    Full thickness rotator cuff tear M75.120    Hoarseness of voice R49.0    History of smoking greater than 50 pack years Z87.891    Unintentional weight loss R63.4    Night sweats R61    Abnormal findings on laryngoscopy R09.89    Gastroesophageal reflux disease K21.9    Supraglottic airway obstruction J38.6    Lymphadenopathy of head and neck region R59.0    Head and neck cancer ([facility]) C76.0    Failure to thrive in adult R62.7    Tachycardia R00.0    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    Severe malnutrition E43    Acute on chronic respiratory failure with hypoxia J96.21    Cancer of glottis ([facility]) C32.0    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    Acute respiratory failure with hypoxia J96.01    SIRS (systemic inflammatory response syndrome) ([facility]) R65.10    Diarrhea R19.7    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    Status post tracheostomy ([facility]) Z93.0    Physical deconditioning R53.81    Hyponatremia E87.1    Chronic obstructive pulmonary disease ([facility]) J44.9    Status post laryngectomy Z90.02    Pneumonia of right upper lobe due to infectious organism J18.9    Malnutrition E46    Macrocytic anemia D53.9    Rash R21    Postoperative or surgical complication, initial encounter T81.9XXA    Abnormal CT of the chest R93.89         ASSESSMENT/PLAN   Pneumonia involving the right upper lobe: repeat BAL was negative for bacteria  Hx of head and neck cancer :  infection with candida albicans on diflucan  Squamous cell cancer s/p surgery  Malnutrition  Continue current treatment  Discharge plan      Thee Isabel MD, 4/10/2025 10:45 AM

## 2025-04-10 NOTE — PROGRESS NOTES
Hospitalist Progress Note  Internal Medicine Resident      Patient: Masood Randhawa 74 y.o. 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 Bauer's on 3/14/2025.  Patient reports copious secretions from his mouth and tracheostomy over the past week, with notes of grossly bloody secretions.  Right lateral posterior neck is edematous and erythematous upon evaluation.  CT soft tissue neck with contrast shows a fluid collection with air fluid level and enhancing margin in the posterior lateral right neck suspicious for abscess.  Afebrile.  No leukocytosis.  Per ENT, Important to note patient is a total laryngectomy patient, not tracheostomy.  He cannot be intubated orally.  Please contact ENT on-call if there are any questions concerning patient's airway.  ENT consulted  CT soft tissue neck 4/9 shows continued improvement along floor of mouth and right neck at site of previous abscess with no residual collection present.  Change catheter in place with 2 small bubbles of glass along floor of mouth, persistent moderate severity opacification of the right upper lung  Planning for repeat CT neck  Keep head of bed elevated no lower than 45 degrees  IR consulted 4/2 for pigtail insertion into right lateral posterior neck  Continue puréed diet, Continue tube feeds if not able to finish at least <50% of food on plate.   continue to monitor for signs and symptoms of infection  Antibiotics discontinued 4/6, vancomycin 4/1-4/5, Zosyn 4/1-4/6  Continue to follow ID and ENT recs     RUL Pneumonia: Likely secondary to aspiration pneumonia. Seen on CT chest on 4/1/2025 revealing emphysematous changes throughout both lung fields, superimposed 8.2 x 6.2 cm abnormal density in  seconds.  Peripheral Pulses: +2 palpable, equal bilaterally.       Labs/Radiology: See chart or assessment above.     Electronically signed by Freddy Sellers MD on 4/10/2025 at 6:19 PM  Case was discussed with Attending, Dr. Estrada.

## 2025-04-10 NOTE — PROGRESS NOTES
Department of Otolaryngology  Progress Note    Chief Complaint:  Neck swelling, hemoptysis    SUBJECTIVE:  Patient denies any new issues today.  CT completed yesterday and reviewed- see below.    A complete multi-organ review of systems was performed using a new patient questionnaire, and reviewed by me.  ENT:  negative except as noted in HPI  CONSTITUTIONAL:  negative except as noted in HPI  EYES:  negative except as noted in HPI  RESPIRATORY:  negative except as noted in HPI  CARDIOVASCULAR:  negative except as noted in HPI  GASTROINTESTINAL:  negative except as noted in HPI  GENITOURINARY:  negative except as noted in HPI  MUSCULOSKELETAL:  negative except as noted in HPI  SKIN:  negative except as noted in HPI  ENDOCRINE/METABOLIC: negative except as noted in HPI  HEMATOLOGIC/LYMPHATIC:  negative except as noted in HPI  ALLERGY/IMMUN: negative except as noted in HPI  NEUROLOGICAL:  negative except as noted in HPI  BEHAVIOR/PSYCH:  negative except as noted in HPI    OBJECTIVE      Physical  VITALS:  /78   Pulse 95   Temp 98.1 °F (36.7 °C) (Oral)   Resp 18   Ht 1.676 m (5' 6\")   Wt 60.7 kg (133 lb 13.1 oz)   SpO2 94%   BMI 21.60 kg/m²     Patient sitting upright in chair.  Wife not currently present.  Patient communicates by pointing and mouthing words.  He is not in any distress.  Humidification via trach mask in place and torqued to right side, as well as his laryngectomy tube - repositioned to midline.  Elena tube is clean; removed to evaluate stoma.  Stomal edges with mild crusting.  Suture in place right trifurcation at 9 o'clock.  Left neck is scaphoid.  Right neck drain in place with minimal serous drainage; this was removed.  Right submandibular tissue is mildly indurated, non tender.      Data  CBC with Differential:    Lab Results   Component Value Date/Time    WBC 7.0 04/09/2025 04:14 AM    RBC 2.67 04/09/2025 04:14 AM    RBC 4.56 02/22/2022 02:50 PM    RBC 14 02/22/2022 02:50 PM    HGB 8.9

## 2025-04-10 NOTE — PROGRESS NOTES
[facility]  OCCUPATIONAL THERAPY MISSED TREATMENT NOTE  [facility] ICU STEPDOWN TELEMETRY 4K  4K-10/010-A      Date: 4/10/2025  Patient Name: Masood Randhawa        CSN: [id number]   : 1950  (74 y.o.)  Gender: male   Referring Practitioner: Freddy Sellers MD            REASON FOR MISSED TREATMENT: Patient Unavailable with RT, will attempt next available time.

## 2025-04-11 NOTE — PROGRESS NOTES
Comprehensive Nutrition Assessment    Type and Reason for Visit:  Reassess, Nutrition support    Nutrition Recommendations/Plan:   Continue supplemental bolus feed regimen: Bolus 237 mls Jevity 1.5 if patient consumes less than 50% of meals (3 meals).  Bolus 237 mls Jevity 1.5 at 8pm daily. Bolus the 237mls Jevity at 8pm daily even if pt eats good.   Flush with 50ml water before & after each tube feeding bolus.  Continue Ensure Plus High Protein TID (po).  Wife mentions has appointment with Jacqueline Levine (OP RD) ~ 4/30.  Dysphagia Pureed diet, Tube feeding regimen & handouts reviewed with pt's wife  4/8 & 4/9 and reinforced 4/11.  Emphasized pt needs ~46-56 ounces of fluid /day & encouraged wife to keep a record of all fluid given, Encouraged wife to provide a 3 day food diary & tube feeding diary for next appointment with OP RD.    Pt to receive home tube feeding supplies from [facility].       Malnutrition Assessment:  Malnutrition Status:  Severe malnutrition (04/08/25 1330)    Context:  Chronic Illness     Findings of the 6 clinical characteristics of malnutrition:  Energy Intake:   (meeting 100% of nutrition needs via PEG enteral feedings PTA, PEG placed 2/14/25)  Weight Loss:   (significant losses over last 6-7 months; appears to have gained weight since 2/9/25  (PEG Placed 2/14/25))     Body Fat Loss:  Severe body fat loss (noted prior to enteral feeding start; now meeting 100% of estimated nutritional needs via TF) Orbital, Triceps, Fat Overlying Ribs, Buccal region   Muscle Mass Loss:  Severe muscle mass loss (noted prior to enteral feeding start; now meeting 100% of estimated nutritional needs via TF) Temples (temporalis), Clavicles (pectoralis & deltoids), Thigh (quadriceps), Calf (gastrocnemius), Hand (interosseous), Scapula (trapezius)  Fluid Accumulation:  Mild Extremities   Strength:  Not Performed    Nutrition Assessment:     Pt. nutritionally improving AEB po intake has been 50% or greater of meals,  good acceptance of Ensure Plus and receives evening bolus of Jevity.   At risk for further nutrition compromise r/t admit with abscess of neck-planning IR drain placement 4/2/25, known SCC of glottis, s/p 3/14/25:Total Bilateral Laryngectomy with radical neck dissection. Left Selective Neck Dissection. Tracheoesphogeal Puncture Fistula Construction and TEP Placement. Right Hemithyroidectomy , Dysphagia, need for enteral feeds via PEG prior to admit to help meet nutritional needs, RUL Pneumonia likely secondary to aspiration, recent hospitalization 3/4-3/10/25 for PNA and COPD exacerbation, increased nutrient needs to support wound healing, and underlying medical condition (PMHx: COPD, GERD, HTN, OA, PVD, s/p laryngoscopy - 2/12/25, PEG tube placement 2/14/25, former smoker, ETOH abuse)     Nutrition Related Findings:    Pt. Report/Treatments/Miscellaneous: Patient seen with wife, reports intake 50% or greater so has only be receiving evening bolus of Jevity 1.5 which RN confirms, good acceptance of Ensure Plus and drinks 100% ONS, wife reports plans for discharge home today - answered wife's questions regarding pureed diet and supplemental bolus tube feed  GI Status: BM 4/10   Pertinent Labs: sodium 132, potassium 4.2, BUN 27, Creatinine 0.6, glucose 95   Pertinent Meds: rocephin, diflucan, culturelle, imodium, protonix       Wound Type:  (Total bilateral laryngectomywithradicalneckdissection,left selective neck dissection, tracheoesophageal puncture fistula construction and TEP placement, right hemithyroidectomy: Performed by Dr. Bauer's on 3/14/2025. stage II sacrum, stage II coccyx)       Current Nutrition Intake & Therapies:    Average Meal Intake: 51-75%, %  Average Supplements Intake: %  ADULT DIET; Dysphagia - Pureed  ADULT ORAL NUTRITION SUPPLEMENT; Breakfast, Lunch, Dinner; Standard High Calorie/High Protein Oral Supplement  ADULT TUBE FEEDING; PEG; Other Tube Feeding (specify); Jevity 1.5;

## 2025-04-11 NOTE — PLAN OF CARE
Problem: Discharge Planning  Goal: Discharge to home or other facility with appropriate resources  Outcome: Adequate for Discharge     Problem: Skin/Tissue Integrity  Goal: Skin integrity remains intact  Description: 1.  Monitor for areas of redness and/or skin breakdown  2.  Assess vascular access sites hourly  3.  Every 4-6 hours minimum:  Change oxygen saturation probe site  4.  Every 4-6 hours:  If on nasal continuous positive airway pressure, respiratory therapy assess nares and determine need for appliance change or resting period  Outcome: Adequate for Discharge     Problem: ABCDS Injury Assessment  Goal: Absence of physical injury  Outcome: Adequate for Discharge     Problem: Safety - Adult  Goal: Free from fall injury  Outcome: Adequate for Discharge     Problem: Pain  Goal: Verbalizes/displays adequate comfort level or baseline comfort level  Outcome: Adequate for Discharge     Problem: Nutrition Deficit:  Goal: Optimize nutritional status  Outcome: Adequate for Discharge     Problem: Skin/Tissue Integrity - Adult  Goal: Skin integrity remains intact  Description: 1.  Monitor for areas of redness and/or skin breakdown  2.  Assess vascular access sites hourly  3.  Every 4-6 hours minimum:  Change oxygen saturation probe site  4.  Every 4-6 hours:  If on nasal continuous positive airway pressure, respiratory therapy assess nares and determine need for appliance change or resting period  Outcome: Adequate for Discharge     Problem: Gastrointestinal - Adult  Goal: Maintains adequate nutritional intake  Outcome: Adequate for Discharge     Problem: Infection - Adult  Goal: Absence of infection at discharge  Outcome: Adequate for Discharge

## 2025-04-11 NOTE — DISCHARGE SUMMARY
Attending supervising physicians attestation statement:       I Discussed the findings and plans with the Resident physician  personally   and agree with the  note as outlined below  . spoke with the staff  Regarding care plans and recommendations.  Patient was seen and examined by this provider.     Time with pt care > 30 mins    Electronically signed by David Herrera MD on 2025 at 9:09 AM             Resident Discharge Summary (Hospitalist)      Patient: Masood Randhawa 74 y.o. 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 Bauer's on 3/14/2025.  Patient reports copious secretions from his mouth and tracheostomy over the past week, with notes of grossly bloody secretions.  Right lateral posterior neck is edematous and erythematous upon evaluation.  CT soft tissue neck with contrast shows a fluid collection with air fluid level and enhancing margin in the posterior lateral right neck suspicious for abscess.  Afebrile.  No leukocytosis.  Per ENT, Important to note patient is a total laryngectomy patient, not tracheostomy.  He cannot be intubated orally.  Please contact ENT on-call if there are any questions concerning patient's airway. IR consulted  for pigtail insertion into right lateral posterior neck. CT soft tissue neck  shows continued improvement along floor of mouth and right neck at site of previous abscess with no residual collection present.  Change catheter in place with 2 small bubbles of glass along floor of mouth, persistent moderate severity  [id number]  [phone]    Follow up on 4/14/2025  Follow up with Dr. Daley 4/14 at 2:40pm         Discharge Medications:      Medication List        CONTINUE taking these medications      amLODIPine 5 MG tablet  Commonly known as: NORVASC  Take 1 tablet by mouth daily     aspirin 81 MG EC tablet     benazepril 20 MG tablet  Commonly known as: LOTENSIN  Take 1 tablet by mouth daily     famotidine 40 MG tablet  Commonly known as: PEPCID  Take 1 tablet by mouth every evening     ipratropium 0.5 mg-albuterol 2.5 mg 0.5-2.5 (3) MG/3ML Soln nebulizer solution  Commonly known as: DUONEB  Inhale 3 mLs into the lungs in the morning and 3 mLs in the evening.     MULTI VITAMIN PO     mupirocin 2 % ointment  Commonly known as: BACTROBAN  Apply to stoma with q tip TID.     Nebulizer/Tubing/Mouthpiece Kit  1 kit by Does not apply route in the morning, at noon, and at bedtime     pantoprazole 40 MG tablet  Commonly known as: PROTONIX  Take 1 tablet by mouth every morning (before breakfast)     sodium chloride nebulizer 0.9 % solution            STOP taking these medications      azithromycin 250 MG tablet  Commonly known as: Zithromax Z-Manolo     cefdinir 300 MG capsule  Commonly known as: OMNICEF                   Time Spent on discharge is 35 minutes in the examination, evaluation, counseling and review of medications and discharge plan.    Thank you Samy Piper DO for the opportunity to be involved in this patient's care.      Signed:    Electronically signed by Freddy Sellers MD on 4/11/25 at 3:35 PM EDT     Case was discussed with Attending, Dr. Herrera.

## 2025-04-11 NOTE — CARE COORDINATION
4/11/25, 1:20 PM EDT    Patient goals/plan/ treatment preferences discussed by  and .  Patient goals/plan/ treatment preferences reviewed with patient/ family.  Patient/ family verbalize understanding of discharge plan and are in agreement with goal/plan/treatment preferences.  Understanding was demonstrated using the teach back method.  AVS provided by RN at time of discharge, which includes all necessary medical information pertaining to the patients current course of illness, treatment, post-discharge goals of care, and treatment preferences.     Services At/After Discharge: Outpatient PT, OT, ST    Patient plans to discharge home with spouse and current outpatient services. Patient continues to deny ECF.     RN made aware.

## 2025-04-11 NOTE — PROGRESS NOTES
Progress note: Infectious diseases    Patient - Masood Randhawa,  Age - 74 y.o.    - 1950      Room Number - 4K-10/010-A   MRN -  [id number]   [facility] # - [id number]  Date of Admission -  2025  1:04 PM    SUBJECTIVE:   No new issues.  Plan for discharge  OBJECTIVE   VITALS    height is 1.676 m (5' 6\") and weight is 60.7 kg (133 lb 13.1 oz). His oral temperature is 98.4 °F (36.9 °C). His blood pressure is 122/51 (abnormal) and his pulse is 103 (abnormal). His respiration is 20 and oxygen saturation is 94%.       Wt Readings from Last 3 Encounters:   25 60.7 kg (133 lb 13.1 oz)   25 54.3 kg (119 lb 9.6 oz)   25 56.9 kg (125 lb 7.1 oz)       I/O (24 Hours)    Intake/Output Summary (Last 24 hours) at 2025 0946  Last data filed at 2025 0937  Gross per 24 hour   Intake 560 ml   Output 650 ml   Net -90 ml       General Appearance  Awake, alert, oriented, chronically sick looking  HEENT - normocephalic, atraumatic, pale  conjunctiva,  anicteric sclera  Neck -   has tracheostomy   Lungs -  Bilateral  air entry, + rhonchi,  Cardiovascular - Heart sounds are normal.    Abdomen - soft, not distended, nontender, peg tube   Neurologic -awake and oriented  Skin - No bruising or bleeding  Extremities - No edema, no cyanosis, clubbing     MEDICATIONS:      aspirin  81 mg Oral Daily    loperamide  2 mg Oral TID    guaiFENesin  400 mg Per G Tube Q6H    cefTRIAXone (ROCEPHIN) IV  1,000 mg IntraVENous Q24H    fluconazole  400 mg IntraVENous Q24H    lactobacillus  1 capsule Oral Daily with breakfast    pantoprazole  40 mg IntraVENous Daily    miconazole   Topical BID    ipratropium 0.5 mg-albuterol 2.5 mg  1 Dose Inhalation Q4H WA RT    [Held by provider] amLODIPine  5 mg Oral Daily    lisinopril  20 mg Oral Daily    sodium chloride flush  5-40 mL IntraVENous 2 times per day    enoxaparin  40 mg SubCUTAneous  N32.81    Muscular deconditioning R29.898    Tendinopathy of left biceps tendon M67.922    Injury of left shoulder S49.92XA    Full thickness rotator cuff tear M75.120    Hoarseness of voice R49.0    History of smoking greater than 50 pack years Z87.891    Unintentional weight loss R63.4    Night sweats R61    Abnormal findings on laryngoscopy R09.89    Gastroesophageal reflux disease K21.9    Supraglottic airway obstruction J38.6    Lymphadenopathy of head and neck region R59.0    Head and neck cancer (HCC) C76.0    Failure to thrive in adult R62.7    Tachycardia R00.0    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    Severe malnutrition E43    Acute on chronic respiratory failure with hypoxia J96.21    Cancer of glottis (HCC) C32.0    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    Acute respiratory failure with hypoxia J96.01    SIRS (systemic inflammatory response syndrome) ([facility]) R65.10    Diarrhea R19.7    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    Status post tracheostomy ([facility]) Z93.0    Physical deconditioning R53.81    Hyponatremia E87.1    Chronic obstructive pulmonary disease (HCC) J44.9    Status post laryngectomy Z90.02    Pneumonia of right upper lobe due to infectious organism J18.9    Malnutrition E46    Macrocytic anemia D53.9    Rash R21    Postoperative or surgical complication, initial encounter T81.9XXA    Abnormal CT of the chest R93.89         ASSESSMENT/PLAN   Pneumonia involving the right upper lobe: repeat BAL was negative for bacteria  Hx of head and neck cancer :  infection with candida albicans on diflucan  Squamous cell cancer s/p surgery  Malnutrition  Ok with discharge plan  ID will sign off      Thee Isabel MD, 4/11/2025 9:46 AM

## 2025-04-11 NOTE — PROGRESS NOTES
Discharge instructions given to patient and family, verbalizes understanding. Denies any concerns, transferred to discharge lobby and assisted to private vehicle.

## 2025-04-11 NOTE — PROGRESS NOTES
Department of Otolaryngology  Progress Note    Chief Complaint:  Neck swelling, hemoptysis    SUBJECTIVE:  Patient denies any acute events overnight.  He was starting breakfast upon my arrival.  Both he and his wife at bedside denied any acute concerns regarding discharge plan hopefully later today.    A complete multi-organ review of systems was performed using a new patient questionnaire, and reviewed by me.  ENT:  negative except as noted in HPI  CONSTITUTIONAL:  negative except as noted in HPI  EYES:  negative except as noted in HPI  RESPIRATORY:  negative except as noted in HPI  CARDIOVASCULAR:  negative except as noted in HPI  GASTROINTESTINAL:  negative except as noted in HPI  GENITOURINARY:  negative except as noted in HPI  MUSCULOSKELETAL:  negative except as noted in HPI  SKIN:  negative except as noted in HPI  ENDOCRINE/METABOLIC: negative except as noted in HPI  HEMATOLOGIC/LYMPHATIC:  negative except as noted in HPI  ALLERGY/IMMUN: negative except as noted in HPI  NEUROLOGICAL:  negative except as noted in HPI  BEHAVIOR/PSYCH:  negative except as noted in HPI    OBJECTIVE      Physical  VITALS:  BP (!) 150/82   Pulse 96   Temp 98.2 °F (36.8 °C) (Axillary)   Resp 20   Ht 1.676 m (5' 6\")   Wt 60.7 kg (133 lb 13.1 oz)   SpO2 90%   BMI 21.60 kg/m²     Patient sitting in hospital bed without acute respiratory distress with wife at bedside. Patient communicates by pointing and mouthing words.  He is not in any distress.  Humidification via trach mask in place and torqued to right side, as well as his laryngectomy tube - repositioned to midline.  Elena tube was just cleaned and appeared patent; removed to evaluate stoma.  Stomal edges with mild crusting.  Suture in place right trifurcation at 9 o'clock.  Left neck is scaphoid.  Right submandibular tissue is mildly indurated, non tender stable from prior.    Data  CBC with Differential:    Lab Results   Component Value Date/Time    WBC 7.4 04/11/2025

## 2025-04-11 NOTE — PROGRESS NOTES
[facility]  INPATIENT SPEECH THERAPY  STRZ ICU STEPDOWN TELEMETRY 4K  DAILY NOTE    Discharge Recommendations: Outpatient Speech Therapy    SLP Individual Minutes  Time In: 1005  Time Out: 1038  Minutes: 33  Timed Code Treatment Minutes: 0 Minutes       Date: 2025  Patient Name: Masood Randhawa      CSN: [id number]   : 1950  (74 y.o.)  Gender: male   Referring Physician:  RUBEN Sanchez  Diagnosis: Abscess of neck (L02.11)  Precautions: PATIENT IS A NECK BREATHER - S/P TOTAL LARYNGECTOMY  Current Diet: Puree with thin liquids  Respiratory Status:  trach mask/collar ; humidified air  Swallowing Strategies: Standard Universal Swallow Precautions  Date of Last MBS/FEES: Not Applicable    Pain:  4/10 - neck    Subjective:  Patient seen in collaboration with an SLP colleague, Susan this date. Patient lying in bed leaning to the right upon arrival. Patient repositioned in bed to be in an upright position.  Spouse also present throughout.  Patient's drain on the right side of the neck has been removed.  Patient and spouse report the plan is for discharge to home today.     Short-Term Goals:  SHORT TERM GOAL #1:  Goal 1: Patient and family will exhibit an understanding of total laryngeactomy anatomy, ATOS supplies for home management, multi-modal communication, and the role of SLP interventions (e.g., TEP/voice prosthesis) to maximize education given extensive surgical interventions.  INTERVENTIONS: Quickly reviewed how to monitor for leaking through the center and around the outside of the voice prosthesis.  This SLP also demonstrated how to do this this session while the patient was drinking water with green food coloring mixed in. Also reviewed the need to call the OP speech therapy department at this facility if leaking is noted.    Completed education on what to do if the patient's TEP would become dislodged.  If it would become dislodged, informed the patient and spouse that        Brook Sims M.S. CCC-SLP [id number]

## 2025-04-11 NOTE — PROGRESS NOTES
[facility]  STRZ ICU STEPDOWN TELEMETRY 4K  Occupational Therapy  Daily Note    Discharge Recommendations: Home with Home Health OT  Equipment Recommendations: No defer to next level of care       Time In: 0755  Time Out: 08  Timed Code Treatment Minutes: 17 Minutes  Minutes: 17          Date: 2025  Patient Name: Masood Randhawa,   Gender: male      Room: [address] [place] [address]  MRN: [id number]  : 1950  (74 y.o.)  Referring Practitioner: Freddy Sellers MD  Diagnosis: Postoperative or surgical complication, initial encounter  Additional Pertinent Hx: Masood Randhawa is a 74 y.o. male with PMHx of COPD, hypertension, BPH and recent total bilateral laryngectomy with radical neck dissection on 3/14/2025 who presents to [facility] with reports of copious secretions with notes of intermittent bloody secretions.  His hospitalization was complicated with neck and parotid swelling and required delay gentle IV and biotics for hospital-acquired pneumonia.  Patient states that he has also been experiencing some chills, and has had to cover himself with several blankets to stay warm.  He is accompanied by his wife and states that he has not been experiencing any fevers.  Dr. Bauer was consulted in the ED and evaluated patient.  Recommending IR consult for pigtail into right lateral posterior neck.    Restrictions/Precautions:  Restrictions/Precautions: Fall Risk, General Precautions, Contact Precautions  Position Activity Restriction  Other Position/Activity Restrictions: neck breather      Social/Functional History:  Lives With: Spouse  Type of Home: House  Home Layout: Two level, Able to Live on Main level with bedroom/bathroom  Home Access: Stairs to enter without rails  Entrance Stairs - Number of Steps: 2, had bear statue he uses for UE support  Home Equipment: Walker - Rolling, Hospital bed, Walker - 4-Wheeled, Wheelchair - Manual   Bathroom Shower/Tub: Tub/Shower unit, Shower

## 2025-04-13 PROBLEM — Z98.890 POST-OPERATIVE STATE: Status: ACTIVE | Noted: 2025-04-13

## 2025-04-14 ENCOUNTER — OFFICE VISIT (OUTPATIENT)
Dept: ENT CLINIC | Age: 75
End: 2025-04-14

## 2025-04-14 ENCOUNTER — CARE COORDINATION (OUTPATIENT)
Dept: CARE COORDINATION | Age: 75
End: 2025-04-14

## 2025-04-14 VITALS
HEART RATE: 99 BPM | SYSTOLIC BLOOD PRESSURE: 96 MMHG | OXYGEN SATURATION: 90 % | DIASTOLIC BLOOD PRESSURE: 56 MMHG | TEMPERATURE: 99.2 F

## 2025-04-14 DIAGNOSIS — Z98.890 STATUS POST RADICAL DISSECTION OF NECK: ICD-10-CM

## 2025-04-14 DIAGNOSIS — Z90.02 STATUS POST LARYNGECTOMY: ICD-10-CM

## 2025-04-14 DIAGNOSIS — R59.0 LYMPHADENOPATHY OF HEAD AND NECK REGION: ICD-10-CM

## 2025-04-14 DIAGNOSIS — T81.9XXA POSTOPERATIVE OR SURGICAL COMPLICATION, INITIAL ENCOUNTER: Primary | ICD-10-CM

## 2025-04-14 DIAGNOSIS — G89.3 CANCER ASSOCIATED PAIN: ICD-10-CM

## 2025-04-14 DIAGNOSIS — R49.1 APHONIA: ICD-10-CM

## 2025-04-14 DIAGNOSIS — C32.0 SQUAMOUS CELL CARCINOMA OF GLOTTIS: Primary | ICD-10-CM

## 2025-04-14 PROCEDURE — 99024 POSTOP FOLLOW-UP VISIT: CPT | Performed by: OTOLARYNGOLOGY

## 2025-04-14 PROCEDURE — 1111F DSCHRG MED/CURRENT MED MERGE: CPT | Performed by: FAMILY MEDICINE

## 2025-04-14 NOTE — CARE COORDINATION
Care Transitions Note    Initial Call - Call within 2 business days of discharge: Yes    Patient Current Location:  Home: [address] [place] [address]    Care Transition Nurse contacted the spouse/partner  by telephone to perform post hospital discharge assessment, verified name and  as identifiers.  Provided introduction to self, and explanation of the Care Transition Nurse role.    Patient: Masood Randhawa    Patient : 1950   MRN: [id number]    Reason for Admission: bloody secretions post op  Discharge Date: 25  RURS: Readmission Risk Score: 27.7      Last Discharge Facility       Date Complaint Diagnosis Description Type Department Provider    25 Blood in Trach Post-operative state ... ED to Hosp-Admission (Discharged) (ADMITTED) David Reid MD; Feng Garcia.            Was this an external facility discharge? No    Additional needs identified to be addressed with provider   No needs identified             Method of communication with provider: none.    Patients top risk factors for readmission: caregiver stress, lack of knowledge about disease, medical condition-s/p total laryngectomy/neck dissection 3/14/25 trach COPD, and multiple health system providers    Interventions to address risk factors:   Review of patient management of conditions/medications: s/p laryngectomy trach-> ENT HFU today   DME: [facility]    Care Summary Note: CTN call to Soraya (spouse) today and she says Ildefonso is doing ok, really worn out and tired.  Denies inc. Sob, chest pain, dizziness, n/v/d, fever, chills, swelling, malaise.  Yellow green secretions from trach are present but much less.  ENT HFU today. She will take him to the appt.  Drain sites look ok, no redness, swelling or drainage.  Meds reviewed and are correct, she did stop antibiotics.  Will resume OP OT/PT/SP tomorrow.  Rad Onc 25  Wt.=126 (up 3 lbs.)  PEG tube feedings supplement pureed diet. She says he

## 2025-04-15 ENCOUNTER — HOSPITAL ENCOUNTER (OUTPATIENT)
Dept: OCCUPATIONAL THERAPY | Age: 75
Setting detail: THERAPIES SERIES
Discharge: HOME | End: 2025-04-15
Payer: MEDICARE

## 2025-04-15 ENCOUNTER — TELEPHONE (OUTPATIENT)
Dept: CARDIAC REHAB | Age: 75
End: 2025-04-15

## 2025-04-15 PROCEDURE — 97165 OT EVAL LOW COMPLEX 30 MIN: CPT

## 2025-04-15 PROCEDURE — 97110 THERAPEUTIC EXERCISES: CPT

## 2025-04-15 NOTE — TELEPHONE ENCOUNTER
PULMONARY REHABILITATION REFERRAL  COPD Exacerbation    Pulmonary Rehab Evaluation order received.    Called pt on 3/11 and no answer, left message.  On 3/18 we did not call pt due to pt in ICU.  Checked on pt on 4/8 and pt still an inpatient on 4K.  So called pt today, 4/15, there was no answer, left message asking pt to call us back.

## 2025-04-15 NOTE — PROGRESS NOTES
[facility] PHYSICIANS [place] SPECIALTY  [facility] EAR, NOSE AND THROAT  [address]  [address]  [facility] [place] [address]  Dept: [phone]  Dept Fax: [phone]  Loc: [phone]    Masood Randhawa is a 74 y.o. male who was referred by No ref. provider found for:  Chief Complaint   Patient presents with    Post-Op Check     Patient here for a  post op patient has no other concerns and is doing good.        HPI:        History of Present Illness  Masood Randhawa is a 74 y.o. male who is here for a follow-up status post laryngectomy and modified radical neck dissection on the left and a limited neck dissection on the right.  He has been readmitted twice postdischarge from this extensive operation for largely medical problems associated with his comorbidities.  He returns today with his wife reporting overall taking oral nutrition much better than he had previously but still getting at least 2 tube feeding meals per day.    History is reported by his wife in the presence of the patient.  He has been readmitted twice, initially for a major surgical procedure and subsequently due to complications. Post his most recent admission, he reports satisfactory neck function and swallowing ability. He continues to utilize tube feeding once or twice daily, specifically for breakfast, as he is not inclined towards morning meals. He expresses dissatisfaction with the taste of the tube feed and queries about the possibility of consuming non-pureed foods. He has been observed attempting to speak without the tracheostomy tube, with air escaping from the sides. He experienced a single episode of sound production. He has an upcoming appointment scheduled for Friday. He is also receiving Ensure as part of his oral nutritional intake.  He is complaining of food having \"no flavor\" as is commonly the case post laryngectomy.        History:     Allergies   Allergen Reactions    Pcn [Penicillins] Other (See Comments)     Was

## 2025-04-15 NOTE — PROGRESS NOTES
Resistance  - 1 x daily - 7 x weekly - 3 sets - 10 reps  - Seated Elbow Extension with Self-Anchored Resistance  - 1 x daily - 7 x weekly - 3 sets - 10 reps  []  No new Education completed  []  Reviewed Prior HEP      [x]  Patient verbalized and/or demonstrated understanding of education provided.  []  Patient unable to verbalize and/or demonstrate understanding of education provided.  Will continue education.  [x]  Barriers to learning: none    PLAN:  Treatment Recommendations: Strengthening, Functional Mobility Training, Home Exercise Program, Patient Education, and Self-Care Education and Training    [x]  Plan of care initiated.  Plan to see patient 2 times per week for 8 weeks to address the treatment planned outlined above.  []  Continue with current plan of care  []  Modify plan of care as follows:    []  Hold pending physician visit  []  Discharge    Time In 1415   Time Out 1510   Timed Code Minutes: 10 min   Total Treatment Time: 55 min       Electronically Signed by:  Wendy SANFORD/SAJAN, CHT #[id number]

## 2025-04-16 ENCOUNTER — HOSPITAL ENCOUNTER (OUTPATIENT)
Dept: SPEECH THERAPY | Age: 75
Setting detail: THERAPIES SERIES
Discharge: HOME | End: 2025-04-16
Payer: MEDICARE

## 2025-04-16 ENCOUNTER — CARE COORDINATION (OUTPATIENT)
Dept: CARE COORDINATION | Age: 75
End: 2025-04-16

## 2025-04-16 PROCEDURE — 92507 TX SP LANG VOICE COMM INDIV: CPT | Performed by: SPEECH-LANGUAGE PATHOLOGIST

## 2025-04-16 NOTE — PROGRESS NOTES
Goal: Did not obtain as of now; will obtain in future session.    Short Term Goals:  Time Frame for Short-Term Goals: 6 weeks    SHORT TERM GOAL #1: Patient and family will exhibit an understanding of total laryngectomy anatomy and establish an account with [facility] in order to support successful home management and improve overall ability to successfully communicate s/p total laryngectomy.   INTERVENTIONS: Patient's spouse inquired about an email that she received from [facility] as they are still trying to get the patient's account set up and checking insurance benefits.  Spouse reports she started to fill out the form, but was not able to get it completed so she requested assistance with it this date.  This SLP assisted the patient's spouse in filling out the rest of the information that needed filled out on the Patient Services Form (despite this form being sent to [facility] by this SLP about 1 week ago).  The spouse then received an email confirmation that the form was successfully completed and sent to [facility].  Educated the patient and spouse that [facility] needs this information so they can order his laryngectomy supplies in the future.      Reviewed some of the terminology related to the patient's anatomy changes and prosthesis.  Educated the patient that the hole/opening to his airway is called the \"stoma\" and the voice prosthesis is the device seen inside his trachea that passed through to the esophagus.  Patient nodded his head to demonstrate understanding.     SHORT TERM GOAL #2: Patient and spouse will demonstrate understanding of cleaning the voice prothesis, cleaning the LaryTube, secretion management, 24/hour use of HME and rational to utilize in order to promote overall care and pulmonary return s/p total laryngectomy.   INTERVENTIONS: Patient arrived with his size 9/36 non-fenestrated larytube in place with a night HME attached to it.  The larytube was held in place by the neck

## 2025-04-16 NOTE — CARE COORDINATION
Care Transitions Note    Follow Up Call     Attempted to reach spouse/partner  for transitions of care follow up.  Unable to reach spouse/partner .      Outreach Attempts:   Multiple attempts to contact spouse/partner  at phone numbers on file.     Care Summary Note: day #1 no response to CTN outreach.    Follow Up Appointment:   Future Appointments         Provider Specialty Dept Phone    4/17/2025  3:00 PM Katerina Velásquez, PT Physical Therapy [phone]    4/18/2025 1:00 PM Samy Gordillo MD; SCHEDULE, [facility] RAD NURSE Radiation Oncology [phone]    4/18/2025 1:30 PM SCHEDULE, [facility] CT SIM Radiation Oncology [phone]    4/30/2025 1:00 PM Brook Sims, SLP Speech Therapy [phone]    4/30/2025 3:00 PM Jacqueline Levine RD, LD Internal Medicine [phone]    5/7/2025 1:00 PM Brook Sims SLP Speech Therapy [phone]    5/14/2025 1:00 PM Brook Sims, SLP Speech Therapy [phone]    5/19/2025 2:15 PM Rickie Daley MD Otolaryngology [phone]    5/21/2025 1:00 PM Brook Sims, SLP Speech Therapy [phone]    5/28/2025 1:00 PM Brook Sims, SLP Speech Therapy [phone]    7/7/2025 9:40 AM (Arrive by 9:10 AM) STR CT IMAGING RM1  [facility] [phone]    7/15/2025 2:00 PM Mariah Lorenzo, APRN - CNP Pulmonology [phone]            Plan for follow-up on next business day.  based on severity of symptoms and risk factors. Plan for next call: symptom management-bloody secretions from trach, PEG tube feedings,sxs infection/swelling   self management-DRE humidifier, wt. Pureed foods, BM  follow-up appointment-ENT 4/14/25->review  PT/OT/SP going ok?    Swathi Blancas RN

## 2025-04-17 ENCOUNTER — TELEPHONE (OUTPATIENT)
Dept: CARDIAC REHAB | Age: 75
End: 2025-04-17

## 2025-04-17 ENCOUNTER — CARE COORDINATION (OUTPATIENT)
Dept: CARE COORDINATION | Age: 75
End: 2025-04-17

## 2025-04-17 ENCOUNTER — HOSPITAL ENCOUNTER (OUTPATIENT)
Dept: PHYSICAL THERAPY | Age: 75
Setting detail: THERAPIES SERIES
Discharge: HOME | End: 2025-04-17
Payer: MEDICARE

## 2025-04-17 DIAGNOSIS — R49.1 APHONIA: ICD-10-CM

## 2025-04-17 DIAGNOSIS — Z98.890 STATUS POST RADICAL DISSECTION OF NECK: Primary | ICD-10-CM

## 2025-04-17 PROCEDURE — 97110 THERAPEUTIC EXERCISES: CPT

## 2025-04-17 PROCEDURE — 97162 PT EVAL MOD COMPLEX 30 MIN: CPT

## 2025-04-17 NOTE — PROGRESS NOTES
[facility] with reports of copious secretions with notes of intermittent bloody secretions. His hospitalization was complicated with neck and parotid swelling and required delay gentle IV and biotics for hospital-acquired pneumonia.  Was discharged on 3/24/25 and readmitted on 4/1 and discharged 4/13. Is deconditioned since hospitalization and therapy has been ordered. He was evaluated by HH and determined a candidate for OP rehab per ST note.        SUBJECTIVE: Patient is deconditioned from recent hospitalizations.  Is walking with rolling walker at home to go to the bathroom.      Social/Functional History and Current Status:  Medications and Allergies have been reviewed and are listed on Medical History Questionnaire.    Masood Randhawa lives with spouse in a multiple floor home with ability to complete ADL's on main floor with stairs and no handrail to enter.    Task Previous Current   ADL’s  Independent Assistance Required   IADL's Independent Assistance Required   Ambulation Independent Assistance Required   Transfers Independent Modified Independent   Recreation Independent Used to hunt, watches TV now   Community Integration Independent Independent   Driving Active  Does not drive   Work Retired  Retired     Brief Fatigue Inventory   Throughout our lives, most of us have times when we feel very tired or fatigued. Have you felt unusually tired or fatigued in the last week? No   Scale: 0 (No Fatigue)  <<<<>>>>  10 (As Bad as You Can Imagine)   1. How would you rate your fatigue (weariness, tiredness) right NOW? 3   2. How would you rate your USUAL level of fatigue during the past 24 hours? 3   3. How would you rate your WORST level of fatigue during the past 24 hours? 4   Scale: 0 (Does Not Interfere) <<<<>>>> 10 (Completely Interferes)   4. How would you rate how your fatigue has interfered with your GENERAL ACTIVITY in the past 24 hours? 3   5. How would you rate how your fatigue has interfered

## 2025-04-17 NOTE — CARE COORDINATION
Care Transitions Note    Follow Up Call     Patient Current Location:  Home: [address] [address]    Care Transition Nurse contacted the spouse/partner  by telephone. Verified name and  as identifiers.    Additional needs identified to be addressed with provider   No needs identified                 Method of communication with provider: none.    Care Summary Note:  Spoke with wife, Soraya, said Ildefonso is doing pretty good, just tired.  He is able to do his own ADLs.  Denies chest pain, fever, chills, dizziness, dyspnea, n/v/d.  He is coughing up yellow phlegm but not as often, no bloody secretions.  Continues to peg tube feedings morning and late evening.  He is allowed to eat soft foods now, didn't like pureed.  Said where the syringe go in tube, it popped off.  It is closed now, wondered if it was from gas in stomach since he is eating food now.  She will mention it to PT today and Dr Gordillo tomorrow.  Saw ENT the other day and said everything looked good.  Will be starting radiation and chemo, discussing tomorrow.  No other issues to report.  Denies any other needs.  No other questions or concerns at this time.  Will continue to follow, agreeable to calls.    Plan of care updates since last contact:  No changes       Advance Care Planning:   Does patient have an Advance Directive: reviewed during previous call, see note. .    Medication Review:  No changes since last call.     Remote Patient Monitoring:  Offered patient enrollment in the Remote Patient Monitoring (RPM) program for in-home monitoring: Yes, but did not enroll at this time: controlled chronic disease management.    Assessments:  Care Transitions Subsequent and Final Call    Subsequent and Final Calls  Do you have any ongoing symptoms?: No  Have your medications changed?: No  Do you have any questions related to your medications?: No  Do you currently have any active services?: Yes  Are you currently active with any

## 2025-04-17 NOTE — TELEPHONE ENCOUNTER
Spoke with spouse. Patient will be completing PT/OT. Not interested in Pulmonary Rehab. Order closed.

## 2025-04-18 ENCOUNTER — APPOINTMENT (OUTPATIENT)
Dept: RADIATION ONCOLOGY | Age: 75
End: 2025-04-18
Payer: MEDICARE

## 2025-04-18 ENCOUNTER — HOSPITAL ENCOUNTER (OUTPATIENT)
Dept: RADIATION ONCOLOGY | Age: 75
End: 2025-04-18
Payer: MEDICARE

## 2025-04-21 ENCOUNTER — APPOINTMENT (OUTPATIENT)
Dept: RADIATION ONCOLOGY | Age: 75
End: 2025-04-21
Payer: MEDICARE

## 2025-04-21 ENCOUNTER — HOSPITAL ENCOUNTER (EMERGENCY)
Age: 75
Discharge: HOME | End: 2025-04-21
Attending: EMERGENCY MEDICINE
Payer: MEDICARE

## 2025-04-21 ENCOUNTER — APPOINTMENT (OUTPATIENT)
Dept: GENERAL RADIOLOGY | Age: 75
End: 2025-04-21
Payer: MEDICARE

## 2025-04-21 ENCOUNTER — APPOINTMENT (OUTPATIENT)
Dept: CT IMAGING | Age: 75
End: 2025-04-21
Payer: MEDICARE

## 2025-04-21 VITALS
OXYGEN SATURATION: 95 % | DIASTOLIC BLOOD PRESSURE: 75 MMHG | TEMPERATURE: 98.6 F | WEIGHT: 122 LBS | SYSTOLIC BLOOD PRESSURE: 137 MMHG | BODY MASS INDEX: 19.69 KG/M2 | RESPIRATION RATE: 16 BRPM | HEART RATE: 98 BPM

## 2025-04-21 DIAGNOSIS — R22.0 LEFT FACIAL SWELLING: ICD-10-CM

## 2025-04-21 DIAGNOSIS — Z43.0 TRACHEOSTOMY CARE (HCC): Primary | ICD-10-CM

## 2025-04-21 DIAGNOSIS — K94.23 PEG TUBE MALFUNCTION (HCC): ICD-10-CM

## 2025-04-21 DIAGNOSIS — K94.23 LEAKING PEG TUBE (HCC): ICD-10-CM

## 2025-04-21 LAB
ALBUMIN SERPL BCG-MCNC: 3.7 G/DL (ref 3.4–4.9)
ALP SERPL-CCNC: 108 U/L (ref 40–129)
ALT SERPL W/O P-5'-P-CCNC: 21 U/L (ref 10–50)
ANION GAP SERPL CALC-SCNC: 10 MEQ/L (ref 8–16)
AST SERPL-CCNC: 28 U/L (ref 10–50)
BASOPHILS ABSOLUTE: 0.1 THOU/MM3 (ref 0–0.1)
BASOPHILS NFR BLD AUTO: 0.6 %
BILIRUB CONJ SERPL-MCNC: 0.4 MG/DL (ref 0–0.2)
BILIRUB SERPL-MCNC: 0.7 MG/DL (ref 0.3–1.2)
BUN SERPL-MCNC: 26 MG/DL (ref 8–23)
CALCIUM SERPL-MCNC: 9.3 MG/DL (ref 8.8–10.2)
CHLORIDE SERPL-SCNC: 101 MEQ/L (ref 98–111)
CO2 SERPL-SCNC: 27 MEQ/L (ref 22–29)
CREAT SERPL-MCNC: 0.7 MG/DL (ref 0.7–1.2)
DEPRECATED RDW RBC AUTO: 51.4 FL (ref 35–45)
EOSINOPHIL NFR BLD AUTO: 0.7 %
EOSINOPHILS ABSOLUTE: 0.1 THOU/MM3 (ref 0–0.4)
ERYTHROCYTE [DISTWIDTH] IN BLOOD BY AUTOMATED COUNT: 14.3 % (ref 11.5–14.5)
GFR SERPL CREATININE-BSD FRML MDRD: > 90 ML/MIN/1.73M2
GLUCOSE SERPL-MCNC: 108 MG/DL (ref 74–109)
HCT VFR BLD AUTO: 31.9 % (ref 42–52)
HGB BLD-MCNC: 10.6 GM/DL (ref 14–18)
IMM GRANULOCYTES # BLD AUTO: 0.09 THOU/MM3 (ref 0–0.07)
IMM GRANULOCYTES NFR BLD AUTO: 0.6 %
LACTIC ACID, SEPSIS: 1.5 MMOL/L (ref 0.5–1.9)
LIPASE SERPL-CCNC: 56 U/L (ref 13–60)
LYMPHOCYTES ABSOLUTE: 1.8 THOU/MM3 (ref 1–4.8)
LYMPHOCYTES NFR BLD AUTO: 12 %
MAGNESIUM SERPL-MCNC: 2 MG/DL (ref 1.6–2.6)
MCH RBC QN AUTO: 32.7 PG (ref 26–33)
MCHC RBC AUTO-ENTMCNC: 33.2 GM/DL (ref 32.2–35.5)
MCV RBC AUTO: 98.5 FL (ref 80–94)
MONOCYTES ABSOLUTE: 0.9 THOU/MM3 (ref 0.4–1.3)
MONOCYTES NFR BLD AUTO: 5.8 %
NEUTROPHILS ABSOLUTE: 12.3 THOU/MM3 (ref 1.8–7.7)
NEUTROPHILS NFR BLD AUTO: 80.3 %
NRBC BLD AUTO-RTO: 0 /100 WBC
NT-PROBNP SERPL IA-MCNC: 109 PG/ML (ref 0–449)
OSMOLALITY SERPL CALC.SUM OF ELEC: 281 MOSMOL/KG (ref 275–300)
PLATELET # BLD AUTO: 469 THOU/MM3 (ref 130–400)
PMV BLD AUTO: 9.4 FL (ref 9.4–12.4)
POTASSIUM SERPL-SCNC: 4.3 MEQ/L (ref 3.5–5.2)
PROCALCITONIN SERPL IA-MCNC: 0.05 NG/ML (ref 0.01–0.09)
PROT SERPL-MCNC: 7.9 G/DL (ref 6.4–8.3)
RBC # BLD AUTO: 3.24 MILL/MM3 (ref 4.7–6.1)
SODIUM SERPL-SCNC: 138 MEQ/L (ref 135–145)
TROPONIN, HIGH SENSITIVITY: 25 NG/L (ref 0–12)
WBC # BLD AUTO: 15.3 THOU/MM3 (ref 4.8–10.8)

## 2025-04-21 PROCEDURE — 6360000004 HC RX CONTRAST MEDICATION: Performed by: EMERGENCY MEDICINE

## 2025-04-21 PROCEDURE — 80053 COMPREHEN METABOLIC PANEL: CPT

## 2025-04-21 PROCEDURE — 49465 FLUORO EXAM OF G/COLON TUBE: CPT

## 2025-04-21 PROCEDURE — 43762 RPLC GTUBE NO REVJ TRC: CPT

## 2025-04-21 PROCEDURE — 36415 COLL VENOUS BLD VENIPUNCTURE: CPT

## 2025-04-21 PROCEDURE — 85025 COMPLETE CBC W/AUTO DIFF WBC: CPT

## 2025-04-21 PROCEDURE — 99285 EMERGENCY DEPT VISIT HI MDM: CPT

## 2025-04-21 PROCEDURE — 6370000000 HC RX 637 (ALT 250 FOR IP): Performed by: EMERGENCY MEDICINE

## 2025-04-21 PROCEDURE — 87040 BLOOD CULTURE FOR BACTERIA: CPT

## 2025-04-21 PROCEDURE — 83690 ASSAY OF LIPASE: CPT

## 2025-04-21 PROCEDURE — 70491 CT SOFT TISSUE NECK W/DYE: CPT

## 2025-04-21 PROCEDURE — 83735 ASSAY OF MAGNESIUM: CPT

## 2025-04-21 PROCEDURE — 84484 ASSAY OF TROPONIN QUANT: CPT

## 2025-04-21 PROCEDURE — 83605 ASSAY OF LACTIC ACID: CPT

## 2025-04-21 PROCEDURE — 84145 PROCALCITONIN (PCT): CPT

## 2025-04-21 PROCEDURE — 82248 BILIRUBIN DIRECT: CPT

## 2025-04-21 PROCEDURE — 83880 ASSAY OF NATRIURETIC PEPTIDE: CPT

## 2025-04-21 RX ORDER — DIATRIZOATE MEGLUMINE AND DIATRIZOATE SODIUM 660; 100 MG/ML; MG/ML
30 SOLUTION ORAL; RECTAL
Status: DISCONTINUED | OUTPATIENT
Start: 2025-04-21 | End: 2025-04-21 | Stop reason: HOSPADM

## 2025-04-21 RX ORDER — IOPAMIDOL 755 MG/ML
80 INJECTION, SOLUTION INTRAVASCULAR
Status: COMPLETED | OUTPATIENT
Start: 2025-04-21 | End: 2025-04-21

## 2025-04-21 RX ORDER — LIDOCAINE HYDROCHLORIDE 20 MG/ML
JELLY TOPICAL PRN
Status: DISCONTINUED | OUTPATIENT
Start: 2025-04-21 | End: 2025-04-21 | Stop reason: HOSPADM

## 2025-04-21 RX ADMIN — LIDOCAINE HYDROCHLORIDE: 20 JELLY TOPICAL at 13:15

## 2025-04-21 RX ADMIN — IOPAMIDOL 80 ML: 755 INJECTION, SOLUTION INTRAVENOUS at 14:23

## 2025-04-21 RX ADMIN — DIATRIZOATE MEGLUMINE AND DIATRIZOATE SODIUM 30 ML: 600; 100 SOLUTION ORAL; RECTAL at 13:31

## 2025-04-21 ASSESSMENT — ENCOUNTER SYMPTOMS
SORE THROAT: 0
PHOTOPHOBIA: 0
DIARRHEA: 0
CHOKING: 0
SHORTNESS OF BREATH: 0
ABDOMINAL DISTENTION: 0
CHEST TIGHTNESS: 0
TROUBLE SWALLOWING: 0
COUGH: 0
NAUSEA: 0
RHINORRHEA: 0
WHEEZING: 0
ABDOMINAL PAIN: 0
BLOOD IN STOOL: 0
BACK PAIN: 0
CONSTIPATION: 0
EYE ITCHING: 0
EYE PAIN: 0
EYE DISCHARGE: 0
VOMITING: 0
EYE REDNESS: 0
VOICE CHANGE: 0
SINUS PRESSURE: 0

## 2025-04-21 ASSESSMENT — PAIN - FUNCTIONAL ASSESSMENT: PAIN_FUNCTIONAL_ASSESSMENT: NONE - DENIES PAIN

## 2025-04-21 NOTE — ED PROVIDER NOTES
[facility]  eMERGENCY dEPARTMENT eNCOUnter          CHIEF COMPLAINT       Chief Complaint   Patient presents with    Tracheostomy Tube Change    G Tube Complications       Nurses Notes reviewed and I agree except as noted in the HPI.      HISTORY OF PRESENT ILLNESS    Masood Randhawa is a 74 y.o. male who presents for G tube replacement because of malfunction of cap and continued draining.  Also fro what appears to be re-accumulation of fluid of the left side of face and lower jaw.  Apparently when he came in his Quentin (Trach) tube fell out as he was coming in.  Respiratory was called.  PEG tube was replaced by myself.  No complications.  Elena tube was replaced by respiratory no complications.  Then the wife brings up the real reason that they came in was because he started having fluid accumulation on the left side of his face again.  He had a drain in there at 1 time.  Patient does have head and neck cancer.  He does not eat or swallow he is not complaining of any shortness of breath or problems patient is otherwise resting comfortably on the cot no apparent distress.  Patient here today is hemodynamically stable afebrile no other physical complaints per patient or family at this time.  Patient is full code.    REVIEW OF SYSTEMS     Review of Systems   Constitutional:  Negative for activity change, appetite change, diaphoresis, fatigue and unexpected weight change.   HENT:  Negative for congestion, ear discharge, ear pain, hearing loss, rhinorrhea, sinus pressure, sore throat, trouble swallowing and voice change.         Facial swelling, Elena tube is out it was replaced by respiratory.   Eyes:  Negative for photophobia, pain, discharge, redness and itching.   Respiratory:  Negative for cough, choking, chest tightness, shortness of breath and wheezing.    Cardiovascular:  Negative for chest pain, palpitations and leg swelling.   Gastrointestinal:  Negative for abdominal distention, abdominal

## 2025-04-21 NOTE — DISCHARGE INSTRUCTIONS
Patient has some left-sided facial swelling he had a previous drain.  Here today the facial swelling is improved.  He is instructed by his ENT to use lemon drops and warm compresses as previously directed.  He is instructed to call the ENT and schedule follow-up appointment.  Patient also had problem with his Elena tube as well as his PEG tube.  Those have been placed back in.  Patient is instructed to follow-up with a primary care physician and do so within the next 1 to 2 days.  Patient is instructed return to the nearest emergency room immediately for any new or worsening complaints

## 2025-04-21 NOTE — ED NOTES
Pt resting on cot. No distress noted. Pt provided with water. Call light in reach. Pt denies any further needs at this time.

## 2025-04-21 NOTE — ED TRIAGE NOTES
Pt to ED due to his g-tube leaking and his trach tube fell out this morning. Wife states that the stopper for the g-tube kept leaking last night and this morning she cannot get it stopped. Pt has no complaints.

## 2025-04-21 NOTE — ED NOTES
G-tube replaced by dr. Zuniga. Pt tolerated very well. Call light in reach. Wife remains at bedside.

## 2025-04-22 ENCOUNTER — TELEPHONE (OUTPATIENT)
Dept: FAMILY MEDICINE CLINIC | Age: 75
End: 2025-04-22

## 2025-04-22 ENCOUNTER — CARE COORDINATION (OUTPATIENT)
Dept: CARE COORDINATION | Age: 75
End: 2025-04-22

## 2025-04-22 ENCOUNTER — HOSPITAL ENCOUNTER (OUTPATIENT)
Dept: RADIATION ONCOLOGY | Age: 75
Discharge: HOME | End: 2025-04-22
Payer: MEDICARE

## 2025-04-22 ENCOUNTER — HOSPITAL ENCOUNTER (OUTPATIENT)
Dept: CT IMAGING | Age: 75
Discharge: HOME | End: 2025-04-22

## 2025-04-22 VITALS
OXYGEN SATURATION: 95 % | TEMPERATURE: 98.4 F | RESPIRATION RATE: 16 BRPM | SYSTOLIC BLOOD PRESSURE: 112 MMHG | HEART RATE: 100 BPM | DIASTOLIC BLOOD PRESSURE: 60 MMHG

## 2025-04-22 DIAGNOSIS — C32.0 MALIGNANT NEOPLASM OF GLOTTIS (HCC): ICD-10-CM

## 2025-04-22 PROCEDURE — 3209999900 CT GUIDE RADIATION THERAPY NO CHARGE

## 2025-04-22 PROCEDURE — 77334 RADIATION TREATMENT AID(S): CPT | Performed by: RADIOLOGY

## 2025-04-22 PROCEDURE — 77332 RADIATION TREATMENT AID(S): CPT | Performed by: RADIOLOGY

## 2025-04-22 ASSESSMENT — ENCOUNTER SYMPTOMS
VOICE CHANGE: 1
BACK PAIN: 0
NAUSEA: 0
SINUS PAIN: 0
ABDOMINAL PAIN: 0
COUGH: 1
SHORTNESS OF BREATH: 0
FACIAL SWELLING: 0
SINUS PRESSURE: 0
RECTAL PAIN: 0
TROUBLE SWALLOWING: 1
VOMITING: 0
SORE THROAT: 0
BLOOD IN STOOL: 0

## 2025-04-22 NOTE — PROGRESS NOTES
[facility]           [address], [address]        [place], [place] [address]        O: [phone]        F: [phone]       EQUISO            FOLLOW UP NOTE    Date of Service: 2025  Patient ID: Masood Randhawa   : 1950  MRN: [id number]   Acct Number: [id number]       DATE OF SERVICE: 2025   LOCATION: [facility]  PROVIDER: Samy Gordillo MD MS    FOLLOW UP PHYSICIANS:  PAULO Seymour CNP (ENT), Dr. Carmen Villavicencio (Med Onc)    ASSESSMENT AND PLAN:   Cancer Staging   Cancer of glottis (HCC)  Staging form: Larynx - Glottis, AJCC 8th Edition  - Clinical: Stage IVB (cT4a, cN3, cM0) - Unsigned  - Pathologic stage from 3/28/2025: Stage DENILSON (ypT4a, pN1, cM0) - Signed by Rickie Daley MD on 3/28/2025      ***    {RTCMTH:21344}    HPI:  {HPIONCHX:93994}    INTERVAL HISTORY:  Masood Randhawa is a 74 y.o. male is presenting today for regularly scheduled follow up regarding the above mentioned oncologic history.    Review of Systems   Constitutional:  Positive for fatigue. Negative for activity change, appetite change and unexpected weight change.   HENT:  Positive for congestion, hearing loss, trouble swallowing and voice change (Trach present). Negative for ear pain, facial swelling, nosebleeds, sinus pressure, sinus pain, sore throat and tinnitus.         Swelling under jaw   Eyes:  Negative for visual disturbance.   Respiratory:  Positive for cough. Negative for shortness of breath.    Cardiovascular:  Negative for chest pain.   Gastrointestinal:  Negative for abdominal pain, blood in stool, nausea, rectal pain and vomiting.   Genitourinary:  Negative for dysuria, frequency and urgency.   Musculoskeletal:  Negative for arthralgias, back pain, myalgias, neck pain and neck stiffness.   Neurological:  Positive for speech difficulty (Trach present) and weakness (Generalized). Negative for dizziness, facial asymmetry, light-headedness,

## 2025-04-22 NOTE — CARE COORDINATION
Care Transitions Note    Follow Up Call     Attempted to reach spouse/partner  for transitions of care follow up.  Unable to reach spouse/partner .      Outreach Attempts:   HIPAA compliant voicemail left for spouse/partner .     Care Summary Note: day #1 no response to CTN outreach.    Follow Up Appointment:   Future Appointments         Provider Specialty Dept Phone    4/30/2025 1:00 PM Brook Sims, SLP Speech Therapy [phone]    4/30/2025 3:00 PM Jacqueline Levine, BENJAMIN, LD Internal Medicine [phone]    5/5/2025 2:30 PM Katerina Velásquez, PT Physical Therapy [phone]    5/5/2025 3:00 PM Wendy Carrasquillo, OT Occupational Therapy [phone] 5/7/2025 1:00 PM Brook Sims, SLP Speech Therapy [phone] 5/14/2025 1:00 PM Brook Sims, SLP Speech Therapy [phone] 5/14/2025 2:00 PM Urbano Rowe, OT Occupational Therapy [phone] 5/14/2025 2:30 PM Katerina Lema, PTA Physical Therapy [phone]    5/16/2025 1:30 PM Raven Salazar, OT Occupational Therapy [phone] 5/16/2025 2:00 PM Katerina Lema, PTA Physical Therapy [phone]    5/19/2025 2:15 PM Rickie Daley MD Otolaryngology [phone]    5/21/2025 12:30 PM Miri Reeves, OT Occupational Therapy [phone] 5/21/2025 1:00 PM Brook Sims SLP Speech Therapy [phone]    5/21/2025 2:00 PM Katerina Lema, PTA Physical Therapy [phone]    5/28/2025 1:00 PM Brook Sims SLP Speech Therapy [phone]    5/28/2025 2:00 PM Urbano Rowe OT Occupational Therapy [phone]    5/28/2025 2:30 PM Katerina Lema, PTA Physical Therapy [phone]    5/30/2025 1:30 PM Raven Salazar OT Occupational Therapy [phone]    5/30/2025 2:00 PM Katerina Lema PTA Physical Therapy [phone]    6/4/2025 1:30 PM Luna Dubois OT Occupational Therapy [phone]    6/4/2025 2:00 PM Katerina Lema PTA Physical Therapy [phone]    7/7/2025 9:40 AM (Arrive by 9:10 AM) STR CT IMAGING RM1

## 2025-04-23 ENCOUNTER — TELEPHONE (OUTPATIENT)
Dept: ENT CLINIC | Age: 75
End: 2025-04-23

## 2025-04-23 ENCOUNTER — CARE COORDINATION (OUTPATIENT)
Dept: CARE COORDINATION | Age: 75
End: 2025-04-23

## 2025-04-23 ENCOUNTER — PREP FOR PROCEDURE (OUTPATIENT)
Dept: ENT CLINIC | Age: 75
End: 2025-04-23

## 2025-04-23 NOTE — TELEPHONE ENCOUNTER
Lvm for patient to cb. Needs TEP change at [address]. Eda holding time 05/13 @ 10 am for patient. Dr. SIMS will not need to be there per  discussion with him. This will strictly be with Brook Sims. She  will need to be included on the prep for proc so she may chart on the case. It will still be on his schedule for surgery.

## 2025-04-23 NOTE — CARE COORDINATION
Care Transitions Note    Follow Up Call -2nd attempt    Attempted to reach spouse/partner  for transitions of care follow up.  Unable to reach spouse/partner .    If no return call, CTN will sign off-2nd attempt.  Will hand to Josselyn ROMAN for possible enrollment.     Outreach Attempts:   HIPAA compliant voicemail left for spouse/partner .       Follow Up Appointment:   Future Appointments         Provider Specialty Dept Phone    4/30/2025 1:00 PM Brook Sims, SLP Speech Therapy [phone]    4/30/2025 3:00 PM Jacqueline Levine, BENJAMIN, LD Internal Medicine [phone]    5/5/2025 2:30 PM Katerina Velásquez, PT Physical Therapy [phone] 5/5/2025 3:00 PM Wendy Carrasquillo, OT Occupational Therapy [phone] 5/7/2025 1:00 PM Brook Sims SLP Speech Therapy [phone] 5/14/2025 1:00 PM Brook Sims SLP Speech Therapy [phone] 5/14/2025 2:00 PM Urbano Rowe, OT Occupational Therapy [phone]    5/14/2025 2:30 PM Katerina Lema, PTA Physical Therapy [phone] 5/16/2025 1:30 PM Raven Salazar, OT Occupational Therapy [phone] 5/16/2025 2:00 PM Katerina Lema, PTA Physical Therapy [phone] 5/19/2025 2:15 PM Rickie Daley MD Otolaryngology [phone]    5/21/2025 12:30 PM Miri Reeves, OT Occupational Therapy [phone]    5/21/2025 1:00 PM Brook Sims SLP Speech Therapy [phone] 5/21/2025 2:00 PM Katerina Lema, PTA Physical Therapy [phone]    5/28/2025 1:00 PM Brook Sims SLP Speech Therapy [phone]    5/28/2025 2:00 PM Urbano Rowe OT Occupational Therapy [phone] 5/28/2025 2:30 PM Katerina Lema PTA Physical Therapy [phone]    5/30/2025 1:30 PM Raven Salazar, OT Occupational Therapy [phone]    5/30/2025 2:00 PM Katerina Lema PTA Physical Therapy [phone]    6/4/2025 1:30 PM Luna Dubois OT Occupational Therapy [phone]    6/4/2025 2:00 PM Katerina Lema PTA Physical Therapy [phone]

## 2025-04-24 ENCOUNTER — CARE COORDINATION (OUTPATIENT)
Dept: CARE COORDINATION | Age: 75
End: 2025-04-24

## 2025-04-24 NOTE — TELEPHONE ENCOUNTER
I spoke with Soraya. Patient is having issues with TEP coming out. I offered to schedule with Brook on 05/13 for TEP change.  Soraya did not have her schedule.  She said Ildefonso has appt with Brook for ST Weds 04/30 and she will discuss with Brook at that time. I'm not sure Geovanna schedule will be able to be held until this discussion takes place but I will route the call to her and Comfort since Comfort will likely be back next week.

## 2025-04-24 NOTE — CARE COORDINATION
2:00 PM Katerina Lema PTA Physical Therapy [phone]    6/4/2025 1:30 PM Luna Dubois, OT Occupational Therapy [phone]    6/4/2025 2:00 PM Katerina Lema PTA Physical Therapy [phone]    7/7/2025 9:40 AM (Arrive by 9:10 AM) STR CT IMAGING RM1  [facility] [phone]    7/15/2025 2:00 PM Mraiah Lorenzo, APRN - [facility] Pulmonology [phone]            Follow Up:   Plan for next ACM outreach in approximately 1-2 days  to complete:  - outreach attempt to offer care management services.

## 2025-04-26 LAB
BACTERIA BLD AEROBE CULT: NORMAL
BACTERIA BLD AEROBE CULT: NORMAL

## 2025-04-29 ENCOUNTER — CARE COORDINATION (OUTPATIENT)
Dept: CARE COORDINATION | Age: 75
End: 2025-04-29

## 2025-04-29 RX ORDER — PANTOPRAZOLE SODIUM 40 MG/1
40 TABLET, DELAYED RELEASE ORAL
Qty: 90 TABLET | Refills: 2 | Status: SHIPPED | OUTPATIENT
Start: 2025-04-29

## 2025-04-29 ASSESSMENT — ENCOUNTER SYMPTOMS: DYSPNEA ASSOCIATED WITH: EXERTION

## 2025-04-29 NOTE — CARE COORDINATION
Ambulatory Care Coordination Note     2025 10:13 AM     Patient Current Location:  Home: [address] [place] [address]     This patient was received as a referral from Care Transitions.    ACM contacted the spouse/partner by telephone. Verified name and  with spouse/partner as identifiers. Provided introduction to self, and explanation of the ACM role.   Spouse/Partner accepted care management services at this time.          ACM: Josselyn Navarro RN     Challenges to be reviewed by the provider   Additional needs identified to be addressed with provider Yes  medications-pt was placed on Protonix in .  Pt finished medication appx 1 wk ago.  Did not have any refills on current script so wife assumed pt did not need to continue taking it.  Pt is still taking Pepcid as prescribed.  Please review.  If pt needs to continue Protonix he will need a new script sent to pharmacy.                 Method of communication with provider: chart routing.    Utilization: Initial Call - N/A    Care Summary Note: Ildefonso was referred to care coordination for education and assistance in managing his chronic conditions and healthcare needs.  Pt has h/o:   Pt is s/p total bilateral laryngectomy with radical neck dissection in March.  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   Spoke with wife Soraya today.  She reports that Ildefonso is doing well.   Pt is getting stronger.  Currently in outpt PT/OT/ST.   Pt has trach.  Wife completes all of pt's trach care.  Reports secretions are thick yellow.  States that pt is not using humidification like he is supposed to despite her encouraging him to do so.   Pt has some tenderness at trach site but denies concern for infection.   Denies worsening cough or SOB.   Pt is not smoking  Has appt with ST and dietician tomorrow  Pt was on Pureed diet but wife reports that pt did not like pureed foods so he advanced himself to solids.  Encouraged to try to stick

## 2025-04-29 NOTE — TELEPHONE ENCOUNTER
Wife Soraya notified that pt is to continue Protonix.  Informed that [facility].  Verbalizes understanding.

## 2025-04-30 ENCOUNTER — OFFICE VISIT (OUTPATIENT)
Dept: INTERNAL MEDICINE CLINIC | Age: 75
End: 2025-04-30
Payer: MEDICARE

## 2025-04-30 ENCOUNTER — HOSPITAL ENCOUNTER (OUTPATIENT)
Dept: SPEECH THERAPY | Age: 75
Setting detail: THERAPIES SERIES
Discharge: HOME | End: 2025-04-30
Payer: MEDICARE

## 2025-04-30 VITALS — HEIGHT: 66 IN | WEIGHT: 125.6 LBS | BODY MASS INDEX: 20.18 KG/M2

## 2025-04-30 DIAGNOSIS — R63.0 DECREASED APPETITE: Primary | ICD-10-CM

## 2025-04-30 DIAGNOSIS — R63.4 UNINTENDED WEIGHT LOSS: ICD-10-CM

## 2025-04-30 DIAGNOSIS — R62.7 FAILURE TO THRIVE IN ADULT: ICD-10-CM

## 2025-04-30 PROCEDURE — 97802 MEDICAL NUTRITION INDIV IN: CPT | Performed by: DIETITIAN, REGISTERED

## 2025-04-30 PROCEDURE — 92507 TX SP LANG VOICE COMM INDIV: CPT | Performed by: SPEECH-LANGUAGE PATHOLOGIST

## 2025-04-30 PROCEDURE — NBSRV NON-BILLABLE SERVICE: Performed by: DIETITIAN, REGISTERED

## 2025-04-30 NOTE — PATIENT INSTRUCTIONS
Continue to bolus Jevity 1.5 -  Try to get 2x/day.    If you are able to swallow liquids per your provider recommendations and without choking or coughing. You can continue to drink milkshakes or chocolate flavored milk.    Continue to try the soft appropriate foods recommended on the Difficulty swallowing guidelines.    Follow the clock for meals and snacks.    Get the [employer] original oral nutrition supplement drinks in addtion to buying the high protein drinks.  - Drink 1-2 original drinks  - Drink 1 high protein drink/day.    Bring a 1 week food log to next dietitian appt.

## 2025-04-30 NOTE — PROGRESS NOTES
see patient 1 times per week for 12 weeks to address the treatment planned outlined above.  [x]  Continue with current plan of care  []  Modify plan of care as follows:    []  Hold pending physician visit  []  Discharge    Time In 1303   Time Out 1353   Timed Code Minutes: 0 min   Total Treatment Time: 50 min       Brook Sims M.S. CCC-SLP [id number]

## 2025-04-30 NOTE — PROGRESS NOTES
[facility]  [facility]  [address] [place] [address]  [phone] (phone)  [phone] (fax)    Patient Name: Masood Randhawa. Date of Birth: [birth year]. MRN: [id number]      Assessment: Patient is a 74 y.o. male seen for Initial MNT visit for   R63.0 (ICD-10-CM) - Decreased appetite   R63.4 (ICD-10-CM) - Unintended weight loss   R62.7 (ICD-10-CM) - Failure to thrive in adult   & Squamous cell carcinoma of glottis .     -Nutritionally relevant labs:   Lab Results   Component Value Date/Time    LABA1C 5.1 12/19/2024 01:26 PM    LABA1C 4.7 12/07/2023 12:58 PM    LABA1C 5.9 02/22/2022 02:50 PM    GLUCOSE 108 04/21/2025 01:15 PM    GLUCOSE 95 04/11/2025 05:11 AM    GLUCOSE 115 (H) 12/19/2024 01:26 PM    GLUCOSE 103 12/07/2023 12:58 PM    CHOL 156 12/19/2024 01:26 PM    HDL 87 12/19/2024 01:26 PM    HDL 68 02/21/2012 12:00 AM    TRIG 64 12/19/2024 01:26 PM          Latest Ref Rng & Units 4/21/2025     1:15 PM 4/11/2025     5:11 AM 4/9/2025     4:14 AM 4/8/2025     4:03 AM 4/7/2025     3:59 AM   Labs Renal   BUN 8 - 23 mg/dL 26  27  28  26  21    Cr 0.7 - 1.2 mg/dL 0.7  0.6  0.6  0.7  0.7    K 3.5 - 5.2 meq/L 4.3  4.2  4.4  4.3  4.5    Na 135 - 145 meq/L 138  132  133  133  131       Pt here with his wife Soraya. Pt unable to speak.  PEG Tube feeding with Jevity 1.5 formula bolusing 1-2 cartons/day instead of 5x/day d/t pt wanting to eat most of the time.  Pt refuses pureed foods and eating more regular food.  Pt nods his head no to difficulty swallowing or chewing and denies coughing or choking on liquids or solid food.  Wife Soraya states she boluses a carton of formula when not wanting to eat. Usually in the morning and/or evening.  Pt drinks  3 \"[employer]\" brand high protein chocolate shakes/day = 1 shake = 160 calories and 30 gms protein.    -Food recall:   Yesterday prok chop, rice & cheese dish  Brkf - brkf sandwich prepared from frozen - chooses

## 2025-05-02 ENCOUNTER — CARE COORDINATION (OUTPATIENT)
Dept: CARE COORDINATION | Age: 75
End: 2025-05-02

## 2025-05-05 ENCOUNTER — HOSPITAL ENCOUNTER (OUTPATIENT)
Dept: PHYSICAL THERAPY | Age: 75
Setting detail: THERAPIES SERIES
Discharge: HOME | End: 2025-05-05
Payer: MEDICARE

## 2025-05-05 ENCOUNTER — HOSPITAL ENCOUNTER (OUTPATIENT)
Dept: OCCUPATIONAL THERAPY | Age: 75
Setting detail: THERAPIES SERIES
Discharge: HOME | End: 2025-05-05
Payer: MEDICARE

## 2025-05-05 PROCEDURE — 97110 THERAPEUTIC EXERCISES: CPT

## 2025-05-05 NOTE — PROGRESS NOTES
[facility]  ONCOLOGY REHABILITATION  PHYSICAL THERAPY  [x] DAILY NOTE    [x] SPECIALIZED THERAPY SERVICES - [place]     Date: 2025  Patient Name:  Masood Randhawa  : 1950  MRN: [id number]  CSN: [id number]    Referring Practitioner Cathryn Esparza PA-C [id number]      Diagnosis  Diagnoses       C32.0 (ICD-10-CM) - Squamous cell carcinoma of glottis    G89.3 (ICD-10-CM) - Cancer associated pain    R63.4 (ICD-10-CM) - Unintentional weight loss           Treatment Diagnosis R26.2 Difficulty in walking  R53.1 Weakness  R53.0 Cancer Related Fatigue   Date of Evaluation 25   Additional Pertinent History Masood Randhawa has a past medical history of BPH (benign prostatic hyperplasia), Chronic back pain, Chronic obstructive pulmonary disease (HCC), COPD (chronic obstructive pulmonary disease) (HCC), Erectile dysfunction, Gastroesophageal reflux disease with apnea without esophagitis, Head and neck cancer (HCC), Hypertension, Osteoarthritis, Personal history of colonic polyps, Pneumonia, PVD (peripheral vascular disease), and PVD (peripheral vascular disease).  he has a past surgical history that includes Colonoscopy (2006); lumbar fusion; Rotator cuff repair (2021); Carpal tunnel release (Bilateral); US ASP/INJ THYROID CYST (2025); laryngoscopy (N/A, 2025); IR FLUORO GUIDED GASTROSTOMY TUBE INSERTION PERC W CONTRAST (2025); Laryngectomy (Bilateral, 3/14/2025); and bronchoscopy (N/A, 4/3/2025).     Allergies Allergies   Allergen Reactions    Pcn [Penicillins] Other (See Comments)     Was told he was allergic since he was a child      Medications   Current Outpatient Medications:     pantoprazole (PROTONIX) 40 MG tablet, Take 1 tablet by mouth every morning (before breakfast), Disp: 90 tablet, Rfl: 2    mupirocin (BACTROBAN) 2 % ointment, Apply to stoma with q tip TID. (Patient not taking: Reported on 2025), Disp: 1 g, Rfl: 3    sodium chloride

## 2025-05-05 NOTE — PROGRESS NOTES
[facility]  OCCUPATIONAL THERAPY  [] EVALUATION  [x] DAILY NOTE (LAND) [] DAILY NOTE (AQUATIC ) [] PROGRESS NOTE [] DISCHARGE NOTE    [x] OUTPATIENT REHABILITATION [facility]   [] [place] [facility] [place]    [] [facility]   [] [facility]    Date: 2025  Patient Name:  Masood Randhawa  : 1950  MRN: [id number]  CSN: [id number]    Referring Practitioner Cherelle Pat, APRN - CNP [id number]      Diagnosis  Diagnoses       C32.0 (ICD-10-CM) - Malignant neoplasm of glottis (HCC)    G89.3 (ICD-10-CM) - Neoplasm related pain (acute) (chronic)    R63.4 (ICD-10-CM) - Abnormal weight loss           Treatment Diagnosis R53.1 Weakness  M62.81 Generalized Weakness   Date of Evaluation 4/15/25   Additional Pertinent History Masood Randhawa has a past medical history of BPH (benign prostatic hyperplasia), Chronic back pain, Chronic obstructive pulmonary disease (HCC), COPD (chronic obstructive pulmonary disease) (HCC), Erectile dysfunction, Gastroesophageal reflux disease with apnea without esophagitis, Head and neck cancer (HCC), Hypertension, Osteoarthritis, Personal history of colonic polyps, Pneumonia, PVD (peripheral vascular disease), and PVD (peripheral vascular disease).  he has a past surgical history that includes Colonoscopy (2006); lumbar fusion; Rotator cuff repair (2021); Carpal tunnel release (Bilateral); US ASP/INJ THYROID CYST (2025); laryngoscopy (N/A, 2025); IR FLUORO GUIDED GASTROSTOMY TUBE INSERTION PERC W CONTRAST (2025); Laryngectomy (Bilateral, 3/14/2025); and bronchoscopy (N/A, 4/3/2025).     Allergies Allergies   Allergen Reactions    Pcn [Penicillins] Other (See Comments)     Was told he was allergic since he was a child      Medications   Current Outpatient Medications:     pantoprazole (PROTONIX) 40 MG tablet, Take 1 tablet by mouth every morning (before breakfast), Disp: 90 tablet, Rfl: 2    mupirocin

## 2025-05-06 PROCEDURE — 77301 RADIOTHERAPY DOSE PLAN IMRT: CPT | Performed by: RADIOLOGY

## 2025-05-06 PROCEDURE — 77300 RADIATION THERAPY DOSE PLAN: CPT | Performed by: RADIOLOGY

## 2025-05-07 ENCOUNTER — HOSPITAL ENCOUNTER (OUTPATIENT)
Dept: RADIATION ONCOLOGY | Age: 75
End: 2025-05-07
Payer: MEDICARE

## 2025-05-07 ENCOUNTER — HOSPITAL ENCOUNTER (OUTPATIENT)
Dept: SPEECH THERAPY | Age: 75
Setting detail: THERAPIES SERIES
Discharge: HOME | End: 2025-05-07
Payer: MEDICARE

## 2025-05-07 PROCEDURE — 92507 TX SP LANG VOICE COMM INDIV: CPT | Performed by: SPEECH-LANGUAGE PATHOLOGIST

## 2025-05-07 NOTE — PROGRESS NOTES
4/30/2025), Disp: 1 g, Rfl: 3    sodium chloride nebulizer 0.9 % solution, Take 3 mLs by nebulization as needed (throat/ airway secretions), Disp: , Rfl:     ipratropium 0.5 mg-albuterol 2.5 mg (DUONEB) 0.5-2.5 (3) MG/3ML SOLN nebulizer solution, Inhale 3 mLs into the lungs in the morning and 3 mLs in the evening., Disp: 180 mL, Rfl: 0    Respiratory Therapy Supplies (NEBULIZER/TUBING/MOUTHPIECE) KIT, 1 kit by Does not apply route in the morning, at noon, and at bedtime, Disp: 1 kit, Rfl: 0    aspirin 81 MG EC tablet, Take 1 tablet by mouth daily, Disp: , Rfl:     amLODIPine (NORVASC) 5 MG tablet, Take 1 tablet by mouth daily, Disp: 90 tablet, Rfl: 3    benazepril (LOTENSIN) 20 MG tablet, Take 1 tablet by mouth daily, Disp: 90 tablet, Rfl: 3    Multiple Vitamin (MULTI VITAMIN PO), Take 1 tablet by mouth daily, Disp: , Rfl:       Functional Outcome Measure Used [facility] Alaryngeal communication     Functional Outcome Score Level 1 (3/27/25)       Insurance: Primary: Payor: MEDICARE /  /  / ,   Secondary: [place] [facility]   Authorization Information No visit limit. Treatment must be medically necessary and must required the skill of a licensed therapist or therapist assistant. Benefit will not cover maintenance or preventative treatment.    Initial CPT Codes Requested 77354 - Speech/Language Therapy  22995 - Dysphagia Therapy   Progress Note Counter 5/10 for progress note (Reporting Period: 03/27/25 to  )   Recertification Date 06/19/25   Physician Follow-Up 05/19/25 Dr. Daley    Physician Orders    Precautions PATIENT IS A TOTAL NECK BREATHER    History of Present Illness Patient arrived to OP evaluation in wheelchair with spouse Soraya; patient appearing very fatigued with laying head on table majority of evaluation. Spouse reporting \"he has pretty much slept all morning, I don't know why he is so tired. He is walking some around the house.\"   Per spouse report,   03/14/25 total laryngectomy surgery   03/24/25

## 2025-05-08 ENCOUNTER — CARE COORDINATION (OUTPATIENT)
Dept: CARE COORDINATION | Age: 75
End: 2025-05-08

## 2025-05-08 ENCOUNTER — CLINICAL DOCUMENTATION (OUTPATIENT)
Dept: SPIRITUAL SERVICES | Age: 75
End: 2025-05-08

## 2025-05-08 SDOH — ECONOMIC STABILITY: FOOD INSECURITY: WITHIN THE PAST 12 MONTHS, YOU WORRIED THAT YOUR FOOD WOULD RUN OUT BEFORE YOU GOT THE MONEY TO BUY MORE.: NEVER TRUE

## 2025-05-08 SDOH — SOCIAL STABILITY: SOCIAL INSECURITY: ARE YOU MARRIED, WIDOWED, DIVORCED, SEPARATED, NEVER MARRIED, OR LIVING WITH A PARTNER?: MARRIED

## 2025-05-08 SDOH — ECONOMIC STABILITY: FOOD INSECURITY: HOW HARD IS IT FOR YOU TO PAY FOR THE VERY BASICS LIKE FOOD, HOUSING, MEDICAL CARE, AND HEATING?: NOT HARD AT ALL

## 2025-05-08 SDOH — ECONOMIC STABILITY: FOOD INSECURITY: WITHIN THE PAST 12 MONTHS, THE FOOD YOU BOUGHT JUST DIDN'T LAST AND YOU DIDN'T HAVE MONEY TO GET MORE.: NEVER TRUE

## 2025-05-08 SDOH — HEALTH STABILITY: PHYSICAL HEALTH: ON AVERAGE, HOW MANY MINUTES DO YOU ENGAGE IN EXERCISE AT THIS LEVEL?: 30 MIN

## 2025-05-08 SDOH — SOCIAL STABILITY: SOCIAL NETWORK
DO YOU BELONG TO ANY CLUBS OR ORGANIZATIONS SUCH AS CHURCH GROUPS, UNIONS, FRATERNAL OR ATHLETIC GROUPS, OR SCHOOL GROUPS?: NO

## 2025-05-08 SDOH — HEALTH STABILITY: MENTAL HEALTH
DO YOU FEEL STRESS - TENSE, RESTLESS, NERVOUS, OR ANXIOUS, OR UNABLE TO SLEEP AT NIGHT BECAUSE YOUR MIND IS TROUBLED ALL THE TIME - THESE DAYS?: TO SOME EXTENT

## 2025-05-08 SDOH — HEALTH STABILITY: PHYSICAL HEALTH: ON AVERAGE, HOW MANY DAYS PER WEEK DO YOU ENGAGE IN MODERATE TO STRENUOUS EXERCISE (LIKE A BRISK WALK)?: 3 DAYS

## 2025-05-08 SDOH — ECONOMIC STABILITY: HOUSING INSECURITY: IN THE LAST 12 MONTHS, WAS THERE A TIME WHEN YOU WERE NOT ABLE TO PAY THE MORTGAGE OR RENT ON TIME?: NO

## 2025-05-08 SDOH — SOCIAL STABILITY: SOCIAL NETWORK: HOW OFTEN DO YOU ATTEND MEETINGS OF THE CLUBS OR ORGANIZATIONS YOU BELONG TO?: NEVER

## 2025-05-08 SDOH — ECONOMIC STABILITY: TRANSPORTATION INSECURITY: IN THE PAST 12 MONTHS, HAS LACK OF TRANSPORTATION KEPT YOU FROM MEDICAL APPOINTMENTS OR FROM GETTING MEDICATIONS?: NO

## 2025-05-08 ASSESSMENT — ACTIVITIES OF DAILY LIVING (ADL): LACK_OF_TRANSPORTATION: NO

## 2025-05-08 NOTE — CARE COORDINATION
Ambulatory Care Coordination Note     2025 3:03 PM     Patient Current Location:  Home: [address] [place] [address]     ACM contacted the spouse/partner by telephone. Verified name and  with spouse/partner as identifiers.         ACM: Josselyn Navarro RN     Challenges to be reviewed by the provider   Additional needs identified to be addressed with provider No  none               Method of communication with provider: none.    Utilization: Patient has not had any utilization since our last call.     Care Summary Note: Ildefonso was referred to care coordination for education and assistance in managing his chronic conditions and healthcare needs.  Pt has h/o:   Pt is s/p total bilateral laryngectomy with radical neck dissection in March.  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   Spoke with wife Soraya. She states that overall Ildefonso is doing well.   Napping during call today.   Pt continues to take part in outpt PT/OT/ST.  Following with dietician  Eating solid foods now.  Dietician recommended 1-2 bolus Jevity feedings per day. Wife reports that pt has been refusing these if eating. Reports that he has been compliant with drinking 2 nutritional drinks per day.  Sometimes will do a 3rd one.    Wife feels he has been able to gain some wt d/t improved nutritional status.    Coughs at times and still c/o discomfort around trach site when wife is cleaning it but she states that he didn't complain yesterday when ST cleaned it.  She has purchased a mirror with light so pt can watch wife when she is cleaning it.    No new concerns.    Has SpO2 monitor and is checking routinely.  Reports sats remain in the 90's.    Pt is walking more.   Offered patient enrollment in the Remote Patient Monitoring (RPM) program for in-home monitoring: Yes, but did not enroll at this time: already monitoring with home equipment.     Assessments Completed:   No changes since last call    Medications

## 2025-05-08 NOTE — PROGRESS NOTES
spoke with pt significant other via telephone regarding referred patient interest in illness support. She stated that followup is not necessary and services not needed at this time.

## 2025-05-09 ENCOUNTER — HOSPITAL ENCOUNTER (OUTPATIENT)
Dept: RADIATION ONCOLOGY | Age: 75
End: 2025-05-09
Payer: MEDICARE

## 2025-05-09 ENCOUNTER — APPOINTMENT (OUTPATIENT)
Dept: CT IMAGING | Age: 75
End: 2025-05-09
Payer: MEDICARE

## 2025-05-09 ENCOUNTER — HOSPITAL ENCOUNTER (EMERGENCY)
Age: 75
Discharge: HOME | End: 2025-05-09
Attending: EMERGENCY MEDICINE
Payer: MEDICARE

## 2025-05-09 ENCOUNTER — TELEPHONE (OUTPATIENT)
Dept: ENT CLINIC | Age: 75
End: 2025-05-09

## 2025-05-09 VITALS
TEMPERATURE: 98.4 F | SYSTOLIC BLOOD PRESSURE: 127 MMHG | OXYGEN SATURATION: 99 % | WEIGHT: 125 LBS | RESPIRATION RATE: 16 BRPM | HEART RATE: 81 BPM | DIASTOLIC BLOOD PRESSURE: 70 MMHG | BODY MASS INDEX: 20.18 KG/M2

## 2025-05-09 DIAGNOSIS — K52.9 PROCTOCOLITIS: Primary | ICD-10-CM

## 2025-05-09 DIAGNOSIS — K92.1 GASTROINTESTINAL HEMORRHAGE WITH MELENA: ICD-10-CM

## 2025-05-09 PROBLEM — J39.8 OTHER SPECIFIED DISEASES OF UPPER RESPIRATORY TRACT: Status: ACTIVE | Noted: 2025-05-09

## 2025-05-09 LAB
ABO GROUP BLD: NORMAL
ALBUMIN SERPL BCG-MCNC: 3.7 G/DL (ref 3.4–4.9)
ALP SERPL-CCNC: 77 U/L (ref 40–129)
ALT SERPL W/O P-5'-P-CCNC: 16 U/L (ref 10–50)
ANION GAP SERPL CALC-SCNC: 14 MEQ/L (ref 8–16)
AST SERPL-CCNC: 22 U/L (ref 10–50)
BASOPHILS ABSOLUTE: 0.1 THOU/MM3 (ref 0–0.1)
BASOPHILS NFR BLD AUTO: 0.7 %
BILIRUB SERPL-MCNC: 0.3 MG/DL (ref 0.3–1.2)
BUN SERPL-MCNC: 20 MG/DL (ref 8–23)
CALCIUM SERPL-MCNC: 9.3 MG/DL (ref 8.8–10.2)
CHLORIDE SERPL-SCNC: 101 MEQ/L (ref 98–111)
CO2 SERPL-SCNC: 25 MEQ/L (ref 22–29)
CREAT SERPL-MCNC: 0.8 MG/DL (ref 0.7–1.2)
DEPRECATED RDW RBC AUTO: 55.6 FL (ref 35–45)
EOSINOPHIL NFR BLD AUTO: 3.8 %
EOSINOPHILS ABSOLUTE: 0.4 THOU/MM3 (ref 0–0.4)
ERYTHROCYTE [DISTWIDTH] IN BLOOD BY AUTOMATED COUNT: 15.1 % (ref 11.5–14.5)
GFR SERPL CREATININE-BSD FRML MDRD: > 90 ML/MIN/1.73M2
GLUCOSE SERPL-MCNC: 154 MG/DL (ref 74–109)
HCT VFR BLD AUTO: 30.6 % (ref 42–52)
HEMOCCULT STL QL: POSITIVE
HGB BLD-MCNC: 9.8 GM/DL (ref 14–18)
IAT IGG-SP REAG SERPL QL: NORMAL
IMM GRANULOCYTES # BLD AUTO: 0.06 THOU/MM3 (ref 0–0.07)
IMM GRANULOCYTES NFR BLD AUTO: 0.5 %
INR PPP: 1.13 (ref 0.85–1.13)
LIPASE SERPL-CCNC: 30 U/L (ref 13–60)
LYMPHOCYTES ABSOLUTE: 2.1 THOU/MM3 (ref 1–4.8)
LYMPHOCYTES NFR BLD AUTO: 18.7 %
MAGNESIUM SERPL-MCNC: 1.9 MG/DL (ref 1.6–2.6)
MCH RBC QN AUTO: 32.3 PG (ref 26–33)
MCHC RBC AUTO-ENTMCNC: 32 GM/DL (ref 32.2–35.5)
MCV RBC AUTO: 101 FL (ref 80–94)
MONOCYTES ABSOLUTE: 0.9 THOU/MM3 (ref 0.4–1.3)
MONOCYTES NFR BLD AUTO: 7.8 %
NEUTROPHILS ABSOLUTE: 7.5 THOU/MM3 (ref 1.8–7.7)
NEUTROPHILS NFR BLD AUTO: 68.5 %
NRBC BLD AUTO-RTO: 0 /100 WBC
OSMOLALITY SERPL CALC.SUM OF ELEC: 285.1 MOSMOL/KG (ref 275–300)
PLATELET # BLD AUTO: 339 THOU/MM3 (ref 130–400)
PMV BLD AUTO: 9.9 FL (ref 9.4–12.4)
POTASSIUM SERPL-SCNC: 3.6 MEQ/L (ref 3.5–5.2)
PROT SERPL-MCNC: 7.5 G/DL (ref 6.4–8.3)
RBC # BLD AUTO: 3.03 MILL/MM3 (ref 4.7–6.1)
RH BLD: NORMAL
SODIUM SERPL-SCNC: 140 MEQ/L (ref 135–145)
TROPONIN, HIGH SENSITIVITY: 27 NG/L (ref 0–12)
TROPONIN, HIGH SENSITIVITY: 29 NG/L (ref 0–12)
WBC # BLD AUTO: 11 THOU/MM3 (ref 4.8–10.8)

## 2025-05-09 PROCEDURE — 74177 CT ABD & PELVIS W/CONTRAST: CPT

## 2025-05-09 PROCEDURE — 86901 BLOOD TYPING SEROLOGIC RH(D): CPT

## 2025-05-09 PROCEDURE — 77338 DESIGN MLC DEVICE FOR IMRT: CPT | Performed by: RADIOLOGY

## 2025-05-09 PROCEDURE — 80053 COMPREHEN METABOLIC PANEL: CPT

## 2025-05-09 PROCEDURE — 86900 BLOOD TYPING SEROLOGIC ABO: CPT

## 2025-05-09 PROCEDURE — 82272 OCCULT BLD FECES 1-3 TESTS: CPT

## 2025-05-09 PROCEDURE — 83690 ASSAY OF LIPASE: CPT

## 2025-05-09 PROCEDURE — 93005 ELECTROCARDIOGRAM TRACING: CPT

## 2025-05-09 PROCEDURE — 36415 COLL VENOUS BLD VENIPUNCTURE: CPT

## 2025-05-09 PROCEDURE — 84484 ASSAY OF TROPONIN QUANT: CPT

## 2025-05-09 PROCEDURE — 85025 COMPLETE CBC W/AUTO DIFF WBC: CPT

## 2025-05-09 PROCEDURE — 83735 ASSAY OF MAGNESIUM: CPT

## 2025-05-09 PROCEDURE — 99285 EMERGENCY DEPT VISIT HI MDM: CPT

## 2025-05-09 PROCEDURE — 2580000003 HC RX 258

## 2025-05-09 PROCEDURE — 86885 COOMBS TEST INDIRECT QUAL: CPT

## 2025-05-09 PROCEDURE — 6360000004 HC RX CONTRAST MEDICATION: Performed by: EMERGENCY MEDICINE

## 2025-05-09 PROCEDURE — 85610 PROTHROMBIN TIME: CPT

## 2025-05-09 RX ORDER — 0.9 % SODIUM CHLORIDE 0.9 %
1000 INTRAVENOUS SOLUTION INTRAVENOUS ONCE
Status: COMPLETED | OUTPATIENT
Start: 2025-05-09 | End: 2025-05-09

## 2025-05-09 RX ORDER — IOPAMIDOL 755 MG/ML
80 INJECTION, SOLUTION INTRAVASCULAR
Status: COMPLETED | OUTPATIENT
Start: 2025-05-09 | End: 2025-05-09

## 2025-05-09 RX ADMIN — SODIUM CHLORIDE 1000 ML: 9 INJECTION, SOLUTION INTRAVENOUS at 14:00

## 2025-05-09 RX ADMIN — IOPAMIDOL 80 ML: 755 INJECTION, SOLUTION INTRAVENOUS at 16:22

## 2025-05-09 ASSESSMENT — PAIN - FUNCTIONAL ASSESSMENT
PAIN_FUNCTIONAL_ASSESSMENT: NONE - DENIES PAIN

## 2025-05-09 NOTE — ED PROVIDER NOTES
[facility] EMERGENCY DEPARTMENT  EMERGENCY DEPARTMENT ENCOUNTER          Pt Name: Masood Randhawa  MRN: [id number]  Birthdate 1950  Date of evaluation: 5/9/2025  Physician: Ricardo Beltre MD  Supervising Attending Physician: Bhavik Zuniga DO       CHIEF COMPLAINT     No chief complaint on file.        HISTORY OF PRESENT ILLNESS    HPI  Masood Randhawa is a 74 y.o. male who presents to the emergency department from home, by private vehicle for evaluation of rectal bleeding.  The patient, and his wife who accompanied to the ED to help provide the history is here today for 2 days worth of rectal bleeding.  The wife notes that she changes pants and she noticed blood within his underwear.  The patient has had this episode once before, but unsure about the resolution of it.  The patient had a colonoscopy 4 years ago and has had polyps, but no other abnormal findings on colonoscopy.  The patient does endorse a history of alcoholism, but states that he has never had an EGD, and has no abdominal pain, and no nausea and vomiting and no hematemesis.  The patient states that he was weak, and the wife endorses that he looks more pale than normal.  The patient denies any pain when he wipes and feels no masses when he wipes.  The patient does have a current history of head and neck cancer, but has not started therapy yet related to this.  He does have a trach related to this.  The patient does not take any anticoagulation or antiplatelets, other than a baby aspirin daily.  The patient has no other acute complaints at this time.      REVIEW OF SYSTEMS   Review of Systems      PAST MEDICAL AND SURGICAL HISTORY     Past Medical History:   Diagnosis Date    BPH (benign prostatic hyperplasia)     Chronic back pain     Chronic obstructive pulmonary disease (HCC) 3/15/2025    COPD (chronic obstructive pulmonary disease) (HCC)     Erectile dysfunction     Gastroesophageal reflux disease with apnea    74 female patient is ED today with signs of rectal bleeding suspicious for AVM, angiodysplasia, internal hemorrhoids versus external hemorrhoids, cancer  Please see ED course and MDM sections below for continuation and resolution of this initial assessment if applicable.    Initial plan:   Labs  EKG  Fluid bolus         Comorbid conditions pertinent to this ED encounter:  Cancer      Differential Diagnosis includes but is not limited to:  AVM  Angiodysplasia  Internal hemorrhoid  External hemorrhoid  Fissure       Decision Rules/Clinical Scores utilized:  Not Applicable       Code Status:  Not addressed during this ED visit    Social determinants of health impacting treatment or disposition:  Considered and not applicable     MIPS documentation:  N/A    Medical Decision Making    Laboratory studies were within the patient's normal limits, do not check to troponin, so no evidence of significant demand ischemia related to blood loss.  Patient's hemoglobin was stable, when reflected back on comparative studies.  Patient has seen Dr. Kingston in the past, decision made to discharge patient home with instructions to follow-up with him and [facility].  Patient verbalized understanding of this without issue.  Occult stool was positive, no evidence of masses, tears, or hemorrhoids on rectal exam on digital rectal exam.  CT abdomen pelvis was done due to the patient's history of head and neck cancer, with no significant findings of masses.  Coagulation studies within normal limits    ED COURSE   ED Medications administered this visit (None if left blank):   Medications   sodium chloride 0.9 % bolus 1,000 mL (1,000 mLs IntraVENous New Bag 5/9/25 1400)   iopamidol (ISOVUE-370) 76 % injection 80 mL (80 mLs IntraVENous Given 5/9/25 1622)         ED Course as of 05/09/25 1657   Fri May 09, 2025   1549 Troponin(!):    Troponin, High Sensitivity 29(!)  Delta 2, at patient's [AG]      ED Course User Index  [AG] Ricardo Beltre

## 2025-05-09 NOTE — ED NOTES
Patient to the ED with complaints of rectal bleeding. Patient and wife states that patient has been having gross red blood in his stool for the past two days. Denies large clots or black stools. Patient is resting in bed with easy and unlabored respirations. Call light in reach. Side rails up x2. Patient denies further complaints or concerns. Will monitor.

## 2025-05-09 NOTE — TELEPHONE ENCOUNTER
ICC from Jessica Bailey with [facility]. She said that a mist machine was ordered for alessio. Medicare will not approve it because of missing information. She would like to know if you could go into the note from 04/11 and add another dx for --other specified diseases of upper respiratory treatment. She said this way Medicare will approve it and patient will not have to pay for it.

## 2025-05-09 NOTE — ED PROVIDER NOTES
I performed a history and physical examination of the patient and discussed management with the resident. I reviewed the resident’s note and agree with the documented findings and plan of care. Any areas of disagreement are noted on the chart. I was personally present for the key portions of any procedures. I have documented in the chart those procedures where I was not present during the key portions. I have reviewed the emergency nurses triage note. I agree with the chief complaint, past medical history, past surgical history, allergies, medications, social and family history as documented unless otherwise noted below. Documentation of the HPI, Physical Exam and Medical Decision Making performed by medical students or scribes is based on my personal performance of the HPI, PE and MDM. For Phys Assistant/ Nurse Practitioner cases/documentation I have personally evaluated this patient and have completed at least one if not all key elements of the E/M (history, physical exam, and MDM). My findings are as noted below.    In other words, I personally saw and examined the patient I have reviewed and agreed with the resident findings including all diagnostic interpretations and treatment plans as written.  I was present for the key portion of any procedures performed and the inclusive time noted in any critical care statement.    Patient presents today for rectal bleeding.  This is a 74-year-old male with a history of cancer has a trach and a J-tube in place.  Comes in today for complaints of same.  Patient states that that he got up today and change his underwear noticed a large amount of blood in the underwear.  Patient states he had this in the past.  Patient states that he has no abdominal pain at this time.  Examination reveals soft abdomen, G-tube in place.  Hemoccult was positive.  No other further complaints per patient and wife at this time.      CT ABDOMEN PELVIS W IV CONTRAST Additional Contrast? None   Final

## 2025-05-09 NOTE — TELEPHONE ENCOUNTER
Attempted to call Jessica Bailey at Home [facility]. Got voicemail, left message to call the office back to let her know the another order has maria g placed for the machine.

## 2025-05-09 NOTE — DISCHARGE INSTRUCTIONS
Your evaluated in the emergency room today for rectal bleeding.  You are found to have proctocolitis, which is benign, are not dangerous, inflammation of your colon.  Your laboratory studies were at their baseline, so we will discharge you here from the ED with recommendations to follow-up with [facility].  I am providing their contact remission and discharge instructions.  Please call them on Monday of next week to schedule appointment for evaluation related to this.  Please go back to the ED should you develop significant weakness and fatigue, worsening bleeding, or inability to eat.

## 2025-05-11 LAB
EKG ATRIAL RATE: 83 BPM
EKG P AXIS: 71 DEGREES
EKG P-R INTERVAL: 130 MS
EKG Q-T INTERVAL: 360 MS
EKG QRS DURATION: 82 MS
EKG QTC CALCULATION (BAZETT): 423 MS
EKG R AXIS: 62 DEGREES
EKG T AXIS: 49 DEGREES
EKG VENTRICULAR RATE: 83 BPM

## 2025-05-11 PROCEDURE — 93010 ELECTROCARDIOGRAM REPORT: CPT | Performed by: INTERNAL MEDICINE

## 2025-05-12 ENCOUNTER — CARE COORDINATION (OUTPATIENT)
Dept: CARE COORDINATION | Age: 75
End: 2025-05-12

## 2025-05-12 ENCOUNTER — TELEPHONE (OUTPATIENT)
Dept: FAMILY MEDICINE CLINIC | Age: 75
End: 2025-05-12

## 2025-05-12 NOTE — TELEPHONE ENCOUNTER
Attempted to call Jessica Bailey at Home [facility]. Got voicemail, left message to call the office ack to let her know the dx has been updated

## 2025-05-12 NOTE — TELEPHONE ENCOUNTER
Jessica Bailey called back I advised her as to what Cathryn said. Patient verbalized understanding and thanked me.

## 2025-05-12 NOTE — CARE COORDINATION
Ambulatory Care Coordination Note     2025 11:05 AM     Patient Current Location:  Home: [address] [place] [address]     ACM contacted the spouse/partner by telephone. Verified name and  with spouse/partner as identifiers.         ACM: Josselyn Navarro RN     Challenges to be reviewed by the provider   Additional needs identified to be addressed with provider No  none               Method of communication with provider: none.    Utilization: Has the patient been seen in the ED since your last call? Yes,   Discharge Date: 25   Discharge Facility: [facility]  Reason for ED Visit: proctocolitis  Visit Diagnosis: proctocolitis    Number of ED visits in the last 6 months: 3      Do you have any ongoing symptoms? Yes, my symptoms have improved. Wife reports no bleeding since ED visit  Current symptoms: no s/s at present time.    Did you call your PCP prior to going to the ED? No, did not call the PCP office.     Review of Discharge Instructions:   [x] AVS discharge instructions  [x] Right Care, Right Place, Right Time document  [] Medication changes  [x] Follow up appointments  [] Referral follow up   [x]   Advised wife to arrange f/u with [facility].  She states that she will talk with  to see if he is willing.        Care Summary Note: Ildefonso was seen in ED for rectal bleeding over the wknd.  Wife reports that pt had it x 2 days when she finally convinced him to come in to ED for evaluation.  Pt denies other s/s and since visit she reports that pt has not had anymore bleeding.  She states that pt was agreeable in letting her know should it start again.    Declines need for PCP f/u at this time.        Offered patient enrollment in the Remote Patient Monitoring (RPM) program for in-home monitoring: Yes, but did not enroll at this time: already monitoring with home equipment.     Assessments Completed:   No changes since last call    Medications Reviewed:

## 2025-05-13 ENCOUNTER — HOSPITAL ENCOUNTER (OUTPATIENT)
Dept: SPEECH THERAPY | Age: 75
Setting detail: THERAPIES SERIES
Discharge: HOME | End: 2025-05-13
Payer: MEDICARE

## 2025-05-13 PROBLEM — Z98.890 POST-OPERATIVE STATE: Status: RESOLVED | Noted: 2025-04-13 | Resolved: 2025-05-13

## 2025-05-13 PROCEDURE — 92597 ORAL SPEECH DEVICE EVAL: CPT | Performed by: SPEECH-LANGUAGE PATHOLOGIST

## 2025-05-13 NOTE — PROGRESS NOTES
[facility]  SPEECH THERAPY  [x] TRACHEOESOPHAGEAL VOICE PROSTHESIS EVALUATION  [] DAILY NOTE   [] PROGRESS NOTE [] DISCHARGE NOTE    [x] OUTPATIENT REHABILITATION CENTER [place]   [] [facility] CARE [place]    [] [facility]   [] [facility]    Date: 2025  Patient Name:  Masood Randhawa  : 1950  MRN: [id number]  CSN: [id number]    Referring Practitioner Cherelle Pat, APRN - CNP [phone]      Diagnosis  Diagnoses       R49.1 (ICD-10-CM) - Aphonia    Z90.02 (ICD-10-CM) - Acquired absence of larynx           Treatment Diagnosis R49.1 Aphonia     Date of Evaluation 25   Additional Pertinent History Masood Randhawa has a past medical history of BPH (benign prostatic hyperplasia), Chronic back pain, Chronic obstructive pulmonary disease (HCC), COPD (chronic obstructive pulmonary disease) (HCC), Erectile dysfunction, Gastroesophageal reflux disease with apnea without esophagitis, Head and neck cancer (HCC), Hypertension, Osteoarthritis, Personal history of colonic polyps, Pneumonia, PVD (peripheral vascular disease), and PVD (peripheral vascular disease).  he has a past surgical history that includes Colonoscopy (2006); lumbar fusion; Rotator cuff repair (2021); Carpal tunnel release (Bilateral); US ASP/INJ THYROID CYST (2025); laryngoscopy (N/A, 2025); IR FLUORO GUIDED GASTROSTOMY TUBE INSERTION PERC W CONTRAST (2025); Laryngectomy (Bilateral, 3/14/2025); and bronchoscopy (N/A, 4/3/2025).     Allergies Allergies   Allergen Reactions    Pcn [Penicillins] Other (See Comments)     Was told he was allergic since he was a child      Medications   Current Outpatient Medications:     pantoprazole (PROTONIX) 40 MG tablet, Take 1 tablet by mouth every morning (before breakfast), Disp: 90 tablet, Rfl: 2    mupirocin (BACTROBAN) 2 % ointment, Apply to stoma with q tip TID. (Patient not taking: Reported on 2025), Disp: 1 g,

## 2025-05-14 ENCOUNTER — APPOINTMENT (OUTPATIENT)
Dept: OCCUPATIONAL THERAPY | Age: 75
End: 2025-05-14
Payer: MEDICARE

## 2025-05-14 ENCOUNTER — HOSPITAL ENCOUNTER (OUTPATIENT)
Dept: PHYSICAL THERAPY | Age: 75
Setting detail: THERAPIES SERIES
Discharge: HOME | End: 2025-05-14
Payer: MEDICARE

## 2025-05-14 ENCOUNTER — HOSPITAL ENCOUNTER (OUTPATIENT)
Dept: OCCUPATIONAL THERAPY | Age: 75
Setting detail: THERAPIES SERIES
Discharge: HOME | End: 2025-05-14
Payer: MEDICARE

## 2025-05-14 ENCOUNTER — HOSPITAL ENCOUNTER (OUTPATIENT)
Dept: SPEECH THERAPY | Age: 75
Setting detail: THERAPIES SERIES
Discharge: HOME | End: 2025-05-14
Payer: MEDICARE

## 2025-05-14 PROCEDURE — 97110 THERAPEUTIC EXERCISES: CPT

## 2025-05-14 PROCEDURE — 92507 TX SP LANG VOICE COMM INDIV: CPT | Performed by: SPEECH-LANGUAGE PATHOLOGIST

## 2025-05-14 NOTE — PROGRESS NOTES
[facility]  SPEECH THERAPY  [] SPEECH LANGUAGE COGNITIVE EVALUATION  TOTAL LARYNGECTOMY EVALUATION   [x] DAILY NOTE   [] PROGRESS NOTE [] DISCHARGE NOTE    [x] OUTPATIENT REHABILITATION CENTER [facility]   [] [facility] CARE [place]    [] [facility]   [] [facility]    Date: 2025  Patient Name:  Masood Randhawa  : 1950  MRN: [id number]  CSN: [id number]    Referring Practitioner Cherelle Pat, APRN - CNP [id number]      Diagnosis  Diagnoses       Z90.02 (ICD-10-CM) - H/O radical laryngectomy    R49.1 (ICD-10-CM) - Aphonia           Treatment Diagnosis R49.1 Aphonia      Date of Evaluation 3/27/25   Additional Pertinent History Masood Randhawa has a past medical history of BPH (benign prostatic hyperplasia), Chronic back pain, Chronic obstructive pulmonary disease (HCC), COPD (chronic obstructive pulmonary disease) (HCC), Erectile dysfunction, Gastroesophageal reflux disease with apnea without esophagitis, Head and neck cancer (HCC), Hypertension, Osteoarthritis, Personal history of colonic polyps, Pneumonia, PVD (peripheral vascular disease), and PVD (peripheral vascular disease).  he has a past surgical history that includes Colonoscopy (2006); lumbar fusion; Rotator cuff repair (2021); Carpal tunnel release (Bilateral); US ASP/INJ THYROID CYST (2025); laryngoscopy (N/A, 2025); IR FLUORO GUIDED GASTROSTOMY TUBE INSERTION PERC W CONTRAST (2025); Laryngectomy (Bilateral, 3/14/2025); and bronchoscopy (N/A, 4/3/2025).     Allergies Allergies   Allergen Reactions    Pcn [Penicillins] Other (See Comments)     Was told he was allergic since he was a child      Medications   Current Outpatient Medications:     pantoprazole (PROTONIX) 40 MG tablet, Take 1 tablet by mouth every morning (before breakfast), Disp: 90 tablet, Rfl: 2    mupirocin (BACTROBAN) 2 % ointment, Apply to stoma with q tip TID. (Patient not taking: Reported on

## 2025-05-14 NOTE — PROGRESS NOTES
made. Patient has a difficult time letting go of the parallel bars while performing standing exercises that require single leg stance position. Patient was able to let go of the parallel bars when performing balance exercises. He tolerated tolerated today's session well. Patient denied needing any seated rest breaks during standing exercises.       GOALS:  Patient Goal: \"Increase strength.\"    Short Term Goals to be met in 12 weeks:  See LTGs    Long Term Goals to be met in 12 weeks:   Patient to improve Brief Fatigue Inventory to 2 or less to assist with being able to get self ready for day.  Improve ambulation with patient able to ambulate with rolling walker x100 feet with supervision to assist with walking into house.  Increase leg strength to 4/5 to assist with getting up from a low chair.  Improve transfers to independent from a low surface to assist with community outings.    Patient Education: Exercise progressions, updated handouts provided   [email or web address] access code: [email or web address]   Education Outcome: Verbalized understanding  Education Barriers: None    PLAN:  Treatment Recommendations: Ambulation, Strengthening, Range of Motion, Conditioning, Manual Techniques, Postural Re-Training, Body Mechanics/Ergonomics, Home Exercise Prescription, and Safety Education    Continue per POC.  Plan to see patient up to 2 times per week for up to 12 weeks to address the treatment planned outlined above.  Time In 1430   Time Out 1511   Timed Code Minutes: 41 min   Total Treatment Time: 41 min       Electronically Signed by: Katerina Lema PTA

## 2025-05-14 NOTE — PROGRESS NOTES
[facility]  OCCUPATIONAL THERAPY  [] EVALUATION  [x] DAILY NOTE (LAND) [] DAILY NOTE (AQUATIC ) [] PROGRESS NOTE [] DISCHARGE NOTE    [x] OUTPATIENT REHABILITATION [facility]   [] [facility] CARE [place]    [] [facility]   [] [facility]    Date: 2025  Patient Name:  Masood Randhawa  : 1950  MRN: [id number]  CSN: [id number]    Referring Practitioner Cherelle Pat, APRN - CNP [phone]      Diagnosis  Diagnoses       C32.0 (ICD-10-CM) - Malignant neoplasm of glottis (HCC)    G89.3 (ICD-10-CM) - Neoplasm related pain (acute) (chronic)    R63.4 (ICD-10-CM) - Abnormal weight loss           Treatment Diagnosis R53.1 Weakness  M62.81 Generalized Weakness   Date of Evaluation 4/15/25   Additional Pertinent History Masood Randhawa has a past medical history of BPH (benign prostatic hyperplasia), Chronic back pain, Chronic obstructive pulmonary disease (HCC), COPD (chronic obstructive pulmonary disease) (HCC), Erectile dysfunction, Gastroesophageal reflux disease with apnea without esophagitis, Head and neck cancer (HCC), Hypertension, Osteoarthritis, Personal history of colonic polyps, Pneumonia, PVD (peripheral vascular disease), and PVD (peripheral vascular disease).  he has a past surgical history that includes Colonoscopy (2006); lumbar fusion; Rotator cuff repair (2021); Carpal tunnel release (Bilateral); US ASP/INJ THYROID CYST (2025); laryngoscopy (N/A, 2025); IR FLUORO GUIDED GASTROSTOMY TUBE INSERTION PERC W CONTRAST (2025); Laryngectomy (Bilateral, 3/14/2025); and bronchoscopy (N/A, 4/3/2025).     Allergies Allergies   Allergen Reactions    Pcn [Penicillins] Other (See Comments)     Was told he was allergic since he was a child      Medications   Current Outpatient Medications:     pantoprazole (PROTONIX) 40 MG tablet, Take 1 tablet by mouth every morning (before breakfast), Disp: 90 tablet, Rfl: 2    mupirocin

## 2025-05-15 ENCOUNTER — HOSPITAL ENCOUNTER (OUTPATIENT)
Dept: RADIATION ONCOLOGY | Age: 75
Discharge: HOME | End: 2025-05-15
Payer: MEDICARE

## 2025-05-15 PROCEDURE — 77386 HC NTSTY MODUL RAD TX DLVR CPLX: CPT | Performed by: RADIOLOGY

## 2025-05-16 ENCOUNTER — HOSPITAL ENCOUNTER (OUTPATIENT)
Dept: PHYSICAL THERAPY | Age: 75
Setting detail: THERAPIES SERIES
Discharge: HOME | End: 2025-05-16
Payer: MEDICARE

## 2025-05-16 ENCOUNTER — HOSPITAL ENCOUNTER (OUTPATIENT)
Dept: RADIATION ONCOLOGY | Age: 75
Discharge: HOME | End: 2025-05-16
Payer: MEDICARE

## 2025-05-16 ENCOUNTER — HOSPITAL ENCOUNTER (OUTPATIENT)
Dept: OCCUPATIONAL THERAPY | Age: 75
Setting detail: THERAPIES SERIES
Discharge: HOME | End: 2025-05-16
Payer: MEDICARE

## 2025-05-16 PROCEDURE — 97110 THERAPEUTIC EXERCISES: CPT

## 2025-05-16 PROCEDURE — 77386 HC NTSTY MODUL RAD TX DLVR CPLX: CPT | Performed by: RADIOLOGY

## 2025-05-16 NOTE — PROGRESS NOTES
[facility]  ONCOLOGY REHABILITATION  PHYSICAL THERAPY  [x] DAILY NOTE    [x] SPECIALIZED THERAPY SERVICES - [place]     Date: 2025  Patient Name:  Masood Randhawa  : 1950  MRN: [id number]  CSN: [id number]    Referring Practitioner Cathryn Esparza PA-C [id number]      Diagnosis  Diagnoses       C32.0 (ICD-10-CM) - Squamous cell carcinoma of glottis    G89.3 (ICD-10-CM) - Cancer associated pain    R63.4 (ICD-10-CM) - Unintentional weight loss           Treatment Diagnosis R26.2 Difficulty in walking  R53.1 Weakness  R53.0 Cancer Related Fatigue   Date of Evaluation 25   Additional Pertinent History Masood Randhawa has a past medical history of BPH (benign prostatic hyperplasia), Chronic back pain, Chronic obstructive pulmonary disease (HCC), COPD (chronic obstructive pulmonary disease) (HCC), Erectile dysfunction, Gastroesophageal reflux disease with apnea without esophagitis, Head and neck cancer (HCC), Hypertension, Osteoarthritis, Personal history of colonic polyps, Pneumonia, PVD (peripheral vascular disease), and PVD (peripheral vascular disease).  he has a past surgical history that includes Colonoscopy (2006); lumbar fusion; Rotator cuff repair (2021); Carpal tunnel release (Bilateral); US ASP/INJ THYROID CYST (2025); laryngoscopy (N/A, 2025); IR FLUORO GUIDED GASTROSTOMY TUBE INSERTION PERC W CONTRAST (2025); Laryngectomy (Bilateral, 3/14/2025); and bronchoscopy (N/A, 4/3/2025).     Allergies Allergies   Allergen Reactions    Pcn [Penicillins] Other (See Comments)     Was told he was allergic since he was a child      Medications   Current Outpatient Medications:     pantoprazole (PROTONIX) 40 MG tablet, Take 1 tablet by mouth every morning (before breakfast), Disp: 90 tablet, Rfl: 2    mupirocin (BACTROBAN) 2 % ointment, Apply to stoma with q tip TID. (Patient not taking: Reported on 2025), Disp: 1 g, Rfl: 3    sodium chloride

## 2025-05-16 NOTE — PROGRESS NOTES
[facility]  OCCUPATIONAL THERAPY  [] EVALUATION  [x] DAILY NOTE (LAND) [] DAILY NOTE (AQUATIC ) [] PROGRESS NOTE [] DISCHARGE NOTE    [x] OUTPATIENT REHABILITATION [facility]   [] [facility] CARE [place]    [] [facility]   [] [facility]    Date: 2025  Patient Name:  Masood Randhawa  : 1950  MRN: [id number]  CSN: [id number]    Referring Practitioner Cherelle Pat, APRN - CNP [phone]      Diagnosis  Diagnoses       C32.0 (ICD-10-CM) - Malignant neoplasm of glottis (HCC)    G89.3 (ICD-10-CM) - Neoplasm related pain (acute) (chronic)    R63.4 (ICD-10-CM) - Abnormal weight loss           Treatment Diagnosis R53.1 Weakness  M62.81 Generalized Weakness   Date of Evaluation 4/15/25   Additional Pertinent History Masood Randhawa has a past medical history of BPH (benign prostatic hyperplasia), Chronic back pain, Chronic obstructive pulmonary disease (HCC), COPD (chronic obstructive pulmonary disease) (HCC), Erectile dysfunction, Gastroesophageal reflux disease with apnea without esophagitis, Head and neck cancer (HCC), Hypertension, Osteoarthritis, Personal history of colonic polyps, Pneumonia, PVD (peripheral vascular disease), and PVD (peripheral vascular disease).  he has a past surgical history that includes Colonoscopy (2006); lumbar fusion; Rotator cuff repair (2021); Carpal tunnel release (Bilateral); US ASP/INJ THYROID CYST (2025); laryngoscopy (N/A, 2025); IR FLUORO GUIDED GASTROSTOMY TUBE INSERTION PERC W CONTRAST (2025); Laryngectomy (Bilateral, 3/14/2025); and bronchoscopy (N/A, 4/3/2025).     Allergies Allergies   Allergen Reactions    Pcn [Penicillins] Other (See Comments)     Was told he was allergic since he was a child      Medications   Current Outpatient Medications:     pantoprazole (PROTONIX) 40 MG tablet, Take 1 tablet by mouth every morning (before breakfast), Disp: 90 tablet, Rfl: 2    mupirocin

## 2025-05-19 ENCOUNTER — OFFICE VISIT (OUTPATIENT)
Dept: ENT CLINIC | Age: 75
End: 2025-05-19

## 2025-05-19 ENCOUNTER — HOSPITAL ENCOUNTER (OUTPATIENT)
Dept: RADIATION ONCOLOGY | Age: 75
Discharge: HOME | End: 2025-05-19
Payer: MEDICARE

## 2025-05-19 VITALS
SYSTOLIC BLOOD PRESSURE: 114 MMHG | DIASTOLIC BLOOD PRESSURE: 68 MMHG | HEART RATE: 88 BPM | OXYGEN SATURATION: 100 % | TEMPERATURE: 97.7 F

## 2025-05-19 DIAGNOSIS — R49.1 APHONIA: ICD-10-CM

## 2025-05-19 DIAGNOSIS — Z98.890 STATUS POST RADICAL DISSECTION OF NECK: ICD-10-CM

## 2025-05-19 DIAGNOSIS — C32.0 SQUAMOUS CELL CARCINOMA OF GLOTTIS (HCC): Primary | ICD-10-CM

## 2025-05-19 DIAGNOSIS — Z90.02 STATUS POST LARYNGECTOMY: ICD-10-CM

## 2025-05-19 PROCEDURE — 77014 CHG CT GUIDANCE RADIATION THERAPY FLDS PLACEMENT: CPT | Performed by: RADIOLOGY

## 2025-05-19 PROCEDURE — 77386 HC NTSTY MODUL RAD TX DLVR CPLX: CPT | Performed by: RADIOLOGY

## 2025-05-19 PROCEDURE — 99024 POSTOP FOLLOW-UP VISIT: CPT | Performed by: OTOLARYNGOLOGY

## 2025-05-20 ENCOUNTER — HOSPITAL ENCOUNTER (OUTPATIENT)
Dept: RADIATION ONCOLOGY | Age: 75
Discharge: HOME | End: 2025-05-20
Payer: MEDICARE

## 2025-05-20 VITALS
OXYGEN SATURATION: 100 % | DIASTOLIC BLOOD PRESSURE: 59 MMHG | RESPIRATION RATE: 18 BRPM | WEIGHT: 125.88 LBS | SYSTOLIC BLOOD PRESSURE: 107 MMHG | BODY MASS INDEX: 20.32 KG/M2 | HEART RATE: 95 BPM | TEMPERATURE: 98.2 F

## 2025-05-20 PROCEDURE — 77014 CHG CT GUIDANCE RADIATION THERAPY FLDS PLACEMENT: CPT | Performed by: RADIOLOGY

## 2025-05-20 PROCEDURE — 77386 HC NTSTY MODUL RAD TX DLVR CPLX: CPT | Performed by: RADIOLOGY

## 2025-05-20 NOTE — PROGRESS NOTES
[facility] [place] [facility]  [facility]  [address]   [address]  [facility] [address]  Dept: [phone]  Dept Fax: [phone]  Loc: [phone]    Masood Randhawa is a 74 y.o. male who was referred by No ref. provider found for:  Chief Complaint   Patient presents with    Post-Op Check     1 month post op.  Total layryngectomy / neck dissection 3/14/25.  His feeding tube is pulling out more than it should be and it is causing pain.   He doesn't use it any longer since he eats normal food.  He did have some rectal bleeding that he did go to ER for treatment and she will call for an appointment with specialist.       HPI:       History of Present Illness  Masood Randhawa is a 74 y.o. male who presents for an interval follow-up post laryngectomy and neck dissection. He is 3 days into his adjunctive radiation therapy for cancer with positive lymph nodes.    He reports that the sutures have not yet dissolved. The doctor explains that the sutures are long-lasting dissolvable ones. During the visit, the doctor removes some sutures and advises that while undergoing radiation therapy, voicing may be challenging. The patient expresses concerns about potential obstructions and the presence of dogs at home. The doctor suggests using bibs with filters to prevent foreign objects from entering the stoma.    The patient mentions a previous communication regarding the foam sticky bibs, which the doctor confirms as acceptable to cover his stoma and filter out particles and insects from entering the trach. The doctor emphasizes the importance of using the speech TEP device and encourages the patient to practice speaking. The patient is advised to schedule the next visit approximately two months after the completion of radiation therapy.        History:     Allergies   Allergen Reactions    Pcn [Penicillins] Other (See Comments)     Was told he was allergic since he was a child

## 2025-05-20 NOTE — PROGRESS NOTES
[facility]          [address]        [place] [address]        O: [phone]        F: [phone]       [employer]            Dr. Samy Gordillo MD MS          Dr. Lynn Vanegas MD PhD    ON TREATMENT VISIT (OTV) NOTE     Date of Service: 2025  Patient ID: Masood Randhawa   : 1950  MRN: [id number]   Acct Number: [id number]     RADIATION ONCOLOGY ATTENDING:  Samy Gordillo MD MS    DIAGNOSIS:   Cancer Staging   Cancer of glottis (HCC)  Staging form: Larynx - Glottis, AJCC 8th Edition  - Clinical: Stage IVB (cT4a, cN3, cM0) - Unsigned  - Pathologic stage from 3/28/2025: Stage DENILSON (ypT4a, pN1, cM0) - Signed by Rickie Daley MD on 3/28/2025      Treatment Area: Head/Neck    Current Total Dose(cGy): 800  Current Fraction:   Final/Cumulative Rx. Dose (cGy): 6000    Patient was seen today for weekly visit.     Wt Readings from Last 3 Encounters:   25 57.1 kg (125 lb 14.1 oz)   25 56.7 kg (125 lb)   25 57 kg (125 lb 9.6 oz)       BP (!) 107/59   Pulse 95   Temp 98.2 °F (36.8 °C) (Infrared)   Resp 18   Wt 57.1 kg (125 lb 14.1 oz)   SpO2 100%   BMI 20.32 kg/m²     Lab Results   Component Value Date    WBC 11.0 (H) 2025    HGB 9.8 (L) 2025    HCT 30.6 (L) 2025     2025       Comfort Alteration  Fatigue:Must curtail daily activities even with rest periods and early bedtime    Pain Location: Denies  Pain Intensity (Current): 0 No Pain  Pain Treatment: N/A  Pain Relief: n/a    Emotional Alteration:   Coping: somewhat effective    Nutritional Alteration  Anorexia: none   Nausea: No nausea noted  Vomiting: No vomiting   Salivary Glands- Acute: No changes over baseline  Taste Disturbance (Dysgeusia): Normal   Dyspepsia/Heartburn: None  Dysphagia/Esophagitis: Dysphagia, requires pureed, soft or liquid diet - Has peg tube but hasn't needed to use.    Skin

## 2025-05-20 NOTE — PROGRESS NOTES
[facility]  [address] [place] [address]  Phone: [phone]   Toll Free: [phone]   Fax: [phone]    RADIATION ONCOLOGY EDUCATION    CHIEF COMPLAINT: Masood presents to radiation oncology today for education regarding treatment to the H&N.      PLAN:   Expected and potential side effects were discussed in detail, along with written handouts.  Skin care and moisturization instructions were discussed in detail.   Patient was not agreeable to physical therapy referral. Explained referral could be placed at any time if patient changes their mind.   Patient was informed of dietician that is available weekly and as needed.  Educated on weekly on treatment visit to meet with Physician to monitor side effects and treatment course.  Informed patient that Physician is available at any time to discuss radiation side effects/concerns through out treatment course.  Masood had the opportunity to ask questions, and indicated that all questions were satisfactorily addressed.

## 2025-05-21 ENCOUNTER — HOSPITAL ENCOUNTER (OUTPATIENT)
Dept: RADIATION ONCOLOGY | Age: 75
Discharge: HOME | End: 2025-05-21
Payer: MEDICARE

## 2025-05-21 ENCOUNTER — HOSPITAL ENCOUNTER (OUTPATIENT)
Dept: SPEECH THERAPY | Age: 75
Setting detail: THERAPIES SERIES
Discharge: HOME | End: 2025-05-21
Payer: MEDICARE

## 2025-05-21 ENCOUNTER — HOSPITAL ENCOUNTER (OUTPATIENT)
Dept: PHYSICAL THERAPY | Age: 75
Setting detail: THERAPIES SERIES
Discharge: HOME | End: 2025-05-21
Payer: MEDICARE

## 2025-05-21 ENCOUNTER — HOSPITAL ENCOUNTER (OUTPATIENT)
Dept: OCCUPATIONAL THERAPY | Age: 75
Setting detail: THERAPIES SERIES
Discharge: HOME | End: 2025-05-21
Payer: MEDICARE

## 2025-05-21 DIAGNOSIS — C32.0 CANCER OF GLOTTIS (HCC): Primary | ICD-10-CM

## 2025-05-21 PROCEDURE — 97110 THERAPEUTIC EXERCISES: CPT

## 2025-05-21 PROCEDURE — 77386 HC NTSTY MODUL RAD TX DLVR CPLX: CPT | Performed by: RADIOLOGY

## 2025-05-21 PROCEDURE — 92507 TX SP LANG VOICE COMM INDIV: CPT | Performed by: SPEECH-LANGUAGE PATHOLOGIST

## 2025-05-21 PROCEDURE — 77427 RADIATION TX MANAGEMENT X5: CPT | Performed by: RADIOLOGY

## 2025-05-21 RX ORDER — BETAMETHASONE VALERATE 1.2 MG/G
CREAM TOPICAL
Qty: 90 G | Refills: 0 | Status: SHIPPED | OUTPATIENT
Start: 2025-05-21

## 2025-05-21 NOTE — PROGRESS NOTES
[facility]  OCCUPATIONAL THERAPY  [] EVALUATION  [x] DAILY NOTE (LAND) [] DAILY NOTE (AQUATIC ) [] PROGRESS NOTE [] DISCHARGE NOTE    [x] OUTPATIENT REHABILITATION [facility]   [] [facility] CARE [place]    [] [facility]   [] [facility]    Date: 2025  Patient Name:  Masood Randhawa  : 1950  MRN: [id number]  CSN: [id number]    Referring Practitioner Cherelle Pat, APRN - CNP [phone]      Diagnosis  Diagnoses       C32.0 (ICD-10-CM) - Malignant neoplasm of glottis (HCC)    G89.3 (ICD-10-CM) - Neoplasm related pain (acute) (chronic)    R63.4 (ICD-10-CM) - Abnormal weight loss           Treatment Diagnosis R53.1 Weakness  M62.81 Generalized Weakness   Date of Evaluation 4/15/25   Additional Pertinent History Masood Randhawa has a past medical history of BPH (benign prostatic hyperplasia), Chronic back pain, Chronic obstructive pulmonary disease (HCC), COPD (chronic obstructive pulmonary disease) (HCC), Erectile dysfunction, Gastroesophageal reflux disease with apnea without esophagitis, Head and neck cancer (HCC), Hypertension, Osteoarthritis, Personal history of colonic polyps, Pneumonia, PVD (peripheral vascular disease), and PVD (peripheral vascular disease).  he has a past surgical history that includes Colonoscopy (2006); lumbar fusion; Rotator cuff repair (2021); Carpal tunnel release (Bilateral); US ASP/INJ THYROID CYST (2025); laryngoscopy (N/A, 2025); IR FLUORO GUIDED GASTROSTOMY TUBE INSERTION PERC W CONTRAST (2025); Laryngectomy (Bilateral, 3/14/2025); and bronchoscopy (N/A, 4/3/2025).     Allergies Allergies   Allergen Reactions    Pcn [Penicillins] Other (See Comments)     Was told he was allergic since he was a child      Medications   Current Outpatient Medications:     betamethasone valerate (VALISONE) 0.1 % cream, Apply topically to radiation treatment area 2 times daily., Disp: 90 g, Rfl: 0

## 2025-05-21 NOTE — PROGRESS NOTES
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    G89.3 (ICD-10-CM) - Cancer associated pain    R63.4 (ICD-10-CM) - Unintentional weight loss           Treatment Diagnosis R26.2 Difficulty in walking  R53.1 Weakness  R53.0 Cancer Related Fatigue   Date of Evaluation 25   Additional Pertinent History Masood Randhawa has a past medical history of BPH (benign prostatic hyperplasia), Chronic back pain, Chronic obstructive pulmonary disease (HCC), COPD (chronic obstructive pulmonary disease) (HCC), Erectile dysfunction, Gastroesophageal reflux disease with apnea without esophagitis, Head and neck cancer (HCC), Hypertension, Osteoarthritis, Personal history of colonic polyps, Pneumonia, PVD (peripheral vascular disease), and PVD (peripheral vascular disease).  he has a past surgical history that includes Colonoscopy (2006); lumbar fusion; Rotator cuff repair (2021); Carpal tunnel release (Bilateral); US ASP/INJ THYROID CYST (2025); laryngoscopy (N/A, 2025); IR FLUORO GUIDED GASTROSTOMY TUBE INSERTION PERC W CONTRAST (2025); Laryngectomy (Bilateral, 3/14/2025); and bronchoscopy (N/A, 4/3/2025).     Allergies Allergies   Allergen Reactions    Pcn [Penicillins] Other (See Comments)     Was told he was allergic since he was a child      Medications   Current Outpatient Medications:     betamethasone valerate (VALISONE) 0.1 % cream, Apply topically to radiation treatment area 2 times daily., Disp: 90 g, Rfl: 0    pantoprazole (PROTONIX) 40 MG tablet, Take 1 tablet by mouth every morning (before breakfast), Disp: 90 tablet, Rfl: 2    mupirocin

## 2025-05-21 NOTE — PROGRESS NOTES
approved by Dr. Daley, further education on ongoing care   []  No new Education completed  []  Reviewed Prior HEP      [x]  Patient and spouse verbalized and/or demonstrated understanding of education provided.  []  Patient unable to verbalize and/or demonstrate understanding of education provided.  Will continue education.  [x]  Barriers to learning: fatigue       PLAN:  Treatment Recommendations:     []  Plan of care initiated.  Plan to see patient 1 times per week for 12 weeks to address the treatment planned outlined above.  [x]  Continue with current plan of care  []  Modify plan of care as follows:    []  Hold pending physician visit  []  Discharge    Time In 1305   Time Out 1352   Timed Code Minutes: 0 min   Total Treatment Time: 47 min       Brook Sims M.S. CCC-SLP 1425

## 2025-05-22 ENCOUNTER — HOSPITAL ENCOUNTER (OUTPATIENT)
Dept: RADIATION ONCOLOGY | Age: 75
Discharge: HOME | End: 2025-05-22
Payer: MEDICARE

## 2025-05-22 ENCOUNTER — CARE COORDINATION (OUTPATIENT)
Dept: CARE COORDINATION | Age: 75
End: 2025-05-22

## 2025-05-22 PROCEDURE — 77386 HC NTSTY MODUL RAD TX DLVR CPLX: CPT | Performed by: RADIOLOGY

## 2025-05-22 NOTE — CARE COORDINATION
Attempted to reach patient for continued Care Coordination follow up and education.  Patient was unavailable at the time of my call, and a generic voicemail message was left asking patient to return my call at [phone].

## 2025-05-23 ENCOUNTER — HOSPITAL ENCOUNTER (OUTPATIENT)
Dept: RADIATION ONCOLOGY | Age: 75
Discharge: HOME | End: 2025-05-23
Payer: MEDICARE

## 2025-05-23 PROCEDURE — 77386 HC NTSTY MODUL RAD TX DLVR CPLX: CPT | Performed by: RADIOLOGY

## 2025-05-27 ENCOUNTER — HOSPITAL ENCOUNTER (OUTPATIENT)
Dept: RADIATION ONCOLOGY | Age: 75
Discharge: HOME | End: 2025-05-27
Payer: MEDICARE

## 2025-05-27 VITALS
RESPIRATION RATE: 16 BRPM | HEART RATE: 89 BPM | TEMPERATURE: 98.3 F | SYSTOLIC BLOOD PRESSURE: 125 MMHG | OXYGEN SATURATION: 99 % | WEIGHT: 124.34 LBS | BODY MASS INDEX: 20.07 KG/M2 | DIASTOLIC BLOOD PRESSURE: 60 MMHG

## 2025-05-27 PROCEDURE — 77386 HC NTSTY MODUL RAD TX DLVR CPLX: CPT | Performed by: RADIOLOGY

## 2025-05-27 NOTE — PROGRESS NOTES
[facility]          [address]        [place] [id number]        O: [phone]        F: [phone]       [employer]            Dr. Samy Gordillo MD MS          Dr. Lynn Vanegas MD PhD    ON TREATMENT VISIT (OTV) NOTE     Date of Service: 2025  Patient ID: Masood Randhawa   : 1950  MRN: [id number]   Acct Number: [id number]     RADIATION ONCOLOGY ATTENDING:  Samy Gordillo MD MS    DIAGNOSIS:   Cancer Staging   Cancer of glottis (HCC)  Staging form: Larynx - Glottis, AJCC 8th Edition  - Clinical: Stage IVB (cT4a, cN3, cM0) - Unsigned  - Pathologic stage from 3/28/2025: Stage DENILSON (ypT4a, pN1, cM0) - Signed by Rickie Daley MD on 3/28/2025      Treatment Area: Head/Neck    Current Total Dose(cGy): 1600  Current Fraction:   Final/Cumulative Rx. Dose (cGy): 6000    Patient was seen today for weekly visit.     Wt Readings from Last 3 Encounters:   25 56.4 kg (124 lb 5.4 oz)   25 57.1 kg (125 lb 14.1 oz)   25 56.7 kg (125 lb)       /60   Pulse 89   Temp 98.3 °F (36.8 °C) (Infrared)   Resp 16   Wt 56.4 kg (124 lb 5.4 oz)   SpO2 99%   BMI 20.07 kg/m²     Lab Results   Component Value Date    WBC 11.0 (H) 2025    HGB 10.6 (L) 2025    HCT 33.0 (L) 2025     2025       Comfort Alteration  Fatigue:Must curtail daily activities even with rest periods and early bedtime    Pain Location: Denies  Pain Intensity (Current): 0 No Pain  Pain Treatment: N/A  Pain Relief: n/a    Emotional Alteration:   Coping: somewhat effective    Nutritional Alteration  Anorexia: none   Nausea: No nausea noted  Vomiting: No vomiting   Salivary Glands- Acute: Mild PO dryness, thick saliva, altered taste, no change in PO intake   Taste Disturbance (Dysgeusia): Markedly altered  Dyspepsia/Heartburn: None  Dysphagia/Esophagitis: Mild dysphagia, but can eat regular diet - Used

## 2025-05-28 ENCOUNTER — HOSPITAL ENCOUNTER (OUTPATIENT)
Dept: PHYSICAL THERAPY | Age: 75
Setting detail: THERAPIES SERIES
Discharge: HOME | End: 2025-05-28
Payer: MEDICARE

## 2025-05-28 ENCOUNTER — HOSPITAL ENCOUNTER (OUTPATIENT)
Dept: OCCUPATIONAL THERAPY | Age: 75
Setting detail: THERAPIES SERIES
Discharge: HOME | End: 2025-05-28
Payer: MEDICARE

## 2025-05-28 ENCOUNTER — HOSPITAL ENCOUNTER (OUTPATIENT)
Dept: RADIATION ONCOLOGY | Age: 75
Discharge: HOME | End: 2025-05-28
Payer: MEDICARE

## 2025-05-28 ENCOUNTER — APPOINTMENT (OUTPATIENT)
Dept: OCCUPATIONAL THERAPY | Age: 75
End: 2025-05-28
Payer: MEDICARE

## 2025-05-28 ENCOUNTER — HOSPITAL ENCOUNTER (OUTPATIENT)
Dept: SPEECH THERAPY | Age: 75
Setting detail: THERAPIES SERIES
Discharge: HOME | End: 2025-05-28
Payer: MEDICARE

## 2025-05-28 PROCEDURE — 92507 TX SP LANG VOICE COMM INDIV: CPT | Performed by: SPEECH-LANGUAGE PATHOLOGIST

## 2025-05-28 PROCEDURE — 97110 THERAPEUTIC EXERCISES: CPT

## 2025-05-28 PROCEDURE — 77386 HC NTSTY MODUL RAD TX DLVR CPLX: CPT | Performed by: RADIOLOGY

## 2025-05-28 NOTE — PROGRESS NOTES
[facility]  OCCUPATIONAL THERAPY  [] EVALUATION  [x] DAILY NOTE (LAND) [] DAILY NOTE (AQUATIC ) [] PROGRESS NOTE [] DISCHARGE NOTE    [x] OUTPATIENT REHABILITATION [facility]   [] [facility] CARE [place]    [] [facility]   [] [facility]    Date: 2025  Patient Name:  Masood Randhawa  : 1950  MRN: [id number]  CSN: [id number]    Referring Practitioner Cherelle Pat, APRN - CNP [phone]      Diagnosis  Diagnoses       C32.0 (ICD-10-CM) - Malignant neoplasm of glottis (HCC)    G89.3 (ICD-10-CM) - Neoplasm related pain (acute) (chronic)    R63.4 (ICD-10-CM) - Abnormal weight loss           Treatment Diagnosis R53.1 Weakness  M62.81 Generalized Weakness   Date of Evaluation 4/15/25   Additional Pertinent History Masood Randhawa has a past medical history of BPH (benign prostatic hyperplasia), Chronic back pain, Chronic obstructive pulmonary disease (HCC), COPD (chronic obstructive pulmonary disease) (HCC), Erectile dysfunction, Gastroesophageal reflux disease with apnea without esophagitis, Head and neck cancer (HCC), Hypertension, Osteoarthritis, Personal history of colonic polyps, Pneumonia, PVD (peripheral vascular disease), and PVD (peripheral vascular disease).  he has a past surgical history that includes Colonoscopy (2006); lumbar fusion; Rotator cuff repair (2021); Carpal tunnel release (Bilateral); US ASP/INJ THYROID CYST (2025); laryngoscopy (N/A, 2025); IR FLUORO GUIDED GASTROSTOMY TUBE INSERTION PERC W CONTRAST (2025); Laryngectomy (Bilateral, 3/14/2025); and bronchoscopy (N/A, 4/3/2025).     Allergies Allergies   Allergen Reactions    Pcn [Penicillins] Other (See Comments)     Was told he was allergic since he was a child      Medications   Current Outpatient Medications:     betamethasone valerate (VALISONE) 0.1 % cream, Apply topically to radiation treatment area 2 times daily., Disp: 90 g, Rfl: 0

## 2025-05-28 NOTE — PROGRESS NOTES
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    G89.3 (ICD-10-CM) - Cancer associated pain    R63.4 (ICD-10-CM) - Unintentional weight loss           Treatment Diagnosis R26.2 Difficulty in walking  R53.1 Weakness  R53.0 Cancer Related Fatigue   Date of Evaluation 25   Additional Pertinent History Masood Randhawa has a past medical history of BPH (benign prostatic hyperplasia), Chronic back pain, Chronic obstructive pulmonary disease (HCC), COPD (chronic obstructive pulmonary disease) (HCC), Erectile dysfunction, Gastroesophageal reflux disease with apnea without esophagitis, Head and neck cancer (HCC), Hypertension, Osteoarthritis, Personal history of colonic polyps, Pneumonia, PVD (peripheral vascular disease), and PVD (peripheral vascular disease).  he has a past surgical history that includes Colonoscopy (2006); lumbar fusion; Rotator cuff repair (2021); Carpal tunnel release (Bilateral); US ASP/INJ THYROID CYST (2025); laryngoscopy (N/A, 2025); IR FLUORO GUIDED GASTROSTOMY TUBE INSERTION PERC W CONTRAST (2025); Laryngectomy (Bilateral, 3/14/2025); and bronchoscopy (N/A, 4/3/2025).     Allergies Allergies   Allergen Reactions    Pcn [Penicillins] Other (See Comments)     Was told he was allergic since he was a child      Medications   Current Outpatient Medications:     betamethasone valerate (VALISONE) 0.1 % cream, Apply topically to radiation treatment area 2 times daily., Disp: 90 g, Rfl: 0    pantoprazole (PROTONIX) 40 MG tablet, Take 1 tablet by mouth every morning (before breakfast), Disp: 90 tablet, Rfl: 2    mupirocin

## 2025-05-28 NOTE — PROGRESS NOTES
[facility]  SPEECH THERAPY  [] SPEECH LANGUAGE COGNITIVE EVALUATION  TOTAL LARYNGECTOMY EVALUATION   [x] DAILY NOTE   [] PROGRESS NOTE [] DISCHARGE NOTE    [x] OUTPATIENT REHABILITATION CENTER [facility]   [] [facility] [place]    [] [facility]   [] [facility]    Date: 2025  Patient Name:  Masood Randhawa  : 1950  MRN: [id number]  CSN: [id number]    Referring Practitioner Cherelle Pat, APRN - CNP [id number]      Diagnosis  Diagnoses       Z90.02 (ICD-10-CM) - H/O radical laryngectomy    R49.1 (ICD-10-CM) - Aphonia           Treatment Diagnosis R49.1 Aphonia      Date of Evaluation 3/27/25   Additional Pertinent History Masood Randhawa has a past medical history of BPH (benign prostatic hyperplasia), Chronic back pain, Chronic obstructive pulmonary disease (HCC), COPD (chronic obstructive pulmonary disease) (HCC), Erectile dysfunction, Gastroesophageal reflux disease with apnea without esophagitis, Head and neck cancer (HCC), Hypertension, Osteoarthritis, Personal history of colonic polyps, Pneumonia, PVD (peripheral vascular disease), and PVD (peripheral vascular disease).  he has a past surgical history that includes Colonoscopy (2006); lumbar fusion; Rotator cuff repair (2021); Carpal tunnel release (Bilateral); US ASP/INJ THYROID CYST (2025); laryngoscopy (N/A, 2025); IR FLUORO GUIDED GASTROSTOMY TUBE INSERTION PERC W CONTRAST (2025); Laryngectomy (Bilateral, 3/14/2025); and bronchoscopy (N/A, 4/3/2025).     Allergies Allergies   Allergen Reactions    Pcn [Penicillins] Other (See Comments)     Was told he was allergic since he was a child      Medications   Current Outpatient Medications:     betamethasone valerate (VALISONE) 0.1 % cream, Apply topically to radiation treatment area 2 times daily., Disp: 90 g, Rfl: 0    pantoprazole (PROTONIX) 40 MG tablet, Take 1 tablet by mouth every morning (before breakfast),

## 2025-05-29 ENCOUNTER — HOSPITAL ENCOUNTER (OUTPATIENT)
Dept: RADIATION ONCOLOGY | Age: 75
Discharge: HOME | End: 2025-05-29
Payer: MEDICARE

## 2025-05-29 ENCOUNTER — APPOINTMENT (OUTPATIENT)
Dept: RADIATION ONCOLOGY | Age: 75
End: 2025-05-29
Payer: MEDICARE

## 2025-05-29 PROCEDURE — 77386 HC NTSTY MODUL RAD TX DLVR CPLX: CPT | Performed by: RADIOLOGY

## 2025-05-30 ENCOUNTER — HOSPITAL ENCOUNTER (OUTPATIENT)
Dept: RADIATION ONCOLOGY | Age: 75
Discharge: HOME | End: 2025-05-30
Payer: MEDICARE

## 2025-05-30 ENCOUNTER — HOSPITAL ENCOUNTER (OUTPATIENT)
Dept: OCCUPATIONAL THERAPY | Age: 75
Setting detail: THERAPIES SERIES
Discharge: HOME | End: 2025-05-30
Payer: MEDICARE

## 2025-05-30 ENCOUNTER — HOSPITAL ENCOUNTER (OUTPATIENT)
Dept: PHYSICAL THERAPY | Age: 75
Setting detail: THERAPIES SERIES
Discharge: HOME | End: 2025-05-30
Payer: MEDICARE

## 2025-05-30 PROCEDURE — 97110 THERAPEUTIC EXERCISES: CPT

## 2025-05-30 PROCEDURE — 77386 HC NTSTY MODUL RAD TX DLVR CPLX: CPT | Performed by: RADIOLOGY

## 2025-05-30 NOTE — PROGRESS NOTES
[facility]  OCCUPATIONAL THERAPY  [] EVALUATION  [x] DAILY NOTE (LAND) [] DAILY NOTE (AQUATIC ) [] PROGRESS NOTE [] DISCHARGE NOTE    [x] OUTPATIENT REHABILITATION [facility]   [] [facility] [place]    [] [facility]   [] [facility]    Date: 2025  Patient Name:  Masood Randhawa  : 1950  MRN: [id number]  CSN: [id number]    Referring Practitioner Cherelle Pat, APRN - CNP [id number]      Diagnosis  Diagnoses       C32.0 (ICD-10-CM) - Malignant neoplasm of glottis (HCC)    G89.3 (ICD-10-CM) - Neoplasm related pain (acute) (chronic)    R63.4 (ICD-10-CM) - Abnormal weight loss           Treatment Diagnosis R53.1 Weakness  M62.81 Generalized Weakness   Date of Evaluation 4/15/25   Additional Pertinent History Masood Randhawa has a past medical history of BPH (benign prostatic hyperplasia), Chronic back pain, Chronic obstructive pulmonary disease (HCC), COPD (chronic obstructive pulmonary disease) (HCC), Erectile dysfunction, Gastroesophageal reflux disease with apnea without esophagitis, Head and neck cancer (HCC), Hypertension, Osteoarthritis, Personal history of colonic polyps, Pneumonia, PVD (peripheral vascular disease), and PVD (peripheral vascular disease).  he has a past surgical history that includes Colonoscopy (2006); lumbar fusion; Rotator cuff repair (2021); Carpal tunnel release (Bilateral); US ASP/INJ THYROID CYST (2025); laryngoscopy (N/A, 2025); IR FLUORO GUIDED GASTROSTOMY TUBE INSERTION PERC W CONTRAST (2025); Laryngectomy (Bilateral, 3/14/2025); and bronchoscopy (N/A, 4/3/2025).     Allergies Allergies   Allergen Reactions    Pcn [Penicillins] Other (See Comments)     Was told he was allergic since he was a child      Medications   Current Outpatient Medications:     betamethasone valerate (VALISONE) 0.1 % cream, Apply topically to radiation treatment area 2 times daily., Disp: 90 g, Rfl: 0

## 2025-05-30 NOTE — PROGRESS NOTES
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    G89.3 (ICD-10-CM) - Cancer associated pain    R63.4 (ICD-10-CM) - Unintentional weight loss           Treatment Diagnosis R26.2 Difficulty in walking  R53.1 Weakness  R53.0 Cancer Related Fatigue   Date of Evaluation 25   Additional Pertinent History Masood Randhawa has a past medical history of BPH (benign prostatic hyperplasia), Chronic back pain, Chronic obstructive pulmonary disease (HCC), COPD (chronic obstructive pulmonary disease) (HCC), Erectile dysfunction, Gastroesophageal reflux disease with apnea without esophagitis, Head and neck cancer (HCC), Hypertension, Osteoarthritis, Personal history of colonic polyps, Pneumonia, PVD (peripheral vascular disease), and PVD (peripheral vascular disease).  he has a past surgical history that includes Colonoscopy (2006); lumbar fusion; Rotator cuff repair (2021); Carpal tunnel release (Bilateral); US ASP/INJ THYROID CYST (2025); laryngoscopy (N/A, 2025); IR FLUORO GUIDED GASTROSTOMY TUBE INSERTION PERC W CONTRAST (2025); Laryngectomy (Bilateral, 3/14/2025); and bronchoscopy (N/A, 4/3/2025).     Allergies Allergies   Allergen Reactions    Pcn [Penicillins] Other (See Comments)     Was told he was allergic since he was a child      Medications   Current Outpatient Medications:     betamethasone valerate (VALISONE) 0.1 % cream, Apply topically to radiation treatment area 2 times daily., Disp: 90 g, Rfl: 0    pantoprazole (PROTONIX) 40 MG tablet, Take 1 tablet by mouth every morning (before breakfast), Disp: 90 tablet, Rfl: 2    mupirocin

## 2025-06-02 ENCOUNTER — CARE COORDINATION (OUTPATIENT)
Dept: CARE COORDINATION | Age: 75
End: 2025-06-02

## 2025-06-02 ENCOUNTER — HOSPITAL ENCOUNTER (OUTPATIENT)
Dept: RADIATION ONCOLOGY | Age: 75
Discharge: HOME | End: 2025-06-02
Payer: MEDICARE

## 2025-06-02 PROCEDURE — 77386 HC NTSTY MODUL RAD TX DLVR CPLX: CPT | Performed by: RADIOLOGY

## 2025-06-02 NOTE — CARE COORDINATION
Ambulatory Care Coordination Note     2025 4:46 PM     Patient Current Location:  Home: [address] [place], [place] [id number]     ACM contacted the spouse/partner by telephone. Verified name and  with spouse/partner as identifiers.         ACM: Josselyn Navarro RN     Challenges to be reviewed by the provider   Additional needs identified to be addressed with provider yes  FYI: Wife reports pt woke up this AM with facial swelling to right side of his face and along jaw line.  Denies pain, redness, or warmth to area.  She does state that pt likes to sleep on right side so she is unsure if that is why he is swollen or if it's d/t something else.  Pt does have appt following radiation tx tomorrow.  Notes placed in appt desk to evaluate area at appt.  Advised wife  in meantime to monitor swelling and advised to call tomorrow if swelling worsens prior to his appt.                  Method of communication with provider: none.    Utilization: Patient has not had any utilization since our last call.     Care Summary Note: Ildefonso was referred to care coordination for education and assistance in managing his chronic conditions and healthcare needs.  Pt has h/o:   Pt is s/p total bilateral laryngectomy with radical neck dissection in March.  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   Spoke with wife Soraya today. States that she wanted to call doctor about swelling in his face but pt did not want her to.  Informed her that I will provide update to doctor.   Pt is undergoing radiation tx's.  Pt is tolerating tx's.    Continues to work with PT/OT/ST.  Working on talking.    Had recent ENT appt.  No new orders from that visit.   Soraya reports that pt has recently had more energy and has been in good spirits.   She reports right side facial swelling.  No redness, pain, or warmth to site.  Pt does sleep on right side.  Message sent to radiation oncologist as pt has appt tomorrow and notes

## 2025-06-03 ENCOUNTER — HOSPITAL ENCOUNTER (OUTPATIENT)
Dept: RADIATION ONCOLOGY | Age: 75
Discharge: HOME | End: 2025-06-03
Payer: MEDICARE

## 2025-06-03 VITALS
TEMPERATURE: 98 F | SYSTOLIC BLOOD PRESSURE: 103 MMHG | BODY MASS INDEX: 20.18 KG/M2 | OXYGEN SATURATION: 100 % | HEART RATE: 90 BPM | RESPIRATION RATE: 16 BRPM | WEIGHT: 125 LBS | DIASTOLIC BLOOD PRESSURE: 56 MMHG

## 2025-06-03 PROCEDURE — 77386 HC NTSTY MODUL RAD TX DLVR CPLX: CPT | Performed by: RADIOLOGY

## 2025-06-03 NOTE — PROGRESS NOTES
[facility]          [address], [address]        [place] [address]        O: [phone]        F: [phone]       [employer]            Dr. Samy Gordillo MD MS          Dr. Lynn Vanegas MD PhD    ON TREATMENT VISIT (OTV) NOTE     Date of Service: 6/3/2025  Patient ID: Masood Randhawa   : 1950  MRN: [id number]   Acct Number: [id number]     RADIATION ONCOLOGY ATTENDING:  Samy Gordillo MD MS    DIAGNOSIS:   Cancer Staging   Cancer of glottis (HCC)  Staging form: Larynx - Glottis, AJCC 8th Edition  - Clinical: Stage IVB (cT4a, cN3, cM0) - Unsigned  - Pathologic stage from 3/28/2025: Stage DENILSON (ypT4a, pN1, cM0) - Signed by Rickie Daley MD on 3/28/2025      Treatment Area: Head/Neck    Current Total Dose(cGy): 2600  Current Fraction:   Final/Cumulative Rx. Dose (cGy): 6000    Patient was seen today for weekly visit.     Wt Readings from Last 3 Encounters:   25 56.7 kg (125 lb)   25 56.4 kg (124 lb 5.4 oz)   25 57.1 kg (125 lb 14.1 oz)       BP (!) 103/56   Pulse 90   Temp 98 °F (36.7 °C)   Resp 16   Wt 56.7 kg (125 lb)   SpO2 100%   BMI 20.18 kg/m²     Lab Results   Component Value Date    WBC 11.0 (H) 2025    HGB 10.6 (L) 2025    HCT 33.0 (L) 2025     2025       Comfort Alteration  Fatigue:Must curtail daily activities even with rest periods and early bedtime    Pain Location: Denies  Pain Intensity (Current): 0 No Pain  Pain Treatment: N/A  Pain Relief: n/a    Emotional Alteration:   Coping: somewhat effective    Nutritional Alteration  Anorexia: none   Nausea: No nausea noted  Vomiting: No vomiting   Salivary Glands- Acute: No changes over baseline  Taste Disturbance (Dysgeusia): Normal   Dyspepsia/Heartburn: None  Dysphagia/Esophagitis: Mild dysphagia, but can eat regular diet - Has peg tube but hasn't needed to use.    Skin Alteration   Skin reaction: No

## 2025-06-04 ENCOUNTER — HOSPITAL ENCOUNTER (OUTPATIENT)
Dept: OCCUPATIONAL THERAPY | Age: 75
Setting detail: THERAPIES SERIES
Discharge: HOME | End: 2025-06-04
Payer: MEDICARE

## 2025-06-04 ENCOUNTER — HOSPITAL ENCOUNTER (OUTPATIENT)
Dept: RADIATION ONCOLOGY | Age: 75
Discharge: HOME | End: 2025-06-04
Payer: MEDICARE

## 2025-06-04 ENCOUNTER — HOSPITAL ENCOUNTER (OUTPATIENT)
Dept: PHYSICAL THERAPY | Age: 75
Setting detail: THERAPIES SERIES
Discharge: HOME | End: 2025-06-04
Payer: MEDICARE

## 2025-06-04 ENCOUNTER — OFFICE VISIT (OUTPATIENT)
Dept: FAMILY MEDICINE CLINIC | Age: 75
End: 2025-06-04

## 2025-06-04 VITALS
BODY MASS INDEX: 19.69 KG/M2 | DIASTOLIC BLOOD PRESSURE: 66 MMHG | HEART RATE: 76 BPM | SYSTOLIC BLOOD PRESSURE: 122 MMHG | HEIGHT: 66 IN | WEIGHT: 122.5 LBS | RESPIRATION RATE: 20 BRPM

## 2025-06-04 DIAGNOSIS — C32.0 SQUAMOUS CELL CARCINOMA OF GLOTTIS (HCC): Primary | ICD-10-CM

## 2025-06-04 DIAGNOSIS — Z93.1 S/P PERCUTANEOUS ENDOSCOPIC GASTROSTOMY (PEG) TUBE PLACEMENT (HCC): ICD-10-CM

## 2025-06-04 DIAGNOSIS — R53.1 GENERAL WEAKNESS: ICD-10-CM

## 2025-06-04 DIAGNOSIS — R63.0 DECREASED APPETITE: ICD-10-CM

## 2025-06-04 DIAGNOSIS — C32.9 PRIMARY SQUAMOUS CELL CARCINOMA OF LARYNX (HCC): ICD-10-CM

## 2025-06-04 DIAGNOSIS — R53.81 PHYSICAL DECONDITIONING: ICD-10-CM

## 2025-06-04 PROCEDURE — 97110 THERAPEUTIC EXERCISES: CPT

## 2025-06-04 PROCEDURE — 77386 HC NTSTY MODUL RAD TX DLVR CPLX: CPT | Performed by: RADIOLOGY

## 2025-06-04 ASSESSMENT — PATIENT HEALTH QUESTIONNAIRE - PHQ9
SUM OF ALL RESPONSES TO PHQ QUESTIONS 1-9: 0
1. LITTLE INTEREST OR PLEASURE IN DOING THINGS: NOT AT ALL
2. FEELING DOWN, DEPRESSED OR HOPELESS: NOT AT ALL

## 2025-06-04 NOTE — PROGRESS NOTES
Masood Randhawa (:  1950) is a 74 y.o. male,Established patient, here for evaluation of the following chief complaint(s):  Other (Drainage from peg tube, infection? )          Subjective   SUBJECTIVE/OBJECTIVE:  HPI  Chief Complaint   Patient presents with    Other     Drainage from peg tube, infection?      Pt presents today with c/o possible infection around PEG tube.  Some drainage noted.  Denies pain.  Denies redness or fevers.    Wt Readings from Last 3 Encounters:   25 55.6 kg (122 lb 8 oz)   25 56.7 kg (125 lb)   25 56.4 kg (124 lb 5.4 oz)     BP Readings from Last 3 Encounters:   25 122/66   25 (!) 103/56   25 125/60       Patient Active Problem List   Diagnosis    Primary hypertension    Hypercholesteremia    PVD (peripheral vascular disease)    Low back pain    ED (erectile dysfunction)    OA (osteoarthritis), multiple joints    BPH (benign prostatic hyperplasia)    Medication monitoring encounter    Tobacco smoke exposure    IFG (impaired fasting glucose)    Compression fracture of L4 lumbar vertebra, age indeterminate.    Foraminal stenosis of lumbosacral region. L3-4 to L5-S1 due to herniated disc.    Left leg weakness    Uncomplicated alcohol dependence (HCC), 8-10 beers daily.    OAB (overactive bladder)    Muscular deconditioning    Tendinopathy of left biceps tendon    Injury of left shoulder    Full thickness rotator cuff tear    Hoarseness of voice    History of smoking greater than 50 pack years    Unintentional weight loss    Night sweats    Abnormal findings on laryngoscopy    Gastroesophageal reflux disease    Supraglottic airway obstruction    Lymphadenopathy of head and neck region    Head and neck cancer (HCC)    Failure to thrive in adult    Tachycardia    Vocal cord mass    Severe malnutrition    Acute on chronic respiratory failure with hypoxia (HCC)    Cancer of glottis (HCC)    Squamous cell carcinoma of glottis (HCC)    Acute respiratory

## 2025-06-04 NOTE — PROGRESS NOTES
[facility]  ONCOLOGY REHABILITATION  PHYSICAL THERAPY  [x] DAILY NOTE    [x] SPECIALIZED THERAPY SERVICES - LIM     Date: 2025  Patient Name:  Masood Randhawa  : 1950  MRN: [id number]  CSN: [id number]    Referring Practitioner Cathryn Esparza PA-C [id number]      Diagnosis  Diagnoses       C32.0 (ICD-10-CM) - Squamous cell carcinoma of glottis    G89.3 (ICD-10-CM) - Cancer associated pain    R63.4 (ICD-10-CM) - Unintentional weight loss           Treatment Diagnosis R26.2 Difficulty in walking  R53.1 Weakness  R53.0 Cancer Related Fatigue   Date of Evaluation 25   Additional Pertinent History Masood Randhawa has a past medical history of BPH (benign prostatic hyperplasia), Chronic back pain, Chronic obstructive pulmonary disease (HCC), COPD (chronic obstructive pulmonary disease) (HCC), Erectile dysfunction, Gastroesophageal reflux disease with apnea without esophagitis, Head and neck cancer (HCC), Hypertension, Osteoarthritis, Personal history of colonic polyps, Pneumonia, PVD (peripheral vascular disease), and PVD (peripheral vascular disease).  he has a past surgical history that includes Colonoscopy (2006); lumbar fusion; Rotator cuff repair (2021); Carpal tunnel release (Bilateral); US ASP/INJ THYROID CYST (2025); laryngoscopy (N/A, 2025); IR FLUORO GUIDED GASTROSTOMY TUBE INSERTION PERC W CONTRAST (2025); Laryngectomy (Bilateral, 3/14/2025); and bronchoscopy (N/A, 4/3/2025).     Allergies Allergies   Allergen Reactions    Pcn [Penicillins] Other (See Comments)     Was told he was allergic since he was a child      Medications   Current Outpatient Medications:     betamethasone valerate (VALISONE) 0.1 % cream, Apply topically to radiation treatment area 2 times daily., Disp: 90 g, Rfl: 0    pantoprazole (PROTONIX) 40 MG tablet, Take 1 tablet by mouth every morning (before breakfast), Disp: 90 tablet, Rfl: 2    mupirocin (BACTROBAN)

## 2025-06-04 NOTE — PROGRESS NOTES
[facility]  OCCUPATIONAL THERAPY  [] EVALUATION  [x] DAILY NOTE (LAND) [] DAILY NOTE (AQUATIC ) [] PROGRESS NOTE [] DISCHARGE NOTE    [x] OUTPATIENT REHABILITATION [facility]   [] [facility] C[facility] [place]    [] [facility]   [] [facility]    Date: 2025  Patient Name:  Masood Randhawa  : 1950  MRN: [id number]  CSN: [id number]    Referring Practitioner Cherelle Pat, APRN - CNP [phone]      Diagnosis  Diagnoses       C32.0 (ICD-10-CM) - Malignant neoplasm of glottis (HCC)    G89.3 (ICD-10-CM) - Neoplasm related pain (acute) (chronic)    R63.4 (ICD-10-CM) - Abnormal weight loss           Treatment Diagnosis R53.1 Weakness  M62.81 Generalized Weakness   Date of Evaluation 4/15/25   Additional Pertinent History Masood Randhawa has a past medical history of BPH (benign prostatic hyperplasia), Chronic back pain, Chronic obstructive pulmonary disease (HCC), COPD (chronic obstructive pulmonary disease) (HCC), Erectile dysfunction, Gastroesophageal reflux disease with apnea without esophagitis, Head and neck cancer (HCC), Hypertension, Osteoarthritis, Personal history of colonic polyps, Pneumonia, PVD (peripheral vascular disease), and PVD (peripheral vascular disease).  he has a past surgical history that includes Colonoscopy (2006); lumbar fusion; Rotator cuff repair (2021); Carpal tunnel release (Bilateral); US ASP/INJ THYROID CYST (2025); laryngoscopy (N/A, 2025); IR FLUORO GUIDED GASTROSTOMY TUBE INSERTION PERC W CONTRAST (2025); Laryngectomy (Bilateral, 3/14/2025); and bronchoscopy (N/A, 4/3/2025).     Allergies Allergies   Allergen Reactions    Pcn [Penicillins] Other (See Comments)     Was told he was allergic since he was a child      Medications   Current Outpatient Medications:     betamethasone valerate (VALISONE) 0.1 % cream, Apply topically to radiation treatment area 2 times daily., Disp: 90 g, Rfl: 0

## 2025-06-05 ENCOUNTER — HOSPITAL ENCOUNTER (OUTPATIENT)
Dept: RADIATION ONCOLOGY | Age: 75
Discharge: HOME | End: 2025-06-05
Payer: MEDICARE

## 2025-06-05 PROCEDURE — 77386 HC NTSTY MODUL RAD TX DLVR CPLX: CPT | Performed by: RADIOLOGY

## 2025-06-05 ASSESSMENT — ENCOUNTER SYMPTOMS
GASTROINTESTINAL NEGATIVE: 1
RESPIRATORY NEGATIVE: 1

## 2025-06-06 ENCOUNTER — HOSPITAL ENCOUNTER (OUTPATIENT)
Dept: RADIATION ONCOLOGY | Age: 75
Discharge: HOME | End: 2025-06-06
Payer: MEDICARE

## 2025-06-06 PROCEDURE — 77386 HC NTSTY MODUL RAD TX DLVR CPLX: CPT | Performed by: RADIOLOGY

## 2025-06-09 ENCOUNTER — HOSPITAL ENCOUNTER (OUTPATIENT)
Dept: SPEECH THERAPY | Age: 75
Setting detail: THERAPIES SERIES
Discharge: HOME | End: 2025-06-09
Payer: MEDICARE

## 2025-06-09 ENCOUNTER — CLINICAL DOCUMENTATION (OUTPATIENT)
Dept: RADIATION ONCOLOGY | Age: 75
End: 2025-06-09

## 2025-06-09 ENCOUNTER — HOSPITAL ENCOUNTER (OUTPATIENT)
Dept: RADIATION ONCOLOGY | Age: 75
Discharge: HOME | End: 2025-06-09
Payer: MEDICARE

## 2025-06-09 PROCEDURE — 92507 TX SP LANG VOICE COMM INDIV: CPT

## 2025-06-09 PROCEDURE — 92526 ORAL FUNCTION THERAPY: CPT

## 2025-06-09 PROCEDURE — 77386 HC NTSTY MODUL RAD TX DLVR CPLX: CPT | Performed by: RADIOLOGY

## 2025-06-09 NOTE — PROGRESS NOTES
Nutrition Assessment    Reason for Visit: EN Follow up    Nutrition Recommendations:   - Drink 2-3 ONS like Ensure complete per day  - Add sauces to foods to help swallow  - 68g PRO per day.   -Goal is weight gain  -Follow up in 1 month     Malnutrition Assessment:   Malnutrition Status: Moderate Malnutrition  Context:  Findings of the 6 clinical characteristics of malnutrition (minimum of 2 out of 6 clinical characteristics is required to make the dx of moderate or severe Protein Calorie Malnutrition based on AND/ASPEN Guidelines):   1. Energy Intake: % of needs   2. Weight Loss: >20% x 1year   3. Fat Loss: Mild   4. Muscle Loss: Mild   5. Fluid Accumulation: Not noted   6.  Strength:Not measured    Nutrition Diagnosis:   Problem: Inadequate oral intake  Etiology: Dysphasia  Signs and Symptoms: Difficulty swallowing, need for supplemental EN.     Nutrition Assessment:   History: H+N Cancer, GERD, COPD, HTN, Osteoarthritis  Subjective: Pt here with his wife Soraya. Pt unable to speak. No longer using PEG or feedings. 100% of feeding PO. PEG Tube feeding with Jevity 1.5 formula bolusing 1-2 cartons/day instead of 5x/day d/t pt wanting to eat most of the time. Complains of some trouble swallowing, working with SLP. Drinks 2-3 ONS, either Boost or [employer] brand, whatever is on sale. Wife feeds him home cooked meals.     Diet recall  Bkft- Cereal  Lunch + Dinner- Meat, pot roast, asparagus, etc.   Current Nutrition:   Oral Diet: Regular    Oral Diet Intake:    % of needs    Oral Nutrition Supplement (ONS): 3 per day   ONS intake:    2 of 3 per day  Anthropometric Measures:     Wt Readings from Last 3 Encounters:   06/04/25 55.6 kg (122 lb 8 oz)   06/03/25 56.7 kg (125 lb)   05/27/25 56.4 kg (124 lb 5.4 oz)       Usual Weight:   175-180#, 1 year ago per previous note.    Ideal Weight:  141#    BMI:19.77    Nutritional Alteration  Anorexia: None  Nausea: None  Vomiting: None  Dyspepsia/Heartburn:

## 2025-06-09 NOTE — PROGRESS NOTES
breathing, stoma occlusion and speaking, occluding the stoma with a HME and without a HME, practice thumb occlusion of stoma daily to get better/quicker at it, monitor for leaking through or around the outside of the TEP  []  No new Education completed  []  Reviewed Prior HEP      [x]  Patient and spouse verbalized and/or demonstrated understanding of education provided.  []  Patient unable to verbalize and/or demonstrate understanding of education provided.  Will continue education.  [x]  Barriers to learning: fatigue       PLAN:  Treatment Recommendations:     []  Plan of care initiated.  Plan to see patient 1 times per week for 12 weeks to address the treatment planned outlined above.  [x]  Continue with current plan of care  []  Modify plan of care as follows:    []  Hold pending physician visit  []  Discharge    Time In 1120   Time Out 1200   Timed Code Minutes: 0 min   Total Treatment Time: 40 min       Reyna Savage M.S. CCC-SLP [id number]

## 2025-06-10 ENCOUNTER — HOSPITAL ENCOUNTER (OUTPATIENT)
Dept: RADIATION ONCOLOGY | Age: 75
Discharge: HOME | End: 2025-06-10
Payer: MEDICARE

## 2025-06-10 ENCOUNTER — CARE COORDINATION (OUTPATIENT)
Dept: CARE COORDINATION | Age: 75
End: 2025-06-10

## 2025-06-10 ENCOUNTER — HOSPITAL ENCOUNTER (OUTPATIENT)
Dept: RADIATION ONCOLOGY | Age: 75
End: 2025-06-10
Payer: MEDICARE

## 2025-06-10 VITALS
OXYGEN SATURATION: 98 % | HEART RATE: 109 BPM | TEMPERATURE: 98.3 F | SYSTOLIC BLOOD PRESSURE: 151 MMHG | DIASTOLIC BLOOD PRESSURE: 78 MMHG | RESPIRATION RATE: 16 BRPM

## 2025-06-10 DIAGNOSIS — C32.0 SQUAMOUS CELL CARCINOMA OF GLOTTIS (HCC): Primary | ICD-10-CM

## 2025-06-10 ASSESSMENT — PAIN DESCRIPTION - LOCATION: LOCATION: ABDOMEN

## 2025-06-10 ASSESSMENT — PAIN SCALES - GENERAL: PAINLEVEL_OUTOF10: 8

## 2025-06-10 NOTE — PROGRESS NOTES
facility-administered medications for this encounter.       PHYSICAL EXAM:       ECO - Symptomatic but completely ambulatory (Restricted in physically strenuous activity but ambulatory and able to carry out work of a light or sedentary nature. For example, light housework, office work)     General: NAD, AO x 3, Mentation is clear with appropriate affect.  HEENT: Normocephalic, atraumatic.  Trach.  Stoma clear.  Skin intact with minimal erythema. Lymphedema of chin  Thorax:  Unlabored  Abdomen:  Non-distended.  Feeding tube in place with minimal drainage, unable to appreciate surrounding erythema with dressing.  Skin - treatment portal: Left cheek crusting    Chemotherapy Update: None    Treatment Imaging: All imaging reviewed and approved by Dr. Vanegas/    ASSESSMENT: No significant radiation side effects. Responding appropriately to symptomatic management.    New medications, diagnostic results: Continue treatment as planned    PLAN: Again reviewed potential side effects of radiation for the patient's treatment.    Continue local/topical care. Discussed lymphedema. To see PCP tomorrow to discuss PEG tube concerns   Continue current radiation course as prescribed.

## 2025-06-10 NOTE — CARE COORDINATION
Attempted to reach patient for continued Care Coordination follow up and education.  Patient was unavailable at the time of my call, and a generic voicemail message was left asking patient to return my call at [phone].

## 2025-06-11 ENCOUNTER — HOSPITAL ENCOUNTER (OUTPATIENT)
Dept: INTERVENTIONAL RADIOLOGY/VASCULAR | Age: 75
Discharge: HOME | End: 2025-06-11
Payer: MEDICARE

## 2025-06-11 ENCOUNTER — HOSPITAL ENCOUNTER (OUTPATIENT)
Dept: PHYSICAL THERAPY | Age: 75
Setting detail: THERAPIES SERIES
End: 2025-06-11
Payer: MEDICARE

## 2025-06-11 ENCOUNTER — APPOINTMENT (OUTPATIENT)
Dept: RADIATION ONCOLOGY | Age: 75
End: 2025-06-11
Payer: MEDICARE

## 2025-06-11 ENCOUNTER — APPOINTMENT (OUTPATIENT)
Dept: OCCUPATIONAL THERAPY | Age: 75
End: 2025-06-11
Payer: MEDICARE

## 2025-06-11 DIAGNOSIS — C32.0 SQUAMOUS CELL CARCINOMA OF GLOTTIS (HCC): ICD-10-CM

## 2025-06-11 PROCEDURE — 49450 REPLACE G/C TUBE PERC: CPT

## 2025-06-11 PROCEDURE — C1769 GUIDE WIRE: HCPCS

## 2025-06-11 RX ORDER — DIATRIZOATE MEGLUMINE AND DIATRIZOATE SODIUM 660; 100 MG/ML; MG/ML
SOLUTION ORAL; RECTAL PRN
Status: DISCONTINUED | OUTPATIENT
Start: 2025-06-11 | End: 2025-06-12 | Stop reason: HOSPADM

## 2025-06-11 RX ADMIN — DIATRIZOATE MEGLUMINE AND DIATRIZOATE SODIUM 15 ML: 660; 100 SOLUTION ORAL; RECTAL at 09:15

## 2025-06-11 NOTE — PROGRESS NOTES
0832 Patient received in IR for PEG tube evaluation with possible tube exchange.   0835 This procedure has been fully reviewed with the patient and written informed consent has been obtained.  0845 Procedure started with .  0852 Dr Kinney speaking with Dr Gordillo via telephone.  0907 Procedure completed; patient tolerated well. Dressing to exit site; no bleeding noted.  0908 Dr Kinney speaking with wife.   0914 Patient on wheelchair; comfort ensured.  0916 Patient taken to main radiology and discharged in stable condition. Pt alert and oriented x3; follows commands. Skin pink, warm, and dry. Respirations easy, regular, and nonlabored.

## 2025-06-12 ENCOUNTER — HOSPITAL ENCOUNTER (OUTPATIENT)
Dept: RADIATION ONCOLOGY | Age: 75
Discharge: HOME | End: 2025-06-12
Payer: MEDICARE

## 2025-06-12 PROCEDURE — 77386 HC NTSTY MODUL RAD TX DLVR CPLX: CPT | Performed by: RADIOLOGY

## 2025-06-13 ENCOUNTER — HOSPITAL ENCOUNTER (OUTPATIENT)
Dept: OCCUPATIONAL THERAPY | Age: 75
Setting detail: THERAPIES SERIES
Discharge: HOME | End: 2025-06-13
Payer: MEDICARE

## 2025-06-13 ENCOUNTER — HOSPITAL ENCOUNTER (OUTPATIENT)
Dept: RADIATION ONCOLOGY | Age: 75
Discharge: HOME | End: 2025-06-13
Payer: MEDICARE

## 2025-06-13 ENCOUNTER — HOSPITAL ENCOUNTER (OUTPATIENT)
Dept: PHYSICAL THERAPY | Age: 75
Setting detail: THERAPIES SERIES
Discharge: HOME | End: 2025-06-13
Payer: MEDICARE

## 2025-06-13 DIAGNOSIS — C32.9 PRIMARY SQUAMOUS CELL CARCINOMA OF LARYNX (HCC): Primary | ICD-10-CM

## 2025-06-13 PROCEDURE — 97110 THERAPEUTIC EXERCISES: CPT

## 2025-06-13 PROCEDURE — 77386 HC NTSTY MODUL RAD TX DLVR CPLX: CPT | Performed by: RADIOLOGY

## 2025-06-13 RX ORDER — LIDOCAINE HYDROCHLORIDE 20 MG/ML
SOLUTION OROPHARYNGEAL
Qty: 100 ML | Refills: 1 | Status: SHIPPED | OUTPATIENT
Start: 2025-06-13

## 2025-06-13 NOTE — PROGRESS NOTES
[facility]  OCCUPATIONAL THERAPY  [] EVALUATION  [x] DAILY NOTE (LAND) [] DAILY NOTE (AQUATIC ) [] PROGRESS NOTE [] DISCHARGE NOTE    [x] OUTPATIENT REHABILITATION [facility]   [] [facility] [place]    [] [facility]   [] [facility]    Date: 2025  Patient Name:  Masood Randhawa  : 1950  MRN: [id number]  CSN: [id number]    Referring Practitioner Cherelle Pat, APRN - CNP [phone]      Diagnosis  Diagnoses       C32.0 (ICD-10-CM) - Malignant neoplasm of glottis (HCC)    G89.3 (ICD-10-CM) - Neoplasm related pain (acute) (chronic)    R63.4 (ICD-10-CM) - Abnormal weight loss           Treatment Diagnosis R53.1 Weakness  M62.81 Generalized Weakness   Date of Evaluation 4/15/25   Additional Pertinent History Masood Randhawa has a past medical history of BPH (benign prostatic hyperplasia), Chronic back pain, Chronic obstructive pulmonary disease (HCC), COPD (chronic obstructive pulmonary disease) (HCC), Erectile dysfunction, Gastroesophageal reflux disease with apnea without esophagitis, Head and neck cancer (HCC), Hypertension, Osteoarthritis, Personal history of colonic polyps, Pneumonia, PVD (peripheral vascular disease), and PVD (peripheral vascular disease).  he has a past surgical history that includes Colonoscopy (2006); lumbar fusion; Rotator cuff repair (2021); Carpal tunnel release (Bilateral); US ASP/INJ THYROID CYST (2025); laryngoscopy (N/A, 2025); IR FLUORO GUIDED GASTROSTOMY TUBE INSERTION PERC W CONTRAST (2025); Laryngectomy (Bilateral, 3/14/2025); and bronchoscopy (N/A, 4/3/2025).     Allergies Allergies   Allergen Reactions    Pcn [Penicillins] Other (See Comments)     Was told he was allergic since he was a child      Medications   Current Outpatient Medications:     betamethasone valerate (VALISONE) 0.1 % cream, Apply topically to radiation treatment area 2 times daily., Disp: 90 g, Rfl: 0

## 2025-06-13 NOTE — PROGRESS NOTES
[facility]  ONCOLOGY REHABILITATION  PHYSICAL THERAPY  [x] DAILY NOTE    [x] SPECIALIZED THERAPY SERVICES - [place]     Date: 2025  Patient Name:  Masood Randhawa  : 1950  MRN: [id number]  CSN: [id number]    Referring Practitioner Cathryn Esparza PA-C [id number]      Diagnosis  Diagnoses       C32.0 (ICD-10-CM) - Squamous cell carcinoma of glottis    G89.3 (ICD-10-CM) - Cancer associated pain    R63.4 (ICD-10-CM) - Unintentional weight loss           Treatment Diagnosis R26.2 Difficulty in walking  R53.1 Weakness  R53.0 Cancer Related Fatigue   Date of Evaluation 25   Additional Pertinent History Masood Randhawa has a past medical history of BPH (benign prostatic hyperplasia), Chronic back pain, Chronic obstructive pulmonary disease (HCC), COPD (chronic obstructive pulmonary disease) (HCC), Erectile dysfunction, Gastroesophageal reflux disease with apnea without esophagitis, Head and neck cancer (HCC), Hypertension, Osteoarthritis, Personal history of colonic polyps, Pneumonia, PVD (peripheral vascular disease), and PVD (peripheral vascular disease).  he has a past surgical history that includes Colonoscopy (2006); lumbar fusion; Rotator cuff repair (2021); Carpal tunnel release (Bilateral); US ASP/INJ THYROID CYST (2025); laryngoscopy (N/A, 2025); IR FLUORO GUIDED GASTROSTOMY TUBE INSERTION PERC W CONTRAST (2025); Laryngectomy (Bilateral, 3/14/2025); and bronchoscopy (N/A, 4/3/2025).     Allergies Allergies   Allergen Reactions    Pcn [Penicillins] Other (See Comments)     Was told he was allergic since he was a child      Medications   Current Outpatient Medications:     betamethasone valerate (VALISONE) 0.1 % cream, Apply topically to radiation treatment area 2 times daily., Disp: 90 g, Rfl: 0    pantoprazole (PROTONIX) 40 MG tablet, Take 1 tablet by mouth every morning (before breakfast), Disp: 90 tablet, Rfl: 2    mupirocin

## 2025-06-16 ENCOUNTER — HOSPITAL ENCOUNTER (OUTPATIENT)
Dept: SPEECH THERAPY | Age: 75
Setting detail: THERAPIES SERIES
End: 2025-06-16
Payer: MEDICARE

## 2025-06-16 ENCOUNTER — HOSPITAL ENCOUNTER (OUTPATIENT)
Age: 75
Setting detail: OUTPATIENT SURGERY
Discharge: HOME | End: 2025-06-16
Attending: INTERNAL MEDICINE | Admitting: INTERNAL MEDICINE
Payer: MEDICARE

## 2025-06-16 ENCOUNTER — APPOINTMENT (OUTPATIENT)
Dept: ENDOSCOPY | Age: 75
End: 2025-06-16
Attending: INTERNAL MEDICINE
Payer: MEDICARE

## 2025-06-16 ENCOUNTER — HOSPITAL ENCOUNTER (OUTPATIENT)
Dept: RADIATION ONCOLOGY | Age: 75
Discharge: HOME | End: 2025-06-16
Payer: MEDICARE

## 2025-06-16 ENCOUNTER — ANESTHESIA (OUTPATIENT)
Dept: ENDOSCOPY | Age: 75
End: 2025-06-16
Payer: MEDICARE

## 2025-06-16 ENCOUNTER — ANESTHESIA EVENT (OUTPATIENT)
Dept: ENDOSCOPY | Age: 75
End: 2025-06-16
Payer: MEDICARE

## 2025-06-16 VITALS
OXYGEN SATURATION: 98 % | RESPIRATION RATE: 16 BRPM | SYSTOLIC BLOOD PRESSURE: 115 MMHG | HEIGHT: 70 IN | DIASTOLIC BLOOD PRESSURE: 58 MMHG | WEIGHT: 125 LBS | TEMPERATURE: 97.4 F | HEART RATE: 70 BPM | BODY MASS INDEX: 17.9 KG/M2

## 2025-06-16 PROCEDURE — 2709999900 HC NON-CHARGEABLE SUPPLY: Performed by: INTERNAL MEDICINE

## 2025-06-16 PROCEDURE — 3700000001 HC ADD 15 MINUTES (ANESTHESIA): Performed by: INTERNAL MEDICINE

## 2025-06-16 PROCEDURE — 77014 CHG CT GUIDANCE RADIATION THERAPY FLDS PLACEMENT: CPT | Performed by: RADIOLOGY

## 2025-06-16 PROCEDURE — 88305 TISSUE EXAM BY PATHOLOGIST: CPT

## 2025-06-16 PROCEDURE — 3609010600 HC COLONOSCOPY POLYPECTOMY SNARE/COLD BIOPSY: Performed by: INTERNAL MEDICINE

## 2025-06-16 PROCEDURE — 6360000002 HC RX W HCPCS: Performed by: NURSE ANESTHETIST, CERTIFIED REGISTERED

## 2025-06-16 PROCEDURE — 77386 HC NTSTY MODUL RAD TX DLVR CPLX: CPT | Performed by: RADIOLOGY

## 2025-06-16 PROCEDURE — 3700000000 HC ANESTHESIA ATTENDED CARE: Performed by: INTERNAL MEDICINE

## 2025-06-16 PROCEDURE — 7100000010 HC PHASE II RECOVERY - FIRST 15 MIN: Performed by: INTERNAL MEDICINE

## 2025-06-16 PROCEDURE — 7100000011 HC PHASE II RECOVERY - ADDTL 15 MIN: Performed by: INTERNAL MEDICINE

## 2025-06-16 PROCEDURE — 2580000003 HC RX 258: Performed by: INTERNAL MEDICINE

## 2025-06-16 RX ORDER — SODIUM CHLORIDE 0.9 % (FLUSH) 0.9 %
5-40 SYRINGE (ML) INJECTION EVERY 12 HOURS SCHEDULED
Status: CANCELLED | OUTPATIENT
Start: 2025-06-16

## 2025-06-16 RX ORDER — SODIUM CHLORIDE 0.9 % (FLUSH) 0.9 %
5-40 SYRINGE (ML) INJECTION PRN
Status: CANCELLED | OUTPATIENT
Start: 2025-06-16

## 2025-06-16 RX ORDER — SODIUM CHLORIDE 9 MG/ML
INJECTION, SOLUTION INTRAVENOUS CONTINUOUS
Status: DISCONTINUED | OUTPATIENT
Start: 2025-06-16 | End: 2025-06-16 | Stop reason: HOSPADM

## 2025-06-16 RX ORDER — SODIUM CHLORIDE 9 MG/ML
25 INJECTION, SOLUTION INTRAVENOUS PRN
Status: CANCELLED | OUTPATIENT
Start: 2025-06-16

## 2025-06-16 RX ORDER — PROPOFOL 10 MG/ML
INJECTION, EMULSION INTRAVENOUS
Status: DISCONTINUED | OUTPATIENT
Start: 2025-06-16 | End: 2025-06-16 | Stop reason: SDUPTHER

## 2025-06-16 RX ADMIN — PROPOFOL 50 MG: 10 INJECTION, EMULSION INTRAVENOUS at 09:50

## 2025-06-16 RX ADMIN — PROPOFOL 50 MG: 10 INJECTION, EMULSION INTRAVENOUS at 09:59

## 2025-06-16 RX ADMIN — PHENYLEPHRINE HYDROCHLORIDE 100 MCG: 10 INJECTION INTRAVENOUS at 10:00

## 2025-06-16 RX ADMIN — PROPOFOL 50 MG: 10 INJECTION, EMULSION INTRAVENOUS at 09:41

## 2025-06-16 RX ADMIN — PHENYLEPHRINE HYDROCHLORIDE 100 MCG: 10 INJECTION INTRAVENOUS at 10:06

## 2025-06-16 RX ADMIN — SODIUM CHLORIDE: 0.9 INJECTION, SOLUTION INTRAVENOUS at 08:27

## 2025-06-16 RX ADMIN — PROPOFOL 50 MG: 10 INJECTION, EMULSION INTRAVENOUS at 09:55

## 2025-06-16 RX ADMIN — PROPOFOL 50 MG: 10 INJECTION, EMULSION INTRAVENOUS at 09:42

## 2025-06-16 RX ADMIN — PROPOFOL 50 MG: 10 INJECTION, EMULSION INTRAVENOUS at 10:06

## 2025-06-16 RX ADMIN — PROPOFOL 50 MG: 10 INJECTION, EMULSION INTRAVENOUS at 09:45

## 2025-06-16 ASSESSMENT — PAIN - FUNCTIONAL ASSESSMENT
PAIN_FUNCTIONAL_ASSESSMENT: NONE - DENIES PAIN
PAIN_FUNCTIONAL_ASSESSMENT: 0-10

## 2025-06-16 NOTE — DISCHARGE INSTRUCTIONS
Await biopsy results for follow up    Off ice Visit GALA Guerrero CNP  2-3 months (or as otherwise scheduled).

## 2025-06-16 NOTE — ANESTHESIA PRE PROCEDURE
Department of Anesthesiology  Preprocedure Note       Name:  Masood Randhawa   Age:  74 y.o.  :  1950                                          MRN:  [id number]         Date:  2025      Surgeon: Surgeon(s):  Kvng Kingston MD    Procedure: Procedure(s):  COLONOSCOPY    Medications prior to admission:   Prior to Admission medications    Medication Sig Start Date End Date Taking? Authorizing Provider   betamethasone valerate (VALISONE) 0.1 % cream Apply topically to radiation treatment area 2 times daily. 25  Yes Catalina Colin PA-C   pantoprazole (PROTONIX) 40 MG tablet Take 1 tablet by mouth every morning (before breakfast) 25  Yes Cathryn Esparza PA-C   mupirocin (BACTROBAN) 2 % ointment Apply to stoma with q tip TID. 3/24/25  Yes Cathryn Esparza PA-C   sodium chloride nebulizer 0.9 % solution Take 3 mLs by nebulization as needed (throat/ airway secretions) 25  Yes Hilary Peralta MD   aspirin 81 MG EC tablet Take 1 tablet by mouth daily   Yes Hilary Peralta MD   amLODIPine (NORVASC) 5 MG tablet Take 1 tablet by mouth daily 24  Yes Samy Piper DO   benazepril (LOTENSIN) 20 MG tablet Take 1 tablet by mouth daily 24  Yes Samy Piper DO   Multiple Vitamin (MULTI VITAMIN PO) Take 1 tablet by mouth daily   Yes ProviderHilary MD   lidocaine viscous hcl (XYLOCAINE) 2 % SOLN solution Follow directions as given in clinic 25   Samy Gordillo MD   ipratropium 0.5 mg-albuterol 2.5 mg (DUONEB) 0.5-2.5 (3) MG/3ML SOLN nebulizer solution Inhale 3 mLs into the lungs in the morning and 3 mLs in the evening. 3/8/25 6/4/25  Emma Castillo MD   Respiratory Therapy Supplies (NEBULIZER/TUBING/MOUTHPIECE) KIT 1 kit by Does not apply route in the morning, at noon, and at bedtime 25   Alecia Seymour, APRN - CNP       Current medications:    Current Facility-Administered Medications   Medication Dose Route Frequency Provider Last

## 2025-06-16 NOTE — PROGRESS NOTES
Lalitha Scope # .  Colonoscopy completed, tolerated well. 3 polyps removed, 2 jars labeled and sent to lab for processing. Photos taken.

## 2025-06-16 NOTE — BRIEF OP NOTE
Brief Postoperative Note      Patient: Masood Randhawa  YOB: 1950  MRN: [id number]    Date of Procedure: 6/16/2025    Pre-Op Diagnosis Codes:      * Rectal bleeding [K62.5]    Post-Op Diagnosis: Same       Procedure(s):  COLONOSCOPY POLYPECTOMY SNARE/BIOPSY    Surgeon(s):  Kvng Kingston MD    Assistant:  * No surgical staff found *    Anesthesia: Monitor Anesthesia Care    Estimated Blood Loss (mL): Minimal    Complications: None    Specimens:   ID Type Source Tests Collected by Time Destination   A : Splenic colon polyp Tissue Colon SURGICAL PATHOLOGY Kvng Kingston MD 6/16/2025 0948    B : Ascending colon polyps Tissue Colon SURGICAL PATHOLOGY Kvng Kingston MD 6/16/2025 0958        Implants:  * No implants in log *      Drains:   Gastrostomy/Enterostomy/Jejunostomy Tube Gastrostomy LUQ 1 20 fr (Active)       [REMOVED] Closed/Suction Drain Posterior;Right Neck (Removed)       [REMOVED] Closed/Suction Drain Left;Anterior Neck (Removed)       [REMOVED] Gastrostomy/Enterostomy/Jejunostomy Tube Gastrostomy LUQ 1 18 fr (Removed)       Findings:  Infection Present At Time Of Surgery (PATOS) (choose all levels that have infection present):  No infection present  Other Findings: above    Electronically signed by Kvng Kingston MD on 6/16/2025 at 10:13 AM

## 2025-06-16 NOTE — ANESTHESIA POSTPROCEDURE EVALUATION
Department of Anesthesiology  Postprocedure Note    Patient: Masood Randhawa  MRN: [id number]  YOB: 1950  Date of evaluation: 6/16/2025    Procedure Summary       Date: 06/16/25 Room / Location: [place] [address] / [facility]    Anesthesia Start: 0933 Anesthesia Stop: 1023    Procedure: COLONOSCOPY POLYPECTOMY SNARE/BIOPSY Diagnosis:       Rectal bleeding      (Rectal bleeding [K62.5])    Surgeons: Kvng Kingston MD Responsible Provider: Yvan Cardoza MD    Anesthesia Type: MAC, TIVA ASA Status: 3            Anesthesia Type: No value filed.    Edenilson Phase I: Edenilson Score: 10    Edenilson Phase II: Edenilson Score: 10    Anesthesia Post Evaluation    Patient location during evaluation: bedside  Patient participation: complete - patient participated  Level of consciousness: awake and alert  Airway patency: patent  Nausea & Vomiting: no nausea and no vomiting  Cardiovascular status: hemodynamically stable  Respiratory status: acceptable, spontaneous ventilation and room air  Hydration status: euvolemic  Pain management: adequate        No notable events documented.

## 2025-06-16 NOTE — H&P
[address] [address]                           HISTORY & PHYSICAL      PATIENT NAME: PEMA BOLANOS            : 1950  MED REC NO: [id number]                       ROOM: [facility]  ACCOUNT NO: [id number]                       ADMIT DATE: 2025  PROVIDER: Kvng Kingston MD      REASON FOR VISIT:  Rectal bleeding.    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  He has had some intermittent rectal bleeding.  We are checking colonoscopy today.    PAST MEDICAL HISTORY:  Previously listed and unchanged.    MEDICATIONS:  Previously listed and unchanged.    ALLERGIES:  PREVIOUSLY LISTED AND UNCHANGED.      SOCIAL HISTORY:  Previously listed and unchanged.    FAMILY HISTORY:  Previously listed and unchanged.    REVIEW OF SYSTEMS:  Previously listed and unchanged.    PHYSICAL EXAMINATION:  GENERAL:  Awake, alert, and oriented x3.   VITAL SIGNS:  The patient is afebrile.  Heart rate 85, respiratory rate 18, blood pressure 140/76, oxygen saturation 99%.   HEENT:  Normocephalic, atraumatic.  NECK:  Supple.  Tracheostomy is in place.  LUNGS:  Clear anteriorly.  HEART:  Regular rate.  ABDOMEN:  Soft, nontender.  RECTAL:  Will be checked with colonoscopy.  EXTREMITIES:  Without edema.  NEURO:  Awake, alert, and oriented x3.    IMPRESSION:  Rectal bleeding.    PLAN:  Colonoscopy.          KVNG KINGSTON MD      D:  2025 09:35:30     T:  2025 09:58:33     SHELIA/MICHELLE  Job #:  [id number]     Doc#:  [id number]

## 2025-06-16 NOTE — OP NOTE
Operative Note      Patient: Masood Randhawa  YOB: 1950  MRN: [id number]    Date of Procedure: 6/16/2025    Pre-Op Diagnosis Codes:      * Rectal bleeding [K62.5]    Post-Op Diagnosis: Same       Procedure(s):  COLONOSCOPY POLYPECTOMY SNARE/BIOPSY    Surgeon(s):  Kvng Kingston MD    Assistant:   * No surgical staff found *    Anesthesia: Monitor Anesthesia Care    Estimated Blood Loss (mL): Minimal    Complications: None    Specimens:   ID Type Source Tests Collected by Time Destination   A : Splenic colon polyp Tissue Colon SURGICAL PATHOLOGY Kvng Kingston MD 6/16/2025 0948    B : Ascending colon polyps Tissue Colon SURGICAL PATHOLOGY Kvng Kingston MD 6/16/2025 0958        Implants:  * No implants in log *      Drains:   Gastrostomy/Enterostomy/Jejunostomy Tube Gastrostomy LUQ 1 20 fr (Active)       [REMOVED] Closed/Suction Drain Posterior;Right Neck (Removed)       [REMOVED] Closed/Suction Drain Left;Anterior Neck (Removed)       [REMOVED] Gastrostomy/Enterostomy/Jejunostomy Tube Gastrostomy LUQ 1 18 fr (Removed)       Findings:  Infection Present At Time Of Surgery (PATOS) (choose all levels that have infection present):  No infection present  Other Findings: above    Detailed Description of Procedure:   dictated    Electronically signed by Kvng Kingston MD on 6/16/2025 at 10:13 AM

## 2025-06-16 NOTE — PROGRESS NOTES
Admitted to Endo department and admitted to Endo room 12  Plan of care reviewed with patient.   Call light within reach.   Allergies reviewed with Patient/ wife  Bed in lowest position, locked, with one bed rail up.   Appropriate arm bands on patient.   Bathroom offered.   All questions and concerns of patient addressed    Name: Soraya  Relationship to patient: wife  Phone number: [phone]

## 2025-06-16 NOTE — PROCEDURES
[address] [address]                             PROCEDURE NOTE      PATIENT NAME: PEMA BOLANOS            : 1950  MED REC NO: [id number]                       ROOM: [facility]  ACCOUNT NO: [id number]                       ADMIT DATE: 2025  PROVIDER: Kvng Kingston MD      DATE OF PROCEDURE:  2025    SURGEON:  Kvng Kingston MD    PROCEDURE:  Colonoscopy.    INDICATION:  Rectal bleeding.    ANESTHESIA:  IV Diprivan.    Prior to procedure, risks, benefits, and complications (including, but not limited to the following; bleeding, infection, perforation, emergency surgery, stroke, heart attack, death, possible anesthetic risks, and the fact that the procedure is not 100% accurate or successful), indications, and alternatives of colonoscopy were explained to the patient.  The patient understood and agreed to procedure.  All questions were answered.    DESCRIPTION OF PROCEDURE:  With the patient in left lateral decubitus position, digital rectal exam was done.  Prostate did not feel enlarged.  Colonoscope introduced per rectum.  Mucosa appeared normal.  Colonoscope was advanced to rectosigmoid colon and descending colon.  Diverticula are present.  Colonoscope was advanced to transverse colon, ascending colon, and cecum.  Diverticula extended to the cecum.  Cecum was confirmed by visualization of the ileocecal valve, visualization of the appendiceal orifice, and visualization of the entire terminal ileum,     Colonoscope was carefully retraced back to the colon.  Colon was decompressed along the way.  Colonic walls were carefully inspected along the way.  Colonoscope was retraced back to the ascending colon.  4 and 5 mm polyps were removed by cold snare from ascending colon.  Colonoscope was reintroduced into cecum for \"second look.\"  Colonoscope was retraced back to the ascending colon, transverse colon, and

## 2025-06-16 NOTE — PROGRESS NOTES
Recovery mode, pt denies discomfort. Passing gas, taking in fluids. Dr. Kingston discussed findings with pt and responsible party. Discharge instructions provided and understanding verbalized.

## 2025-06-17 ENCOUNTER — HOSPITAL ENCOUNTER (OUTPATIENT)
Dept: RADIATION ONCOLOGY | Age: 75
Discharge: HOME | End: 2025-06-17

## 2025-06-17 ENCOUNTER — HOSPITAL ENCOUNTER (OUTPATIENT)
Dept: RADIATION ONCOLOGY | Age: 75
Discharge: HOME | End: 2025-06-17
Payer: MEDICARE

## 2025-06-17 VITALS
RESPIRATION RATE: 16 BRPM | WEIGHT: 119.71 LBS | BODY MASS INDEX: 17.18 KG/M2 | HEART RATE: 96 BPM | OXYGEN SATURATION: 98 % | DIASTOLIC BLOOD PRESSURE: 59 MMHG | TEMPERATURE: 98.3 F | SYSTOLIC BLOOD PRESSURE: 118 MMHG

## 2025-06-17 PROCEDURE — 77386 HC NTSTY MODUL RAD TX DLVR CPLX: CPT | Performed by: RADIOLOGY

## 2025-06-17 PROCEDURE — 77014 CHG CT GUIDANCE RADIATION THERAPY FLDS PLACEMENT: CPT | Performed by: RADIOLOGY

## 2025-06-17 NOTE — PROGRESS NOTES
[facility]          [address], [address]        [place] [id number]        O: [phone]        F: [phone]       Asana            Dr. Samy Gordillo MD MS          Dr. Lynn Vanegas MD PhD    ON TREATMENT VISIT (OTV) NOTE     Date of Service: 2025  Patient ID: Masood Randhawa   : 1950  MRN: [id number]   Acct Number:      RADIATION ONCOLOGY ATTENDING:  Samy Gordillo MD MS    DIAGNOSIS:   Cancer Staging   Cancer of glottis (HCC)  Staging form: Larynx - Glottis, AJCC 8th Edition  - Clinical: Stage IVB (cT4a, cN3, cM0) - Unsigned  - Pathologic stage from 3/28/2025: Stage DENILSON (ypT4a, pN1, cM0) - Signed by Rickie Daley MD on 3/28/2025      Treatment Area: Head/Neck    Current Total Dose(cGy): 4200  Current Fraction:   Final/Cumulative Rx. Dose (cGy): 6000    Patient was seen today for weekly visit.     Wt Readings from Last 3 Encounters:   25 54.3 kg (119 lb 11.4 oz)   25 56.7 kg (125 lb)   25 55.6 kg (122 lb 8 oz)       BP (!) 118/59   Pulse 96   Temp 98.3 °F (36.8 °C) (Infrared)   Resp 16   Wt 54.3 kg (119 lb 11.4 oz)   SpO2 98%   BMI 17.18 kg/m²     Lab Results   Component Value Date    WBC 11.0 (H) 2025    HGB 10.6 (L) 2025    HCT 33.0 (L) 2025     2025       Comfort Alteration  Fatigue:Must curtail daily activities even with rest periods and early bedtime    Pain Location: Denies  Pain Intensity (Current): 0 No Pain  Pain Treatment: N/A  Pain Relief: n/a    Emotional Alteration:   Coping: somewhat effective    Nutritional Alteration  Anorexia: Loss of appetite but able to eat a small amount of food and/or liquids  Nausea: 1-2 episodes of nausea in 24 hours  Vomiting: No vomiting   Salivary Glands- Acute: Mild PO dryness, thick saliva, altered taste, no change in PO intake   Taste Disturbance (Dysgeusia): Markedly altered  Dyspepsia/Heartburn:

## 2025-06-18 ENCOUNTER — HOSPITAL ENCOUNTER (OUTPATIENT)
Dept: RADIATION ONCOLOGY | Age: 75
Discharge: HOME | End: 2025-06-18
Payer: MEDICARE

## 2025-06-18 ENCOUNTER — HOSPITAL ENCOUNTER (OUTPATIENT)
Dept: OCCUPATIONAL THERAPY | Age: 75
Setting detail: THERAPIES SERIES
Discharge: HOME | End: 2025-06-18
Payer: MEDICARE

## 2025-06-18 ENCOUNTER — HOSPITAL ENCOUNTER (OUTPATIENT)
Dept: PHYSICAL THERAPY | Age: 75
Setting detail: THERAPIES SERIES
Discharge: HOME | End: 2025-06-18
Payer: MEDICARE

## 2025-06-18 PROCEDURE — 97110 THERAPEUTIC EXERCISES: CPT

## 2025-06-18 PROCEDURE — 77386 HC NTSTY MODUL RAD TX DLVR CPLX: CPT | Performed by: RADIOLOGY

## 2025-06-18 NOTE — PROGRESS NOTES
* PLEASE SIGN, DATE AND TIME CERTIFICATION BELOW AND RETURN TO [facility] OUTPATIENT REHABILITATION (FAX #: [phone]).  ATTEST/CO-SIGN IF ACCESSING VIA [employer].  THANK YOU.**    I certify that I have examined the patient below and determined that Physical Medicine and Rehabilitation service is necessary and that I approve the established plan of care for up to 90 days or as specifically noted.  Attestation, signature or co-signature of physician indicates approval of certification requirements.    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    G89.3 (ICD-10-CM) - Cancer associated pain    R63.4 (ICD-10-CM) - Unintentional weight loss           Treatment Diagnosis R26.2 Difficulty in walking  R53.1 Weakness  R53.0 Cancer Related Fatigue   Date of Evaluation 25   Additional Pertinent History Masood Randhawa has a past medical history of BPH (benign prostatic hyperplasia), Chronic back pain, Chronic obstructive pulmonary disease (HCC), COPD (chronic obstructive pulmonary disease) (HCC), Erectile dysfunction, Gastroesophageal reflux disease with apnea without esophagitis, Head and neck cancer (HCC), Hypertension, Osteoarthritis, Personal history of colonic polyps, Pneumonia, PVD (peripheral vascular disease), and PVD (peripheral vascular disease).  he has a past surgical history that includes Colonoscopy (2006); lumbar fusion; Rotator cuff repair (2021); Carpal tunnel release (Bilateral); US ASP/INJ THYROID CYST (2025); laryngoscopy (N/A, 2025); IR FLUORO GUIDED

## 2025-06-18 NOTE — PROGRESS NOTES
roast, asparagus, etc.   Current Nutrition:              Oral Diet: Regular               Oral Diet Intake:    % of needs              Oral Nutrition Supplement (ONS): 3 per day              ONS intake:    2 of 3 per day  Anthropometric Measures:                Wt Readings from Last 3 Encounters:   06/17/25 54.3 kg (119 lb 11.4 oz)   06/16/25 56.7 kg (125 lb)   06/04/25 55.6 kg (122 lb 8 oz)                  Usual Weight:   175-180#, 1 year ago per previous note.               Ideal Weight:  141#              BMI:19.77     Nutritional Alteration  Anorexia: Loss of appetite but able to eat a small amount of food and/or liquids  Nausea: 1-2 episodes of nausea in 24 hours  Vomiting: No vomiting   Salivary Glands- Acute: Mild PO dryness, thick saliva, altered taste, no change in PO intake   Taste Disturbance (Dysgeusia): Markedly altered  Dyspepsia/Heartburn: None  Dysphagia/Esophagitis: Mild dysphagia, but can eat regular diet - Has peg tube but hasn't needed to use.     Comparative Standards:                 Energy (kcal/day): ~ 1539 - 1824  (27-32 kcals/kg/day)  Weight Used for Protein Requirements:  (57)  Protein (g/day): ~ 68 grams or more (1.2 grams/kg/day)  Fluid (ml/day): ~ 1539 - 1824 ml (25-30 mls/kg/day)     Nutrition Interventions:     - Use up to 5 containers of Jevity 1.5 per day when pt declines meals  - 68g PRO per day.   -Goal is weight gain     Nutrition Evaluation:          Evaluation: Goals set       Goals: Patient will consume 75% or greater of normal intake and maintain weight throughout treatment.         Monitoring: Oral intake, diet tolerance, weight,      Signed: Catalina Tejada RDN  Contact number: [phone]

## 2025-06-18 NOTE — PROGRESS NOTES
[facility]  OCCUPATIONAL THERAPY  [] EVALUATION  [x] DAILY NOTE (LAND) [] DAILY NOTE (AQUATIC ) [] PROGRESS NOTE [] DISCHARGE NOTE    [x] OUTPATIENT REHABILITATION [facility]   [] [facility] CARE [place]    [] [facility]   [] [facility]    Date: 2025  Patient Name:  Masood Randhawa  : 1950  MRN: [id number]  CSN: [id number]    Referring Practitioner Cherelle Pat, APRN - CNP [phone]      Diagnosis  Diagnoses       C32.0 (ICD-10-CM) - Malignant neoplasm of glottis (HCC)    G89.3 (ICD-10-CM) - Neoplasm related pain (acute) (chronic)    R63.4 (ICD-10-CM) - Abnormal weight loss           Treatment Diagnosis R53.1 Weakness  M62.81 Generalized Weakness   Date of Evaluation 4/15/25   Additional Pertinent History Masood Randhawa has a past medical history of BPH (benign prostatic hyperplasia), Chronic back pain, Chronic obstructive pulmonary disease (HCC), COPD (chronic obstructive pulmonary disease) (HCC), Erectile dysfunction, Gastroesophageal reflux disease with apnea without esophagitis, Head and neck cancer (HCC), Hypertension, Osteoarthritis, Personal history of colonic polyps, Pneumonia, PVD (peripheral vascular disease), and PVD (peripheral vascular disease).  he has a past surgical history that includes Colonoscopy (2006); lumbar fusion; Rotator cuff repair (2021); Carpal tunnel release (Bilateral); US ASP/INJ THYROID CYST (2025); laryngoscopy (N/A, 2025); IR FLUORO GUIDED GASTROSTOMY TUBE INSERTION PERC W CONTRAST (2025); Laryngectomy (Bilateral, 3/14/2025); bronchoscopy (N/A, 4/3/2025); and Colonoscopy (N/A, 2025).     Allergies Allergies   Allergen Reactions    Pcn [Penicillins] Other (See Comments)     Was told he was allergic since he was a child      Medications   Current Outpatient Medications:     lidocaine viscous hcl (XYLOCAINE) 2 % SOLN solution, Follow directions as given in clinic, Disp: 100 mL,

## 2025-06-19 ENCOUNTER — HOSPITAL ENCOUNTER (OUTPATIENT)
Dept: RADIATION ONCOLOGY | Age: 75
Discharge: HOME | End: 2025-06-19
Payer: MEDICARE

## 2025-06-19 PROCEDURE — 77386 HC NTSTY MODUL RAD TX DLVR CPLX: CPT | Performed by: RADIOLOGY

## 2025-06-20 ENCOUNTER — HOSPITAL ENCOUNTER (OUTPATIENT)
Dept: RADIATION ONCOLOGY | Age: 75
Discharge: HOME | End: 2025-06-20
Payer: MEDICARE

## 2025-06-20 ENCOUNTER — CARE COORDINATION (OUTPATIENT)
Dept: CARE COORDINATION | Age: 75
End: 2025-06-20

## 2025-06-20 ENCOUNTER — APPOINTMENT (OUTPATIENT)
Dept: OCCUPATIONAL THERAPY | Age: 75
End: 2025-06-20
Payer: MEDICARE

## 2025-06-20 ENCOUNTER — APPOINTMENT (OUTPATIENT)
Dept: PHYSICAL THERAPY | Age: 75
End: 2025-06-20
Payer: MEDICARE

## 2025-06-20 PROCEDURE — 77386 HC NTSTY MODUL RAD TX DLVR CPLX: CPT | Performed by: RADIOLOGY

## 2025-06-20 NOTE — CARE COORDINATION
SCHEDULE, [facility] Radiation Oncology [phone]    6/25/2025 12:30 PM Miri Reeves, OT Occupational Therapy [phone] 6/25/2025  1:00 PM Katerina Velásquez, PT Physical Therapy [phone] 6/25/2025 2:30 PM SCHEDULE, [facility] Radiation Oncology [phone]    6/26/2025 2:30 PM SCHEDULE, [facility] Radiation Oncology [phone]    6/27/2025 11:30 AM Elizabeth Booth, PTA Physical Therapy [phone] 6/27/2025 12:15 PM Miri Reeves, OT Occupational Therapy [phone] 6/27/2025 2:30 PM SCHEDULE, [facility] Radiation Oncology [phone]    6/30/2025 10:45 AM Alecia Montes, SLP Speech Therapy [phone] 6/30/2025 2:30 PM SCHEDULE, [facility] Radiation Oncology [phone]    7/2/2025 1:00 PM Katerina Velásquez, PT Physical Therapy [phone]    7/3/2025 11:00 AM Katerina Velásquez, PT Physical Therapy [phone] 7/7/2025 9:40 AM (Arrive by 9:10 AM) STR CT IMAGING RM1  [facility] [phone]    7/7/2025 10:15 AM Alecia Montes, SLP Speech Therapy [phone] 7/9/2025 11:45 AM Katerina Lema, PTA Physical Therapy [phone] 7/9/2025 1:00 PM Jacqueline Levine RD, LD Internal Medicine [phone]    7/11/2025 11:30 AM Elizabeth Booth, PTA Physical Therapy [phone]    7/14/2025 11:30 AM Reyna Savage, SLP Speech Therapy [phone]    7/15/2025 2:00 PM Mariah Lorenzo, APRN - CNP Pulmonology [phone]    7/16/2025 1:00 PM Katerina Velásquez, PT Physical Therapy [phone]    7/18/2025 1:00 PM Katerina Lema, PTA Physical Therapy [phone] 7/21/2025 11:30 AM Reyna Savage, SLP Speech Therapy [phone]    7/23/2025 12:45 PM Elizabeth Booth, PTA Physical Therapy [phone]    7/25/2025 11:00 AM Elizabeth Booth, PTA Physical Therapy [phone]    7/28/2025 11:30 AM Reyna Savage, SLP Speech Therapy [phone]    7/29/2025 1:30 PM Rickie Daley MD Otolaryngology

## 2025-06-23 ENCOUNTER — HOSPITAL ENCOUNTER (OUTPATIENT)
Dept: SPEECH THERAPY | Age: 75
Setting detail: THERAPIES SERIES
Discharge: HOME | End: 2025-06-23
Payer: MEDICARE

## 2025-06-23 ENCOUNTER — HOSPITAL ENCOUNTER (OUTPATIENT)
Dept: RADIATION ONCOLOGY | Age: 75
Discharge: HOME | End: 2025-06-23
Payer: MEDICARE

## 2025-06-23 PROCEDURE — 92526 ORAL FUNCTION THERAPY: CPT

## 2025-06-23 PROCEDURE — 92507 TX SP LANG VOICE COMM INDIV: CPT

## 2025-06-23 PROCEDURE — 77386 HC NTSTY MODUL RAD TX DLVR CPLX: CPT | Performed by: RADIOLOGY

## 2025-06-23 PROCEDURE — 77014 CHG CT GUIDANCE RADIATION THERAPY FLDS PLACEMENT: CPT | Performed by: RADIOLOGY

## 2025-06-23 NOTE — DISCHARGE INSTRUCTIONS
PATIENT DISCHARGE INSTRUCTIONS    Remember that side effects present at the end of your treatments will improve within a few weeks after the last treatment.  Eat well balanced meals even though your treatments are finished.  This will help speed the healing process. Continue any special diets prescribed to control side effects until these side effects have been resolved.  Get plenty of rest.  If you have experienced fatigue and/or weakness, this may continue for several weeks after your last treatment.  Continue with your daily activities according to the way you feel.  Continue to be gentle with your skin.  Follow your present skin care instructions until your follow-up visit.  IF YOU DEVELOP ANY CHANGES IN YOUR SKIN IN THE AREA TREATED WITH RADIATION, PLEASE CALL THE RADIATION ONCOLOGY NURSE -[phone].  Protect your skin from any injury and avoid direct sun exposure in the treatment area.  The skin in the treated area may always be more sensitive than the rest of your skin.  Always use SPF 30 or higher sun block if you will be in the sun and cannot avoid exposure.  Please contact your referring physician for a follow-up appointment in addition to your Radiation Oncology appointment.  Presence of pain:   Medication Taper: No    See Instructions Dated: N/A    Quick Check-up with Catalina Carpenter 7/8/25 @ 1:30pm

## 2025-06-23 NOTE — PROGRESS NOTES
[facility]  SPEECH THERAPY  [] SPEECH LANGUAGE COGNITIVE EVALUATION  TOTAL LARYNGECTOMY EVALUATION   [x] DAILY NOTE   [] PROGRESS NOTE [] DISCHARGE NOTE    [x] OUTPATIENT REHABILITATION [facility]   [] [facility]    [] [facility]   [] [facility]    Date: 2025  Patient Name:  Masood Randhawa  : 1950  MRN: [id number]  CSN: [id number]    Referring Practitioner Cherelle Pat, APRN - CNP [id number]      Diagnosis  Diagnoses       Z90.02 (ICD-10-CM) - H/O radical laryngectomy    R49.1 (ICD-10-CM) - Aphonia           Treatment Diagnosis R49.1 Aphonia      Date of Evaluation 3/27/25   Additional Pertinent History Masood Randhawa has a past medical history of BPH (benign prostatic hyperplasia), Chronic back pain, Chronic obstructive pulmonary disease (HCC), COPD (chronic obstructive pulmonary disease) (HCC), Erectile dysfunction, Gastroesophageal reflux disease with apnea without esophagitis, Head and neck cancer (HCC), Hypertension, Osteoarthritis, Personal history of colonic polyps, Pneumonia, PVD (peripheral vascular disease), and PVD (peripheral vascular disease).  he has a past surgical history that includes Colonoscopy (2006); lumbar fusion; Rotator cuff repair (2021); Carpal tunnel release (Bilateral); US ASP/INJ THYROID CYST (2025); laryngoscopy (N/A, 2025); IR FLUORO GUIDED GASTROSTOMY TUBE INSERTION PERC W CONTRAST (2025); Laryngectomy (Bilateral, 3/14/2025); bronchoscopy (N/A, 4/3/2025); and Colonoscopy (N/A, 2025).     Allergies Allergies   Allergen Reactions    Pcn [Penicillins] Other (See Comments)     Was told he was allergic since he was a child      Medications   Current Outpatient Medications:     lidocaine viscous hcl (XYLOCAINE) 2 % SOLN solution, Follow directions as given in clinic, Disp: 100 mL, Rfl: 1    betamethasone valerate (VALISONE) 0.1 % cream, Apply topically to radiation

## 2025-06-24 ENCOUNTER — HOSPITAL ENCOUNTER (OUTPATIENT)
Dept: RADIATION ONCOLOGY | Age: 75
Discharge: HOME | End: 2025-06-24
Payer: MEDICARE

## 2025-06-24 VITALS
HEART RATE: 126 BPM | RESPIRATION RATE: 16 BRPM | TEMPERATURE: 101.7 F | DIASTOLIC BLOOD PRESSURE: 58 MMHG | SYSTOLIC BLOOD PRESSURE: 109 MMHG | WEIGHT: 116.62 LBS | OXYGEN SATURATION: 91 % | BODY MASS INDEX: 16.73 KG/M2

## 2025-06-24 PROCEDURE — 77386 HC NTSTY MODUL RAD TX DLVR CPLX: CPT | Performed by: RADIOLOGY

## 2025-06-24 PROCEDURE — 77014 CHG CT GUIDANCE RADIATION THERAPY FLDS PLACEMENT: CPT | Performed by: RADIOLOGY

## 2025-06-24 NOTE — PROGRESS NOTES
[facility]          [address], [address]        [place] [id number]        O: [phone]        F: [phone]       [place]            Dr. Samy Gordillo MD MS          Dr. Lynn Vanegas MD PhD    ON TREATMENT VISIT (OTV) NOTE     Date of Service: 2025  Patient ID: Masood Randhawa   : 1950  MRN: [id number]   Acct Number: [id number]     RADIATION ONCOLOGY ATTENDING:  Samy Gordillo MD MS    DIAGNOSIS:   Cancer Staging   Cancer of glottis (HCC)  Staging form: Larynx - Glottis, AJCC 8th Edition  - Clinical: Stage IVB (cT4a, cN3, cM0) - Unsigned  - Pathologic stage from 3/28/2025: Stage DENILSON (ypT4a, pN1, cM0) - Signed by Rickie Daley MD on 3/28/2025      Treatment Area: Head/Neck    Current Total Dose(cGy): 5200  Current Fraction:   Final/Cumulative Rx. Dose (cGy): 6000    Patient was seen today for weekly visit.     Wt Readings from Last 3 Encounters:   25 52.9 kg (116 lb 10 oz)   25 54.3 kg (119 lb 11.4 oz)   25 56.7 kg (125 lb)       BP (!) 109/58   Pulse (!) 126   Temp (!) 101.7 °F (38.7 °C) (Axillary)   Resp 16   Wt 52.9 kg (116 lb 10 oz)   SpO2 91%   BMI 16.73 kg/m²     Lab Results   Component Value Date    WBC 11.0 (H) 2025    HGB 10.6 (L) 2025    HCT 33.0 (L) 2025     2025       Comfort Alteration  Fatigue:Must curtail daily activities even with rest periods and early bedtime    Pain Location: Denies  Pain Intensity (Current): 0 No Pain  Pain Treatment: N/A  Pain Relief: n/a    Emotional Alteration:   Coping: somewhat effective    Nutritional Alteration  Anorexia: Unable to eat any food and/or liquids, using tube 3 cans daily  Nausea: No nausea noted  Vomiting: No vomiting   Salivary Glands- Acute: Mild PO dryness, thick saliva, altered taste, no change in PO intake   Taste Disturbance (Dysgeusia): Markedly altered  Dyspepsia/Heartburn:

## 2025-06-25 ENCOUNTER — HOSPITAL ENCOUNTER (OUTPATIENT)
Dept: PHYSICAL THERAPY | Age: 75
Setting detail: THERAPIES SERIES
Discharge: HOME | End: 2025-06-25
Payer: MEDICARE

## 2025-06-25 ENCOUNTER — HOSPITAL ENCOUNTER (OUTPATIENT)
Dept: OCCUPATIONAL THERAPY | Age: 75
Setting detail: THERAPIES SERIES
Discharge: HOME | End: 2025-06-25
Payer: MEDICARE

## 2025-06-25 ENCOUNTER — HOSPITAL ENCOUNTER (OUTPATIENT)
Dept: RADIATION ONCOLOGY | Age: 75
Discharge: HOME | End: 2025-06-25
Payer: MEDICARE

## 2025-06-25 PROCEDURE — 97110 THERAPEUTIC EXERCISES: CPT

## 2025-06-25 PROCEDURE — 77386 HC NTSTY MODUL RAD TX DLVR CPLX: CPT | Performed by: RADIOLOGY

## 2025-06-25 NOTE — PROGRESS NOTES
Nutrition Assessment     Reason for Visit: ON treatment follow up     Nutrition Recommendations:   - Use up to 5 containers of Jevity 1.5 per day when pt declines meals  -Drink 2-3 ONS like Ensure complete per day  - Add sauces to foods to help swallow  - 68g PRO per day.   -Goal is weight gain  -Follow up in 1 week                Malnutrition Assessment:   Malnutrition Status: Moderate Malnutrition  Context:  Findings of the 6 clinical characteristics of malnutrition (minimum of 2 out of 6 clinical characteristics is required to make the dx of moderate or severe Protein Calorie Malnutrition based on AND/ASPEN Guidelines):              1. Energy Intake: % of needs              2. Weight Loss: >20% x 1year              3. Fat Loss: Mild              4. Muscle Loss: Mild              5. Fluid Accumulation: Not noted              6.  Strength:Not measured     Nutrition Diagnosis:   Problem: Inadequate oral intake  Etiology: Dysphasia  Signs and Symptoms: Difficulty swallowing, need for supplemental EN.      Nutrition Assessment:   History: H+N Cancer, GERD, COPD, HTN, Osteoarthritis  Subjective:   6/25/25 Spoke with pts wife Soraya. She states that pt has been sick the fast few days with a fever, but fever broke today.  Pt has not wanted to eat and asked for feedings through tube.  Has given 3 containers of Jevity 1.5 the past few days. Pt feeling better today and ate breakfast. Pt lost 3 lbs since last week. Recd to give 3-5 containers of Jevity 1.5 per day even if he eats PO. No further questions per Soraya.     6/18/25 Spoke with pt and wife before rad tx. Wife claims that pt has not been eating well due to fatigue and throat pain. MD prescribed MLB for throat pain and will try out today. I encouraged pt to use PEG tube up to 5 bottles per day when appetite is down. Pt had colonoscopy earlier this week causing wt to decrease.      6/9/25 Pt here with

## 2025-06-25 NOTE — PROGRESS NOTES
[facility]  OCCUPATIONAL THERAPY  [] EVALUATION  [x] DAILY NOTE (LAND) [] DAILY NOTE (AQUATIC ) [] PROGRESS NOTE [] DISCHARGE NOTE    [x] OUTPATIENT REHABILITATION [facility]   [] [facility] [place]    [] [facility]   [] [facility]    Date: 2025  Patient Name:  Masood Randhawa  : 1950  MRN: [id number]  CSN: [id number]    Referring Practitioner Cherelle Pat, APRN - CNP [id number]      Diagnosis  Diagnoses       C32.0 (ICD-10-CM) - Malignant neoplasm of glottis (HCC)    G89.3 (ICD-10-CM) - Neoplasm related pain (acute) (chronic)    R63.4 (ICD-10-CM) - Abnormal weight loss           Treatment Diagnosis R53.1 Weakness  M62.81 Generalized Weakness   Date of Evaluation 4/15/25   Additional Pertinent History Masood Randhawa has a past medical history of BPH (benign prostatic hyperplasia), Chronic back pain, Chronic obstructive pulmonary disease (HCC), COPD (chronic obstructive pulmonary disease) (HCC), Erectile dysfunction, Gastroesophageal reflux disease with apnea without esophagitis, Head and neck cancer (HCC), Hypertension, Osteoarthritis, Personal history of colonic polyps, Pneumonia, PVD (peripheral vascular disease), and PVD (peripheral vascular disease).  he has a past surgical history that includes Colonoscopy (2006); lumbar fusion; Rotator cuff repair (2021); Carpal tunnel release (Bilateral); US ASP/INJ THYROID CYST (2025); laryngoscopy (N/A, 2025); IR FLUORO GUIDED GASTROSTOMY TUBE INSERTION PERC W CONTRAST (2025); Laryngectomy (Bilateral, 3/14/2025); bronchoscopy (N/A, 4/3/2025); and Colonoscopy (N/A, 2025).     Allergies Allergies   Allergen Reactions    Pcn [Penicillins] Other (See Comments)     Was told he was allergic since he was a child      Medications   Current Outpatient Medications:     lidocaine viscous hcl (XYLOCAINE) 2 % SOLN solution, Follow directions as given in clinic, Disp: 100 mL,

## 2025-06-25 NOTE — PROGRESS NOTES
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    G89.3 (ICD-10-CM) - Cancer associated pain    R63.4 (ICD-10-CM) - Unintentional weight loss           Treatment Diagnosis R26.2 Difficulty in walking  R53.1 Weakness  R53.0 Cancer Related Fatigue   Date of Evaluation 25   Additional Pertinent History Masood Randhawa has a past medical history of BPH (benign prostatic hyperplasia), Chronic back pain, Chronic obstructive pulmonary disease (HCC), COPD (chronic obstructive pulmonary disease) (HCC), Erectile dysfunction, Gastroesophageal reflux disease with apnea without esophagitis, Head and neck cancer (HCC), Hypertension, Osteoarthritis, Personal history of colonic polyps, Pneumonia, PVD (peripheral vascular disease), and PVD (peripheral vascular disease).  he has a past surgical history that includes Colonoscopy (2006); lumbar fusion; Rotator cuff repair (2021); Carpal tunnel release (Bilateral); US ASP/INJ THYROID CYST (2025); laryngoscopy (N/A, 2025); IR FLUORO GUIDED GASTROSTOMY TUBE INSERTION PERC W CONTRAST (2025); Laryngectomy (Bilateral, 3/14/2025); bronchoscopy (N/A, 4/3/2025); and Colonoscopy (N/A, 2025).     Allergies Allergies   Allergen Reactions    Pcn [Penicillins] Other (See Comments)     Was told he was allergic since he was a child      Medications   Current Outpatient Medications:     lidocaine viscous hcl (XYLOCAINE) 2 % SOLN solution, Follow directions as given in clinic, Disp: 100 mL, Rfl: 1    betamethasone valerate (VALISONE) 0.1 % cream, Apply topically to radiation treatment area 2 times daily., Disp: 90 g,

## 2025-06-26 ENCOUNTER — HOSPITAL ENCOUNTER (OUTPATIENT)
Dept: RADIATION ONCOLOGY | Age: 75
Discharge: HOME | End: 2025-06-26
Payer: MEDICARE

## 2025-06-26 PROCEDURE — 77386 HC NTSTY MODUL RAD TX DLVR CPLX: CPT | Performed by: RADIOLOGY

## 2025-06-27 ENCOUNTER — HOSPITAL ENCOUNTER (OUTPATIENT)
Dept: OCCUPATIONAL THERAPY | Age: 75
Setting detail: THERAPIES SERIES
Discharge: HOME | End: 2025-06-27
Payer: MEDICARE

## 2025-06-27 ENCOUNTER — HOSPITAL ENCOUNTER (OUTPATIENT)
Dept: PHYSICAL THERAPY | Age: 75
Setting detail: THERAPIES SERIES
Discharge: HOME | End: 2025-06-27
Payer: MEDICARE

## 2025-06-27 ENCOUNTER — HOSPITAL ENCOUNTER (OUTPATIENT)
Dept: RADIATION ONCOLOGY | Age: 75
Discharge: HOME | End: 2025-06-27
Payer: MEDICARE

## 2025-06-27 PROCEDURE — 77386 HC NTSTY MODUL RAD TX DLVR CPLX: CPT | Performed by: RADIOLOGY

## 2025-06-27 PROCEDURE — 97110 THERAPEUTIC EXERCISES: CPT

## 2025-06-27 PROCEDURE — 97535 SELF CARE MNGMENT TRAINING: CPT

## 2025-06-27 PROCEDURE — 77427 RADIATION TX MANAGEMENT X5: CPT | Performed by: RADIOLOGY

## 2025-06-27 NOTE — PROGRESS NOTES
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    G89.3 (ICD-10-CM) - Cancer associated pain    R63.4 (ICD-10-CM) - Unintentional weight loss           Treatment Diagnosis R26.2 Difficulty in walking  R53.1 Weakness  R53.0 Cancer Related Fatigue   Date of Evaluation 25   Additional Pertinent History Masood Randhawa has a past medical history of BPH (benign prostatic hyperplasia), Chronic back pain, Chronic obstructive pulmonary disease (HCC), COPD (chronic obstructive pulmonary disease) (HCC), Erectile dysfunction, Gastroesophageal reflux disease with apnea without esophagitis, Head and neck cancer (HCC), Hypertension, Osteoarthritis, Personal history of colonic polyps, Pneumonia, PVD (peripheral vascular disease), and PVD (peripheral vascular disease).  he has a past surgical history that includes Colonoscopy (2006); lumbar fusion; Rotator cuff repair (2021); Carpal tunnel release (Bilateral); US ASP/INJ THYROID CYST (2025); laryngoscopy (N/A, 2025); IR FLUORO GUIDED GASTROSTOMY TUBE INSERTION PERC W CONTRAST (2025); Laryngectomy (Bilateral, 3/14/2025); bronchoscopy (N/A, 4/3/2025); and Colonoscopy (N/A, 2025).     Allergies Allergies   Allergen Reactions    Pcn [Penicillins] Other (See Comments)     Was told he was allergic since he was a child      Medications   Current Outpatient Medications:     lidocaine viscous hcl (XYLOCAINE) 2 % SOLN solution, Follow directions as given in clinic, Disp: 100 mL, Rfl: 1    betamethasone valerate (VALISONE) 0.1 % cream, Apply topically to radiation treatment area 2 times daily., Disp: 90 g,

## 2025-06-27 NOTE — DISCHARGE SUMMARY
[facility]  OCCUPATIONAL THERAPY  [] EVALUATION  [] DAILY NOTE (LAND) [] DAILY NOTE (AQUATIC ) [] PROGRESS NOTE [x] DISCHARGE NOTE    [x] OUTPATIENT REHABILITATION [facility]   [] [facility] [place]    [] [facility]   [facility]    Date: 2025  Patient Name:  Masood Randhawa  : 1950  MRN: [id number]  CSN: [id number]    Referring Practitioner Cherelle Pat, APRN - CNP [id number]      Diagnosis  Diagnoses       C32.0 (ICD-10-CM) - Malignant neoplasm of glottis (HCC)    G89.3 (ICD-10-CM) - Neoplasm related pain (acute) (chronic)    R63.4 (ICD-10-CM) - Abnormal weight loss           Treatment Diagnosis R53.1 Weakness  M62.81 Generalized Weakness   Date of Evaluation 4/15/25   Additional Pertinent History Masood Randhawa has a past medical history of BPH (benign prostatic hyperplasia), Chronic back pain, Chronic obstructive pulmonary disease (HCC), COPD (chronic obstructive pulmonary disease) (HCC), Erectile dysfunction, Gastroesophageal reflux disease with apnea without esophagitis, Head and neck cancer (HCC), Hypertension, Osteoarthritis, Personal history of colonic polyps, Pneumonia, PVD (peripheral vascular disease), and PVD (peripheral vascular disease).  he has a past surgical history that includes Colonoscopy (2006); lumbar fusion; Rotator cuff repair (2021); Carpal tunnel release (Bilateral); US ASP/INJ THYROID CYST (2025); laryngoscopy (N/A, 2025); IR FLUORO GUIDED GASTROSTOMY TUBE INSERTION PERC W CONTRAST (2025); Laryngectomy (Bilateral, 3/14/2025); bronchoscopy (N/A, 4/3/2025); and Colonoscopy (N/A, 2025).     Allergies Allergies   Allergen Reactions    Pcn [Penicillins] Other (See Comments)     Was told he was allergic since he was a child      Medications   Current Outpatient Medications:     lidocaine viscous hcl (XYLOCAINE) 2 % SOLN solution, Follow directions as given in clinic, Disp: 100 mL,

## 2025-06-30 ENCOUNTER — HOSPITAL ENCOUNTER (OUTPATIENT)
Dept: SPEECH THERAPY | Age: 75
Setting detail: THERAPIES SERIES
Discharge: HOME | End: 2025-06-30
Payer: MEDICARE

## 2025-06-30 ENCOUNTER — HOSPITAL ENCOUNTER (OUTPATIENT)
Dept: RADIATION ONCOLOGY | Age: 75
Discharge: HOME | End: 2025-06-30
Payer: MEDICARE

## 2025-06-30 PROCEDURE — 77014 CHG CT GUIDANCE RADIATION THERAPY FLDS PLACEMENT: CPT | Performed by: RADIOLOGY

## 2025-06-30 PROCEDURE — 92507 TX SP LANG VOICE COMM INDIV: CPT

## 2025-06-30 PROCEDURE — 92526 ORAL FUNCTION THERAPY: CPT

## 2025-06-30 PROCEDURE — 77386 HC NTSTY MODUL RAD TX DLVR CPLX: CPT | Performed by: RADIOLOGY

## 2025-06-30 NOTE — PROGRESS NOTES
* PLEASE SIGN, DATE AND TIME CERTIFICATION BELOW AND RETURN TO [facility] OUTPATIENT REHABILITATION (FAX #: [phone]).  ATTEST/CO-SIGN IF ACCESSING VIA [employer]uddle[employer]ET.  THANK YOU.**    I certify that I have examined the patient below and determined that Physical Medicine and Rehabilitation service is necessary and that I approve the established plan of care for up to 90 days or as specifically noted.  Attestation, signature or co-signature of physician indicates approval of certification requirements.    ________________________ ____________  Physician Signature   Date       [facility]  SPEECH THERAPY  [] SPEECH LANGUAGE COGNITIVE EVALUATION  TOTAL LARYNGECTOMY EVALUATION   [] DAILY NOTE   [x] PROGRESS NOTE [] DISCHARGE NOTE    [x] OUTPATIENT REHABILITATION CENTER [facility]   [] [facility]    [] [facility]   [] [facility]    Date: 2025  Patient Name:  Masood Randhawa  : 1950  MRN: [id number]  CSN: [id number]    Referring Practitioner Cherelle Pat, APRN - CNP [id number]      Diagnosis  Diagnoses       Z90.02 (ICD-10-CM) - H/O radical laryngectomy    R49.1 (ICD-10-CM) - Aphonia           Treatment Diagnosis R49.1 Aphonia      Date of Evaluation 3/27/25   Additional Pertinent History Masood Randhawa has a past medical history of BPH (benign prostatic hyperplasia), Chronic back pain, Chronic obstructive pulmonary disease (HCC), COPD (chronic obstructive pulmonary disease) (HCC), Erectile dysfunction, Gastroesophageal reflux disease with apnea without esophagitis, Head and neck cancer (HCC), Hypertension, Osteoarthritis, Personal history of colonic polyps, Pneumonia, PVD (peripheral vascular disease), and PVD (peripheral vascular disease).  he has a past surgical history that includes Colonoscopy (2006); lumbar fusion; Rotator cuff repair (2021); Carpal tunnel release (Bilateral); US ASP/INJ THYROID CYST (2025); laryngoscopy

## 2025-07-02 ENCOUNTER — CARE COORDINATION (OUTPATIENT)
Dept: CARE COORDINATION | Age: 75
End: 2025-07-02

## 2025-07-02 ENCOUNTER — TELEPHONE (OUTPATIENT)
Dept: RADIATION ONCOLOGY | Age: 75
End: 2025-07-02

## 2025-07-02 ENCOUNTER — HOSPITAL ENCOUNTER (OUTPATIENT)
Dept: PHYSICAL THERAPY | Age: 75
Setting detail: THERAPIES SERIES
Discharge: HOME | End: 2025-07-02
Payer: MEDICARE

## 2025-07-02 ENCOUNTER — APPOINTMENT (OUTPATIENT)
Dept: OCCUPATIONAL THERAPY | Age: 75
End: 2025-07-02
Payer: MEDICARE

## 2025-07-02 PROCEDURE — 97110 THERAPEUTIC EXERCISES: CPT

## 2025-07-02 NOTE — PROGRESS NOTES
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    G89.3 (ICD-10-CM) - Cancer associated pain    R63.4 (ICD-10-CM) - Unintentional weight loss           Treatment Diagnosis R26.2 Difficulty in walking  R53.1 Weakness  R53.0 Cancer Related Fatigue   Date of Evaluation 25   Additional Pertinent History Masood Randhawa has a past medical history of BPH (benign prostatic hyperplasia), Chronic back pain, Chronic obstructive pulmonary disease (HCC), COPD (chronic obstructive pulmonary disease) (HCC), Erectile dysfunction, Gastroesophageal reflux disease with apnea without esophagitis, Head and neck cancer (HCC), Hypertension, Osteoarthritis, Personal history of colonic polyps, Pneumonia, PVD (peripheral vascular disease), and PVD (peripheral vascular disease).  he has a past surgical history that includes Colonoscopy (2006); lumbar fusion; Rotator cuff repair (2021); Carpal tunnel release (Bilateral); US ASP/INJ THYROID CYST (2025); laryngoscopy (N/A, 2025); IR FLUORO GUIDED GASTROSTOMY TUBE INSERTION PERC W CONTRAST (2025); Laryngectomy (Bilateral, 3/14/2025); bronchoscopy (N/A, 4/3/2025); and Colonoscopy (N/A, 2025).     Allergies Allergies   Allergen Reactions    Pcn [Penicillins] Other (See Comments)     Was told he was allergic since he was a child      Medications   Current Outpatient Medications:     lidocaine viscous hcl (XYLOCAINE) 2 % SOLN solution, Follow directions as given in clinic, Disp: 100 mL, Rfl: 1    betamethasone valerate (VALISONE) 0.1 % cream, Apply topically to radiation treatment area 2 times daily., Disp: 90 g, Rfl:

## 2025-07-02 NOTE — CARE COORDINATION
Ambulatory Care Coordination Note     2025 4:39 PM     Patient Current Location:  Home: [address] [place] [address]     ACM contacted the spouse/partner by telephone. Verified name and  with spouse/partner as identifiers.         ACM: Josselyn Navarro RN     Challenges to be reviewed by the provider   Additional needs identified to be addressed with provider No  none               Method of communication with provider: none.    Utilization: Patient has not had any utilization since our last call.     Care Summary Note: simón was referred to care coordination for education and assistance in managing his chronic conditions and healthcare needs.  Pt has h/o:   Pt is s/p total bilateral laryngectomy with radical neck dissection in March.  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   Spoke with wife Soraya today.    She states that Ildefonso is doing ok.  Weak but hoping that with time now that radiation is over with he will get stronger.    Had phone appt today with oncology dietician.  Plan if for to do 4-5 cans of Jevity per day since he is not getting calorie in orally.  Pt is on oral nutrition supplements but only drinking appx 1 per day.  Encouraged to try to increase to 2-3 per day.   Wife had me cancel  appt with dietician since he is following with oncology dietician.   Continuing with outpt PT/ST.   Has CT chest 77  Wife has pulse ox.  Encouraged deep breathing exercises.  Has been low 90's but has dropped to 87% before. Encouraged to monitor closely.  Advised to call if having worsening SOB, low pulse ox and not able to get back up with deep breathing exercises or fever develops.  Aware someone is on call at PCP office after hours and on wknd.    Offered patient enrollment in the Remote Patient Monitoring (RPM) program for in-home monitoring: Yes, but did not enroll at this time: already monitoring with home equipment.     Assessments Completed:   No changes since

## 2025-07-02 NOTE — TELEPHONE ENCOUNTER
previous note.               Ideal Weight:  141#              BMI:19.77     Nutritional Alteration  Anorexia: Loss of appetite but able to eat a small amount of food and/or liquids  Nausea: 1-2 episodes of nausea in 24 hours  Vomiting: No vomiting   Salivary Glands- Acute: Mild PO dryness, thick saliva, altered taste, no change in PO intake   Taste Disturbance (Dysgeusia): Markedly altered  Dyspepsia/Heartburn: None  Dysphagia/Esophagitis: Mild dysphagia, but can eat regular diet - Has peg tube but hasn't needed to use.     Comparative Standards:                 Energy (kcal/day): ~ 1539 - 1824  (27-32 kcals/kg/day)  Weight Used for Protein Requirements:  (57)  Protein (g/day): ~ 68 grams or more (1.2 grams/kg/day)  Fluid (ml/day): ~ 1539 - 1824 ml (25-30 mls/kg/day)     Nutrition Interventions:     - Use 4-5 containers of Jevity 1.5 per day.  - Add olive oil or butter to food.   -Goal is weight gain  - will follow up at next appt.     Nutrition Evaluation:          Evaluation: Goals set       Goals: Patient will consume 75% or greater of normal intake and maintain weight throughout treatment.         Monitoring: Oral intake, diet tolerance, weight,      Signed: Catalina Tejada RDN  Contact number: [phone]

## 2025-07-03 ENCOUNTER — HOSPITAL ENCOUNTER (OUTPATIENT)
Dept: PHYSICAL THERAPY | Age: 75
Setting detail: THERAPIES SERIES
Discharge: HOME | End: 2025-07-03
Payer: MEDICARE

## 2025-07-03 ENCOUNTER — APPOINTMENT (OUTPATIENT)
Dept: OCCUPATIONAL THERAPY | Age: 75
End: 2025-07-03
Payer: MEDICARE

## 2025-07-03 PROCEDURE — 97110 THERAPEUTIC EXERCISES: CPT

## 2025-07-07 ENCOUNTER — HOSPITAL ENCOUNTER (OUTPATIENT)
Dept: CT IMAGING | Age: 75
Discharge: HOME | End: 2025-07-07
Payer: MEDICARE

## 2025-07-07 ENCOUNTER — HOSPITAL ENCOUNTER (OUTPATIENT)
Dept: SPEECH THERAPY | Age: 75
Setting detail: THERAPIES SERIES
Discharge: HOME | End: 2025-07-07
Payer: MEDICARE

## 2025-07-07 DIAGNOSIS — R93.89 ABNORMAL CT OF THE CHEST: ICD-10-CM

## 2025-07-07 DIAGNOSIS — J18.9 PNEUMONIA OF RIGHT UPPER LOBE DUE TO INFECTIOUS ORGANISM: ICD-10-CM

## 2025-07-07 DIAGNOSIS — R59.0 LAD (LYMPHADENOPATHY), MEDIASTINAL: ICD-10-CM

## 2025-07-07 LAB — POC CREATININE WHOLE BLOOD: 0.8 MG/DL (ref 0.5–1.2)

## 2025-07-07 PROCEDURE — 92507 TX SP LANG VOICE COMM INDIV: CPT

## 2025-07-07 PROCEDURE — 71260 CT THORAX DX C+: CPT

## 2025-07-07 PROCEDURE — 6360000004 HC RX CONTRAST MEDICATION: Performed by: NURSE PRACTITIONER

## 2025-07-07 PROCEDURE — 82565 ASSAY OF CREATININE: CPT

## 2025-07-07 RX ORDER — IOPAMIDOL 755 MG/ML
80 INJECTION, SOLUTION INTRAVASCULAR
Status: COMPLETED | OUTPATIENT
Start: 2025-07-07 | End: 2025-07-07

## 2025-07-07 RX ADMIN — IOPAMIDOL 80 ML: 755 INJECTION, SOLUTION INTRAVENOUS at 09:26

## 2025-07-07 NOTE — PROGRESS NOTES
[facility]  SPEECH THERAPY  [] SPEECH LANGUAGE COGNITIVE EVALUATION  TOTAL LARYNGECTOMY EVALUATION   [x] DAILY NOTE   [] PROGRESS NOTE [] DISCHARGE NOTE    [x] OUTPATIENT REHABILITATION [facility]   [] [facility]    [] [facility]   [] [facility]    Date: 2025  Patient Name:  Masood Randhawa  : 1950  MRN: [id number]  CSN: [id number]    Referring Practitioner Cherelle Pat, APRN - CNP [id number]      Diagnosis  Diagnoses       Z90.02 (ICD-10-CM) - H/O radical laryngectomy    R49.1 (ICD-10-CM) - Aphonia           Treatment Diagnosis R49.1 Aphonia      Date of Evaluation 3/27/25   Additional Pertinent History Masood Randhawa has a past medical history of BPH (benign prostatic hyperplasia), Chronic back pain, Chronic obstructive pulmonary disease (HCC), COPD (chronic obstructive pulmonary disease) (HCC), Erectile dysfunction, Gastroesophageal reflux disease with apnea without esophagitis, Head and neck cancer (HCC), Hypertension, Osteoarthritis, Personal history of colonic polyps, Pneumonia, PVD (peripheral vascular disease), and PVD (peripheral vascular disease).  he has a past surgical history that includes Colonoscopy (2006); lumbar fusion; Rotator cuff repair (2021); Carpal tunnel release (Bilateral); US ASP/INJ THYROID CYST (2025); laryngoscopy (N/A, 2025); IR FLUORO GUIDED GASTROSTOMY TUBE INSERTION PERC W CONTRAST (2025); Laryngectomy (Bilateral, 3/14/2025); bronchoscopy (N/A, 4/3/2025); and Colonoscopy (N/A, 2025).     Allergies Allergies   Allergen Reactions    Pcn [Penicillins] Other (See Comments)     Was told he was allergic since he was a child      Medications   Current Outpatient Medications:     lidocaine viscous hcl (XYLOCAINE) 2 % SOLN solution, Follow directions as given in clinic, Disp: 100 mL, Rfl: 1    betamethasone valerate (VALISONE) 0.1 % cream, Apply topically to radiation

## 2025-07-09 ENCOUNTER — APPOINTMENT (OUTPATIENT)
Dept: OCCUPATIONAL THERAPY | Age: 75
End: 2025-07-09
Payer: MEDICARE

## 2025-07-09 ENCOUNTER — CARE COORDINATION (OUTPATIENT)
Dept: CARE COORDINATION | Age: 75
End: 2025-07-09

## 2025-07-09 ENCOUNTER — HOSPITAL ENCOUNTER (OUTPATIENT)
Dept: PHYSICAL THERAPY | Age: 75
Setting detail: THERAPIES SERIES
Discharge: HOME | End: 2025-07-09
Payer: MEDICARE

## 2025-07-09 PROCEDURE — 97110 THERAPEUTIC EXERCISES: CPT

## 2025-07-09 NOTE — PROGRESS NOTES
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    G89.3 (ICD-10-CM) - Cancer associated pain    R63.4 (ICD-10-CM) - Unintentional weight loss           Treatment Diagnosis R26.2 Difficulty in walking  R53.1 Weakness  R53.0 Cancer Related Fatigue   Date of Evaluation 25   Additional Pertinent History Masood Randhawa has a past medical history of BPH (benign prostatic hyperplasia), Chronic back pain, Chronic obstructive pulmonary disease (HCC), COPD (chronic obstructive pulmonary disease) (HCC), Erectile dysfunction, Gastroesophageal reflux disease with apnea without esophagitis, Head and neck cancer (HCC), Hypertension, Osteoarthritis, Personal history of colonic polyps, Pneumonia, PVD (peripheral vascular disease), and PVD (peripheral vascular disease).  he has a past surgical history that includes Colonoscopy (2006); lumbar fusion; Rotator cuff repair (2021); Carpal tunnel release (Bilateral); US ASP/INJ THYROID CYST (2025); laryngoscopy (N/A, 2025); IR FLUORO GUIDED GASTROSTOMY TUBE INSERTION PERC W CONTRAST (2025); Laryngectomy (Bilateral, 3/14/2025); bronchoscopy (N/A, 4/3/2025); and Colonoscopy (N/A, 2025).     Allergies Allergies   Allergen Reactions    Pcn [Penicillins] Other (See Comments)     Was told he was allergic since he was a child      Medications   Current Outpatient Medications:     lidocaine viscous hcl (XYLOCAINE) 2 % SOLN solution, Follow directions as given in clinic, Disp: 100 mL, Rfl: 1    betamethasone valerate (VALISONE) 0.1 % cream, Apply topically to radiation treatment area 2 times daily., Disp: 90 g, Rfl: 0

## 2025-07-09 NOTE — CARE COORDINATION
PCP/Specialist follow up:   Future Appointments         Provider Specialty Dept Phone    7/11/2025 11:30 AM Elizabeth Booth, PTA Physical Therapy [phone]    7/14/2025 11:30 AM Reyna Savage, SLP Speech Therapy [phone]    7/15/2025 2:00 PM Cordell Johnson PA-C Pulmonology [phone]    7/16/2025 1:00 PM Katerina Velásquez, PT Physical Therapy [phone] 7/18/2025 1:00 PM Katerina Lema, PTA Physical Therapy [phone] 7/21/2025 11:30 AM Reyna Savage SLP Speech Therapy [phone] 7/23/2025 12:45 PM Elizabeth Booth, PTA Physical Therapy [phone] 7/25/2025 11:00 AM Elizabeth Booth, PTA Physical Therapy [phone] 7/28/2025 11:30 AM Reyna Savage SLP Speech Therapy [phone] 7/28/2025 3:00 PM Catalina Colin PA-C; SCHEDULE, LISANDRA WEATHERS NURSE Radiation Oncology [phone]    7/29/2025 1:30 PM Rickie Daley MD Otolaryngology [phone]    7/30/2025 11:45 AM Katerina Lema PTA Physical Therapy [phone]    8/1/2025 11:45 AM Katerina Lema PTA Physical Therapy [phone]            Follow Up:   Plan for next [facility] outreach in approximately 1 week to complete:  - CC Protocol assessments  - disease specific assessments  - goal progression  - education .   Caregiver is agreeable to this plan.

## 2025-07-11 ENCOUNTER — APPOINTMENT (OUTPATIENT)
Dept: OCCUPATIONAL THERAPY | Age: 75
End: 2025-07-11
Payer: MEDICARE

## 2025-07-11 ENCOUNTER — HOSPITAL ENCOUNTER (OUTPATIENT)
Dept: PHYSICAL THERAPY | Age: 75
Setting detail: THERAPIES SERIES
Discharge: HOME | End: 2025-07-11
Payer: MEDICARE

## 2025-07-11 PROCEDURE — 97110 THERAPEUTIC EXERCISES: CPT

## 2025-07-11 NOTE — CASE COMMUNICATION
Communication Note    Patient's wife called in to clerical asking for one of the SLP's to call her about some of the patient's issues.  This SLP called and spoke with the patient's spouse, Soraya, and she said \"I need you to look at [place].\"  She then reported that the patient has some blood in his stoma and there are \"scabs\" inside his stoma (around the edge).  She reports the patient woke up this morning and his larytube had fallen out, but he didn't know when it happened. Spouse denies the patient having any shortness of breath and reports he has not been coughing much up.  She further reports the patient has had some of these scabs for the last 1-2 weeks.  Patient reports the amount of blood that she has seen has been minimal.  This SLP offered to have the patient come in at 4:00 today, but she said that she didn't think the patient would be up for it.  She said, \"He had PT today, but I think he is getting weaker.\" Given that the patient was unable to come in, informed the patient's spouse that if she notices a lot of bright red blood or he is coughing up blood, to go to the ED to get checked out.  Also, reviewed the importance of using humidification to the stoma/trachea all the time to avoid it from getting dried out as she reports the patient has not been wanting to use the humidification as he should.  Patient's spouse verbalized understanding.      Brook Sims M.S. CCC-SLP [id number]

## 2025-07-11 NOTE — PROGRESS NOTES
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    G89.3 (ICD-10-CM) - Cancer associated pain    R63.4 (ICD-10-CM) - Unintentional weight loss           Treatment Diagnosis R26.2 Difficulty in walking  R53.1 Weakness  R53.0 Cancer Related Fatigue   Date of Evaluation 25   Additional Pertinent History Masood Randhawa has a past medical history of BPH (benign prostatic hyperplasia), Chronic back pain, Chronic obstructive pulmonary disease (HCC), COPD (chronic obstructive pulmonary disease) (HCC), Erectile dysfunction, Gastroesophageal reflux disease with apnea without esophagitis, Head and neck cancer (HCC), Hypertension, Osteoarthritis, Personal history of colonic polyps, Pneumonia, PVD (peripheral vascular disease), and PVD (peripheral vascular disease).  he has a past surgical history that includes Colonoscopy (2006); lumbar fusion; Rotator cuff repair (2021); Carpal tunnel release (Bilateral); US ASP/INJ THYROID CYST (2025); laryngoscopy (N/A, 2025); IR FLUORO GUIDED GASTROSTOMY TUBE INSERTION PERC W CONTRAST (2025); Laryngectomy (Bilateral, 3/14/2025); bronchoscopy (N/A, 4/3/2025); and Colonoscopy (N/A, 2025).     Allergies Allergies   Allergen Reactions    Pcn [Penicillins] Other (See Comments)     Was told he was allergic since he was a child      Medications   Current Outpatient Medications:     lidocaine viscous hcl (XYLOCAINE) 2 % SOLN solution, Follow directions as given in clinic, Disp: 100 mL, Rfl: 1    betamethasone valerate (VALISONE) 0.1 % cream, Apply topically to radiation treatment area 2 times daily., Disp: 90 g, Rfl:

## 2025-07-14 ENCOUNTER — APPOINTMENT (OUTPATIENT)
Dept: SPEECH THERAPY | Age: 75
End: 2025-07-14
Payer: MEDICARE

## 2025-07-15 ENCOUNTER — OFFICE VISIT (OUTPATIENT)
Dept: PULMONOLOGY | Age: 75
End: 2025-07-15
Payer: MEDICARE

## 2025-07-15 VITALS
SYSTOLIC BLOOD PRESSURE: 90 MMHG | HEIGHT: 70 IN | TEMPERATURE: 98.2 F | WEIGHT: 118 LBS | OXYGEN SATURATION: 97 % | BODY MASS INDEX: 16.89 KG/M2 | DIASTOLIC BLOOD PRESSURE: 62 MMHG | HEART RATE: 100 BPM

## 2025-07-15 DIAGNOSIS — J69.0 ASPIRATION PNEUMONIA, UNSPECIFIED ASPIRATION PNEUMONIA TYPE, UNSPECIFIED LATERALITY, UNSPECIFIED PART OF LUNG (HCC): Primary | ICD-10-CM

## 2025-07-15 DIAGNOSIS — Z90.02 S/P LARYNGECTOMY: ICD-10-CM

## 2025-07-15 DIAGNOSIS — Z87.891 FORMER SMOKER: ICD-10-CM

## 2025-07-15 DIAGNOSIS — R59.0 LAD (LYMPHADENOPATHY), HILAR: ICD-10-CM

## 2025-07-15 DIAGNOSIS — C32.9 PRIMARY SQUAMOUS CELL CARCINOMA OF LARYNX (HCC): ICD-10-CM

## 2025-07-15 PROCEDURE — 1159F MED LIST DOCD IN RCRD: CPT

## 2025-07-15 PROCEDURE — 99214 OFFICE O/P EST MOD 30 MIN: CPT

## 2025-07-15 PROCEDURE — 1036F TOBACCO NON-USER: CPT

## 2025-07-15 PROCEDURE — 3078F DIAST BP <80 MM HG: CPT

## 2025-07-15 PROCEDURE — 3017F COLORECTAL CA SCREEN DOC REV: CPT

## 2025-07-15 PROCEDURE — G8427 DOCREV CUR MEDS BY ELIG CLIN: HCPCS

## 2025-07-15 PROCEDURE — G8418 CALC BMI BLW LOW PARAM F/U: HCPCS

## 2025-07-15 PROCEDURE — 1123F ACP DISCUSS/DSCN MKR DOCD: CPT

## 2025-07-15 PROCEDURE — 3074F SYST BP LT 130 MM HG: CPT

## 2025-07-15 RX ORDER — CIPROFLOXACIN 500 MG/1
500 TABLET, FILM COATED ORAL 2 TIMES DAILY
Qty: 20 TABLET | Refills: 0 | Status: SHIPPED | OUTPATIENT
Start: 2025-07-15 | End: 2025-07-25

## 2025-07-15 RX ORDER — ACETYLCYSTEINE 100 MG/ML
4 SOLUTION ORAL; RESPIRATORY (INHALATION) 2 TIMES DAILY
Qty: 240 ML | Refills: 11 | Status: SHIPPED | OUTPATIENT
Start: 2025-07-15

## 2025-07-15 ASSESSMENT — ENCOUNTER SYMPTOMS
COLOR CHANGE: 0
TROUBLE SWALLOWING: 1
SINUS PRESSURE: 0
VOMITING: 1
DIARRHEA: 0
VOICE CHANGE: 0
COUGH: 1
CHEST TIGHTNESS: 0
NAUSEA: 0
CONSTIPATION: 0
APNEA: 0
SHORTNESS OF BREATH: 1
CHOKING: 0
WHEEZING: 0

## 2025-07-15 NOTE — PROGRESS NOTES
axillary  lymph node has a maximum SUV of 1.9 (image 102). Degenerative changes are  present in the thoracic spine without evidence of hypermetabolic osseous  metastatic disease.     Abdomen/pelvis: Physiologic activity is present in the liver, spleen, urinary  collecting system and gastrointestinal tract. There is a gastrostomy tube in the  stomach. Exophytic hypoattenuating lesions in the bilateral kidneys may be cysts  but are incompletely characterized on the current study. Atherosclerotic  calcifications are present in the abdominal aorta without evidence of aneurysm.  There are diverticula throughout the colon. The prostate gland is enlarged and  contains multiple calcifications. Phleboliths are present in the pelvis. There  is no FDG avid mesenteric, retroperitoneal or pelvic lymphadenopathy. A  nonenlarged right inguinal lymph node has a maximum SUV of 3.6 (image 333).  Nonenlarged left inguinal lymph nodes have a maximum SUV of 6.2 (image 338).  Degenerative and surgical changes are present in the lumbar spine and pelvis  without evidence of hypermetabolic osseous metastatic disease.     IMPRESSION:  1. FDG avid right cervical lymphadenopathy highly suspicious for metastatic  disease.  2. Mildly FDG avid mediastinal, right hilar and bilateral axillary  lymphadenopathy, likely reactive. Metastatic disease is considered less likely  but not excluded.  3. New, mildly FDG avid alveolar and reticular opacities at the right lung base,  likely infectious or inflammatory. Small, mildly FDG avid right lower lobe lung  nodules may be infectious or inflammatory but malignancy cannot be excluded and  follow-up CT of the chest in 3 months is recommended.  4. Mildly FDG avid bilateral inguinal lymphadenopathy, likely reactive but  metastatic disease cannot be excluded.      2013      Assessment      Diagnosis Orders   1. Aspiration pneumonia, unspecified aspiration pneumonia type, unspecified laterality, unspecified part

## 2025-07-15 NOTE — PATIENT INSTRUCTIONS
-Continue duoneb inhaler as needed every 6 hours for shortness of breath or wheezing.   -Start Mucomyst nebulizer to help thin out secretions, Can use up to 2 times daily   -Repeat CT Chest in 3 months   -Cipro x 10 days to cover for aspiration pneumonia   -Low threshold ot seek care in the ED for worsening sputum or any fevers

## 2025-07-16 ENCOUNTER — HOSPITAL ENCOUNTER (OUTPATIENT)
Dept: PHYSICAL THERAPY | Age: 75
Setting detail: THERAPIES SERIES
Discharge: HOME | End: 2025-07-16
Payer: MEDICARE

## 2025-07-16 ENCOUNTER — APPOINTMENT (OUTPATIENT)
Dept: OCCUPATIONAL THERAPY | Age: 75
End: 2025-07-16
Payer: MEDICARE

## 2025-07-16 PROCEDURE — 97110 THERAPEUTIC EXERCISES: CPT

## 2025-07-16 NOTE — PROGRESS NOTES
address the treatment planned outlined above.  Time In 1300   Time Out 1344   Timed Code Minutes: 44 min   Total Treatment Time: 44 min       Electronically Signed by: Katerina Velásquez PT

## 2025-07-18 ENCOUNTER — HOSPITAL ENCOUNTER (OUTPATIENT)
Dept: PHYSICAL THERAPY | Age: 75
Setting detail: THERAPIES SERIES
Discharge: HOME | End: 2025-07-18
Payer: MEDICARE

## 2025-07-18 ENCOUNTER — HOSPITAL ENCOUNTER (OUTPATIENT)
Dept: SPEECH THERAPY | Age: 75
Setting detail: THERAPIES SERIES
Discharge: HOME | End: 2025-07-18
Payer: MEDICARE

## 2025-07-18 ENCOUNTER — TELEPHONE (OUTPATIENT)
Dept: PULMONOLOGY | Age: 75
End: 2025-07-18

## 2025-07-18 ENCOUNTER — CARE COORDINATION (OUTPATIENT)
Dept: CARE COORDINATION | Age: 75
End: 2025-07-18

## 2025-07-18 ENCOUNTER — APPOINTMENT (OUTPATIENT)
Dept: OCCUPATIONAL THERAPY | Age: 75
End: 2025-07-18
Payer: MEDICARE

## 2025-07-18 PROCEDURE — 92507 TX SP LANG VOICE COMM INDIV: CPT

## 2025-07-18 PROCEDURE — 97110 THERAPEUTIC EXERCISES: CPT

## 2025-07-18 PROCEDURE — 92526 ORAL FUNCTION THERAPY: CPT

## 2025-07-18 NOTE — CARE COORDINATION
Attempted to reach Soraya for continued Care Coordination follow up and education.  Soraya was unavailable at the time of my call, and a generic voicemail message was left asking her to return my call at [phone].

## 2025-07-18 NOTE — PROGRESS NOTES
[facility]  SPEECH THERAPY  [] SPEECH LANGUAGE COGNITIVE EVALUATION  TOTAL LARYNGECTOMY EVALUATION   [x] DAILY NOTE   [] PROGRESS NOTE [] DISCHARGE NOTE    [x] OUTPATIENT REHABILITATION [facility]   [] [facility] CARE [place]    [] [facility]   [] [facility]    Date: 2025  Patient Name:  Masood Randhawa  : 1950  MRN: [id number]  CSN: [id number]    Referring Practitioner Cherelle Pat, APRN - CNP [id number]      Diagnosis  Diagnoses       Z90.02 (ICD-10-CM) - H/O radical laryngectomy    R49.1 (ICD-10-CM) - Aphonia           Treatment Diagnosis R49.1 Aphonia      Date of Evaluation 3/27/25   Additional Pertinent History Masood Randhawa has a past medical history of BPH (benign prostatic hyperplasia), Chronic back pain, Chronic obstructive pulmonary disease (HCC), COPD (chronic obstructive pulmonary disease) (HCC), Erectile dysfunction, Gastroesophageal reflux disease with apnea without esophagitis, Head and neck cancer (HCC), Hypertension, Osteoarthritis, Personal history of colonic polyps, Pneumonia, PVD (peripheral vascular disease), and PVD (peripheral vascular disease).  he has a past surgical history that includes Colonoscopy (2006); lumbar fusion; Rotator cuff repair (2021); Carpal tunnel release (Bilateral); US ASP/INJ THYROID CYST (2025); laryngoscopy (N/A, 2025); IR FLUORO GUIDED GASTROSTOMY TUBE INSERTION PERC W CONTRAST (2025); Laryngectomy (Bilateral, 3/14/2025); bronchoscopy (N/A, 4/3/2025); and Colonoscopy (N/A, 2025).     Allergies Allergies   Allergen Reactions    Pcn [Penicillins] Other (See Comments)     Was told he was allergic since he was a child      Medications   Current Outpatient Medications:     ciprofloxacin (CIPRO) 500 MG tablet, Take 1 tablet by mouth 2 times daily for 10 days, Disp: 20 tablet, Rfl: 0    acetylcysteine (MUCOMYST) 10 % nebulizer solution, Inhale 4 mLs into the lungs

## 2025-07-21 ENCOUNTER — HOSPITAL ENCOUNTER (OUTPATIENT)
Dept: SPEECH THERAPY | Age: 75
Setting detail: THERAPIES SERIES
Discharge: HOME | End: 2025-07-21
Payer: MEDICARE

## 2025-07-21 ENCOUNTER — TELEPHONE (OUTPATIENT)
Dept: RADIATION ONCOLOGY | Age: 75
End: 2025-07-21

## 2025-07-21 ENCOUNTER — TELEPHONE (OUTPATIENT)
Dept: PULMONOLOGY | Age: 75
End: 2025-07-21

## 2025-07-21 DIAGNOSIS — Z87.891 FORMER SMOKER: ICD-10-CM

## 2025-07-21 DIAGNOSIS — J69.0 ASPIRATION PNEUMONIA, UNSPECIFIED ASPIRATION PNEUMONIA TYPE, UNSPECIFIED LATERALITY, UNSPECIFIED PART OF LUNG (HCC): Primary | ICD-10-CM

## 2025-07-21 PROCEDURE — 92507 TX SP LANG VOICE COMM INDIV: CPT

## 2025-07-21 NOTE — TELEPHONE ENCOUNTER
Nutrition Assessment     Reason for Visit: ON treatment follow up     Nutrition Recommendations:   - Encouraged trialing Nutren 2.0  - Use 4-5 containers of Jevity 1.5 per day.  - Drink 2-3 ONS per day  - Add olive oil or butter to food.   -Goal is weight gain  - will follow up at next appt.                Malnutrition Assessment:   Malnutrition Status: Moderate Malnutrition  Context:  Findings of the 6 clinical characteristics of malnutrition (minimum of 2 out of 6 clinical characteristics is required to make the dx of moderate or severe Protein Calorie Malnutrition based on AND/ASPEN Guidelines):              1. Energy Intake: % of needs              2. Weight Loss: >20% x 1year              3. Fat Loss: Mild              4. Muscle Loss: Mild              5. Fluid Accumulation: Not noted              6.  Strength:Not measured     Nutrition Diagnosis:   Problem: Inadequate oral intake  Etiology: Dysphasia  Signs and Symptoms: Difficulty swallowing, need for supplemental EN.      Nutrition Assessment:   History: H+N Cancer, GERD, COPD, HTN, Osteoarthritis  Subjective:   7/21/25 Called to check up on pt due to referral from ASHLYN Arnold. Pt only accepting 1-2 containers of Jevity 1.5 per day. Taking after meals. Pt also eating regular breakfast and dinner as well as 2-3 boost per day. Estimated intake of 1400-1800kcal per day. Weight should stabilize with this intake. Encouraged swticheing to Nutren 2.0 for more calories but pts wife refused. I do have samples of Nutren 2.0 in the office if they want to try. Encouraged intake as tolerated. Will continue to monitor weight.   7/2/25 Spoke with Soraya on the phone. She states pt is still not wanting to eat much. Giving 3 bottles of Jevity 1.5 per day. Pt is eating a little PO. Pt is still losing wt. Asked about switching to TwoCal formula. Soraya refused since they have so much of the Jevity 1.5. Discussed adding olive

## 2025-07-21 NOTE — TELEPHONE ENCOUNTER
Received a denial for the patients ACETYLCYSTEINE. Per express-scripts the nebulizer solution needs sent to a retail pharmacy to to covered under Medicare part B- was originally ran under medicare part D and was denied.    Cordell can you please resend this medication to the patients pharmacy. Thanks

## 2025-07-21 NOTE — PROGRESS NOTES
[facility]  SPEECH THERAPY  [] SPEECH LANGUAGE COGNITIVE EVALUATION  TOTAL LARYNGECTOMY EVALUATION   [x] DAILY NOTE   [] PROGRESS NOTE [] DISCHARGE NOTE    [x] OUTPATIENT REHABILITATION [facility]   [] [facility] CARE [place]    [] [facility]   [] [facility]    Date: 2025  Patient Name:  Masood Randhawa  : 1950  MRN: [id number]  CSN: [id number]    Referring Practitioner Cherelle Pat, APRN - CNP [id number]      Diagnosis  Diagnoses       Z90.02 (ICD-10-CM) - H/O radical laryngectomy    R49.1 (ICD-10-CM) - Aphonia           Treatment Diagnosis R49.1 Aphonia      Date of Evaluation 3/27/25   Additional Pertinent History Masood Randhawa has a past medical history of BPH (benign prostatic hyperplasia), Chronic back pain, Chronic obstructive pulmonary disease (HCC), COPD (chronic obstructive pulmonary disease) (HCC), Erectile dysfunction, Gastroesophageal reflux disease with apnea without esophagitis, Head and neck cancer (HCC), Hypertension, Osteoarthritis, Personal history of colonic polyps, Pneumonia, PVD (peripheral vascular disease), and PVD (peripheral vascular disease).  he has a past surgical history that includes Colonoscopy (2006); lumbar fusion; Rotator cuff repair (2021); Carpal tunnel release (Bilateral); US ASP/INJ THYROID CYST (2025); laryngoscopy (N/A, 2025); IR FLUORO GUIDED GASTROSTOMY TUBE INSERTION PERC W CONTRAST (2025); Laryngectomy (Bilateral, 3/14/2025); bronchoscopy (N/A, 4/3/2025); and Colonoscopy (N/A, 2025).     Allergies Allergies   Allergen Reactions    Pcn [Penicillins] Other (See Comments)     Was told he was allergic since he was a child      Medications   Current Outpatient Medications:     ciprofloxacin (CIPRO) 500 MG tablet, Take 1 tablet by mouth 2 times daily for 10 days, Disp: 20 tablet, Rfl: 0    acetylcysteine (MUCOMYST) 10 % nebulizer solution, Inhale 4 mLs into the lungs

## 2025-07-23 ENCOUNTER — APPOINTMENT (OUTPATIENT)
Dept: OCCUPATIONAL THERAPY | Age: 75
End: 2025-07-23
Payer: MEDICARE

## 2025-07-23 ENCOUNTER — HOSPITAL ENCOUNTER (OUTPATIENT)
Dept: PHYSICAL THERAPY | Age: 75
Setting detail: THERAPIES SERIES
Discharge: HOME | End: 2025-07-23
Payer: MEDICARE

## 2025-07-23 PROCEDURE — 97110 THERAPEUTIC EXERCISES: CPT

## 2025-07-23 RX ORDER — ACETYLCYSTEINE 100 MG/ML
4 SOLUTION ORAL; RESPIRATORY (INHALATION) 2 TIMES DAILY
Qty: 240 ML | Refills: 11 | Status: SHIPPED | OUTPATIENT
Start: 2025-07-23

## 2025-07-23 NOTE — PROGRESS NOTES
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    G89.3 (ICD-10-CM) - Cancer associated pain    R63.4 (ICD-10-CM) - Unintentional weight loss           Treatment Diagnosis R26.2 Difficulty in walking  R53.1 Weakness  R53.0 Cancer Related Fatigue   Date of Evaluation 25   Additional Pertinent History Masood Randhawa has a past medical history of BPH (benign prostatic hyperplasia), Chronic back pain, Chronic obstructive pulmonary disease (HCC), COPD (chronic obstructive pulmonary disease) (HCC), Erectile dysfunction, Gastroesophageal reflux disease with apnea without esophagitis, Head and neck cancer (HCC), Hypertension, Osteoarthritis, Personal history of colonic polyps, Pneumonia, PVD (peripheral vascular disease), and PVD (peripheral vascular disease).  he has a past surgical history that includes Colonoscopy (2006); lumbar fusion; Rotator cuff repair (2021); Carpal tunnel release (Bilateral); US ASP/INJ THYROID CYST (2025); laryngoscopy (N/A, 2025); IR FLUORO GUIDED GASTROSTOMY TUBE INSERTION PERC W CONTRAST (2025); Laryngectomy (Bilateral, 3/14/2025); bronchoscopy (N/A, 4/3/2025); and Colonoscopy (N/A, 2025).     Allergies Allergies   Allergen Reactions    Pcn [Penicillins] Other (See Comments)     Was told he was allergic since he was a child      Medications   Current Outpatient Medications:     acetylcysteine (MUCOMYST) 10 % nebulizer solution, Inhale 4 mLs into the lungs 2 times daily, Disp: 240 mL, Rfl: 11    ciprofloxacin (CIPRO) 500 MG tablet, Take 1 tablet by mouth 2 times daily for 10 days, Disp: 20 tablet, Rfl: 0

## 2025-07-25 ENCOUNTER — HOSPITAL ENCOUNTER (OUTPATIENT)
Dept: PHYSICAL THERAPY | Age: 75
Setting detail: THERAPIES SERIES
Discharge: HOME | End: 2025-07-25
Payer: MEDICARE

## 2025-07-25 ENCOUNTER — APPOINTMENT (OUTPATIENT)
Dept: OCCUPATIONAL THERAPY | Age: 75
End: 2025-07-25
Payer: MEDICARE

## 2025-07-25 PROCEDURE — 97110 THERAPEUTIC EXERCISES: CPT

## 2025-07-25 NOTE — PROGRESS NOTES
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    G89.3 (ICD-10-CM) - Cancer associated pain    R63.4 (ICD-10-CM) - Unintentional weight loss           Treatment Diagnosis R26.2 Difficulty in walking  R53.1 Weakness  R53.0 Cancer Related Fatigue   Date of Evaluation 25   Additional Pertinent History Masood Randhawa has a past medical history of BPH (benign prostatic hyperplasia), Chronic back pain, Chronic obstructive pulmonary disease (HCC), COPD (chronic obstructive pulmonary disease) (HCC), Erectile dysfunction, Gastroesophageal reflux disease with apnea without esophagitis, Head and neck cancer (HCC), Hypertension, Osteoarthritis, Personal history of colonic polyps, Pneumonia, PVD (peripheral vascular disease), and PVD (peripheral vascular disease).  he has a past surgical history that includes Colonoscopy (2006); lumbar fusion; Rotator cuff repair (2021); Carpal tunnel release (Bilateral); US ASP/INJ THYROID CYST (2025); laryngoscopy (N/A, 2025); IR FLUORO GUIDED GASTROSTOMY TUBE INSERTION PERC W CONTRAST (2025); Laryngectomy (Bilateral, 3/14/2025); bronchoscopy (N/A, 4/3/2025); and Colonoscopy (N/A, 2025).     Allergies Allergies   Allergen Reactions    Pcn [Penicillins] Other (See Comments)     Was told he was allergic since he was a child      Medications   Current Outpatient Medications:     acetylcysteine (MUCOMYST) 10 % nebulizer solution, Inhale 4 mLs into the lungs 2 times daily, Disp: 240 mL, Rfl: 11    ciprofloxacin (CIPRO) 500 MG tablet, Take 1 tablet by mouth 2 times daily for 10 days, Disp: 20 tablet, Rfl: 0

## 2025-07-28 ENCOUNTER — HOSPITAL ENCOUNTER (OUTPATIENT)
Dept: SPEECH THERAPY | Age: 75
Setting detail: THERAPIES SERIES
Discharge: HOME | End: 2025-07-28
Payer: MEDICARE

## 2025-07-28 ENCOUNTER — HOSPITAL ENCOUNTER (OUTPATIENT)
Dept: RADIATION ONCOLOGY | Age: 75
Discharge: HOME | End: 2025-07-28
Payer: MEDICARE

## 2025-07-28 VITALS
DIASTOLIC BLOOD PRESSURE: 57 MMHG | OXYGEN SATURATION: 98 % | SYSTOLIC BLOOD PRESSURE: 115 MMHG | WEIGHT: 126 LBS | BODY MASS INDEX: 18.08 KG/M2 | HEART RATE: 86 BPM | RESPIRATION RATE: 16 BRPM | TEMPERATURE: 98.3 F

## 2025-07-28 PROCEDURE — 92507 TX SP LANG VOICE COMM INDIV: CPT

## 2025-07-28 PROCEDURE — 99212 OFFICE O/P EST SF 10 MIN: CPT

## 2025-07-28 ASSESSMENT — ENCOUNTER SYMPTOMS
BLOOD IN STOOL: 0
SINUS PAIN: 0
ABDOMINAL PAIN: 1
BACK PAIN: 0
VOICE CHANGE: 0
NAUSEA: 0
COUGH: 1
FACIAL SWELLING: 0
SHORTNESS OF BREATH: 0
SORE THROAT: 0
RECTAL PAIN: 0
TROUBLE SWALLOWING: 0
SINUS PRESSURE: 0

## 2025-07-28 NOTE — PROGRESS NOTES
[facility]  SPEECH THERAPY  [] SPEECH LANGUAGE COGNITIVE EVALUATION  TOTAL LARYNGECTOMY EVALUATION   [x] DAILY NOTE   [] PROGRESS NOTE [] DISCHARGE NOTE    [x] OUTPATIENT REHABILITATION [facility]   [] [facility] CARE [place]    [] [facility]   [facility]    Date: 2025  Patient Name:  Masood Randhawa  : 1950  MRN: [id number]  CSN: [id number]    Referring Practitioner Cherelle Pat, APRN - CNP [id number]      Diagnosis  Diagnoses       Z90.02 (ICD-10-CM) - H/O radical laryngectomy    R49.1 (ICD-10-CM) - Aphonia           Treatment Diagnosis R49.1 Aphonia      Date of Evaluation 3/27/25   Additional Pertinent History Masood Randhawa has a past medical history of BPH (benign prostatic hyperplasia), Chronic back pain, Chronic obstructive pulmonary disease (HCC), COPD (chronic obstructive pulmonary disease) (HCC), Erectile dysfunction, Gastroesophageal reflux disease with apnea without esophagitis, Head and neck cancer (HCC), Hypertension, Osteoarthritis, Personal history of colonic polyps, Pneumonia, PVD (peripheral vascular disease), and PVD (peripheral vascular disease).  he has a past surgical history that includes Colonoscopy (2006); lumbar fusion; Rotator cuff repair (2021); Carpal tunnel release (Bilateral); US ASP/INJ THYROID CYST (2025); laryngoscopy (N/A, 2025); IR FLUORO GUIDED GASTROSTOMY TUBE INSERTION PERC W CONTRAST (2025); Laryngectomy (Bilateral, 3/14/2025); bronchoscopy (N/A, 4/3/2025); and Colonoscopy (N/A, 2025).     Allergies Allergies   Allergen Reactions    Pcn [Penicillins] Other (See Comments)     Was told he was allergic since he was a child      Medications   Current Outpatient Medications:     acetylcysteine (MUCOMYST) 10 % nebulizer solution, Inhale 4 mLs into the lungs 2 times daily, Disp: 240 mL, Rfl: 11    lidocaine viscous hcl (XYLOCAINE) 2 % SOLN solution, Follow directions as

## 2025-07-28 NOTE — PROGRESS NOTES
Nutrition Assessment     Reason for Visit: ON treatment follow up     Nutrition Recommendations:     - Use 4-5 containers of Jevity 1.5 per day.  - Drink 2-3 ONS per day  - Add olive oil or butter to food.   -Goal is weight gain  - will follow up at next appt.                Malnutrition Assessment:   Malnutrition Status: Moderate Malnutrition  Context:  Findings of the 6 clinical characteristics of malnutrition (minimum of 2 out of 6 clinical characteristics is required to make the dx of moderate or severe Protein Calorie Malnutrition based on AND/ASPEN Guidelines):              1. Energy Intake: % of needs              2. Weight Loss: >20% x 1year              3. Fat Loss: Mild              4. Muscle Loss: Mild              5. Fluid Accumulation: Not noted              6.  Strength:Not measured     Nutrition Diagnosis:   Problem: Inadequate oral intake  Etiology: Dysphasia  Signs and Symptoms: Difficulty swallowing, need for supplemental EN.      Nutrition Assessment:   History: H+N Cancer, GERD, COPD, HTN, Osteoarthritis  Subjective:   7/28/25 Spoke with pt and wife at followup visit. Pt is only doing 1 container of Jevity 1.5 every few days. Doing 50ml flushes before and after feedings plus to rinse every day. Eating by mouth for 2 meals per day plus 3 Boost per day. Pt gained 10# and is up to 126#. Pt prefers to eat by mouth. Encouraged him to continue PO at best efforts. Some dysphagia still but pt doesn't think he is aspirating. No concerns. Will continue to monitor.  7/21/25 Called to check up on pt due to referral from ASHLYN Arnold. Pt only accepting 1-2 containers of Jevity 1.5 per day. Taking after meals. Pt also eating regular breakfast and dinner as well as 2-3 boost per day. Estimated intake of 1400-1800kcal per day. Weight should stabilize with this intake. Encouraged swticheing to Nutren 2.0 for more calories but pts wife refused. I do have samples of Nutren 2.0 
Potential   Not applicable     Lymphatic and / or Vascular Invasion   Indeterminate     Perineural Invasion   Not identified     MARGINS Margin Status for Invasive Tumor   All margins negative for invasive tumor    Distance from Invasive Tumor to Closest Margin   Exact distance: 24 mm (tracheal resection margin)   Margin Status for Noninvasive Tumor (High-grade Dysplasia)   All margins negative for noninvasive tumor     REGIONAL LYMPH NODES Regional Lymph Node Status   Regional lymph nodes present    Tumor present in regional lymph node(s)      Number of Lymph Nodes with Tumor      Exact number (specify):1      Laterality of Lymph Node(s) with Tumor      Ipsilateral (including midline)      Moe Site(s) with Tumor      Level IV      Size of Largest Moe Metastatic Deposit      Exact size: 1.6 cm      Extranodal Extension (TATA)      Not identified    Number of Lymph Nodes Examined    Exact number (specify): 27     DISTANT METASTASIS   Distant Site(s) Involved, if applicable  Not applicable     pTNM CLASSIFICATION (AJCC 8th Edition)   Modified Classification (required only if applicable)  (select all that   apply)   Not applicable     pTNM Classification   For all carcinomas   pT Category   pT4a: Moderately advanced local disease. Tumor invades through the   thyroid cartilage and / or invades tissues beyond the larynx (e.g.,   trachea, soft tissues of neck including deep extrinsic muscle of   tongue, strap muscles, thyroid, or esophagus)   pN Category    pN1: Metastasis in a single ipsilateral lymph node, 3 cm or smaller in    greatest dimension and TATA(-)    pM Category (required only if confirmed pathologically)    Not applicable - pM cannot be determined from the submitted   specimen(s)      3/28/2025 -  Cancer Staged    Staging form: Larynx - Glottis, AJCC 8th Edition  - Pathologic stage from 3/28/2025: Stage DENILSON (ypT4a, pN1, cM0)     4/2/2025 Biopsy    FINAL RESULTS:   A.  Right upper lobe of lung, BAL:

## 2025-07-29 ENCOUNTER — OFFICE VISIT (OUTPATIENT)
Dept: ENT CLINIC | Age: 75
End: 2025-07-29
Payer: MEDICARE

## 2025-07-29 VITALS
SYSTOLIC BLOOD PRESSURE: 118 MMHG | HEIGHT: 70 IN | HEART RATE: 93 BPM | DIASTOLIC BLOOD PRESSURE: 62 MMHG | OXYGEN SATURATION: 98 % | BODY MASS INDEX: 18.14 KG/M2 | WEIGHT: 126.7 LBS

## 2025-07-29 DIAGNOSIS — Z98.890 STATUS POST RADICAL DISSECTION OF NECK: ICD-10-CM

## 2025-07-29 DIAGNOSIS — C32.0 SQUAMOUS CELL CARCINOMA OF GLOTTIS (HCC): Primary | ICD-10-CM

## 2025-07-29 DIAGNOSIS — R49.1 APHONIA: ICD-10-CM

## 2025-07-29 DIAGNOSIS — Z90.02 STATUS POST LARYNGECTOMY: ICD-10-CM

## 2025-07-29 PROBLEM — G89.3 CANCER ASSOCIATED PAIN: Status: ACTIVE | Noted: 2025-07-29

## 2025-07-29 PROCEDURE — G8427 DOCREV CUR MEDS BY ELIG CLIN: HCPCS | Performed by: OTOLARYNGOLOGY

## 2025-07-29 PROCEDURE — 3017F COLORECTAL CA SCREEN DOC REV: CPT | Performed by: OTOLARYNGOLOGY

## 2025-07-29 PROCEDURE — 1159F MED LIST DOCD IN RCRD: CPT | Performed by: OTOLARYNGOLOGY

## 2025-07-29 PROCEDURE — 99212 OFFICE O/P EST SF 10 MIN: CPT | Performed by: OTOLARYNGOLOGY

## 2025-07-29 PROCEDURE — 3078F DIAST BP <80 MM HG: CPT | Performed by: OTOLARYNGOLOGY

## 2025-07-29 PROCEDURE — 1123F ACP DISCUSS/DSCN MKR DOCD: CPT | Performed by: OTOLARYNGOLOGY

## 2025-07-29 PROCEDURE — G8418 CALC BMI BLW LOW PARAM F/U: HCPCS | Performed by: OTOLARYNGOLOGY

## 2025-07-29 PROCEDURE — 1036F TOBACCO NON-USER: CPT | Performed by: OTOLARYNGOLOGY

## 2025-07-29 PROCEDURE — 3074F SYST BP LT 130 MM HG: CPT | Performed by: OTOLARYNGOLOGY

## 2025-07-30 ENCOUNTER — APPOINTMENT (OUTPATIENT)
Dept: OCCUPATIONAL THERAPY | Age: 75
End: 2025-07-30
Payer: MEDICARE

## 2025-07-30 ENCOUNTER — HOSPITAL ENCOUNTER (OUTPATIENT)
Dept: PHYSICAL THERAPY | Age: 75
Setting detail: THERAPIES SERIES
Discharge: HOME | End: 2025-07-30
Payer: MEDICARE

## 2025-07-30 PROCEDURE — 97110 THERAPEUTIC EXERCISES: CPT

## 2025-07-30 NOTE — PROGRESS NOTES
[facility] [place] [facility]  [facility]  [address]  [address]  [facility] [address]  Dept: [phone]  Dept Fax: [phone]  Loc: [phone]    Masood Randhawa is a 74 y.o. male who was referred by No ref. provider found for:  Chief Complaint   Patient presents with    Follow-up     Patient here for 2 month follow up for his Squamous cell carcinoma of glottis.        HPI:       History of Present Illness  Masood Randhawa is a 74 y.o. male who is here for his first post-radiation visit following a total laryngectomy and neck dissections performed by me in March of this year.    He has a tracheoesophageal puncture fit with a prosthesis as a speaking port and was able to communicate effectively using a device yesterday.  He also had an HME device tried on him yesterday was removed after 24 hours due to skin irritation, and a follow-up appointment is scheduled for next week to continue working on this. A larger laryngeal tube with holes was also tried, but it caused discomfort, leading his wife to replace it and use of a smaller one again. Increased swelling in the throat has been noted beneath his chin, which was previously only under the skin but is now protruding outward.     He has gained 8 pounds since the last visit. At home, he uses a walker and has started using a cane for short distances, such as from the house to the car. He was able to walk to the mailbox today.    PAST SURGICAL HISTORY:  Total laryngectomy and neck dissections: 07/2025        History:     Allergies   Allergen Reactions    Pcn [Penicillins] Other (See Comments)     Was told he was allergic since he was a child     Current Outpatient Medications   Medication Sig Dispense Refill    acetylcysteine (MUCOMYST) 10 % nebulizer solution Inhale 4 mLs into the lungs 2 times daily 240 mL 11    lidocaine viscous hcl (XYLOCAINE) 2 % SOLN solution Follow directions as given in clinic 100 mL 1

## 2025-07-30 NOTE — PROGRESS NOTES
NBOS shoulder flex ext, upper trunk rotations, diagonals holding ball  12x      Step ups: forward/lateral  15 R/L fwd  10 R/L lat BOSU  X B UE   Rockerboard - front/back, side/side 15 30\"  1-2 UE          Walking over 4 green hurdles- fwd/lat * 2 laps ea // bars  X U UE   Gait training with 2ww  Throughout session    Cues to stay within walker's SLY, navigating around cones today   Head turns and nods with gait  2 laps ea  // bars  X Light use of // bars    Gait with light handheld assist  ~20ft x1         Specific Interventions Next Treatment: Ambulation with AD/no AD, transfers, gentle stretches, strengthening, conditioning, balance (Feet together with UE movement patterns), step ups, eccentric step downs, FT EC, FT head turns, posture, lymphedema education as needed    Activity Tolerance: Patient tolerated treatment well      ASSESSMENT: Patient wishing to not start on nu step this session. Begun session with equipment strengthening. Patient ambulated throughout session with RW. Patient did not feel ready to attempt ambulation with no AD or UE assist. Patient required occasional cues for hand placement and turning all the way with transfer completion. Cues for upright posture with hip extension. Challenged patient with decreasing to one UE assist as able during standing exercises.         GOALS:  Patient Goal: \"Increase strength.\"    Short Term Goals to be met in 8 weeks:  See LTGs    Long Term Goals to be met in 8 weeks:   Patient to improve Brief Fatigue Inventory to 2 or less to assist with being able to get self ready for day.     Improve ambulation to allow patient to ambulate with no assistive device and no LOB to assist with community outings.   Increase leg strength to 5/5 to assist with getting up from a low chair.    Improve transfers to independent from a low surface to assist with community outings.       Patient Education: Gait training, exercise techniques/progressions     [facility] access

## 2025-08-01 ENCOUNTER — APPOINTMENT (OUTPATIENT)
Dept: OCCUPATIONAL THERAPY | Age: 75
End: 2025-08-01
Payer: MEDICARE

## 2025-08-01 ENCOUNTER — HOSPITAL ENCOUNTER (OUTPATIENT)
Dept: PHYSICAL THERAPY | Age: 75
Setting detail: THERAPIES SERIES
End: 2025-08-01
Payer: MEDICARE

## 2025-08-04 ENCOUNTER — APPOINTMENT (OUTPATIENT)
Dept: GENERAL RADIOLOGY | Age: 75
DRG: 395 | End: 2025-08-04
Payer: MEDICARE

## 2025-08-04 ENCOUNTER — APPOINTMENT (OUTPATIENT)
Dept: CT IMAGING | Age: 75
DRG: 395 | End: 2025-08-04
Payer: MEDICARE

## 2025-08-04 ENCOUNTER — HOSPITAL ENCOUNTER (INPATIENT)
Age: 75
LOS: 4 days | Discharge: HOME | DRG: 395 | End: 2025-08-09
Attending: EMERGENCY MEDICINE | Admitting: INTERNAL MEDICINE
Payer: MEDICARE

## 2025-08-04 ENCOUNTER — TELEPHONE (OUTPATIENT)
Dept: FAMILY MEDICINE CLINIC | Age: 75
End: 2025-08-04

## 2025-08-04 ENCOUNTER — APPOINTMENT (OUTPATIENT)
Dept: PHYSICAL THERAPY | Age: 75
End: 2025-08-04
Payer: MEDICARE

## 2025-08-04 DIAGNOSIS — K62.5 RECTAL BLEEDING: Primary | ICD-10-CM

## 2025-08-04 DIAGNOSIS — K92.2 LOWER GI BLEED: ICD-10-CM

## 2025-08-04 DIAGNOSIS — K92.2 GASTROINTESTINAL HEMORRHAGE, UNSPECIFIED GASTROINTESTINAL HEMORRHAGE TYPE: ICD-10-CM

## 2025-08-04 DIAGNOSIS — D64.9 ANEMIA, UNSPECIFIED TYPE: ICD-10-CM

## 2025-08-04 LAB
ABO GROUP BLD: NORMAL
ANION GAP SERPL CALC-SCNC: 13 MEQ/L (ref 8–16)
BASOPHILS ABSOLUTE: 0 THOU/MM3 (ref 0–0.1)
BASOPHILS NFR BLD AUTO: 0.6 %
BUN SERPL-MCNC: 22 MG/DL (ref 8–23)
CALCIUM SERPL-MCNC: 9 MG/DL (ref 8.8–10.2)
CHLORIDE SERPL-SCNC: 103 MEQ/L (ref 98–111)
CO2 SERPL-SCNC: 24 MEQ/L (ref 22–29)
CREAT SERPL-MCNC: 0.9 MG/DL (ref 0.7–1.2)
DEPRECATED RDW RBC AUTO: 63.1 FL (ref 35–45)
EOSINOPHIL NFR BLD AUTO: 3.2 %
EOSINOPHILS ABSOLUTE: 0.3 THOU/MM3 (ref 0–0.4)
ERYTHROCYTE [DISTWIDTH] IN BLOOD BY AUTOMATED COUNT: 18.3 % (ref 11.5–14.5)
FERRITIN SERPL IA-MCNC: 201 NG/ML (ref 30–400)
GFR SERPL CREATININE-BSD FRML MDRD: 89 ML/MIN/1.73M2
GLUCOSE SERPL-MCNC: 99 MG/DL (ref 74–109)
HCT VFR BLD AUTO: 26.2 % (ref 42–52)
HCT VFR BLD AUTO: 27.3 % (ref 42–52)
HGB BLD-MCNC: 8.5 GM/DL (ref 14–18)
HGB BLD-MCNC: 8.5 GM/DL (ref 14–18)
IAT IGG-SP REAG SERPL QL: NORMAL
IMM GRANULOCYTES # BLD AUTO: 0.05 THOU/MM3 (ref 0–0.07)
IMM GRANULOCYTES NFR BLD AUTO: 0.6 %
IRON SATN MFR SERPL: 20 % (ref 20–50)
IRON SERPL-MCNC: 50 UG/DL (ref 61–157)
LYMPHOCYTES ABSOLUTE: 0.6 THOU/MM3 (ref 1–4.8)
LYMPHOCYTES NFR BLD AUTO: 8 %
MAGNESIUM SERPL-MCNC: 2 MG/DL (ref 1.6–2.6)
MCH RBC QN AUTO: 30.9 PG (ref 26–33)
MCHC RBC AUTO-ENTMCNC: 32.4 GM/DL (ref 32.2–35.5)
MCV RBC AUTO: 95.3 FL (ref 80–94)
MONOCYTES ABSOLUTE: 0.7 THOU/MM3 (ref 0.4–1.3)
MONOCYTES NFR BLD AUTO: 8.6 %
NEUTROPHILS ABSOLUTE: 6.4 THOU/MM3 (ref 1.8–7.7)
NEUTROPHILS NFR BLD AUTO: 79 %
NRBC BLD AUTO-RTO: 0 /100 WBC
OSMOLALITY SERPL CALC.SUM OF ELEC: 282.8 MOSMOL/KG (ref 275–300)
PLATELET # BLD AUTO: 248 THOU/MM3 (ref 130–400)
PMV BLD AUTO: 10 FL (ref 9.4–12.4)
POTASSIUM SERPL-SCNC: 3.8 MEQ/L (ref 3.5–5.2)
RBC # BLD AUTO: 2.75 MILL/MM3 (ref 4.7–6.1)
RH BLD: NORMAL
SODIUM SERPL-SCNC: 140 MEQ/L (ref 135–145)
TIBC SERPL-MCNC: 246 UG/DL (ref 171–450)
WBC # BLD AUTO: 8.1 THOU/MM3 (ref 4.8–10.8)

## 2025-08-04 PROCEDURE — 6360000004 HC RX CONTRAST MEDICATION

## 2025-08-04 PROCEDURE — 85014 HEMATOCRIT: CPT

## 2025-08-04 PROCEDURE — 85018 HEMOGLOBIN: CPT

## 2025-08-04 PROCEDURE — 30233N1 TRANSFUSION OF NONAUTOLOGOUS RED BLOOD CELLS INTO PERIPHERAL VEIN, PERCUTANEOUS APPROACH: ICD-10-PCS | Performed by: FAMILY MEDICINE

## 2025-08-04 PROCEDURE — 86900 BLOOD TYPING SEROLOGIC ABO: CPT

## 2025-08-04 PROCEDURE — 85025 COMPLETE CBC W/AUTO DIFF WBC: CPT

## 2025-08-04 PROCEDURE — 83540 ASSAY OF IRON: CPT

## 2025-08-04 PROCEDURE — 96374 THER/PROPH/DIAG INJ IV PUSH: CPT

## 2025-08-04 PROCEDURE — 96376 TX/PRO/DX INJ SAME DRUG ADON: CPT

## 2025-08-04 PROCEDURE — 82728 ASSAY OF FERRITIN: CPT

## 2025-08-04 PROCEDURE — 86885 COOMBS TEST INDIRECT QUAL: CPT

## 2025-08-04 PROCEDURE — 99285 EMERGENCY DEPT VISIT HI MDM: CPT

## 2025-08-04 PROCEDURE — 83735 ASSAY OF MAGNESIUM: CPT

## 2025-08-04 PROCEDURE — 99223 1ST HOSP IP/OBS HIGH 75: CPT

## 2025-08-04 PROCEDURE — 36415 COLL VENOUS BLD VENIPUNCTURE: CPT

## 2025-08-04 PROCEDURE — 80048 BASIC METABOLIC PNL TOTAL CA: CPT

## 2025-08-04 PROCEDURE — 86923 COMPATIBILITY TEST ELECTRIC: CPT

## 2025-08-04 PROCEDURE — 2500000003 HC RX 250 WO HCPCS

## 2025-08-04 PROCEDURE — 74177 CT ABD & PELVIS W/CONTRAST: CPT

## 2025-08-04 PROCEDURE — P9016 RBC LEUKOCYTES REDUCED: HCPCS

## 2025-08-04 PROCEDURE — 49465 FLUORO EXAM OF G/COLON TUBE: CPT

## 2025-08-04 PROCEDURE — G0378 HOSPITAL OBSERVATION PER HR: HCPCS

## 2025-08-04 PROCEDURE — 6360000002 HC RX W HCPCS

## 2025-08-04 PROCEDURE — 86901 BLOOD TYPING SEROLOGIC RH(D): CPT

## 2025-08-04 PROCEDURE — 83550 IRON BINDING TEST: CPT

## 2025-08-04 PROCEDURE — 2580000003 HC RX 258

## 2025-08-04 RX ORDER — ACETAMINOPHEN 650 MG/1
650 SUPPOSITORY RECTAL EVERY 6 HOURS PRN
Status: DISCONTINUED | OUTPATIENT
Start: 2025-08-04 | End: 2025-08-09 | Stop reason: HOSPADM

## 2025-08-04 RX ORDER — SODIUM CHLORIDE 0.9 % (FLUSH) 0.9 %
5-40 SYRINGE (ML) INJECTION PRN
Status: DISCONTINUED | OUTPATIENT
Start: 2025-08-04 | End: 2025-08-09 | Stop reason: HOSPADM

## 2025-08-04 RX ORDER — ACETAMINOPHEN 325 MG/1
650 TABLET ORAL EVERY 6 HOURS PRN
Status: DISCONTINUED | OUTPATIENT
Start: 2025-08-04 | End: 2025-08-09 | Stop reason: HOSPADM

## 2025-08-04 RX ORDER — SODIUM CHLORIDE 9 MG/ML
INJECTION, SOLUTION INTRAVENOUS PRN
Status: DISCONTINUED | OUTPATIENT
Start: 2025-08-04 | End: 2025-08-09 | Stop reason: HOSPADM

## 2025-08-04 RX ORDER — PANTOPRAZOLE SODIUM 40 MG/1
40 TABLET, DELAYED RELEASE ORAL
Status: CANCELLED | OUTPATIENT
Start: 2025-08-05

## 2025-08-04 RX ORDER — PANTOPRAZOLE SODIUM 40 MG/10ML
80 INJECTION, POWDER, LYOPHILIZED, FOR SOLUTION INTRAVENOUS ONCE
Status: COMPLETED | OUTPATIENT
Start: 2025-08-04 | End: 2025-08-04

## 2025-08-04 RX ORDER — ONDANSETRON 4 MG/1
4 TABLET, ORALLY DISINTEGRATING ORAL EVERY 8 HOURS PRN
Status: DISCONTINUED | OUTPATIENT
Start: 2025-08-04 | End: 2025-08-09 | Stop reason: HOSPADM

## 2025-08-04 RX ORDER — SODIUM CHLORIDE FOR INHALATION 0.9 %
3 VIAL, NEBULIZER (ML) INHALATION PRN
Status: DISCONTINUED | OUTPATIENT
Start: 2025-08-04 | End: 2025-08-09 | Stop reason: HOSPADM

## 2025-08-04 RX ORDER — ONDANSETRON 2 MG/ML
4 INJECTION INTRAMUSCULAR; INTRAVENOUS EVERY 6 HOURS PRN
Status: DISCONTINUED | OUTPATIENT
Start: 2025-08-04 | End: 2025-08-09 | Stop reason: HOSPADM

## 2025-08-04 RX ORDER — IOPAMIDOL 755 MG/ML
80 INJECTION, SOLUTION INTRAVASCULAR
Status: COMPLETED | OUTPATIENT
Start: 2025-08-04 | End: 2025-08-04

## 2025-08-04 RX ORDER — IPRATROPIUM BROMIDE AND ALBUTEROL SULFATE 2.5; .5 MG/3ML; MG/3ML
1 SOLUTION RESPIRATORY (INHALATION)
Status: DISCONTINUED | OUTPATIENT
Start: 2025-08-04 | End: 2025-08-04 | Stop reason: ALTCHOICE

## 2025-08-04 RX ORDER — SODIUM CHLORIDE 9 MG/ML
INJECTION, SOLUTION INTRAVENOUS CONTINUOUS
Status: ACTIVE | OUTPATIENT
Start: 2025-08-04 | End: 2025-08-05

## 2025-08-04 RX ORDER — LISINOPRIL 20 MG/1
20 TABLET ORAL DAILY
Status: DISCONTINUED | OUTPATIENT
Start: 2025-08-05 | End: 2025-08-09 | Stop reason: HOSPADM

## 2025-08-04 RX ORDER — ACETYLCYSTEINE 200 MG/ML
2 SOLUTION ORAL; RESPIRATORY (INHALATION) 2 TIMES DAILY
Status: DISCONTINUED | OUTPATIENT
Start: 2025-08-04 | End: 2025-08-09 | Stop reason: HOSPADM

## 2025-08-04 RX ORDER — DIATRIZOATE MEGLUMINE AND DIATRIZOATE SODIUM 660; 100 MG/ML; MG/ML
30 SOLUTION ORAL; RECTAL
Status: DISCONTINUED | OUTPATIENT
Start: 2025-08-04 | End: 2025-08-09 | Stop reason: HOSPADM

## 2025-08-04 RX ORDER — SODIUM CHLORIDE 0.9 % (FLUSH) 0.9 %
5-40 SYRINGE (ML) INJECTION EVERY 12 HOURS SCHEDULED
Status: DISCONTINUED | OUTPATIENT
Start: 2025-08-04 | End: 2025-08-09 | Stop reason: HOSPADM

## 2025-08-04 RX ORDER — AMLODIPINE BESYLATE 5 MG/1
5 TABLET ORAL DAILY
Status: DISCONTINUED | OUTPATIENT
Start: 2025-08-05 | End: 2025-08-09 | Stop reason: HOSPADM

## 2025-08-04 RX ORDER — ASPIRIN 81 MG/1
81 TABLET ORAL DAILY
Status: DISCONTINUED | OUTPATIENT
Start: 2025-08-05 | End: 2025-08-09 | Stop reason: HOSPADM

## 2025-08-04 RX ADMIN — SODIUM CHLORIDE, PRESERVATIVE FREE 10 ML: 5 INJECTION INTRAVENOUS at 23:07

## 2025-08-04 RX ADMIN — PANTOPRAZOLE SODIUM 80 MG: 40 INJECTION, POWDER, LYOPHILIZED, FOR SOLUTION INTRAVENOUS at 16:35

## 2025-08-04 RX ADMIN — DIATRIZOATE MEGLUMINE AND DIATRIZOATE SODIUM 30 ML: 600; 100 SOLUTION ORAL; RECTAL at 15:42

## 2025-08-04 RX ADMIN — IOPAMIDOL 80 ML: 755 INJECTION, SOLUTION INTRAVENOUS at 20:06

## 2025-08-04 RX ADMIN — SODIUM CHLORIDE: 0.9 INJECTION, SOLUTION INTRAVENOUS at 23:05

## 2025-08-04 ASSESSMENT — ENCOUNTER SYMPTOMS
DIARRHEA: 0
CONSTIPATION: 0
SHORTNESS OF BREATH: 0
SINUS PRESSURE: 0
COUGH: 0
RHINORRHEA: 0
BACK PAIN: 0
COLOR CHANGE: 0
SINUS PAIN: 0
SORE THROAT: 0
ABDOMINAL PAIN: 1
BLOOD IN STOOL: 1
NAUSEA: 1
VOMITING: 1
WHEEZING: 0

## 2025-08-04 ASSESSMENT — PAIN - FUNCTIONAL ASSESSMENT: PAIN_FUNCTIONAL_ASSESSMENT: 0-10

## 2025-08-04 ASSESSMENT — PAIN SCALES - GENERAL: PAINLEVEL_OUTOF10: 3

## 2025-08-05 LAB
ANION GAP SERPL CALC-SCNC: 9 MEQ/L (ref 8–16)
APTT PPP: 27.7 SECONDS (ref 22–38)
BUN SERPL-MCNC: 20 MG/DL (ref 8–23)
CALCIUM SERPL-MCNC: 8.6 MG/DL (ref 8.8–10.2)
CHLORIDE SERPL-SCNC: 107 MEQ/L (ref 98–111)
CO2 SERPL-SCNC: 24 MEQ/L (ref 22–29)
CREAT SERPL-MCNC: 0.7 MG/DL (ref 0.7–1.2)
GFR SERPL CREATININE-BSD FRML MDRD: > 90 ML/MIN/1.73M2
GLUCOSE SERPL-MCNC: 89 MG/DL (ref 74–109)
HCT VFR BLD AUTO: 22.6 % (ref 42–52)
HCT VFR BLD AUTO: 22.8 % (ref 42–52)
HCT VFR BLD AUTO: 24.3 % (ref 42–52)
HCT VFR BLD AUTO: 25.1 % (ref 42–52)
HGB BLD-MCNC: 7.3 GM/DL (ref 14–18)
HGB BLD-MCNC: 7.5 GM/DL (ref 14–18)
HGB BLD-MCNC: 7.9 GM/DL (ref 14–18)
HGB BLD-MCNC: 8.5 GM/DL (ref 14–18)
INR PPP: 1.09 (ref 0.85–1.13)
IRON SATN MFR SERPL: 28 % (ref 20–50)
IRON SERPL-MCNC: 59 UG/DL (ref 61–157)
POTASSIUM SERPL-SCNC: 4 MEQ/L (ref 3.5–5.2)
PROTHROMBIN TIME: 12.7 SECONDS (ref 10–13.5)
SODIUM SERPL-SCNC: 140 MEQ/L (ref 135–145)
TIBC SERPL-MCNC: 213 UG/DL (ref 171–450)

## 2025-08-05 PROCEDURE — 2580000003 HC RX 258

## 2025-08-05 PROCEDURE — 85610 PROTHROMBIN TIME: CPT

## 2025-08-05 PROCEDURE — 85018 HEMOGLOBIN: CPT

## 2025-08-05 PROCEDURE — 96365 THER/PROPH/DIAG IV INF INIT: CPT

## 2025-08-05 PROCEDURE — 85730 THROMBOPLASTIN TIME PARTIAL: CPT

## 2025-08-05 PROCEDURE — 83550 IRON BINDING TEST: CPT

## 2025-08-05 PROCEDURE — 36415 COLL VENOUS BLD VENIPUNCTURE: CPT

## 2025-08-05 PROCEDURE — 6370000000 HC RX 637 (ALT 250 FOR IP): Performed by: NURSE PRACTITIONER

## 2025-08-05 PROCEDURE — 80048 BASIC METABOLIC PNL TOTAL CA: CPT

## 2025-08-05 PROCEDURE — 96366 THER/PROPH/DIAG IV INF ADDON: CPT

## 2025-08-05 PROCEDURE — 36430 TRANSFUSION BLD/BLD COMPNT: CPT

## 2025-08-05 PROCEDURE — 6360000002 HC RX W HCPCS

## 2025-08-05 PROCEDURE — 85014 HEMATOCRIT: CPT

## 2025-08-05 PROCEDURE — 1200000003 HC TELEMETRY R&B

## 2025-08-05 PROCEDURE — 2700000000 HC OXYGEN THERAPY PER DAY

## 2025-08-05 PROCEDURE — 94640 AIRWAY INHALATION TREATMENT: CPT

## 2025-08-05 PROCEDURE — 83540 ASSAY OF IRON: CPT

## 2025-08-05 PROCEDURE — 6370000000 HC RX 637 (ALT 250 FOR IP)

## 2025-08-05 PROCEDURE — 99232 SBSQ HOSP IP/OBS MODERATE 35: CPT | Performed by: NURSE PRACTITIONER

## 2025-08-05 RX ORDER — IPRATROPIUM BROMIDE AND ALBUTEROL SULFATE 2.5; .5 MG/3ML; MG/3ML
1 SOLUTION RESPIRATORY (INHALATION)
Status: DISCONTINUED | OUTPATIENT
Start: 2025-08-05 | End: 2025-08-09 | Stop reason: HOSPADM

## 2025-08-05 RX ORDER — SODIUM CHLORIDE 0.9 % (FLUSH) 0.9 %
5-40 SYRINGE (ML) INJECTION EVERY 12 HOURS SCHEDULED
Status: DISCONTINUED | OUTPATIENT
Start: 2025-08-05 | End: 2025-08-06 | Stop reason: HOSPADM

## 2025-08-05 RX ORDER — SODIUM CHLORIDE 9 MG/ML
25 INJECTION, SOLUTION INTRAVENOUS PRN
Status: DISCONTINUED | OUTPATIENT
Start: 2025-08-05 | End: 2025-08-06 | Stop reason: HOSPADM

## 2025-08-05 RX ORDER — SODIUM CHLORIDE 9 MG/ML
INJECTION, SOLUTION INTRAVENOUS PRN
Status: COMPLETED | OUTPATIENT
Start: 2025-08-05 | End: 2025-08-05

## 2025-08-05 RX ORDER — ALBUTEROL SULFATE 0.83 MG/ML
2.5 SOLUTION RESPIRATORY (INHALATION) EVERY 6 HOURS PRN
Status: DISCONTINUED | OUTPATIENT
Start: 2025-08-05 | End: 2025-08-05

## 2025-08-05 RX ORDER — SODIUM CHLORIDE 0.9 % (FLUSH) 0.9 %
5-40 SYRINGE (ML) INJECTION PRN
Status: DISCONTINUED | OUTPATIENT
Start: 2025-08-05 | End: 2025-08-06 | Stop reason: HOSPADM

## 2025-08-05 RX ORDER — IPRATROPIUM BROMIDE AND ALBUTEROL SULFATE 2.5; .5 MG/3ML; MG/3ML
1 SOLUTION RESPIRATORY (INHALATION) EVERY 4 HOURS PRN
Status: DISCONTINUED | OUTPATIENT
Start: 2025-08-05 | End: 2025-08-09 | Stop reason: HOSPADM

## 2025-08-05 RX ADMIN — PANTOPRAZOLE SODIUM 8 MG/HR: 40 INJECTION, POWDER, FOR SOLUTION INTRAVENOUS at 00:07

## 2025-08-05 RX ADMIN — ACETYLCYSTEINE 400 MG: 200 SOLUTION ORAL; RESPIRATORY (INHALATION) at 08:34

## 2025-08-05 RX ADMIN — PANTOPRAZOLE SODIUM 8 MG/HR: 40 INJECTION, POWDER, FOR SOLUTION INTRAVENOUS at 07:36

## 2025-08-05 RX ADMIN — IPRATROPIUM BROMIDE AND ALBUTEROL SULFATE 1 DOSE: .5; 3 SOLUTION RESPIRATORY (INHALATION) at 19:43

## 2025-08-05 RX ADMIN — SODIUM CHLORIDE: 0.9 INJECTION, SOLUTION INTRAVENOUS at 09:31

## 2025-08-05 RX ADMIN — PANTOPRAZOLE SODIUM 8 MG/HR: 40 INJECTION, POWDER, FOR SOLUTION INTRAVENOUS at 18:07

## 2025-08-05 RX ADMIN — LISINOPRIL 20 MG: 20 TABLET ORAL at 09:32

## 2025-08-05 RX ADMIN — ACETYLCYSTEINE 400 MG: 200 SOLUTION ORAL; RESPIRATORY (INHALATION) at 19:44

## 2025-08-05 RX ADMIN — AMLODIPINE BESYLATE 5 MG: 5 TABLET ORAL at 09:32

## 2025-08-05 RX ADMIN — ASPIRIN 81 MG: 81 TABLET, COATED ORAL at 09:29

## 2025-08-05 RX ADMIN — IPRATROPIUM BROMIDE AND ALBUTEROL SULFATE 1 DOSE: .5; 3 SOLUTION RESPIRATORY (INHALATION) at 08:34

## 2025-08-05 RX ADMIN — SODIUM CHLORIDE: 0.9 INJECTION, SOLUTION INTRAVENOUS at 18:07

## 2025-08-05 ASSESSMENT — PAIN SCALES - GENERAL: PAINLEVEL_OUTOF10: 0

## 2025-08-06 ENCOUNTER — APPOINTMENT (OUTPATIENT)
Dept: ENDOSCOPY | Age: 75
DRG: 395 | End: 2025-08-06
Attending: INTERNAL MEDICINE
Payer: MEDICARE

## 2025-08-06 ENCOUNTER — ANESTHESIA EVENT (OUTPATIENT)
Dept: ENDOSCOPY | Age: 75
End: 2025-08-06
Payer: MEDICARE

## 2025-08-06 ENCOUNTER — ANESTHESIA (OUTPATIENT)
Dept: ENDOSCOPY | Age: 75
End: 2025-08-06
Payer: MEDICARE

## 2025-08-06 ENCOUNTER — HOSPITAL ENCOUNTER (OUTPATIENT)
Dept: PHYSICAL THERAPY | Age: 75
Setting detail: THERAPIES SERIES
Discharge: HOME | End: 2025-08-06

## 2025-08-06 LAB
HCT VFR BLD AUTO: 24.7 % (ref 42–52)
HCT VFR BLD AUTO: 25.6 % (ref 42–52)
HCT VFR BLD AUTO: 25.9 % (ref 42–52)
HCT VFR BLD AUTO: 28.4 % (ref 42–52)
HGB BLD-MCNC: 8.1 GM/DL (ref 14–18)
HGB BLD-MCNC: 8.2 GM/DL (ref 14–18)
HGB BLD-MCNC: 8.4 GM/DL (ref 14–18)
HGB BLD-MCNC: 9.2 GM/DL (ref 14–18)

## 2025-08-06 PROCEDURE — 2700000000 HC OXYGEN THERAPY PER DAY

## 2025-08-06 PROCEDURE — 6370000000 HC RX 637 (ALT 250 FOR IP): Performed by: NURSE PRACTITIONER

## 2025-08-06 PROCEDURE — 3700000000 HC ANESTHESIA ATTENDED CARE: Performed by: INTERNAL MEDICINE

## 2025-08-06 PROCEDURE — 3700000001 HC ADD 15 MINUTES (ANESTHESIA): Performed by: INTERNAL MEDICINE

## 2025-08-06 PROCEDURE — 36415 COLL VENOUS BLD VENIPUNCTURE: CPT

## 2025-08-06 PROCEDURE — 7100000011 HC PHASE II RECOVERY - ADDTL 15 MIN: Performed by: INTERNAL MEDICINE

## 2025-08-06 PROCEDURE — 0DB58ZX EXCISION OF ESOPHAGUS, VIA NATURAL OR ARTIFICIAL OPENING ENDOSCOPIC, DIAGNOSTIC: ICD-10-PCS | Performed by: INTERNAL MEDICINE

## 2025-08-06 PROCEDURE — 7100000010 HC PHASE II RECOVERY - FIRST 15 MIN: Performed by: INTERNAL MEDICINE

## 2025-08-06 PROCEDURE — 85018 HEMOGLOBIN: CPT

## 2025-08-06 PROCEDURE — 6360000002 HC RX W HCPCS

## 2025-08-06 PROCEDURE — 94640 AIRWAY INHALATION TREATMENT: CPT

## 2025-08-06 PROCEDURE — 6370000000 HC RX 637 (ALT 250 FOR IP)

## 2025-08-06 PROCEDURE — 2580000003 HC RX 258

## 2025-08-06 PROCEDURE — 3609012400 HC EGD TRANSORAL BIOPSY SINGLE/MULTIPLE: Performed by: INTERNAL MEDICINE

## 2025-08-06 PROCEDURE — 88305 TISSUE EXAM BY PATHOLOGIST: CPT

## 2025-08-06 PROCEDURE — 85014 HEMATOCRIT: CPT

## 2025-08-06 PROCEDURE — 99232 SBSQ HOSP IP/OBS MODERATE 35: CPT | Performed by: NURSE PRACTITIONER

## 2025-08-06 PROCEDURE — 1200000003 HC TELEMETRY R&B

## 2025-08-06 PROCEDURE — 6360000002 HC RX W HCPCS: Performed by: NURSE ANESTHETIST, CERTIFIED REGISTERED

## 2025-08-06 RX ORDER — LIDOCAINE HYDROCHLORIDE 20 MG/ML
INJECTION, SOLUTION EPIDURAL; INFILTRATION; INTRACAUDAL; PERINEURAL
Status: DISCONTINUED | OUTPATIENT
Start: 2025-08-06 | End: 2025-08-06 | Stop reason: SDUPTHER

## 2025-08-06 RX ADMIN — LISINOPRIL 20 MG: 20 TABLET ORAL at 09:32

## 2025-08-06 RX ADMIN — ACETYLCYSTEINE 400 MG: 200 SOLUTION ORAL; RESPIRATORY (INHALATION) at 08:22

## 2025-08-06 RX ADMIN — AMLODIPINE BESYLATE 5 MG: 5 TABLET ORAL at 09:32

## 2025-08-06 RX ADMIN — ACETYLCYSTEINE 400 MG: 200 SOLUTION ORAL; RESPIRATORY (INHALATION) at 21:07

## 2025-08-06 RX ADMIN — LIDOCAINE HYDROCHLORIDE 50 MG: 20 INJECTION, SOLUTION EPIDURAL; INFILTRATION; INTRACAUDAL; PERINEURAL at 12:32

## 2025-08-06 RX ADMIN — IPRATROPIUM BROMIDE AND ALBUTEROL SULFATE 1 DOSE: .5; 3 SOLUTION RESPIRATORY (INHALATION) at 21:02

## 2025-08-06 RX ADMIN — PROPOFOL 50 MG: 10 INJECTION, EMULSION INTRAVENOUS at 12:32

## 2025-08-06 RX ADMIN — ACETAMINOPHEN 650 MG: 325 TABLET ORAL at 17:55

## 2025-08-06 RX ADMIN — PANTOPRAZOLE SODIUM 8 MG/HR: 40 INJECTION, POWDER, FOR SOLUTION INTRAVENOUS at 03:24

## 2025-08-06 RX ADMIN — PANTOPRAZOLE SODIUM 8 MG/HR: 40 INJECTION, POWDER, FOR SOLUTION INTRAVENOUS at 21:33

## 2025-08-06 RX ADMIN — PROPOFOL 35 MG: 10 INJECTION, EMULSION INTRAVENOUS at 12:39

## 2025-08-06 RX ADMIN — PROPOFOL 50 MG: 10 INJECTION, EMULSION INTRAVENOUS at 12:36

## 2025-08-06 RX ADMIN — IPRATROPIUM BROMIDE AND ALBUTEROL SULFATE 1 DOSE: .5; 3 SOLUTION RESPIRATORY (INHALATION) at 08:22

## 2025-08-06 ASSESSMENT — PAIN SCALES - GENERAL: PAINLEVEL_OUTOF10: 4

## 2025-08-06 ASSESSMENT — PAIN DESCRIPTION - DESCRIPTORS: DESCRIPTORS: DISCOMFORT

## 2025-08-06 ASSESSMENT — PAIN - FUNCTIONAL ASSESSMENT
PAIN_FUNCTIONAL_ASSESSMENT: NONE - DENIES PAIN
PAIN_FUNCTIONAL_ASSESSMENT: 0-10
PAIN_FUNCTIONAL_ASSESSMENT: NONE - DENIES PAIN
PAIN_FUNCTIONAL_ASSESSMENT: NONE - DENIES PAIN

## 2025-08-06 ASSESSMENT — PAIN DESCRIPTION - LOCATION: LOCATION: ABDOMEN

## 2025-08-06 ASSESSMENT — PAIN DESCRIPTION - ORIENTATION: ORIENTATION: LEFT;MID

## 2025-08-07 ENCOUNTER — APPOINTMENT (OUTPATIENT)
Dept: NUCLEAR MEDICINE | Age: 75
DRG: 395 | End: 2025-08-07
Payer: MEDICARE

## 2025-08-07 LAB
HCT VFR BLD AUTO: 25.7 % (ref 42–52)
HCT VFR BLD AUTO: 26.4 % (ref 42–52)
HCT VFR BLD AUTO: 26.4 % (ref 42–52)
HGB BLD-MCNC: 8.4 GM/DL (ref 14–18)
HGB BLD-MCNC: 8.6 GM/DL (ref 14–18)
HGB BLD-MCNC: 8.7 GM/DL (ref 14–18)

## 2025-08-07 PROCEDURE — 6370000000 HC RX 637 (ALT 250 FOR IP): Performed by: NURSE PRACTITIONER

## 2025-08-07 PROCEDURE — 36415 COLL VENOUS BLD VENIPUNCTURE: CPT

## 2025-08-07 PROCEDURE — 1200000003 HC TELEMETRY R&B

## 2025-08-07 PROCEDURE — 6360000002 HC RX W HCPCS

## 2025-08-07 PROCEDURE — 78278 ACUTE GI BLOOD LOSS IMAGING: CPT

## 2025-08-07 PROCEDURE — 2580000003 HC RX 258

## 2025-08-07 PROCEDURE — 3430000000 HC RX DIAGNOSTIC RADIOPHARMACEUTICAL: Performed by: NURSE PRACTITIONER

## 2025-08-07 PROCEDURE — 2700000000 HC OXYGEN THERAPY PER DAY

## 2025-08-07 PROCEDURE — 6370000000 HC RX 637 (ALT 250 FOR IP)

## 2025-08-07 PROCEDURE — 85018 HEMOGLOBIN: CPT

## 2025-08-07 PROCEDURE — 2500000003 HC RX 250 WO HCPCS

## 2025-08-07 PROCEDURE — 85014 HEMATOCRIT: CPT

## 2025-08-07 PROCEDURE — A9560 TC99M LABELED RBC: HCPCS | Performed by: NURSE PRACTITIONER

## 2025-08-07 PROCEDURE — 99232 SBSQ HOSP IP/OBS MODERATE 35: CPT | Performed by: NURSE PRACTITIONER

## 2025-08-07 PROCEDURE — 94640 AIRWAY INHALATION TREATMENT: CPT

## 2025-08-07 PROCEDURE — 94761 N-INVAS EAR/PLS OXIMETRY MLT: CPT

## 2025-08-07 RX ADMIN — Medication 28 MILLICURIE: at 14:35

## 2025-08-07 RX ADMIN — PANTOPRAZOLE SODIUM 8 MG/HR: 40 INJECTION, POWDER, FOR SOLUTION INTRAVENOUS at 07:23

## 2025-08-07 RX ADMIN — ACETYLCYSTEINE 400 MG: 200 SOLUTION ORAL; RESPIRATORY (INHALATION) at 08:00

## 2025-08-07 RX ADMIN — ASPIRIN 81 MG: 81 TABLET, COATED ORAL at 08:44

## 2025-08-07 RX ADMIN — ACETYLCYSTEINE 400 MG: 200 SOLUTION ORAL; RESPIRATORY (INHALATION) at 20:39

## 2025-08-07 RX ADMIN — IPRATROPIUM BROMIDE AND ALBUTEROL SULFATE 1 DOSE: .5; 3 SOLUTION RESPIRATORY (INHALATION) at 20:36

## 2025-08-07 RX ADMIN — PANTOPRAZOLE SODIUM 8 MG/HR: 40 INJECTION, POWDER, FOR SOLUTION INTRAVENOUS at 18:35

## 2025-08-07 RX ADMIN — LISINOPRIL 20 MG: 20 TABLET ORAL at 08:44

## 2025-08-07 RX ADMIN — SODIUM CHLORIDE, PRESERVATIVE FREE 10 ML: 5 INJECTION INTRAVENOUS at 08:45

## 2025-08-07 RX ADMIN — IPRATROPIUM BROMIDE AND ALBUTEROL SULFATE 1 DOSE: .5; 3 SOLUTION RESPIRATORY (INHALATION) at 07:58

## 2025-08-07 RX ADMIN — AMLODIPINE BESYLATE 5 MG: 5 TABLET ORAL at 08:44

## 2025-08-08 PROBLEM — E44.0 MODERATE MALNUTRITION: Status: ACTIVE | Noted: 2025-08-08

## 2025-08-08 PROBLEM — D64.9 ANEMIA: Status: ACTIVE | Noted: 2025-03-24

## 2025-08-08 LAB
HCT VFR BLD AUTO: 26.7 % (ref 42–52)
HGB BLD-MCNC: 8.6 GM/DL (ref 14–18)

## 2025-08-08 PROCEDURE — 85018 HEMOGLOBIN: CPT

## 2025-08-08 PROCEDURE — 99232 SBSQ HOSP IP/OBS MODERATE 35: CPT | Performed by: PHYSICIAN ASSISTANT

## 2025-08-08 PROCEDURE — 94640 AIRWAY INHALATION TREATMENT: CPT

## 2025-08-08 PROCEDURE — 36415 COLL VENOUS BLD VENIPUNCTURE: CPT

## 2025-08-08 PROCEDURE — 94761 N-INVAS EAR/PLS OXIMETRY MLT: CPT

## 2025-08-08 PROCEDURE — 6370000000 HC RX 637 (ALT 250 FOR IP): Performed by: NURSE PRACTITIONER

## 2025-08-08 PROCEDURE — 6370000000 HC RX 637 (ALT 250 FOR IP)

## 2025-08-08 PROCEDURE — 1200000003 HC TELEMETRY R&B

## 2025-08-08 PROCEDURE — 6360000002 HC RX W HCPCS

## 2025-08-08 PROCEDURE — 2580000003 HC RX 258

## 2025-08-08 PROCEDURE — 85014 HEMATOCRIT: CPT

## 2025-08-08 PROCEDURE — 2700000000 HC OXYGEN THERAPY PER DAY

## 2025-08-08 PROCEDURE — 6370000000 HC RX 637 (ALT 250 FOR IP): Performed by: PHYSICIAN ASSISTANT

## 2025-08-08 RX ORDER — PANTOPRAZOLE SODIUM 40 MG/1
40 TABLET, DELAYED RELEASE ORAL
Status: DISCONTINUED | OUTPATIENT
Start: 2025-08-08 | End: 2025-08-09 | Stop reason: HOSPADM

## 2025-08-08 RX ADMIN — ACETYLCYSTEINE 400 MG: 200 SOLUTION ORAL; RESPIRATORY (INHALATION) at 17:05

## 2025-08-08 RX ADMIN — IPRATROPIUM BROMIDE AND ALBUTEROL SULFATE 1 DOSE: .5; 3 SOLUTION RESPIRATORY (INHALATION) at 17:04

## 2025-08-08 RX ADMIN — LISINOPRIL 20 MG: 20 TABLET ORAL at 08:23

## 2025-08-08 RX ADMIN — PANTOPRAZOLE SODIUM 40 MG: 40 TABLET, DELAYED RELEASE ORAL at 08:23

## 2025-08-08 RX ADMIN — PANTOPRAZOLE SODIUM 8 MG/HR: 40 INJECTION, POWDER, FOR SOLUTION INTRAVENOUS at 04:31

## 2025-08-08 RX ADMIN — AMLODIPINE BESYLATE 5 MG: 5 TABLET ORAL at 08:23

## 2025-08-09 VITALS
WEIGHT: 120.15 LBS | BODY MASS INDEX: 17.2 KG/M2 | TEMPERATURE: 98.1 F | HEIGHT: 70 IN | HEART RATE: 82 BPM | DIASTOLIC BLOOD PRESSURE: 65 MMHG | OXYGEN SATURATION: 99 % | RESPIRATION RATE: 16 BRPM | SYSTOLIC BLOOD PRESSURE: 145 MMHG

## 2025-08-09 LAB
HCT VFR BLD AUTO: 26.5 % (ref 42–52)
HGB BLD-MCNC: 8.4 GM/DL (ref 14–18)

## 2025-08-09 PROCEDURE — 85014 HEMATOCRIT: CPT

## 2025-08-09 PROCEDURE — 99239 HOSP IP/OBS DSCHRG MGMT >30: CPT | Performed by: PHYSICIAN ASSISTANT

## 2025-08-09 PROCEDURE — 85018 HEMOGLOBIN: CPT

## 2025-08-09 PROCEDURE — 6360000002 HC RX W HCPCS

## 2025-08-09 PROCEDURE — 6370000000 HC RX 637 (ALT 250 FOR IP): Performed by: NURSE PRACTITIONER

## 2025-08-09 PROCEDURE — 94761 N-INVAS EAR/PLS OXIMETRY MLT: CPT

## 2025-08-09 PROCEDURE — 36415 COLL VENOUS BLD VENIPUNCTURE: CPT

## 2025-08-09 PROCEDURE — 2700000000 HC OXYGEN THERAPY PER DAY

## 2025-08-09 PROCEDURE — 6370000000 HC RX 637 (ALT 250 FOR IP): Performed by: PHYSICIAN ASSISTANT

## 2025-08-09 PROCEDURE — 94640 AIRWAY INHALATION TREATMENT: CPT

## 2025-08-09 PROCEDURE — 2500000003 HC RX 250 WO HCPCS

## 2025-08-09 PROCEDURE — 6370000000 HC RX 637 (ALT 250 FOR IP)

## 2025-08-09 RX ADMIN — LISINOPRIL 20 MG: 20 TABLET ORAL at 08:11

## 2025-08-09 RX ADMIN — ASPIRIN 81 MG: 81 TABLET, COATED ORAL at 08:11

## 2025-08-09 RX ADMIN — IPRATROPIUM BROMIDE AND ALBUTEROL SULFATE 1 DOSE: .5; 3 SOLUTION RESPIRATORY (INHALATION) at 09:29

## 2025-08-09 RX ADMIN — SODIUM CHLORIDE, PRESERVATIVE FREE 10 ML: 5 INJECTION INTRAVENOUS at 08:11

## 2025-08-09 RX ADMIN — ACETYLCYSTEINE 400 MG: 200 SOLUTION ORAL; RESPIRATORY (INHALATION) at 09:34

## 2025-08-09 RX ADMIN — PANTOPRAZOLE SODIUM 40 MG: 40 TABLET, DELAYED RELEASE ORAL at 06:32

## 2025-08-09 RX ADMIN — AMLODIPINE BESYLATE 5 MG: 5 TABLET ORAL at 08:11

## 2025-08-09 ASSESSMENT — PAIN SCALES - GENERAL
PAINLEVEL_OUTOF10: 0
PAINLEVEL_OUTOF10: 0

## 2025-08-11 ENCOUNTER — HOSPITAL ENCOUNTER (OUTPATIENT)
Dept: OTHER | Age: 75
Discharge: HOME | End: 2025-08-11
Payer: MEDICARE

## 2025-08-11 ENCOUNTER — CARE COORDINATION (OUTPATIENT)
Dept: CARE COORDINATION | Age: 75
End: 2025-08-11

## 2025-08-11 DIAGNOSIS — K92.2 GASTROINTESTINAL HEMORRHAGE, UNSPECIFIED GASTROINTESTINAL HEMORRHAGE TYPE: ICD-10-CM

## 2025-08-11 DIAGNOSIS — K92.2 GASTROINTESTINAL HEMORRHAGE, UNSPECIFIED GASTROINTESTINAL HEMORRHAGE TYPE: Primary | ICD-10-CM

## 2025-08-11 LAB
DEPRECATED RDW RBC AUTO: 67.3 FL (ref 35–45)
ERYTHROCYTE [DISTWIDTH] IN BLOOD BY AUTOMATED COUNT: 19 % (ref 11.5–14.5)
HCT VFR BLD AUTO: 27.7 % (ref 42–52)
HGB BLD-MCNC: 8.7 GM/DL (ref 14–18)
MCH RBC QN AUTO: 30.5 PG (ref 26–33)
MCHC RBC AUTO-ENTMCNC: 31.4 GM/DL (ref 32.2–35.5)
MCV RBC AUTO: 97.2 FL (ref 80–94)
PLATELET # BLD AUTO: 312 THOU/MM3 (ref 130–400)
PMV BLD AUTO: 9.6 FL (ref 9.4–12.4)
RBC # BLD AUTO: 2.85 MILL/MM3 (ref 4.7–6.1)
SCAN OF BLOOD SMEAR: NORMAL
WBC # BLD AUTO: 6.5 THOU/MM3 (ref 4.8–10.8)

## 2025-08-11 PROCEDURE — 1111F DSCHRG MED/CURRENT MED MERGE: CPT | Performed by: FAMILY MEDICINE

## 2025-08-11 PROCEDURE — 85027 COMPLETE CBC AUTOMATED: CPT

## 2025-08-11 PROCEDURE — 36415 COLL VENOUS BLD VENIPUNCTURE: CPT

## 2025-08-12 ENCOUNTER — HOSPITAL ENCOUNTER (OUTPATIENT)
Dept: PHYSICAL THERAPY | Age: 75
Setting detail: THERAPIES SERIES
Discharge: HOME | End: 2025-08-12
Payer: MEDICARE

## 2025-08-12 ENCOUNTER — HOSPITAL ENCOUNTER (OUTPATIENT)
Dept: SPEECH THERAPY | Age: 75
Setting detail: THERAPIES SERIES
Discharge: HOME | End: 2025-08-12
Payer: MEDICARE

## 2025-08-12 PROCEDURE — 97110 THERAPEUTIC EXERCISES: CPT

## 2025-08-12 PROCEDURE — 92507 TX SP LANG VOICE COMM INDIV: CPT | Performed by: SPEECH-LANGUAGE PATHOLOGIST

## 2025-08-14 ENCOUNTER — CARE COORDINATION (OUTPATIENT)
Dept: CARE COORDINATION | Age: 75
End: 2025-08-14

## 2025-08-14 ENCOUNTER — HOSPITAL ENCOUNTER (OUTPATIENT)
Dept: PHYSICAL THERAPY | Age: 75
Setting detail: THERAPIES SERIES
Discharge: HOME | End: 2025-08-14
Payer: MEDICARE

## 2025-08-14 PROCEDURE — 97110 THERAPEUTIC EXERCISES: CPT

## 2025-08-19 ENCOUNTER — TELEPHONE (OUTPATIENT)
Dept: ENT CLINIC | Age: 75
End: 2025-08-19

## 2025-08-19 ENCOUNTER — OFFICE VISIT (OUTPATIENT)
Dept: FAMILY MEDICINE CLINIC | Age: 75
End: 2025-08-19

## 2025-08-19 ENCOUNTER — HOSPITAL ENCOUNTER (OUTPATIENT)
Dept: PHYSICAL THERAPY | Age: 75
Setting detail: THERAPIES SERIES
Discharge: HOME | End: 2025-08-19
Payer: MEDICARE

## 2025-08-19 VITALS
HEART RATE: 104 BPM | WEIGHT: 131.8 LBS | BODY MASS INDEX: 18.91 KG/M2 | DIASTOLIC BLOOD PRESSURE: 62 MMHG | SYSTOLIC BLOOD PRESSURE: 108 MMHG | RESPIRATION RATE: 32 BRPM

## 2025-08-19 DIAGNOSIS — I73.9 PERIPHERAL VASCULAR DISEASE: ICD-10-CM

## 2025-08-19 DIAGNOSIS — K21.9 GASTROESOPHAGEAL REFLUX DISEASE, UNSPECIFIED WHETHER ESOPHAGITIS PRESENT: ICD-10-CM

## 2025-08-19 DIAGNOSIS — C32.0 SQUAMOUS CELL CARCINOMA OF GLOTTIS (HCC): ICD-10-CM

## 2025-08-19 DIAGNOSIS — I10 PRIMARY HYPERTENSION: ICD-10-CM

## 2025-08-19 DIAGNOSIS — C32.9 PRIMARY SQUAMOUS CELL CARCINOMA OF LARYNX (HCC): ICD-10-CM

## 2025-08-19 DIAGNOSIS — K92.2 LOWER GI BLEED: Primary | ICD-10-CM

## 2025-08-19 DIAGNOSIS — J44.9 CHRONIC OBSTRUCTIVE PULMONARY DISEASE, UNSPECIFIED COPD TYPE (HCC): ICD-10-CM

## 2025-08-19 PROBLEM — F10.20 UNCOMPLICATED ALCOHOL DEPENDENCE (HCC): Status: RESOLVED | Noted: 2017-03-23 | Resolved: 2025-08-19

## 2025-08-19 PROCEDURE — 97110 THERAPEUTIC EXERCISES: CPT

## 2025-08-19 SDOH — ECONOMIC STABILITY: FOOD INSECURITY: WITHIN THE PAST 12 MONTHS, YOU WORRIED THAT YOUR FOOD WOULD RUN OUT BEFORE YOU GOT MONEY TO BUY MORE.: NEVER TRUE

## 2025-08-19 SDOH — ECONOMIC STABILITY: FOOD INSECURITY: WITHIN THE PAST 12 MONTHS, THE FOOD YOU BOUGHT JUST DIDN'T LAST AND YOU DIDN'T HAVE MONEY TO GET MORE.: NEVER TRUE

## 2025-08-20 ENCOUNTER — TELEPHONE (OUTPATIENT)
Dept: FAMILY MEDICINE CLINIC | Age: 75
End: 2025-08-20

## 2025-08-20 DIAGNOSIS — I89.0 LYMPHEDEMA OF FACE: Primary | ICD-10-CM

## 2025-08-21 ENCOUNTER — APPOINTMENT (OUTPATIENT)
Dept: CT IMAGING | Age: 75
DRG: 607 | End: 2025-08-21
Payer: MEDICARE

## 2025-08-21 ENCOUNTER — HOSPITAL ENCOUNTER (INPATIENT)
Age: 75
LOS: 2 days | Discharge: HOME | DRG: 607 | End: 2025-08-23
Attending: STUDENT IN AN ORGANIZED HEALTH CARE EDUCATION/TRAINING PROGRAM | Admitting: STUDENT IN AN ORGANIZED HEALTH CARE EDUCATION/TRAINING PROGRAM
Payer: MEDICARE

## 2025-08-21 ENCOUNTER — HOSPITAL ENCOUNTER (OUTPATIENT)
Dept: SPEECH THERAPY | Age: 75
Setting detail: THERAPIES SERIES
Discharge: HOME | End: 2025-08-21
Payer: MEDICARE

## 2025-08-21 ENCOUNTER — HOSPITAL ENCOUNTER (OUTPATIENT)
Dept: PHYSICAL THERAPY | Age: 75
Setting detail: THERAPIES SERIES
Discharge: HOME | End: 2025-08-21
Payer: MEDICARE

## 2025-08-21 DIAGNOSIS — I82.C12 ACUTE EMBOLISM AND THROMBOSIS OF LEFT INTERNAL JUGULAR VEIN (HCC): ICD-10-CM

## 2025-08-21 DIAGNOSIS — R22.0 FACIAL SWELLING: Primary | ICD-10-CM

## 2025-08-21 DIAGNOSIS — I89.0 LYMPHEDEMA OF FACE: ICD-10-CM

## 2025-08-21 LAB
ALBUMIN SERPL BCG-MCNC: 3.9 G/DL (ref 3.4–4.9)
ALP SERPL-CCNC: 62 U/L (ref 40–129)
ALT SERPL W/O P-5'-P-CCNC: 11 U/L (ref 10–50)
ANION GAP SERPL CALC-SCNC: 13 MEQ/L (ref 8–16)
ANISOCYTOSIS BLD QL SMEAR: PRESENT
APTT PPP: 28.3 SECONDS (ref 22–38)
AST SERPL-CCNC: 20 U/L (ref 10–50)
BASOPHILS ABSOLUTE: 0 THOU/MM3 (ref 0–0.1)
BASOPHILS NFR BLD AUTO: 0.6 %
BILIRUB CONJ SERPL-MCNC: < 0.1 MG/DL (ref 0–0.2)
BILIRUB SERPL-MCNC: 0.3 MG/DL (ref 0.3–1.2)
BILIRUB UR QL STRIP.AUTO: NEGATIVE
BUN SERPL-MCNC: 22 MG/DL (ref 8–23)
CALCIUM SERPL-MCNC: 9.5 MG/DL (ref 8.5–10.5)
CHARACTER UR: CLEAR
CHLORIDE SERPL-SCNC: 100 MEQ/L (ref 98–111)
CO2 SERPL-SCNC: 25 MEQ/L (ref 22–29)
COLOR, UA: YELLOW
CREAT SERPL-MCNC: 1 MG/DL (ref 0.7–1.2)
CRP SERPL-MCNC: < 0.3 MG/DL (ref 0–0.5)
DEPRECATED RDW RBC AUTO: 71.8 FL (ref 35–45)
EKG ATRIAL RATE: 81 BPM
EKG P AXIS: 81 DEGREES
EKG P-R INTERVAL: 144 MS
EKG Q-T INTERVAL: 372 MS
EKG QRS DURATION: 84 MS
EKG QTC CALCULATION (BAZETT): 432 MS
EKG R AXIS: 72 DEGREES
EKG T AXIS: 56 DEGREES
EKG VENTRICULAR RATE: 81 BPM
EOSINOPHIL NFR BLD AUTO: 3.9 %
EOSINOPHILS ABSOLUTE: 0.3 THOU/MM3 (ref 0–0.4)
ERYTHROCYTE [DISTWIDTH] IN BLOOD BY AUTOMATED COUNT: 19.8 % (ref 11.5–14.5)
ERYTHROCYTE [SEDIMENTATION RATE] IN BLOOD BY WESTERGREN METHOD: 37 MM/HR (ref 0–20)
GFR SERPL CREATININE-BSD FRML MDRD: 78 ML/MIN/1.73M2
GLUCOSE SERPL-MCNC: 82 MG/DL (ref 74–109)
GLUCOSE UR QL STRIP.AUTO: NEGATIVE MG/DL
HCT VFR BLD AUTO: 32 % (ref 42–52)
HEPARIN UNFRACTIONATED: < 0.04 U/ML (ref 0.3–0.7)
HGB BLD-MCNC: 10 GM/DL (ref 14–18)
HGB UR QL STRIP.AUTO: NEGATIVE
IMM GRANULOCYTES # BLD AUTO: 0.07 THOU/MM3 (ref 0–0.07)
IMM GRANULOCYTES NFR BLD AUTO: 1.1 %
INR PPP: 0.98 (ref 0.85–1.13)
KETONES UR QL STRIP.AUTO: NEGATIVE
LACTIC ACID, SEPSIS: 1.4 MMOL/L (ref 0.5–1.9)
LACTIC ACID, SEPSIS: 1.5 MMOL/L (ref 0.5–1.9)
LYMPHOCYTES ABSOLUTE: 1 THOU/MM3 (ref 1–4.8)
LYMPHOCYTES NFR BLD AUTO: 14.4 %
MAGNESIUM SERPL-MCNC: 2.1 MG/DL (ref 1.6–2.6)
MCH RBC QN AUTO: 30.7 PG (ref 26–33)
MCHC RBC AUTO-ENTMCNC: 31.3 GM/DL (ref 32.2–35.5)
MCV RBC AUTO: 98.2 FL (ref 80–94)
MONOCYTES ABSOLUTE: 0.6 THOU/MM3 (ref 0.4–1.3)
MONOCYTES NFR BLD AUTO: 9.5 %
NEUTROPHILS ABSOLUTE: 4.7 THOU/MM3 (ref 1.8–7.7)
NEUTROPHILS NFR BLD AUTO: 70.5 %
NITRITE UR QL STRIP: NEGATIVE
NRBC BLD AUTO-RTO: 0 /100 WBC
NT-PROBNP SERPL IA-MCNC: 119 PG/ML (ref 0–449)
OSMOLALITY SERPL CALC.SUM OF ELEC: 278.1 MOSMOL/KG (ref 275–300)
PH UR STRIP.AUTO: 7 [PH] (ref 5–9)
PLATELET # BLD AUTO: 335 THOU/MM3 (ref 130–400)
PLATELET BLD QL SMEAR: ADEQUATE
PMV BLD AUTO: 9.7 FL (ref 9.4–12.4)
POTASSIUM SERPL-SCNC: 3.9 MEQ/L (ref 3.5–5.2)
PROCALCITONIN SERPL IA-MCNC: 0.07 NG/ML (ref 0.01–0.09)
PROT SERPL-MCNC: 7.8 G/DL (ref 6.4–8.3)
PROT UR STRIP.AUTO-MCNC: NEGATIVE MG/DL
PROTHROMBIN TIME: 11.5 SECONDS (ref 10–13.5)
RBC # BLD AUTO: 3.26 MILL/MM3 (ref 4.7–6.1)
SCAN OF BLOOD SMEAR: NORMAL
SODIUM SERPL-SCNC: 138 MEQ/L (ref 135–145)
SP GR UR REFRACT.AUTO: 1.02 (ref 1–1.03)
TROPONIN, HIGH SENSITIVITY: 25 NG/L (ref 0–12)
TROPONIN, HIGH SENSITIVITY: 26 NG/L (ref 0–12)
UROBILINOGEN, URINE: 0.2 EU/DL (ref 0–1)
WBC # BLD AUTO: 6.7 THOU/MM3 (ref 4.8–10.8)
WBC #/AREA URNS HPF: NEGATIVE /[HPF]

## 2025-08-21 PROCEDURE — 71260 CT THORAX DX C+: CPT

## 2025-08-21 PROCEDURE — 85651 RBC SED RATE NONAUTOMATED: CPT

## 2025-08-21 PROCEDURE — 87040 BLOOD CULTURE FOR BACTERIA: CPT

## 2025-08-21 PROCEDURE — 85025 COMPLETE CBC W/AUTO DIFF WBC: CPT

## 2025-08-21 PROCEDURE — 96366 THER/PROPH/DIAG IV INF ADDON: CPT

## 2025-08-21 PROCEDURE — 84484 ASSAY OF TROPONIN QUANT: CPT

## 2025-08-21 PROCEDURE — 2580000003 HC RX 258: Performed by: STUDENT IN AN ORGANIZED HEALTH CARE EDUCATION/TRAINING PROGRAM

## 2025-08-21 PROCEDURE — 96367 TX/PROPH/DG ADDL SEQ IV INF: CPT

## 2025-08-21 PROCEDURE — 83605 ASSAY OF LACTIC ACID: CPT

## 2025-08-21 PROCEDURE — 92507 TX SP LANG VOICE COMM INDIV: CPT | Performed by: SPEECH-LANGUAGE PATHOLOGIST

## 2025-08-21 PROCEDURE — 82248 BILIRUBIN DIRECT: CPT

## 2025-08-21 PROCEDURE — 93005 ELECTROCARDIOGRAM TRACING: CPT | Performed by: STUDENT IN AN ORGANIZED HEALTH CARE EDUCATION/TRAINING PROGRAM

## 2025-08-21 PROCEDURE — 85610 PROTHROMBIN TIME: CPT

## 2025-08-21 PROCEDURE — 36415 COLL VENOUS BLD VENIPUNCTURE: CPT

## 2025-08-21 PROCEDURE — 86140 C-REACTIVE PROTEIN: CPT

## 2025-08-21 PROCEDURE — 85520 HEPARIN ASSAY: CPT

## 2025-08-21 PROCEDURE — 85730 THROMBOPLASTIN TIME PARTIAL: CPT

## 2025-08-21 PROCEDURE — 70450 CT HEAD/BRAIN W/O DYE: CPT

## 2025-08-21 PROCEDURE — 80053 COMPREHEN METABOLIC PANEL: CPT

## 2025-08-21 PROCEDURE — 83735 ASSAY OF MAGNESIUM: CPT

## 2025-08-21 PROCEDURE — 99285 EMERGENCY DEPT VISIT HI MDM: CPT

## 2025-08-21 PROCEDURE — 81003 URINALYSIS AUTO W/O SCOPE: CPT

## 2025-08-21 PROCEDURE — 84145 PROCALCITONIN (PCT): CPT

## 2025-08-21 PROCEDURE — 97110 THERAPEUTIC EXERCISES: CPT

## 2025-08-21 PROCEDURE — 6360000002 HC RX W HCPCS: Performed by: STUDENT IN AN ORGANIZED HEALTH CARE EDUCATION/TRAINING PROGRAM

## 2025-08-21 PROCEDURE — 83880 ASSAY OF NATRIURETIC PEPTIDE: CPT

## 2025-08-21 PROCEDURE — 96365 THER/PROPH/DIAG IV INF INIT: CPT

## 2025-08-21 PROCEDURE — 70491 CT SOFT TISSUE NECK W/DYE: CPT

## 2025-08-21 PROCEDURE — 6370000000 HC RX 637 (ALT 250 FOR IP)

## 2025-08-21 PROCEDURE — 2060000000 HC ICU INTERMEDIATE R&B

## 2025-08-21 PROCEDURE — 6360000004 HC RX CONTRAST MEDICATION: Performed by: STUDENT IN AN ORGANIZED HEALTH CARE EDUCATION/TRAINING PROGRAM

## 2025-08-21 RX ORDER — ACETAMINOPHEN 325 MG/1
650 TABLET ORAL EVERY 6 HOURS PRN
Status: DISCONTINUED | OUTPATIENT
Start: 2025-08-21 | End: 2025-08-23 | Stop reason: HOSPADM

## 2025-08-21 RX ORDER — 0.9 % SODIUM CHLORIDE 0.9 %
1000 INTRAVENOUS SOLUTION INTRAVENOUS ONCE
Status: COMPLETED | OUTPATIENT
Start: 2025-08-21 | End: 2025-08-21

## 2025-08-21 RX ORDER — SODIUM CHLORIDE 0.9 % (FLUSH) 0.9 %
5-40 SYRINGE (ML) INJECTION EVERY 12 HOURS SCHEDULED
Status: DISCONTINUED | OUTPATIENT
Start: 2025-08-21 | End: 2025-08-23 | Stop reason: HOSPADM

## 2025-08-21 RX ORDER — HEPARIN SODIUM 10000 [USP'U]/100ML
5-30 INJECTION, SOLUTION INTRAVENOUS CONTINUOUS
Status: DISCONTINUED | OUTPATIENT
Start: 2025-08-21 | End: 2025-08-23 | Stop reason: HOSPADM

## 2025-08-21 RX ORDER — IPRATROPIUM BROMIDE AND ALBUTEROL SULFATE 2.5; .5 MG/3ML; MG/3ML
1 SOLUTION RESPIRATORY (INHALATION)
Status: DISCONTINUED | OUTPATIENT
Start: 2025-08-21 | End: 2025-08-23 | Stop reason: HOSPADM

## 2025-08-21 RX ORDER — AMLODIPINE BESYLATE 5 MG/1
5 TABLET ORAL DAILY
Status: DISCONTINUED | OUTPATIENT
Start: 2025-08-22 | End: 2025-08-23 | Stop reason: HOSPADM

## 2025-08-21 RX ORDER — SODIUM CHLORIDE 9 MG/ML
INJECTION, SOLUTION INTRAVENOUS PRN
Status: DISCONTINUED | OUTPATIENT
Start: 2025-08-21 | End: 2025-08-23 | Stop reason: HOSPADM

## 2025-08-21 RX ORDER — POTASSIUM CHLORIDE 7.45 MG/ML
10 INJECTION INTRAVENOUS PRN
Status: DISCONTINUED | OUTPATIENT
Start: 2025-08-21 | End: 2025-08-23 | Stop reason: HOSPADM

## 2025-08-21 RX ORDER — SODIUM CHLORIDE 0.9 % (FLUSH) 0.9 %
5-40 SYRINGE (ML) INJECTION PRN
Status: DISCONTINUED | OUTPATIENT
Start: 2025-08-21 | End: 2025-08-23 | Stop reason: HOSPADM

## 2025-08-21 RX ORDER — ONDANSETRON 4 MG/1
4 TABLET, ORALLY DISINTEGRATING ORAL EVERY 8 HOURS PRN
Status: DISCONTINUED | OUTPATIENT
Start: 2025-08-21 | End: 2025-08-23 | Stop reason: HOSPADM

## 2025-08-21 RX ORDER — ACETAMINOPHEN 650 MG/1
650 SUPPOSITORY RECTAL EVERY 6 HOURS PRN
Status: DISCONTINUED | OUTPATIENT
Start: 2025-08-21 | End: 2025-08-23 | Stop reason: HOSPADM

## 2025-08-21 RX ORDER — ONDANSETRON 2 MG/ML
4 INJECTION INTRAMUSCULAR; INTRAVENOUS EVERY 6 HOURS PRN
Status: DISCONTINUED | OUTPATIENT
Start: 2025-08-21 | End: 2025-08-23 | Stop reason: HOSPADM

## 2025-08-21 RX ORDER — HEPARIN SODIUM 1000 [USP'U]/ML
40 INJECTION, SOLUTION INTRAVENOUS; SUBCUTANEOUS PRN
Status: DISCONTINUED | OUTPATIENT
Start: 2025-08-21 | End: 2025-08-23 | Stop reason: HOSPADM

## 2025-08-21 RX ORDER — PANTOPRAZOLE SODIUM 40 MG/1
40 TABLET, DELAYED RELEASE ORAL
Status: DISCONTINUED | OUTPATIENT
Start: 2025-08-22 | End: 2025-08-23 | Stop reason: HOSPADM

## 2025-08-21 RX ORDER — HEPARIN SODIUM 1000 [USP'U]/ML
80 INJECTION, SOLUTION INTRAVENOUS; SUBCUTANEOUS PRN
Status: DISCONTINUED | OUTPATIENT
Start: 2025-08-21 | End: 2025-08-23 | Stop reason: HOSPADM

## 2025-08-21 RX ORDER — LISINOPRIL 20 MG/1
20 TABLET ORAL DAILY
Status: DISCONTINUED | OUTPATIENT
Start: 2025-08-22 | End: 2025-08-23 | Stop reason: HOSPADM

## 2025-08-21 RX ORDER — HEPARIN SODIUM 1000 [USP'U]/ML
80 INJECTION, SOLUTION INTRAVENOUS; SUBCUTANEOUS ONCE
Status: COMPLETED | OUTPATIENT
Start: 2025-08-21 | End: 2025-08-21

## 2025-08-21 RX ORDER — POTASSIUM CHLORIDE 1500 MG/1
40 TABLET, EXTENDED RELEASE ORAL PRN
Status: DISCONTINUED | OUTPATIENT
Start: 2025-08-21 | End: 2025-08-23 | Stop reason: HOSPADM

## 2025-08-21 RX ORDER — POLYETHYLENE GLYCOL 3350 17 G/17G
17 POWDER, FOR SOLUTION ORAL DAILY PRN
Status: DISCONTINUED | OUTPATIENT
Start: 2025-08-21 | End: 2025-08-23 | Stop reason: HOSPADM

## 2025-08-21 RX ORDER — SODIUM CHLORIDE 9 MG/ML
INJECTION, SOLUTION INTRAVENOUS CONTINUOUS
Status: ACTIVE | OUTPATIENT
Start: 2025-08-21 | End: 2025-08-22

## 2025-08-21 RX ORDER — IOPAMIDOL 755 MG/ML
85 INJECTION, SOLUTION INTRAVASCULAR
Status: COMPLETED | OUTPATIENT
Start: 2025-08-21 | End: 2025-08-21

## 2025-08-21 RX ADMIN — ACETAMINOPHEN 650 MG: 325 TABLET ORAL at 21:32

## 2025-08-21 RX ADMIN — SODIUM CHLORIDE: 0.9 INJECTION, SOLUTION INTRAVENOUS at 21:35

## 2025-08-21 RX ADMIN — SODIUM CHLORIDE 1000 ML: 0.9 INJECTION, SOLUTION INTRAVENOUS at 20:16

## 2025-08-21 RX ADMIN — PIPERACILLIN AND TAZOBACTAM 4500 MG: 4; .5 INJECTION, POWDER, LYOPHILIZED, FOR SOLUTION INTRAVENOUS at 16:54

## 2025-08-21 RX ADMIN — IOPAMIDOL 85 ML: 755 INJECTION, SOLUTION INTRAVENOUS at 17:16

## 2025-08-21 RX ADMIN — HEPARIN SODIUM 4800 UNITS: 1000 INJECTION, SOLUTION INTRAVENOUS; SUBCUTANEOUS at 20:04

## 2025-08-21 RX ADMIN — HEPARIN SODIUM AND DEXTROSE 18 UNITS/KG/HR: 10000; 5 INJECTION INTRAVENOUS at 20:04

## 2025-08-21 RX ADMIN — Medication 1500 MG: at 18:04

## 2025-08-21 ASSESSMENT — PAIN SCALES - GENERAL
PAINLEVEL_OUTOF10: 4
PAINLEVEL_OUTOF10: 0

## 2025-08-21 ASSESSMENT — PAIN DESCRIPTION - LOCATION: LOCATION: JAW;THROAT

## 2025-08-21 ASSESSMENT — PAIN - FUNCTIONAL ASSESSMENT
PAIN_FUNCTIONAL_ASSESSMENT: 0-10
PAIN_FUNCTIONAL_ASSESSMENT: 0-10
PAIN_FUNCTIONAL_ASSESSMENT: ACTIVITIES ARE NOT PREVENTED
PAIN_FUNCTIONAL_ASSESSMENT: 0-10

## 2025-08-21 ASSESSMENT — PAIN DESCRIPTION - DESCRIPTORS: DESCRIPTORS: ACHING;DISCOMFORT

## 2025-08-22 ENCOUNTER — APPOINTMENT (OUTPATIENT)
Dept: CT IMAGING | Age: 75
DRG: 607 | End: 2025-08-22
Payer: MEDICARE

## 2025-08-22 LAB
ANION GAP SERPL CALC-SCNC: 8 MEQ/L (ref 8–16)
BUN SERPL-MCNC: 15 MG/DL (ref 8–23)
CALCIUM SERPL-MCNC: 8.2 MG/DL (ref 8.5–10.5)
CHLORIDE SERPL-SCNC: 105 MEQ/L (ref 98–111)
CO2 SERPL-SCNC: 26 MEQ/L (ref 22–29)
CREAT SERPL-MCNC: 0.9 MG/DL (ref 0.7–1.2)
DEPRECATED RDW RBC AUTO: 68.7 FL (ref 35–45)
ERYTHROCYTE [DISTWIDTH] IN BLOOD BY AUTOMATED COUNT: 19.5 % (ref 11.5–14.5)
GFR SERPL CREATININE-BSD FRML MDRD: 89 ML/MIN/1.73M2
GLUCOSE SERPL-MCNC: 102 MG/DL (ref 74–109)
HCT VFR BLD AUTO: 29.5 % (ref 42–52)
HEPARIN UNFRACTIONATED: 0.63 U/ML (ref 0.3–0.7)
HEPARIN UNFRACTIONATED: 0.65 U/ML (ref 0.3–0.7)
HGB BLD-MCNC: 9.5 GM/DL (ref 14–18)
MAGNESIUM SERPL-MCNC: 1.9 MG/DL (ref 1.6–2.6)
MCH RBC QN AUTO: 30.9 PG (ref 26–33)
MCHC RBC AUTO-ENTMCNC: 32.2 GM/DL (ref 32.2–35.5)
MCV RBC AUTO: 96.1 FL (ref 80–94)
PLATELET # BLD AUTO: 295 THOU/MM3 (ref 130–400)
PMV BLD AUTO: 9.1 FL (ref 9.4–12.4)
POTASSIUM SERPL-SCNC: 3.9 MEQ/L (ref 3.5–5.2)
RBC # BLD AUTO: 3.07 MILL/MM3 (ref 4.7–6.1)
SODIUM SERPL-SCNC: 139 MEQ/L (ref 135–145)
WBC # BLD AUTO: 6.6 THOU/MM3 (ref 4.8–10.8)

## 2025-08-22 PROCEDURE — 2500000003 HC RX 250 WO HCPCS

## 2025-08-22 PROCEDURE — 2060000000 HC ICU INTERMEDIATE R&B

## 2025-08-22 PROCEDURE — 6370000000 HC RX 637 (ALT 250 FOR IP)

## 2025-08-22 PROCEDURE — 6360000004 HC RX CONTRAST MEDICATION: Performed by: STUDENT IN AN ORGANIZED HEALTH CARE EDUCATION/TRAINING PROGRAM

## 2025-08-22 PROCEDURE — 94761 N-INVAS EAR/PLS OXIMETRY MLT: CPT

## 2025-08-22 PROCEDURE — 2580000003 HC RX 258: Performed by: STUDENT IN AN ORGANIZED HEALTH CARE EDUCATION/TRAINING PROGRAM

## 2025-08-22 PROCEDURE — 85027 COMPLETE CBC AUTOMATED: CPT

## 2025-08-22 PROCEDURE — 99223 1ST HOSP IP/OBS HIGH 75: CPT | Performed by: STUDENT IN AN ORGANIZED HEALTH CARE EDUCATION/TRAINING PROGRAM

## 2025-08-22 PROCEDURE — 2700000000 HC OXYGEN THERAPY PER DAY

## 2025-08-22 PROCEDURE — 99222 1ST HOSP IP/OBS MODERATE 55: CPT | Performed by: REGISTERED NURSE

## 2025-08-22 PROCEDURE — 6360000002 HC RX W HCPCS: Performed by: STUDENT IN AN ORGANIZED HEALTH CARE EDUCATION/TRAINING PROGRAM

## 2025-08-22 PROCEDURE — 70498 CT ANGIOGRAPHY NECK: CPT

## 2025-08-22 PROCEDURE — 85520 HEPARIN ASSAY: CPT

## 2025-08-22 PROCEDURE — 80048 BASIC METABOLIC PNL TOTAL CA: CPT

## 2025-08-22 PROCEDURE — 36415 COLL VENOUS BLD VENIPUNCTURE: CPT

## 2025-08-22 PROCEDURE — 83735 ASSAY OF MAGNESIUM: CPT

## 2025-08-22 PROCEDURE — 94640 AIRWAY INHALATION TREATMENT: CPT

## 2025-08-22 RX ORDER — HYDROCODONE BITARTRATE AND ACETAMINOPHEN 5; 325 MG/1; MG/1
1 TABLET ORAL EVERY 6 HOURS PRN
Status: DISCONTINUED | OUTPATIENT
Start: 2025-08-22 | End: 2025-08-23 | Stop reason: HOSPADM

## 2025-08-22 RX ORDER — IOPAMIDOL 755 MG/ML
80 INJECTION, SOLUTION INTRAVASCULAR
Status: COMPLETED | OUTPATIENT
Start: 2025-08-22 | End: 2025-08-22

## 2025-08-22 RX ADMIN — SODIUM CHLORIDE, PRESERVATIVE FREE 10 ML: 5 INJECTION INTRAVENOUS at 09:49

## 2025-08-22 RX ADMIN — AMLODIPINE BESYLATE 5 MG: 5 TABLET ORAL at 09:49

## 2025-08-22 RX ADMIN — IPRATROPIUM BROMIDE AND ALBUTEROL SULFATE 1 DOSE: .5; 3 SOLUTION RESPIRATORY (INHALATION) at 20:02

## 2025-08-22 RX ADMIN — ACETAMINOPHEN 650 MG: 325 TABLET ORAL at 05:15

## 2025-08-22 RX ADMIN — IPRATROPIUM BROMIDE AND ALBUTEROL SULFATE 1 DOSE: .5; 3 SOLUTION RESPIRATORY (INHALATION) at 08:26

## 2025-08-22 RX ADMIN — ACETAMINOPHEN 650 MG: 325 TABLET ORAL at 15:51

## 2025-08-22 RX ADMIN — IOPAMIDOL 80 ML: 755 INJECTION, SOLUTION INTRAVENOUS at 18:17

## 2025-08-22 RX ADMIN — PANTOPRAZOLE SODIUM 40 MG: 40 TABLET, DELAYED RELEASE ORAL at 05:11

## 2025-08-22 RX ADMIN — HEPARIN SODIUM AND DEXTROSE 18 UNITS/KG/HR: 10000; 5 INJECTION INTRAVENOUS at 18:38

## 2025-08-22 RX ADMIN — HYDROCODONE BITARTRATE AND ACETAMINOPHEN 1 TABLET: 5; 325 TABLET ORAL at 11:45

## 2025-08-22 RX ADMIN — HYDROCODONE BITARTRATE AND ACETAMINOPHEN 1 TABLET: 5; 325 TABLET ORAL at 19:21

## 2025-08-22 RX ADMIN — SODIUM CHLORIDE: 0.9 INJECTION, SOLUTION INTRAVENOUS at 11:49

## 2025-08-22 RX ADMIN — LISINOPRIL 20 MG: 20 TABLET ORAL at 09:49

## 2025-08-22 ASSESSMENT — PAIN DESCRIPTION - ORIENTATION
ORIENTATION: INNER
ORIENTATION: RIGHT

## 2025-08-22 ASSESSMENT — PAIN DESCRIPTION - FREQUENCY
FREQUENCY: CONTINUOUS

## 2025-08-22 ASSESSMENT — PAIN DESCRIPTION - ONSET
ONSET: ON-GOING

## 2025-08-22 ASSESSMENT — PAIN SCALES - GENERAL
PAINLEVEL_OUTOF10: 0
PAINLEVEL_OUTOF10: 2
PAINLEVEL_OUTOF10: 8
PAINLEVEL_OUTOF10: 0
PAINLEVEL_OUTOF10: 3
PAINLEVEL_OUTOF10: 4
PAINLEVEL_OUTOF10: 0
PAINLEVEL_OUTOF10: 0
PAINLEVEL_OUTOF10: 7

## 2025-08-22 ASSESSMENT — PAIN DESCRIPTION - LOCATION
LOCATION: NECK
LOCATION: INCISION
LOCATION: JAW;THROAT
LOCATION: JAW
LOCATION: INCISION

## 2025-08-22 ASSESSMENT — PAIN DESCRIPTION - DESCRIPTORS
DESCRIPTORS: ACHING
DESCRIPTORS: ACHING;DISCOMFORT
DESCRIPTORS: ACHING

## 2025-08-22 ASSESSMENT — PAIN - FUNCTIONAL ASSESSMENT
PAIN_FUNCTIONAL_ASSESSMENT: 0-10
PAIN_FUNCTIONAL_ASSESSMENT: ACTIVITIES ARE NOT PREVENTED
PAIN_FUNCTIONAL_ASSESSMENT: 0-10
PAIN_FUNCTIONAL_ASSESSMENT: ACTIVITIES ARE NOT PREVENTED
PAIN_FUNCTIONAL_ASSESSMENT: 0-10
PAIN_FUNCTIONAL_ASSESSMENT: ACTIVITIES ARE NOT PREVENTED
PAIN_FUNCTIONAL_ASSESSMENT: 0-10
PAIN_FUNCTIONAL_ASSESSMENT: ACTIVITIES ARE NOT PREVENTED
PAIN_FUNCTIONAL_ASSESSMENT: 0-10
PAIN_FUNCTIONAL_ASSESSMENT: ACTIVITIES ARE NOT PREVENTED
PAIN_FUNCTIONAL_ASSESSMENT: 0-10

## 2025-08-22 ASSESSMENT — VISUAL ACUITY: VA_NORMAL: 1

## 2025-08-22 ASSESSMENT — ENCOUNTER SYMPTOMS
COLOR CHANGE: 1
SHORTNESS OF BREATH: 1
TROUBLE SWALLOWING: 1

## 2025-08-22 ASSESSMENT — PAIN DESCRIPTION - PAIN TYPE
TYPE: ACUTE PAIN

## 2025-08-23 VITALS
BODY MASS INDEX: 19.22 KG/M2 | HEIGHT: 70 IN | RESPIRATION RATE: 16 BRPM | SYSTOLIC BLOOD PRESSURE: 115 MMHG | OXYGEN SATURATION: 94 % | TEMPERATURE: 97.9 F | HEART RATE: 86 BPM | WEIGHT: 134.26 LBS | DIASTOLIC BLOOD PRESSURE: 56 MMHG

## 2025-08-23 PROBLEM — R22.0 FACIAL SWELLING: Status: ACTIVE | Noted: 2025-08-23

## 2025-08-23 PROBLEM — I89.0 LYMPHEDEMA OF FACE: Status: ACTIVE | Noted: 2025-08-23

## 2025-08-23 LAB
ANION GAP SERPL CALC-SCNC: 7 MEQ/L (ref 8–16)
BACTERIA BLD AEROBE CULT: NORMAL
BACTERIA BLD AEROBE CULT: NORMAL
BUN SERPL-MCNC: 18 MG/DL (ref 8–23)
CALCIUM SERPL-MCNC: 8.7 MG/DL (ref 8.5–10.5)
CHLORIDE SERPL-SCNC: 105 MEQ/L (ref 98–111)
CO2 SERPL-SCNC: 27 MEQ/L (ref 22–29)
CREAT SERPL-MCNC: 0.9 MG/DL (ref 0.7–1.2)
DEPRECATED RDW RBC AUTO: 72.4 FL (ref 35–45)
ERYTHROCYTE [DISTWIDTH] IN BLOOD BY AUTOMATED COUNT: 19.9 % (ref 11.5–14.5)
GFR SERPL CREATININE-BSD FRML MDRD: 89 ML/MIN/1.73M2
GLUCOSE SERPL-MCNC: 117 MG/DL (ref 74–109)
HCT VFR BLD AUTO: 27.7 % (ref 42–52)
HEPARIN UNFRACTIONATED: 0.52 U/ML (ref 0.3–0.7)
HGB BLD-MCNC: 8.8 GM/DL (ref 14–18)
MCH RBC QN AUTO: 31.2 PG (ref 26–33)
MCHC RBC AUTO-ENTMCNC: 31.8 GM/DL (ref 32.2–35.5)
MCV RBC AUTO: 98.2 FL (ref 80–94)
PLATELET # BLD AUTO: 273 THOU/MM3 (ref 130–400)
PMV BLD AUTO: 9.6 FL (ref 9.4–12.4)
POTASSIUM SERPL-SCNC: 4.6 MEQ/L (ref 3.5–5.2)
RBC # BLD AUTO: 2.82 MILL/MM3 (ref 4.7–6.1)
SCAN OF BLOOD SMEAR: NORMAL
SODIUM SERPL-SCNC: 139 MEQ/L (ref 135–145)
WBC # BLD AUTO: 6.3 THOU/MM3 (ref 4.8–10.8)

## 2025-08-23 PROCEDURE — 99239 HOSP IP/OBS DSCHRG MGMT >30: CPT | Performed by: STUDENT IN AN ORGANIZED HEALTH CARE EDUCATION/TRAINING PROGRAM

## 2025-08-23 PROCEDURE — 80048 BASIC METABOLIC PNL TOTAL CA: CPT

## 2025-08-23 PROCEDURE — 85027 COMPLETE CBC AUTOMATED: CPT

## 2025-08-23 PROCEDURE — 94761 N-INVAS EAR/PLS OXIMETRY MLT: CPT

## 2025-08-23 PROCEDURE — 6370000000 HC RX 637 (ALT 250 FOR IP)

## 2025-08-23 PROCEDURE — 94640 AIRWAY INHALATION TREATMENT: CPT

## 2025-08-23 PROCEDURE — 99222 1ST HOSP IP/OBS MODERATE 55: CPT | Performed by: SURGERY

## 2025-08-23 PROCEDURE — 36415 COLL VENOUS BLD VENIPUNCTURE: CPT

## 2025-08-23 PROCEDURE — 85520 HEPARIN ASSAY: CPT

## 2025-08-23 PROCEDURE — 99232 SBSQ HOSP IP/OBS MODERATE 35: CPT | Performed by: NURSE PRACTITIONER

## 2025-08-23 PROCEDURE — 2700000000 HC OXYGEN THERAPY PER DAY

## 2025-08-23 RX ORDER — POLYETHYLENE GLYCOL 3350 17 G/17G
17 POWDER, FOR SOLUTION ORAL DAILY PRN
Qty: 510 G | Refills: 0 | Status: SHIPPED | OUTPATIENT
Start: 2025-08-23

## 2025-08-23 RX ORDER — DABIGATRAN ETEXILATE 150 MG/1
150 CAPSULE ORAL 2 TIMES DAILY
Qty: 180 CAPSULE | Refills: 0 | Status: SHIPPED | OUTPATIENT
Start: 2025-08-23

## 2025-08-23 RX ADMIN — AMLODIPINE BESYLATE 5 MG: 5 TABLET ORAL at 08:41

## 2025-08-23 RX ADMIN — POLYETHYLENE GLYCOL 3350 17 G: 17 POWDER, FOR SOLUTION ORAL at 10:58

## 2025-08-23 RX ADMIN — HYDROCODONE BITARTRATE AND ACETAMINOPHEN 1 TABLET: 5; 325 TABLET ORAL at 07:25

## 2025-08-23 RX ADMIN — HYDROCODONE BITARTRATE AND ACETAMINOPHEN 1 TABLET: 5; 325 TABLET ORAL at 01:30

## 2025-08-23 RX ADMIN — HYDROCODONE BITARTRATE AND ACETAMINOPHEN 1 TABLET: 5; 325 TABLET ORAL at 14:14

## 2025-08-23 RX ADMIN — PANTOPRAZOLE SODIUM 40 MG: 40 TABLET, DELAYED RELEASE ORAL at 05:53

## 2025-08-23 RX ADMIN — IPRATROPIUM BROMIDE AND ALBUTEROL SULFATE 1 DOSE: .5; 3 SOLUTION RESPIRATORY (INHALATION) at 08:17

## 2025-08-23 RX ADMIN — LISINOPRIL 20 MG: 20 TABLET ORAL at 08:41

## 2025-08-23 ASSESSMENT — PAIN DESCRIPTION - ORIENTATION: ORIENTATION: RIGHT

## 2025-08-23 ASSESSMENT — PAIN DESCRIPTION - DESCRIPTORS
DESCRIPTORS: PATIENT UNABLE TO DESCRIBE
DESCRIPTORS: PATIENT UNABLE TO DESCRIBE
DESCRIPTORS: ACHING;SORE

## 2025-08-23 ASSESSMENT — PAIN SCALES - GENERAL
PAINLEVEL_OUTOF10: 6
PAINLEVEL_OUTOF10: 7
PAINLEVEL_OUTOF10: 7

## 2025-08-23 ASSESSMENT — PAIN DESCRIPTION - LOCATION
LOCATION: JAW
LOCATION: JAW
LOCATION: NECK

## 2025-08-25 ENCOUNTER — CARE COORDINATION (OUTPATIENT)
Dept: CARE COORDINATION | Age: 75
End: 2025-08-25

## 2025-08-26 ENCOUNTER — CARE COORDINATION (OUTPATIENT)
Dept: CARE COORDINATION | Age: 75
End: 2025-08-26

## 2025-08-26 ENCOUNTER — HOSPITAL ENCOUNTER (OUTPATIENT)
Dept: SPEECH THERAPY | Age: 75
Setting detail: THERAPIES SERIES
Discharge: HOME | End: 2025-08-26
Payer: MEDICARE

## 2025-08-26 ENCOUNTER — OFFICE VISIT (OUTPATIENT)
Dept: ENT CLINIC | Age: 75
End: 2025-08-26
Payer: MEDICARE

## 2025-08-26 ENCOUNTER — HOSPITAL ENCOUNTER (OUTPATIENT)
Dept: PHYSICAL THERAPY | Age: 75
Setting detail: THERAPIES SERIES
Discharge: HOME | End: 2025-08-26
Payer: MEDICARE

## 2025-08-26 VITALS
WEIGHT: 132.2 LBS | HEART RATE: 86 BPM | HEIGHT: 70 IN | RESPIRATION RATE: 18 BRPM | BODY MASS INDEX: 18.92 KG/M2 | SYSTOLIC BLOOD PRESSURE: 122 MMHG | DIASTOLIC BLOOD PRESSURE: 58 MMHG | TEMPERATURE: 97.5 F | OXYGEN SATURATION: 98 %

## 2025-08-26 DIAGNOSIS — Z98.890 STATUS POST RADICAL DISSECTION OF NECK: ICD-10-CM

## 2025-08-26 DIAGNOSIS — R49.1 APHONIA: ICD-10-CM

## 2025-08-26 DIAGNOSIS — Z90.02 STATUS POST LARYNGECTOMY: ICD-10-CM

## 2025-08-26 DIAGNOSIS — C32.0 SQUAMOUS CELL CARCINOMA OF GLOTTIS (HCC): Primary | ICD-10-CM

## 2025-08-26 DIAGNOSIS — I82.C19 THROMBOSIS OF INTERNAL JUGULAR VEIN, UNSPECIFIED LATERALITY (HCC): Primary | ICD-10-CM

## 2025-08-26 LAB
BACTERIA BLD AEROBE CULT: NORMAL
BACTERIA BLD AEROBE CULT: NORMAL

## 2025-08-26 PROCEDURE — 99212 OFFICE O/P EST SF 10 MIN: CPT | Performed by: OTOLARYNGOLOGY

## 2025-08-26 PROCEDURE — 3074F SYST BP LT 130 MM HG: CPT | Performed by: OTOLARYNGOLOGY

## 2025-08-26 PROCEDURE — 1111F DSCHRG MED/CURRENT MED MERGE: CPT | Performed by: OTOLARYNGOLOGY

## 2025-08-26 PROCEDURE — 97110 THERAPEUTIC EXERCISES: CPT

## 2025-08-26 PROCEDURE — 1123F ACP DISCUSS/DSCN MKR DOCD: CPT | Performed by: OTOLARYNGOLOGY

## 2025-08-26 PROCEDURE — 3078F DIAST BP <80 MM HG: CPT | Performed by: OTOLARYNGOLOGY

## 2025-08-26 PROCEDURE — 1036F TOBACCO NON-USER: CPT | Performed by: OTOLARYNGOLOGY

## 2025-08-26 PROCEDURE — G8420 CALC BMI NORM PARAMETERS: HCPCS | Performed by: OTOLARYNGOLOGY

## 2025-08-26 PROCEDURE — 92507 TX SP LANG VOICE COMM INDIV: CPT | Performed by: SPEECH-LANGUAGE PATHOLOGIST

## 2025-08-26 PROCEDURE — 1159F MED LIST DOCD IN RCRD: CPT | Performed by: OTOLARYNGOLOGY

## 2025-08-26 PROCEDURE — G8427 DOCREV CUR MEDS BY ELIG CLIN: HCPCS | Performed by: OTOLARYNGOLOGY

## 2025-08-26 PROCEDURE — 92526 ORAL FUNCTION THERAPY: CPT | Performed by: SPEECH-LANGUAGE PATHOLOGIST

## 2025-08-26 PROCEDURE — 3017F COLORECTAL CA SCREEN DOC REV: CPT | Performed by: OTOLARYNGOLOGY

## 2025-08-28 ENCOUNTER — HOSPITAL ENCOUNTER (OUTPATIENT)
Dept: PHYSICAL THERAPY | Age: 75
Setting detail: THERAPIES SERIES
Discharge: HOME | End: 2025-08-28
Payer: MEDICARE

## 2025-08-28 PROCEDURE — 97110 THERAPEUTIC EXERCISES: CPT

## 2025-09-02 ENCOUNTER — HOSPITAL ENCOUNTER (OUTPATIENT)
Dept: PHYSICAL THERAPY | Age: 75
Setting detail: THERAPIES SERIES
Discharge: HOME | End: 2025-09-02
Payer: MEDICARE

## 2025-09-02 PROCEDURE — 97162 PT EVAL MOD COMPLEX 30 MIN: CPT

## 2025-09-03 ENCOUNTER — HOSPITAL ENCOUNTER (OUTPATIENT)
Dept: PHYSICAL THERAPY | Age: 75
Setting detail: THERAPIES SERIES
Discharge: HOME | End: 2025-09-03
Payer: MEDICARE

## 2025-09-03 ENCOUNTER — HOSPITAL ENCOUNTER (OUTPATIENT)
Dept: SPEECH THERAPY | Age: 75
Setting detail: THERAPIES SERIES
Discharge: HOME | End: 2025-09-03
Payer: MEDICARE

## 2025-09-03 PROCEDURE — 97110 THERAPEUTIC EXERCISES: CPT

## 2025-09-03 PROCEDURE — 92507 TX SP LANG VOICE COMM INDIV: CPT | Performed by: SPEECH-LANGUAGE PATHOLOGIST

## 2025-09-03 PROCEDURE — 92526 ORAL FUNCTION THERAPY: CPT | Performed by: SPEECH-LANGUAGE PATHOLOGIST

## 2025-09-04 ENCOUNTER — CARE COORDINATION (OUTPATIENT)
Dept: CARE COORDINATION | Age: 75
End: 2025-09-04

## 2025-09-05 ENCOUNTER — HOSPITAL ENCOUNTER (OUTPATIENT)
Dept: PHYSICAL THERAPY | Age: 75
Setting detail: THERAPIES SERIES
Discharge: HOME | End: 2025-09-05
Payer: MEDICARE

## 2025-09-05 PROCEDURE — 97110 THERAPEUTIC EXERCISES: CPT

## 2025-09-05 PROCEDURE — 97140 MANUAL THERAPY 1/> REGIONS: CPT

## (undated) DEVICE — GOWN,SIRUS,NON REINFRCD,LARGE,SET IN SL: Brand: MEDLINE

## (undated) DEVICE — CLIP LIG M TI 6 SIL HNDL FOR OPN AND ENDOSCP SGL APPL

## (undated) DEVICE — Device

## (undated) DEVICE — TUBING, SUCTION, 1/4" X 20', STRAIGHT: Brand: MEDLINE INDUSTRIES, INC.

## (undated) DEVICE — SUTURE ETHILON SZ 3-0 L18IN NONABSORBABLE BLK L24MM FS-1 3/8 663G

## (undated) DEVICE — PROBE 8225101 5PK STD PRASS FL TIP ROHS

## (undated) DEVICE — SUTURE PROL SZ 2-0 L18IN NONABSORBABLE BLU FS L26MM 3/8 CIR 8685H

## (undated) DEVICE — LIQUIBAND RAPID ADHESIVE 36/CS 0.8ML: Brand: MEDLINE

## (undated) DEVICE — SUTURE MONOCRYL + SZ 3-0 L27IN ABSRB UD L26MM SH 1/2 CIR MCP416H

## (undated) DEVICE — AGENT HEMSTAT W2XL4IN OXIDIZED REGENERATED CELOS ABSRB SFT

## (undated) DEVICE — DISSECTOR ENDOSCP L21CM TIP CURVATURE 40DEG FN CRV JAW VES

## (undated) DEVICE — BLADE 1884030HRE TRICUT 4MM 22CM M4 IRR: Brand: TRICUT®

## (undated) DEVICE — GLOVE ORTHO 8   MSG9480

## (undated) DEVICE — BLADE ES L4IN INSUL EDGE

## (undated) DEVICE — SEALANT TISS 10 ML FIBRIN VISTASEAL

## (undated) DEVICE — BLADE,CARBON-STEEL,15,STRL,DISPOSABLE,TB: Brand: MEDLINE

## (undated) DEVICE — COUNTER NDL 40 COUNT HLD 70 FOAM BLK ADH W/ MAG

## (undated) DEVICE — LARYNGOSCOPY - BRONCHOSCOPY: Brand: MEDLINE INDUSTRIES, INC.

## (undated) DEVICE — GLOVE SURG SZ 7.5 L11.73IN FNGR THK9.8MIL STRW LTX POLYMER

## (undated) DEVICE — APPLIER LIG CLP M L11IN TI STR RNG HNDL FOR 20 CLP DISP

## (undated) DEVICE — UNIVERSAL MONOPOLAR LAPAROSCOPIC CABLE 10FT, 4MM PIN CONNECTOR: Brand: CONMED

## (undated) DEVICE — SUTURE MONOCRYL STRATAFIX SPRL + SZ 4-0 L12IN ABSRB UD PS-2 SXMP1B117

## (undated) DEVICE — SYRINGE IRRIG 60ML SFT PLIABLE BLB EZ TO GRP 1 HND USE W/

## (undated) DEVICE — SPECIMEN ORIENTATION CHARMS, SIX DISTINCTLY SHAPED STERILE 10MM CHARMS: Brand: MARGINMAP

## (undated) DEVICE — PENCIL SMK EVAC ALL IN 1 DSGN ENH VISIBILITY IMPROVED AIR

## (undated) DEVICE — PACK-MAJOR

## (undated) DEVICE — EVACUATOR SURG 100CC SIL BLB SUCT RESVR FOR CLS WND DRNGE

## (undated) DEVICE — SUTURE PERMAHAND SZ 2-0 L18IN NONABSORBABLE BLK L26MM SH C012D

## (undated) DEVICE — SUTURE MONOCRYL SZ 4-0 L18IN ABSRB VLT P-3 L13MM 3/8 CIR REV Y464G

## (undated) DEVICE — 3M™ STERI-DRAPE™ INSTRUMENT POUCH 1018: Brand: STERI-DRAPE™

## (undated) DEVICE — HOLDER TRACH TB AD FOAM PREM SFT STRP HK LOOP CLSR E ADJ

## (undated) DEVICE — SUTURE PERMAHAND SZ 4-0 L18IN NONABSORBABLE BLK SILK BRAID A183H

## (undated) DEVICE — SUTURE PDS + SZ 3 0 L27IN ABSRB VLT L17MM RB 1 1 2 CIR PDP305H

## (undated) DEVICE — ENT: Brand: MEDLINE INDUSTRIES, INC.

## (undated) DEVICE — DRAINBAG,ANTI-REFLUX TOWER,L/F,2000ML,LL: Brand: MEDLINE

## (undated) DEVICE — FISH HOOKS, W/SUTURE, RUBBER BAND BLUNT, BARBLESS: Brand: DEROYAL

## (undated) DEVICE — PACK,UNIVERSAL,NO GOWNS: Brand: MEDLINE

## (undated) DEVICE — SUTURE MONOCRYL + ABSORBABLE MONOFILAMENT 3-0 PS-2 27 IN UD SXMP1B109

## (undated) DEVICE — PAD,NON-ADHERENT,3X8,STERILE,LF,1/PK: Brand: MEDLINE

## (undated) DEVICE — DRAIN SURG 19FR 0.25IN SIL RND W/ TRCR INDIC DOT RADPQ FULL

## (undated) DEVICE — APPLIER CLP L9.375IN APER 2.1MM CLS L3.8MM 20 SM TI CLP

## (undated) DEVICE — SPONGE,PEANUT,XRAY,ST,SM,3/8",5/CARD: Brand: MEDLINE INDUSTRIES, INC.

## (undated) DEVICE — SUTURE PERMAHAND SZ 3-0 L18IN NONABSORBABLE BLK SILK BRAID A184H

## (undated) DEVICE — TUBE TRACH AD SZ 8 L79MM OD122MM ID85MM DK BLU PVC CUF CANN

## (undated) DEVICE — SPONGE,NEURO,0.5"X3",XR,STRL,LF,10/PK: Brand: MEDLINE

## (undated) DEVICE — CONTAINER,SPECIMEN,PNEU TUBE,4OZ,OR STRL: Brand: MEDLINE